# Patient Record
Sex: MALE | Race: WHITE | NOT HISPANIC OR LATINO | Employment: OTHER | URBAN - METROPOLITAN AREA
[De-identification: names, ages, dates, MRNs, and addresses within clinical notes are randomized per-mention and may not be internally consistent; named-entity substitution may affect disease eponyms.]

---

## 2017-01-26 ENCOUNTER — OFFICE VISIT (OUTPATIENT)
Dept: FAMILY MEDICINE | Facility: CLINIC | Age: 77
DRG: 871 | End: 2017-01-26
Payer: MEDICARE

## 2017-01-26 ENCOUNTER — HOSPITAL ENCOUNTER (OUTPATIENT)
Dept: RADIOLOGY | Facility: HOSPITAL | Age: 77
Discharge: HOME | DRG: 871 | End: 2017-01-26
Attending: INTERNAL MEDICINE
Payer: MEDICARE

## 2017-01-26 VITALS
RESPIRATION RATE: 16 BRPM | BODY MASS INDEX: 26.37 KG/M2 | TEMPERATURE: 98 F | HEIGHT: 72 IN | SYSTOLIC BLOOD PRESSURE: 138 MMHG | WEIGHT: 194.69 LBS | DIASTOLIC BLOOD PRESSURE: 86 MMHG | OXYGEN SATURATION: 96 % | HEART RATE: 96 BPM

## 2017-01-26 DIAGNOSIS — E11.9 TYPE 2 DIABETES MELLITUS WITHOUT COMPLICATION: ICD-10-CM

## 2017-01-26 DIAGNOSIS — E11.65 UNCONTROLLED TYPE 2 DIABETES MELLITUS WITH HYPERGLYCEMIA, WITHOUT LONG-TERM CURRENT USE OF INSULIN: ICD-10-CM

## 2017-01-26 DIAGNOSIS — R06.02 SOB (SHORTNESS OF BREATH): Primary | ICD-10-CM

## 2017-01-26 DIAGNOSIS — J44.9 CHRONIC OBSTRUCTIVE PULMONARY DISEASE, UNSPECIFIED COPD TYPE: ICD-10-CM

## 2017-01-26 DIAGNOSIS — R06.02 SOB (SHORTNESS OF BREATH): ICD-10-CM

## 2017-01-26 DIAGNOSIS — A31.0 PULMONARY MYCOBACTERIUM AVIUM COMPLEX (MAC) INFECTION: Chronic | ICD-10-CM

## 2017-01-26 PROCEDURE — 99214 OFFICE O/P EST MOD 30 MIN: CPT | Mod: S$GLB,,, | Performed by: INTERNAL MEDICINE

## 2017-01-26 PROCEDURE — 1159F MED LIST DOCD IN RCRD: CPT | Mod: S$GLB,,, | Performed by: INTERNAL MEDICINE

## 2017-01-26 PROCEDURE — 1157F ADVNC CARE PLAN IN RCRD: CPT | Mod: S$GLB,,, | Performed by: INTERNAL MEDICINE

## 2017-01-26 PROCEDURE — 1160F RVW MEDS BY RX/DR IN RCRD: CPT | Mod: S$GLB,,, | Performed by: INTERNAL MEDICINE

## 2017-01-26 PROCEDURE — 99999 PR PBB SHADOW E&M-EST. PATIENT-LVL III: CPT | Mod: PBBFAC,,, | Performed by: INTERNAL MEDICINE

## 2017-01-26 RX ORDER — PROMETHAZINE HYDROCHLORIDE AND CODEINE PHOSPHATE 6.25; 1 MG/5ML; MG/5ML
5 SOLUTION ORAL EVERY 12 HOURS PRN
Qty: 120 ML | Refills: 0 | Status: SHIPPED | OUTPATIENT
Start: 2017-01-26 | End: 2017-02-05

## 2017-01-26 RX ORDER — ALBUTEROL SULFATE 90 UG/1
2 AEROSOL, METERED RESPIRATORY (INHALATION) EVERY 6 HOURS PRN
Qty: 1 INHALER | Refills: 2 | Status: SHIPPED | OUTPATIENT
Start: 2017-01-26 | End: 2017-11-28 | Stop reason: SDUPTHER

## 2017-01-26 RX ORDER — PREDNISONE 20 MG/1
40 TABLET ORAL DAILY
Qty: 14 TABLET | Refills: 0 | Status: ON HOLD | OUTPATIENT
Start: 2017-01-26 | End: 2017-02-02 | Stop reason: HOSPADM

## 2017-01-26 NOTE — PROGRESS NOTES
"Subjective:       Patient ID: Regan Alvarez is a 76 y.o. male.    Chief Complaint: URI (sick past week to 10 days)    HPI Comments: Cough x ten days    HPI: 75 y/o w/ COPD MAC (on avelox and rifabutin) DM presents with ten days of non productive cough and wheezing. Using albuterol three to four times per day. Not using LABA/ICS due to "it wasn't helping". No hemoptysis or purulent sputum no fevers/chills. Not checking blood glucose at home. Denies LE edema no orthopnea. Cough is worse at night    Review of Systems   Constitutional: Negative for activity change, appetite change, fatigue, fever and unexpected weight change.   HENT: Negative for ear pain, rhinorrhea and sore throat.    Eyes: Negative for discharge and visual disturbance.   Respiratory: Positive for cough, shortness of breath and wheezing. Negative for chest tightness.    Cardiovascular: Negative for chest pain, palpitations and leg swelling.   Gastrointestinal: Negative for abdominal pain, constipation and diarrhea.   Endocrine: Negative for cold intolerance and heat intolerance.   Genitourinary: Negative for dysuria and hematuria.   Musculoskeletal: Negative for joint swelling and neck stiffness.   Skin: Negative for rash.   Neurological: Negative for dizziness, syncope, weakness and headaches.   Psychiatric/Behavioral: Negative for suicidal ideas.       Objective:     Vitals:    01/26/17 1517   BP: 138/86   BP Location: Right arm   Patient Position: Sitting   BP Method: Manual   Pulse: 96   Resp: 16   Temp: 98.3 °F (36.8 °C)   TempSrc: Oral   SpO2: 96%   Weight: 88.3 kg (194 lb 10.7 oz)   Height: 6' (1.829 m)          Physical Exam   Constitutional: He is oriented to person, place, and time. He appears well-developed and well-nourished.   HENT:   Head: Normocephalic and atraumatic.   Eyes: Conjunctivae are normal. Pupils are equal, round, and reactive to light.   Neck: Normal range of motion.   Cardiovascular: Normal rate and regular rhythm. "  Exam reveals no gallop and no friction rub.    No murmur heard.  Pulmonary/Chest: Effort normal. He has wheezes. He has no rales.   Decrease breath sound at apicies bilaterally   Abdominal: Soft. Bowel sounds are normal. There is no tenderness. There is no rebound and no guarding.   Musculoskeletal: Normal range of motion. He exhibits no edema or tenderness.   Neurological: He is alert and oriented to person, place, and time. No cranial nerve deficit.   Skin: Skin is warm and dry.   Psychiatric: He has a normal mood and affect.       Assessment:       1. SOB (shortness of breath)    2. Pulmonary Mycobacterium avium complex (MAC) infection    3. Chronic obstructive pulmonary disease, unspecified COPD type    4. Uncontrolled type 2 diabetes mellitus with hyperglycemia, without long-term current use of insulin        Plan:    1/2/3. Suspect some component of reactive airway given wheezing and no inhaled steroid use. Po prednisone x seven days encourage scheduled albuterol good SpO2. Check CXR to eval for consolidation or effusion    4. Needs repeat a1c and renal function

## 2017-01-29 ENCOUNTER — HOSPITAL ENCOUNTER (INPATIENT)
Facility: HOSPITAL | Age: 77
LOS: 4 days | Discharge: HOME HEALTH | DRG: 871 | End: 2017-02-02
Attending: EMERGENCY MEDICINE | Admitting: HOSPITALIST
Payer: MEDICARE

## 2017-01-29 DIAGNOSIS — J18.9 PNEUMONIA OF RIGHT LOWER LOBE DUE TO INFECTIOUS ORGANISM: ICD-10-CM

## 2017-01-29 DIAGNOSIS — A31.0 MYCOBACTERIUM AVIUM COMPLEX: ICD-10-CM

## 2017-01-29 DIAGNOSIS — J44.1 COPD EXACERBATION: Primary | ICD-10-CM

## 2017-01-29 DIAGNOSIS — J18.9 PNEUMONIA DUE TO INFECTIOUS ORGANISM: ICD-10-CM

## 2017-01-29 DIAGNOSIS — A41.9 SEPSIS, DUE TO UNSPECIFIED ORGANISM: ICD-10-CM

## 2017-01-29 DIAGNOSIS — R06.02 SHORTNESS OF BREATH: ICD-10-CM

## 2017-01-29 PROBLEM — J96.01 ACUTE HYPOXEMIC RESPIRATORY FAILURE: Status: ACTIVE | Noted: 2017-01-29

## 2017-01-29 PROBLEM — I10 ESSENTIAL HYPERTENSION: Status: ACTIVE | Noted: 2017-01-29

## 2017-01-29 LAB
ALBUMIN SERPL BCP-MCNC: 2.9 G/DL
ALP SERPL-CCNC: 82 U/L
ALT SERPL W/O P-5'-P-CCNC: 18 U/L
ANION GAP SERPL CALC-SCNC: 11 MMOL/L
AST SERPL-CCNC: 18 U/L
BACTERIA #/AREA URNS HPF: NORMAL /HPF
BASOPHILS NFR BLD: 0 %
BILIRUB SERPL-MCNC: 0.5 MG/DL
BILIRUB UR QL STRIP: NEGATIVE
BNP SERPL-MCNC: 256 PG/ML
BUN SERPL-MCNC: 12 MG/DL
CALCIUM SERPL-MCNC: 9 MG/DL
CHLORIDE SERPL-SCNC: 102 MMOL/L
CLARITY UR: CLEAR
CO2 SERPL-SCNC: 22 MMOL/L
COLOR UR: YELLOW
CREAT SERPL-MCNC: 1 MG/DL
DIFFERENTIAL METHOD: ABNORMAL
EOSINOPHIL NFR BLD: 0 %
ERYTHROCYTE [DISTWIDTH] IN BLOOD BY AUTOMATED COUNT: 14.7 %
EST. GFR  (AFRICAN AMERICAN): >60 ML/MIN/1.73 M^2
EST. GFR  (NON AFRICAN AMERICAN): >60 ML/MIN/1.73 M^2
GLUCOSE SERPL-MCNC: 371 MG/DL
GLUCOSE UR QL STRIP: ABNORMAL
HCT VFR BLD AUTO: 33.4 %
HGB BLD-MCNC: 11.3 G/DL
HGB UR QL STRIP: NEGATIVE
HYALINE CASTS #/AREA URNS LPF: 0 /LPF
KETONES UR QL STRIP: ABNORMAL
LACTATE SERPL-SCNC: 1.5 MMOL/L
LACTATE SERPL-SCNC: 2.5 MMOL/L
LEUKOCYTE ESTERASE UR QL STRIP: NEGATIVE
LYMPHOCYTES NFR BLD: 8 %
MCH RBC QN AUTO: 28.9 PG
MCHC RBC AUTO-ENTMCNC: 33.8 %
MCV RBC AUTO: 85 FL
MICROSCOPIC COMMENT: NORMAL
MONOCYTES NFR BLD: 5 %
NEUTROPHILS NFR BLD: 75 %
NEUTS BAND NFR BLD MANUAL: 12 %
NITRITE UR QL STRIP: NEGATIVE
PH UR STRIP: 6 [PH] (ref 5–8)
PLATELET # BLD AUTO: 329 K/UL
PMV BLD AUTO: 10.6 FL
POCT GLUCOSE: 382 MG/DL (ref 70–110)
POTASSIUM SERPL-SCNC: 4.2 MMOL/L
PROT SERPL-MCNC: 7 G/DL
PROT UR QL STRIP: NEGATIVE
RBC # BLD AUTO: 3.91 M/UL
RBC #/AREA URNS HPF: 0 /HPF (ref 0–4)
SODIUM SERPL-SCNC: 135 MMOL/L
SP GR UR STRIP: 1.03 (ref 1–1.03)
SQUAMOUS #/AREA URNS HPF: 0 /HPF
TROPONIN I SERPL DL<=0.01 NG/ML-MCNC: 0.02 NG/ML
URN SPEC COLLECT METH UR: ABNORMAL
UROBILINOGEN UR STRIP-ACNC: NEGATIVE EU/DL
WBC # BLD AUTO: 20.74 K/UL
WBC #/AREA URNS HPF: 1 /HPF (ref 0–5)
YEAST URNS QL MICRO: NORMAL

## 2017-01-29 PROCEDURE — 25000003 PHARM REV CODE 250: Performed by: HOSPITALIST

## 2017-01-29 PROCEDURE — 63600175 PHARM REV CODE 636 W HCPCS: Performed by: EMERGENCY MEDICINE

## 2017-01-29 PROCEDURE — 25000242 PHARM REV CODE 250 ALT 637 W/ HCPCS: Performed by: EMERGENCY MEDICINE

## 2017-01-29 PROCEDURE — 96375 TX/PRO/DX INJ NEW DRUG ADDON: CPT

## 2017-01-29 PROCEDURE — 25000003 PHARM REV CODE 250: Performed by: INTERNAL MEDICINE

## 2017-01-29 PROCEDURE — 84484 ASSAY OF TROPONIN QUANT: CPT

## 2017-01-29 PROCEDURE — 94644 CONT INHLJ TX 1ST HOUR: CPT

## 2017-01-29 PROCEDURE — 27000221 HC OXYGEN, UP TO 24 HOURS

## 2017-01-29 PROCEDURE — 99285 EMERGENCY DEPT VISIT HI MDM: CPT | Mod: 25

## 2017-01-29 PROCEDURE — 83605 ASSAY OF LACTIC ACID: CPT | Mod: 91

## 2017-01-29 PROCEDURE — 81000 URINALYSIS NONAUTO W/SCOPE: CPT

## 2017-01-29 PROCEDURE — 25000003 PHARM REV CODE 250: Performed by: EMERGENCY MEDICINE

## 2017-01-29 PROCEDURE — 21400001 HC TELEMETRY ROOM

## 2017-01-29 PROCEDURE — 80053 COMPREHEN METABOLIC PANEL: CPT

## 2017-01-29 PROCEDURE — 85007 BL SMEAR W/DIFF WBC COUNT: CPT

## 2017-01-29 PROCEDURE — 63600175 PHARM REV CODE 636 W HCPCS: Performed by: HOSPITALIST

## 2017-01-29 PROCEDURE — 94640 AIRWAY INHALATION TREATMENT: CPT

## 2017-01-29 PROCEDURE — 96365 THER/PROPH/DIAG IV INF INIT: CPT

## 2017-01-29 PROCEDURE — 83880 ASSAY OF NATRIURETIC PEPTIDE: CPT

## 2017-01-29 PROCEDURE — 36415 COLL VENOUS BLD VENIPUNCTURE: CPT

## 2017-01-29 PROCEDURE — 83605 ASSAY OF LACTIC ACID: CPT

## 2017-01-29 PROCEDURE — 87040 BLOOD CULTURE FOR BACTERIA: CPT | Mod: 59

## 2017-01-29 PROCEDURE — 96361 HYDRATE IV INFUSION ADD-ON: CPT

## 2017-01-29 PROCEDURE — 94761 N-INVAS EAR/PLS OXIMETRY MLT: CPT

## 2017-01-29 PROCEDURE — 85027 COMPLETE CBC AUTOMATED: CPT

## 2017-01-29 RX ORDER — METHYLPREDNISOLONE SODIUM SUCCINATE 125 MG/2ML
125 INJECTION INTRAMUSCULAR; INTRAVENOUS
Status: COMPLETED | OUTPATIENT
Start: 2017-01-29 | End: 2017-01-29

## 2017-01-29 RX ORDER — RAMIPRIL 2.5 MG/1
2.5 CAPSULE ORAL DAILY
Status: DISCONTINUED | OUTPATIENT
Start: 2017-01-29 | End: 2017-01-29

## 2017-01-29 RX ORDER — INSULIN ASPART 100 [IU]/ML
1-12 INJECTION, SOLUTION INTRAVENOUS; SUBCUTANEOUS
Status: DISCONTINUED | OUTPATIENT
Start: 2017-01-29 | End: 2017-01-30

## 2017-01-29 RX ORDER — HYDRALAZINE HYDROCHLORIDE 20 MG/ML
10 INJECTION INTRAMUSCULAR; INTRAVENOUS EVERY 4 HOURS PRN
Status: DISCONTINUED | OUTPATIENT
Start: 2017-01-29 | End: 2017-02-02 | Stop reason: HOSPADM

## 2017-01-29 RX ORDER — IPRATROPIUM BROMIDE 0.5 MG/2.5ML
1 SOLUTION RESPIRATORY (INHALATION) CONTINUOUS
Status: DISCONTINUED | OUTPATIENT
Start: 2017-01-29 | End: 2017-01-31

## 2017-01-29 RX ORDER — GLUCAGON 1 MG
1 KIT INJECTION
Status: DISCONTINUED | OUTPATIENT
Start: 2017-01-29 | End: 2017-02-02 | Stop reason: HOSPADM

## 2017-01-29 RX ORDER — RAMIPRIL 2.5 MG/1
5 CAPSULE ORAL DAILY
Status: DISCONTINUED | OUTPATIENT
Start: 2017-01-29 | End: 2017-02-02 | Stop reason: HOSPADM

## 2017-01-29 RX ORDER — MOXIFLOXACIN HYDROCHLORIDE 400 MG/250ML
400 INJECTION, SOLUTION INTRAVENOUS
Status: DISCONTINUED | OUTPATIENT
Start: 2017-01-30 | End: 2017-02-02 | Stop reason: HOSPADM

## 2017-01-29 RX ORDER — ALBUTEROL SULFATE 2.5 MG/.5ML
15 SOLUTION RESPIRATORY (INHALATION) CONTINUOUS
Status: DISCONTINUED | OUTPATIENT
Start: 2017-01-29 | End: 2017-01-31

## 2017-01-29 RX ORDER — ENOXAPARIN SODIUM 100 MG/ML
40 INJECTION SUBCUTANEOUS EVERY 24 HOURS
Status: DISCONTINUED | OUTPATIENT
Start: 2017-01-29 | End: 2017-02-02 | Stop reason: HOSPADM

## 2017-01-29 RX ORDER — BENZONATATE 100 MG/1
200 CAPSULE ORAL 3 TIMES DAILY PRN
Status: DISCONTINUED | OUTPATIENT
Start: 2017-01-29 | End: 2017-02-02 | Stop reason: HOSPADM

## 2017-01-29 RX ORDER — FUROSEMIDE 20 MG/1
20 TABLET ORAL DAILY
Status: DISCONTINUED | OUTPATIENT
Start: 2017-01-29 | End: 2017-02-02 | Stop reason: HOSPADM

## 2017-01-29 RX ORDER — ASPIRIN 325 MG
325 TABLET ORAL EVERY 4 HOURS PRN
Status: DISCONTINUED | OUTPATIENT
Start: 2017-01-29 | End: 2017-02-02 | Stop reason: HOSPADM

## 2017-01-29 RX ORDER — LEVALBUTEROL INHALATION SOLUTION 0.63 MG/3ML
0.63 SOLUTION RESPIRATORY (INHALATION) EVERY 8 HOURS
Status: DISCONTINUED | OUTPATIENT
Start: 2017-01-29 | End: 2017-02-02 | Stop reason: HOSPADM

## 2017-01-29 RX ORDER — ONDANSETRON 2 MG/ML
4 INJECTION INTRAMUSCULAR; INTRAVENOUS EVERY 12 HOURS PRN
Status: DISCONTINUED | OUTPATIENT
Start: 2017-01-29 | End: 2017-02-02 | Stop reason: HOSPADM

## 2017-01-29 RX ORDER — FLUTICASONE FUROATE AND VILANTEROL 100; 25 UG/1; UG/1
1 POWDER RESPIRATORY (INHALATION) DAILY
Status: DISCONTINUED | OUTPATIENT
Start: 2017-01-30 | End: 2017-02-02 | Stop reason: HOSPADM

## 2017-01-29 RX ORDER — RIFABUTIN 150 MG/1
150 CAPSULE ORAL EVERY 12 HOURS
Status: DISCONTINUED | OUTPATIENT
Start: 2017-01-29 | End: 2017-02-02 | Stop reason: HOSPADM

## 2017-01-29 RX ORDER — ACETAMINOPHEN 325 MG/1
650 TABLET ORAL EVERY 6 HOURS PRN
Status: DISCONTINUED | OUTPATIENT
Start: 2017-01-29 | End: 2017-02-02 | Stop reason: HOSPADM

## 2017-01-29 RX ORDER — AMOXICILLIN 250 MG
1 CAPSULE ORAL 2 TIMES DAILY
Status: DISCONTINUED | OUTPATIENT
Start: 2017-01-29 | End: 2017-02-02 | Stop reason: HOSPADM

## 2017-01-29 RX ORDER — FLUTICASONE PROPIONATE AND SALMETEROL 250; 50 UG/1; UG/1
1 POWDER RESPIRATORY (INHALATION) 2 TIMES DAILY
Status: DISCONTINUED | OUTPATIENT
Start: 2017-01-29 | End: 2017-01-29 | Stop reason: CLARIF

## 2017-01-29 RX ORDER — METHYLPREDNISOLONE SODIUM SUCCINATE 125 MG/2ML
80 INJECTION INTRAMUSCULAR; INTRAVENOUS EVERY 8 HOURS
Status: DISCONTINUED | OUTPATIENT
Start: 2017-01-29 | End: 2017-01-30

## 2017-01-29 RX ORDER — METOPROLOL SUCCINATE 50 MG/1
50 TABLET, EXTENDED RELEASE ORAL DAILY
Status: DISCONTINUED | OUTPATIENT
Start: 2017-01-29 | End: 2017-02-02 | Stop reason: HOSPADM

## 2017-01-29 RX ORDER — IPRATROPIUM BROMIDE AND ALBUTEROL SULFATE 2.5; .5 MG/3ML; MG/3ML
3 SOLUTION RESPIRATORY (INHALATION) EVERY 4 HOURS PRN
Status: DISCONTINUED | OUTPATIENT
Start: 2017-01-29 | End: 2017-02-02 | Stop reason: HOSPADM

## 2017-01-29 RX ORDER — PROMETHAZINE HYDROCHLORIDE AND CODEINE PHOSPHATE 6.25; 1 MG/5ML; MG/5ML
5 SOLUTION ORAL EVERY 12 HOURS PRN
Status: DISCONTINUED | OUTPATIENT
Start: 2017-01-29 | End: 2017-01-29

## 2017-01-29 RX ORDER — MOXIFLOXACIN HYDROCHLORIDE 400 MG/250ML
400 INJECTION, SOLUTION INTRAVENOUS
Status: COMPLETED | OUTPATIENT
Start: 2017-01-29 | End: 2017-01-29

## 2017-01-29 RX ORDER — AMLODIPINE BESYLATE 5 MG/1
10 TABLET ORAL DAILY
Status: DISCONTINUED | OUTPATIENT
Start: 2017-01-29 | End: 2017-02-02 | Stop reason: HOSPADM

## 2017-01-29 RX ADMIN — FUROSEMIDE 20 MG: 20 TABLET ORAL at 03:01

## 2017-01-29 RX ADMIN — METOPROLOL SUCCINATE 50 MG: 50 TABLET, EXTENDED RELEASE ORAL at 03:01

## 2017-01-29 RX ADMIN — METHYLPREDNISOLONE SODIUM SUCCINATE 80 MG: 125 INJECTION, POWDER, FOR SOLUTION INTRAMUSCULAR; INTRAVENOUS at 10:01

## 2017-01-29 RX ADMIN — BENZONATATE 200 MG: 100 CAPSULE ORAL at 08:01

## 2017-01-29 RX ADMIN — INSULIN ASPART 10 UNITS: 100 INJECTION, SOLUTION INTRAVENOUS; SUBCUTANEOUS at 05:01

## 2017-01-29 RX ADMIN — IPRATROPIUM BROMIDE AND ALBUTEROL SULFATE 3 ML: .5; 3 SOLUTION RESPIRATORY (INHALATION) at 03:01

## 2017-01-29 RX ADMIN — RAMIPRIL 5 MG: 2.5 CAPSULE ORAL at 05:01

## 2017-01-29 RX ADMIN — LEVALBUTEROL 0.63 MG: 0.63 SOLUTION RESPIRATORY (INHALATION) at 11:01

## 2017-01-29 RX ADMIN — PROMETHAZINE HYDROCHLORIDE AND CODEINE PHOSPHATE 5 ML: 6.25; 1 SYRUP ORAL at 03:01

## 2017-01-29 RX ADMIN — ALBUTEROL SULFATE 15 MG: 2.5 SOLUTION RESPIRATORY (INHALATION) at 12:01

## 2017-01-29 RX ADMIN — IPRATROPIUM BROMIDE 1 MG: 0.5 SOLUTION RESPIRATORY (INHALATION) at 12:01

## 2017-01-29 RX ADMIN — MOXIFLOXACIN HYDROCHLORIDE 400 MG: 400 INJECTION, SOLUTION INTRAVENOUS at 01:01

## 2017-01-29 RX ADMIN — ENOXAPARIN SODIUM 40 MG: 100 INJECTION SUBCUTANEOUS at 03:01

## 2017-01-29 RX ADMIN — RIFABUTIN 150 MG: 150 CAPSULE ORAL at 09:01

## 2017-01-29 RX ADMIN — INSULIN DETEMIR 20 UNITS: 100 INJECTION, SOLUTION SUBCUTANEOUS at 05:01

## 2017-01-29 RX ADMIN — DOCUSATE SODIUM AND SENNOSIDES 1 TABLET: 8.6; 5 TABLET, FILM COATED ORAL at 08:01

## 2017-01-29 RX ADMIN — LEVALBUTEROL 0.63 MG: 0.63 SOLUTION RESPIRATORY (INHALATION) at 05:01

## 2017-01-29 RX ADMIN — RAMIPRIL 2.5 MG: 2.5 CAPSULE ORAL at 03:01

## 2017-01-29 RX ADMIN — AMLODIPINE BESYLATE 10 MG: 5 TABLET ORAL at 05:01

## 2017-01-29 RX ADMIN — SODIUM CHLORIDE 500 ML: 0.9 INJECTION, SOLUTION INTRAVENOUS at 12:01

## 2017-01-29 RX ADMIN — METHYLPREDNISOLONE SODIUM SUCCINATE 125 MG: 125 INJECTION, POWDER, FOR SOLUTION INTRAMUSCULAR; INTRAVENOUS at 12:01

## 2017-01-29 NOTE — SUBJECTIVE & OBJECTIVE
Past Medical History   Diagnosis Date    Arthritis     COPD (chronic obstructive pulmonary disease)     Diabetes mellitus type II     Hypertension     Tuberculosis        Past Surgical History   Procedure Laterality Date    Melanoma         Review of patient's allergies indicates:  No Known Allergies    No current facility-administered medications on file prior to encounter.      Current Outpatient Prescriptions on File Prior to Encounter   Medication Sig    albuterol 90 mcg/actuation inhaler Inhale 2 puffs into the lungs every 6 (six) hours as needed for Wheezing.    aspirin 325 MG tablet Take 325 mg by mouth every 4 (four) hours as needed for Pain.    blood sugar diagnostic (FREESTYLE LITE STRIPS) Strp 1 strip by Misc.(Non-Drug; Combo Route) route once daily.    fluticasone-salmeterol 250-50 mcg/dose (ADVAIR) 250-50 mcg/dose diskus inhaler Inhale 1 puff into the lungs 2 (two) times daily.    furosemide (LASIX) 20 MG tablet Take 1 tablet (20 mg total) by mouth once daily.    glipiZIDE (GLUCOTROL) 5 MG tablet Take 1 tablet (5 mg total) by mouth 2 (two) times daily before meals.    lancets (FREESTYLE LANCETS) Misc 1 each by Misc.(Non-Drug; Combo Route) route once daily.    metformin (GLUCOPHAGE) 1000 MG tablet Take 1 tablet (1,000 mg total) by mouth 2 (two) times daily with meals.    metoprolol succinate (TOPROL-XL) 50 MG 24 hr tablet Take 1 tablet (50 mg total) by mouth once daily.    moxifloxacin (AVELOX) 400 mg tablet Take 1 tablet (400 mg total) by mouth once daily.    multivitamin capsule Take 1 capsule by mouth once daily.    predniSONE (DELTASONE) 20 MG tablet Take 2 tablets (40 mg total) by mouth once daily.    promethazine-codeine 6.25-10 mg/5 ml (PHENERGAN WITH CODEINE) 6.25-10 mg/5 mL syrup Take 5 mLs by mouth every 12 (twelve) hours as needed for Cough.    ramipril (ALTACE) 2.5 MG capsule Take 1 capsule (2.5 mg total) by mouth once daily.    rifabutin (MYCOBUTIN) 150 mg Cap Take 1  capsule (150 mg total) by mouth every 12 (twelve) hours.     Family History     Problem Relation (Age of Onset)    Cancer Father        Social History Main Topics    Smoking status: Former Smoker     Types: Cigars    Smokeless tobacco: Not on file      Comment: uses cigars on ocassion    Alcohol use Yes      Comment: socially    Drug use: No    Sexual activity: Not on file     Review of Systems   Constitutional: Negative for activity change, appetite change, chills and fever.   HENT: Negative for congestion and dental problem.    Eyes: Negative for discharge.   Respiratory: Positive for cough, shortness of breath and wheezing.    Cardiovascular: Negative for chest pain.   Gastrointestinal: Negative for abdominal distention and abdominal pain.   Endocrine: Negative for cold intolerance.   Genitourinary: Negative for difficulty urinating and dysuria.   Musculoskeletal: Negative for arthralgias and back pain.   Allergic/Immunologic: Negative for environmental allergies and food allergies.   Neurological: Negative for dizziness.   Hematological: Negative for adenopathy.   Psychiatric/Behavioral: Negative for agitation and behavioral problems.     Objective:     Vital Signs (Most Recent):  Temp: 97.8 °F (36.6 °C) (01/29/17 1519)  Pulse: 89 (01/29/17 1537)  Resp: (!) 24 (01/29/17 1537)  BP: (!) 180/77 (medications given) (01/29/17 1519)  SpO2: 96 % (01/29/17 1537) Vital Signs (24h Range):  Temp:  [97.8 °F (36.6 °C)-99.3 °F (37.4 °C)] 97.8 °F (36.6 °C)  Pulse:  [] 89  Resp:  [20-32] 24  SpO2:  [92 %-99 %] 96 %  BP: (122-180)/(58-80) 180/77     Weight: 74.8 kg (165 lb)  Body mass index is 18.59 kg/(m^2).    Physical Exam   Constitutional: He is oriented to person, place, and time. He appears distressed.   HENT:   Head: Atraumatic.   Eyes: EOM are normal. Pupils are equal, round, and reactive to light.   Neck: Normal range of motion.   Cardiovascular: Regular rhythm.    Pulmonary/Chest: He has wheezes. He has  rales.   Abdominal: Soft.   Musculoskeletal: Normal range of motion. He exhibits no edema or deformity.   Neurological: He is oriented to person, place, and time. No cranial nerve deficit. Coordination normal.   Skin: Skin is warm and dry. He is not diaphoretic.   Psychiatric: He has a normal mood and affect. His behavior is normal.        Significant Labs:   BMP:   Recent Labs  Lab 01/29/17  1220   *   *   K 4.2      CO2 22*   BUN 12   CREATININE 1.0   CALCIUM 9.0     CBC:   Recent Labs  Lab 01/29/17  1220   WBC 20.74*   HGB 11.3*   HCT 33.4*          Significant Imaging: CXR: I have reviewed all pertinent results/findings within the past 24 hours and my personal findings are:  right lower lobe infiltrate.

## 2017-01-29 NOTE — ED NOTES
SR up x2. Bed locked and low. Call bell in reach. Updated on plan of care. Will continue to monitor.    Urinal at BS and pt aware of need for sample.

## 2017-01-29 NOTE — H&P
[facility] Hospital Medicine  History & Physical    Patient Name: Regan Owensbrook  MRN: [id number]  Admission Date: 1/29/2017  Attending Physician: Tiana Branch MD   Primary Care Provider: Ishaan Adamson MD         Patient information was obtained from patient and ER records.     Subjective:     Principal Problem:Pneumonia due to infectious organism    Chief Complaint: SOB,cough     HPI:  This patient is a 76 y.o. M with DM Type II, HTN, COPD, Arthritis, and history of MAC,under treatment with Rifabutin  who came with C/O of  10 day history of SOB and non productive cough,. Pt denies hemoptysis, fever, chills, diaphoresis, emesis, and nausea. Patient states that he is taking antibiotics for recent diagnosis of MAC,Pt is not on home oxygen.he is former smoker,but quit smoking many years ago,patient is septic with leucocytosis,tachycardia,tachypnea,source pneumonia,per chest x ray and clinical presentation,patient has been started on IB Abx,supplemental oxygen,continues nebulizer and Solumedrol.[place] [facility] [place]    [place] Medical History   Diagnosis Date    Arthritis     COPD (chronic obstructive pulmonary disease)     Diabetes mellitus type II     Hypertension     Tuberculosis        Past Surgical History   Procedure Laterality Date    Melanoma         Review of patient's allergies indicates:  No Known Allergies    No current facility-administered medications on file prior to encounter.      Current Outpatient Prescriptions on File Prior to Encounter   Medication Sig    albuterol 90 mcg/actuation inhaler Inhale 2 puffs into the lungs every 6 (six) hours as needed for Wheezing.    aspirin 325 MG tablet Take 325 mg by mouth every 4 (four) hours as needed for Pain.    blood sugar diagnostic (FREESTYLE LITE STRIPS) Strp 1 strip by Misc.(Non-Drug; Combo Route) route once daily.    fluticasone-salmeterol 250-50 mcg/dose (ADVAIR) 250-50  mcg/dose diskus inhaler Inhale 1 puff into the lungs 2 (two) times daily.    furosemide (LASIX) 20 MG tablet Take 1 tablet (20 mg total) by mouth once daily.    glipiZIDE (GLUCOTROL) 5 MG tablet Take 1 tablet (5 mg total) by mouth 2 (two) times daily before meals.    lancets (FREESTYLE LANCETS) Misc 1 each by Misc.(Non-Drug; Combo Route) route once daily.    metformin (GLUCOPHAGE) 1000 MG tablet Take 1 tablet (1,000 mg total) by mouth 2 (two) times daily with meals.    metoprolol succinate (TOPROL-XL) 50 MG 24 hr tablet Take 1 tablet (50 mg total) by mouth once daily.    moxifloxacin (AVELOX) 400 mg tablet Take 1 tablet (400 mg total) by mouth once daily.    multivitamin capsule Take 1 capsule by mouth once daily.    predniSONE (DELTASONE) 20 MG tablet Take 2 tablets (40 mg total) by mouth once daily.    promethazine-codeine 6.25-10 mg/5 ml (PHENERGAN WITH CODEINE) 6.25-10 mg/5 mL syrup Take 5 mLs by mouth every 12 (twelve) hours as needed for Cough.    ramipril (ALTACE) 2.5 MG capsule Take 1 capsule (2.5 mg total) by mouth once daily.    rifabutin (MYCOBUTIN) 150 mg Cap Take 1 capsule (150 mg total) by mouth every 12 (twelve) hours.     Family History     Problem Relation (Age of Onset)    Cancer Father        Social History Main Topics    Smoking status: Former Smoker     Types: Cigars    Smokeless tobacco: Not on file      Comment: uses cigars on ocassion    Alcohol use Yes      Comment: socially    Drug use: No    Sexual activity: Not on file     Review of Systems   Constitutional: Negative for activity change, appetite change, chills and fever.   HENT: Negative for congestion and dental problem.    Eyes: Negative for discharge.   Respiratory: Positive for cough, shortness of breath and wheezing.    Cardiovascular: Negative for chest pain.   Gastrointestinal: Negative for abdominal distention and abdominal pain.   Endocrine: Negative for cold intolerance.   Genitourinary: Negative for difficulty  urinating and dysuria.   Musculoskeletal: Negative for arthralgias and back pain.   Allergic/Immunologic: Negative for environmental allergies and food allergies.   Neurological: Negative for dizziness.   Hematological: Negative for adenopathy.   Psychiatric/Behavioral: Negative for agitation and behavioral problems.     Objective:     Vital Signs (Most Recent):  Temp: 97.8 °F (36.6 °C) (01/29/17 1519)  Pulse: 89 (01/29/17 1537)  Resp: (!) 24 (01/29/17 1537)  BP: (!) 180/77 (medications given) (01/29/17 1519)  SpO2: 96 % (01/29/17 1537) Vital Signs (24h Range):  Temp:  [97.8 °F (36.6 °C)-99.3 °F (37.4 °C)] 97.8 °F (36.6 °C)  Pulse:  [] 89  Resp:  [20-32] 24  SpO2:  [92 %-99 %] 96 %  BP: (122-180)/(58-80) 180/77     Weight: 74.8 kg (165 lb)  Body mass index is 18.59 kg/(m^2).    Physical Exam   Constitutional: He is oriented to person, place, and time. He appears distressed.   HENT:   Head: Atraumatic.   Eyes: EOM are normal. Pupils are equal, round, and reactive to light.   Neck: Normal range of motion.   Cardiovascular: Regular rhythm.    Pulmonary/Chest: He has wheezes. He has rales.   Abdominal: Soft.   Musculoskeletal: Normal range of motion. He exhibits no edema or deformity.   Neurological: He is oriented to person, place, and time. No cranial nerve deficit. Coordination normal.   Skin: Skin is warm and dry. He is not diaphoretic.   Psychiatric: He has a normal mood and affect. His behavior is normal.        Significant Labs:   BMP:   Recent Labs  Lab 01/29/17  1220   *   *   K 4.2      CO2 22*   BUN 12   CREATININE 1.0   CALCIUM 9.0     CBC:   Recent Labs  Lab 01/29/17  1220   WBC 20.74*   HGB 11.3*   HCT 33.4*          Significant Imaging: CXR: I have reviewed all pertinent results/findings within the past 24 hours and my personal findings are:  right lower lobe infiltrate.    Assessment/Plan:     * Pneumonia due to infectious organism  Will continue with Avelox,follow with  blood culture,no recent hospitalization.      Sepsis  Patient is septic with leucocytosis,tachycardia and Tachypnea,soure pneumonia,likely bacterial,will repeat lactucic level.        Acute hypoxemic respiratory failure  Appear to be multifactorial,COPD exacerbation and pneumonia,will continue with supplemental oxygen.      COPD exacerbation  Will continue with solumedrol,nebulizer.      Pulmonary Mycobacterium avium complex (MAC) infection  continue with home medication Rifabutin,he has been [place] [facility] [place] and [place]monology.      Diabetes mellitus type 2, uncontrolled  Will continue with SSI and basal insulin.      Essential hypertension  will continues with home medication,poorly controlled added Norvasc dn prn IV Hydralazine.      VTE Risk Mitigation         Ordered     enoxaparin injection 40 mg  Daily     Route:  Subcutaneous        01/29/17 1518     Medium Risk of VTE  Once      01/29/17 1518     Place ZOEY hose  Until discontinued      01/29/17 1518        Tiana Branch MD  Department of Hospital Medicine   [facility] [place]

## 2017-01-29 NOTE — ED TRIAGE NOTES
Pt comes in complaining of SOB x 10 days with occasional productive cough. Pt went to lung doctor yesterday was prescribed some meds: cough syrup codeine and prednisone. Pt states he has hx of tuberculosis.  Pt denies fever, chills, or nausea.

## 2017-01-29 NOTE — ED NOTES
Pt RR 24-25, Dr. Bradshaw called to notify him of pts status and any needed interventions . Pt appears more SOB but states that he feels the same. Per Dr. Bradshaw monitor him for now and notify him if pts breathing gets more labored.

## 2017-01-29 NOTE — ASSESSMENT & PLAN NOTE
Appear to be multifactorial,COPD exacerbation and pneumonia,will continue with supplemental oxygen.

## 2017-01-29 NOTE — IP AVS SNAPSHOT
[place] [address] [place] [address]  Phone: [phone]           Patient Discharge Instructions     Our goal is to set you up for success. This packet includes information on your condition, medications, and your home care. It will help you to care for yourself so you don't get sicker and need to go back to the hospital.     Please ask your nurse if you have any questions.        There are many details to remember when preparing to leave the hospital. Here is what you will need to do:    1. Take your medicine. If you are prescribed medications, review your Medication List in the following pages. You may have new medications to  at the pharmacy and others that you'll need to stop taking. Review the instructions for how and when to take your medications. Talk with your doctor or nurses if you are unsure of what to do.     2. Go to your follow-up appointments. Specific follow-up information is listed in the following pages. Your may be contacted by a transition nurse or clinical provider about future appointments. Be sure we have all of the phone numbers to reach you, if needed. Please contact your provider's office if you are unable to make an appointment.     3. Watch for warning signs. Your doctor or nurse will give you detailed warning signs to watch for and when to call for assistance. These instructions may also include educational information about your condition. If you experience any of warning signs to your health, call your doctor.               [facility]  Unless otherwise directed by your provider, please contact [facility], our nurse care line that is available for 24/7 assistance.     [phone] (toll-free)    Registered nurses in the [facility] provide clinical advisement, health education, appointment booking, and other advisory services.                    ** Verify the list of medication(s) below is accurate and up to date.  Carry this with you in case of emergency. If your medications have changed, please notify your healthcare provider.             Medication List      START taking these medications        Additional Info                      amlodipine 10 MG tablet   Commonly known as:  NORVASC   Quantity:  30 tablet   Refills:  11   Dose:  10 mg    Last time this was given:  10 mg on 2/2/2017  9:21 AM   Instructions:  Take 1 tablet (10 mg total) by mouth once daily.     Begin Date    AM    Noon    PM    Bedtime       benzonatate 200 MG capsule   Commonly known as:  TESSALON   Quantity:  30 capsule   Refills:  0   Dose:  200 mg    Last time this was given:  200 mg on 2/2/2017  5:24 AM   Instructions:  Take 1 capsule (200 mg total) by mouth 3 (three) times daily as needed for Cough.     Begin Date    AM    Noon    PM    Bedtime       insulin aspart 100 unit/mL Inpn pen   Commonly known as:  NovoLOG   Quantity:  3 mL   Refills:  0   Dose:  1-10 Units    Last time this was given:  7 Units on 2/2/2017 12:33 PM   Instructions:  Inject 1-10 Units into the skin before meals and at bedtime as needed (Hyperglycemia).     Begin Date    AM    Noon    PM    Bedtime         CONTINUE taking these medications        Additional Info                      albuterol 90 mcg/actuation inhaler   Quantity:  1 Inhaler   Refills:  2   Dose:  2 puff    Instructions:  Inhale 2 puffs into the lungs every 6 (six) hours as needed for Wheezing.     Begin Date    AM    Noon    PM    Bedtime       aspirin 325 MG tablet   Refills:  0   Dose:  325 mg   Indications:  Pain    Instructions:  Take 325 mg by mouth every 4 (four) hours as needed for Pain.     Begin Date    AM    Noon    PM    Bedtime       blood sugar diagnostic Strp   Commonly known as:  FREESTYLE LITE STRIPS   Quantity:  100 strip   Refills:  6   Dose:  1 strip    Instructions:  1 strip by Misc.(Non-Drug; Combo Route) route once daily.     Begin Date    AM    Noon    PM    Bedtime        fluticasone-salmeterol 250-50 mcg/dose 250-50 mcg/dose diskus inhaler   Commonly known as:  ADVAIR   Quantity:  60 each   Refills:  4   Dose:  1 puff    Instructions:  Inhale 1 puff into the lungs 2 (two) times daily.     Begin Date    AM    Noon    PM    Bedtime       furosemide 20 MG tablet   Commonly known as:  LASIX   Quantity:  30 tablet   Refills:  4   Dose:  20 mg    Last time this was given:  20 mg on 2/2/2017  9:21 AM   Instructions:  Take 1 tablet (20 mg total) by mouth once daily.     Begin Date    AM    Noon    PM    Bedtime       glipiZIDE 5 MG tablet   Commonly known as:  GLUCOTROL   Quantity:  180 tablet   Refills:  2   Dose:  5 mg    Instructions:  Take 1 tablet (5 mg total) by mouth 2 (two) times daily before meals.     Begin Date    AM    Noon    PM    Bedtime       lancets Misc   Commonly known as:  FREESTYLE LANCETS   Quantity:  100 each   Refills:  6   Dose:  1 each    Instructions:  1 each by Misc.(Non-Drug; Combo Route) route once daily.     Begin Date    AM    Noon    PM    Bedtime       metformin 1000 MG tablet   Commonly known as:  GLUCOPHAGE   Quantity:  180 tablet   Refills:  3   Dose:  1000 mg    Instructions:  Take 1 tablet (1,000 mg total) by mouth 2 (two) times daily with meals.     Begin Date    AM    Noon    PM    Bedtime       metoprolol succinate 50 MG 24 hr tablet   Commonly known as:  TOPROL-XL   Quantity:  30 tablet   Refills:  11   Dose:  50 mg    Last time this was given:  50 mg on 2/2/2017  9:22 AM   Instructions:  Take 1 tablet (50 mg total) by mouth once daily.     Begin Date    AM    Noon    PM    Bedtime       moxifloxacin 400 mg tablet   Commonly known as:  AVELOX   Quantity:  30 tablet   Refills:  11   Dose:  400 mg    Instructions:  Take 1 tablet (400 mg total) by mouth once daily.     Begin Date    AM    Noon    PM    Bedtime       multivitamin capsule   Refills:  0   Dose:  1 capsule    Instructions:  Take 1 capsule by mouth once daily.     Begin Date    AM    Noon     PM    Bedtime       promethazine-codeine 6.25-10 mg/5 ml 6.25-10 mg/5 mL syrup   Commonly known as:  PHENERGAN with CODEINE   Quantity:  120 mL   Refills:  0   Dose:  5 mL    Last time this was given:  5 mLs on 1/29/2017  3:46 PM   Instructions:  Take 5 mLs by mouth every 12 (twelve) hours as needed for Cough.     Begin Date    AM    Noon    PM    Bedtime       ramipril 2.5 MG capsule   Commonly known as:  ALTACE   Quantity:  30 capsule   Refills:  4   Dose:  2.5 mg    Last time this was given:  5 mg on 2/2/2017  9:21 AM   Instructions:  Take 1 capsule (2.5 mg total) by mouth once daily.     Begin Date    AM    Noon    PM    Bedtime       rifabutin 150 mg Cap   Commonly known as:  MYCOBUTIN   Quantity:  60 capsule   Refills:  11   Dose:  150 mg    Last time this was given:  150 mg on 2/2/2017  9:21 AM   Instructions:  Take 1 capsule (150 mg total) by mouth every 12 (twelve) hours.     Begin Date    AM    Noon    PM    Bedtime         STOP taking these medications     predniSONE 20 MG tablet   Commonly known as:  DELTASONE            Where to Get Your Medications      These medications were sent to [facility] [address] [place] [address] [address] [place] [address]     Phone:  [phone]     amlodipine 10 MG tablet    benzonatate 200 MG capsule    insulin aspart 100 unit/mL Inpn pen    moxifloxacin 400 mg tablet                  Please bring to all follow up appointments:    1. A copy of your discharge instructions.  2. All medicines you are currently taking in their original bottles.  3. Identification and insurance card.    Please arrive 15 minutes ahead of scheduled appointment time.    Please call 24 hours in advance if you must reschedule your appointment and/or time.        Your Scheduled Appointments     Feb 07, 2017  1:00 PM [facility]   Hospital Follow Up with Ishaan Adamson MD   [facility] ([address]  [place] [address]   [phone]              Follow-up Information     Follow up with Ishaan Adamson MD On 2/7/2017.    Specialty:  Internal Medicine    Why:  Outpatient Services: Appointment scheduled for 1:00pm    Contact information:    [address]  [address]  [place] [address]  [phone]          Discharge Instructions     Future Orders    Activity as tolerated     Call MD for:  increased confusion or weakness     Call MD for:  persistent dizziness, light-headedness, or visual disturbances     Call MD for:  persistent nausea and vomiting or diarrhea     Call MD for:  redness, tenderness, or signs of infection (pain, swelling, redness, odor or green/yellow discharge around incision site)     Call MD for:  severe persistent headache     Call MD for:  severe uncontrolled pain     Call MD for:  temperature >100.4     Call MD for:  worsening rash     Diet Diabetic 2200 Calories     OXYGEN FOR HOME USE     Questions:    Liter Flow:  2    Duration:  Continuous    Qualifying SpO2:  85%    Testing done at:  Rest    Route:  nasal cannula    Portable mode:  continuous    Device:  home concentrator with portable unit    Length of need (in months):  99 mos    Patient condition with qualifying saturation:  COPD    Height:  6' 7" (2.007 m)    Weight:  74.8 kg (164 lb 14.4 oz)    Does patient have medical equipment at home?:  glucometer Comment - inhalers    Alternative treatment measures have been tried or considered and deemed clinically ineffective.:  Yes        Discharge Instructions         Discharge Instructions: COPD  You have been diagnosed with chronic obstructive pulmonary disease (COPD). This is a name given to a group of diseases that limit the flow of air in and out of your lungs. This makes it harder to breathe. With COPD, you are also more likely to get lung infections. COPD includes chronic bronchitis and emphysema. COPD is most often caused by heavy, long-term cigarette smoking.  Home " care  Quit smoking  · If you smoke, quit. It is the best thing you can do for your COPD and your overall health.  · Join a stop-smoking program. There are even telephone, text message, and Internet programs to help you quit.  · Ask your healthcare provider about medicines or other methods to help you quit.  · Ask family members to quit smoking as well.  · Don't allow people to smoke in your home, in your car, or when they are around you.  Protect yourself from infection  · Wash your hands often. Do your best to keep your hands away from your face. Most germs are spread from your hands to your mouth.  · Get a flu shot every year. Also ask your provider about pneumonia vaccines.  · Avoid crowds. It's especially important to do this in the winter when more people have colds and flu.  · To stay healthy, get enough sleep, exercise regularly, and eat a balanced diet. You should:  ¨ Get about 8 hours of sleep every night.  ¨ Try to exercise for at least 30 minutes on most days.  ¨ Have healthy foods including fruits and vegetables, 100% whole grains, lean meats and fish, and low-fat dairy products. Try to stay away from foods high in fats and sugar.  Take your medicines  Take your medicines exactly as directed. Don't skip doses.  Manage your stress  Stress can make COPD worse. Use this stress management technique:  · Find a quiet place and sit or lie in a comfortable position.  · Close your eyes and perform breathing exercises for several minutes. Ask your provider about the best way to breathe.  Pulmonary rehabilitation  · Pulmonary rehab can help you feel better. These programs include exercise, breathing techniques, information about COPD, counseling, and help for smokers.  · Ask your provider or your local hospital about programs in your area.  When to call your healthcare provider  Call your provider immediately if you have any of the following:  · Shortness of breath, wheezing, or coughing  · Increased  "mucus  · Yellow, green, bloody, or smelly mucus  · Fever or chills  · Tightness in your chest that does not go away with rest or medicine  · An irregular heartbeat or a feeling that your heart is beating very fast  · Swollen ankles   © 1950-1468 [employer]. [address] [place] [address]. All rights reserved. This information is not intended as a substitute for professional medical care. Always follow your healthcare professional's instructions.            Primary Diagnosis     Your primary diagnosis was:  Pneumonia Due To Infectious Organism      Admission Information     Date & Time Provider Department CSN    1/29/2017 11:38 AM Suzi Clark MD [facility] [id number]      Care Providers     Provider Role Specialty Primary office phone    Suzi Clark MD Attending Provider Hospitalist [phone]      Important Medicare Message          Most Recent Value    Important Message from Medicare Regarding Discharge Appeal Rights  Given to patient/caregiver, Explained to patient/caregiver, Signed/date by patient/caregiver yes 02/02/2017 1117      Your Vitals Were     BP Pulse Temp Resp Height Weight    101/57 80 98 °F (36.7 °C) (Oral) 19 6' 7" (2.007 m) 74.8 kg (164 lb 14.4 oz)    SpO2 BMI             91% 18.58 kg/m2         Recent Lab Values        1/15/2013 10/15/2013 5/14/2015 8/24/2015 11/23/2015 7/6/2016 8/3/2016 1/27/2017      3:39 PM  9:38 AM  8:11 AM  8:11 AM  8:37 AM  9:41 AM 11:56 AM 10:16 AM    A1C 10.3 (H) 8.8 (H) 11.8 (H) 10.0 (H) 8.9 (H) 8.4 (H) 7.8 (H) 8.7 (H)            8.7 (H)    Comment for A1C at  9:41 AM on 7/6/2016:  According to ADA guidelines, hemoglobin A1C <7.0% represents  optimal control in non-pregnant diabetic patients.  Different  metrics may apply to specific populations.   Standards of Medical Care in Diabetes - 2016.  For the purpose of screening for the presence of diabetes:  <5.7%     Consistent with the absence of diabetes  5.7-6.4%  " Consistent with increasing risk for diabetes   (prediabetes)  >or=6.5%  Consistent with diabetes  Currently no consensus exists for use of hemoglobin A1C  for diagnosis of diabetes for children.      Comment for A1C at 11:56 AM on 8/3/2016:  According to ADA guidelines, hemoglobin A1C <7.0% represents  optimal control in non-pregnant diabetic patients.  Different  metrics may apply to specific populations.   Standards of Medical Care in Diabetes - 2016.  For the purpose of screening for the presence of diabetes:  <5.7%     Consistent with the absence of diabetes  5.7-6.4%  Consistent with increasing risk for diabetes   (prediabetes)  >or=6.5%  Consistent with diabetes  Currently no consensus exists for use of hemoglobin A1C  for diagnosis of diabetes for children.      Comment for A1C at 10:16 AM on 1/27/2017:  According to ADA guidelines, hemoglobin A1C <7.0% represents  optimal control in non-pregnant diabetic patients.  Different  metrics may apply to specific populations.   Standards of Medical Care in Diabetes - 2016.  For the purpose of screening for the presence of diabetes:  <5.7%     Consistent with the absence of diabetes  5.7-6.4%  Consistent with increasing risk for diabetes   (prediabetes)  >or=6.5%  Consistent with diabetes  Currently no consensus exists for use of hemoglobin A1C  for diagnosis of diabetes for children.      Comment for A1C at 10:16 AM on 1/27/2017:  According to ADA guidelines, hemoglobin A1C <7.0% represents  optimal control in non-pregnant diabetic patients.  Different  metrics may apply to specific populations.   Standards of Medical Care in Diabetes - 2016.  For the purpose of screening for the presence of diabetes:  <5.7%     Consistent with the absence of diabetes  5.7-6.4%  Consistent with increasing risk for diabetes   (prediabetes)  >or=6.5%  Consistent with diabetes  Currently no consensus exists for use of hemoglobin A1C  for diagnosis of diabetes for children.         Pending Labs     Order Current Status    Blood Culture #1 **CANNOT BE ORDERED STAT** Preliminary result    Blood Culture #2 **CANNOT BE ORDERED STAT** Preliminary result      Allergies as of 2/2/2017     No Known Allergies      Advance Directives     An advance directive is a document which, in the event you are no longer able to make decisions for yourself, tells your healthcare team what kind of treatment you do or do not want to receive, or who you would like to make those decisions for you.  If you do not currently have an advance directive, [facility] encourages you to create one.  For more information call:  [phone]), [phone] ([phone]),  or log on to [email or web address].        Smoking Cessation     If you would like to quit smoking:   You may be eligible for free services if you are a [place] resident and started smoking cigarettes before September 1, 1988.  Call the Smoking Cessation Trust ([facility]) toll free at (095) 618-2878 or (077) 897-1969.   Call 5-390-QUIT-NOW if you do not meet the above criteria.            Language Assistance Services     ATTENTION: Language assistance services are available, free of charge. Please call [phone].      ATENCIÓN: Si yunior moffett, tiene a richardson disposición servicios gratuitos de asistencia lingüística. Llame al [phone].     CHÚ Ý: N?u b?n nói Ti?ng Vi?t, có các d?ch v? h? tr? ngôn ng? mi?n phí dành cho b?n. G?i s? [phone].        Diabetes Discharge Instructions                                   [facility] Sign-Up     Activating your [facility] account is as easy as 1-2-3!     1) Visit [facility] [email or web address], select Sign Up Now, enter this activation code and your date of birth, then select Next.  [email or web address]  Expires: 3/19/2017  3:35 PM      2) Create a username and password to use when you visit [facility] in the future and select a security question in case you lose your password and select Next.    3) Enter your e-mail  address and click Sign Up!    Additional Information  If you have questions, please e-mail [email or web address] or call [phone] to talk to our [facility] staff. Remember, [facility] is NOT to be used for urgent needs. For medical emergencies, dial 911.          [facility] complies with applicable Federal civil rights laws and does not discriminate on the basis of race, color, national origin, age, disability, or sex.

## 2017-01-29 NOTE — ASSESSMENT & PLAN NOTE
Patient is septic with leucocytosis,tachycardia and Tachypnea,soure pneumonia,likely bacterial,will repeat lactucic level.

## 2017-01-29 NOTE — ED PROVIDER NOTES
Encounter Date: 1/29/2017    SCRIBE #1 NOTE: I, Gerda Sr, am scribing for, and in the presence of,  Harjit Gil Jr, MD. I have scribed the following portions of the note - Other sections scribed: HPI, ROS .       History     Chief Complaint   Patient presents with    Shortness of Breath     sob/cough x1 week, getting worse     Review of patient's allergies indicates:  No Known Allergies  HPI Comments: CC: Shortness of Breath    HPI: This patient is a 76 y.o. M with DM Type II, HTN, COPD, Arthritis, and history of Tuberculosis who presents to ED c/o 10 day history of SOB and cough. Pt denies hemoptysis, fever, chills, diaphoresis, emesis, and nausea. Pt's family member states that he is taking antibiotics for recent diagnosis of Tuberculosis. Pt is not on home oxygen. Reports similar episode one year ago and had been admitted for 10 days.     The history is provided by the patient. No  was used.     Past Medical History   Diagnosis Date    Arthritis     COPD (chronic obstructive pulmonary disease)     Diabetes mellitus type II     Hypertension     Tuberculosis      No past medical history pertinent negatives.  Past Surgical History   Procedure Laterality Date    Melanoma       Family History   Problem Relation Age of Onset    Cancer Father      Tuberculosis.  Secondary scar was neoplastic.     Social History   Substance Use Topics    Smoking status: Former Smoker     Types: Cigars    Smokeless tobacco: None      Comment: uses cigars on ocassion    Alcohol use Yes      Comment: socially     Review of Systems   Constitutional: Negative for chills, diaphoresis and fever.   HENT: Negative for sore throat.    Respiratory: Positive for cough and shortness of breath.    Cardiovascular: Negative for chest pain.   Gastrointestinal: Negative for nausea and vomiting.   Genitourinary: Negative for dysuria.   Musculoskeletal: Negative for back pain.   Skin: Negative for rash.    Neurological: Negative for weakness.   Hematological: Does not bruise/bleed easily.       Physical Exam   Initial Vitals   BP Pulse Resp Temp SpO2   01/29/17 1139 01/29/17 1139 01/29/17 1139 -- 01/29/17 1139   130/80 121 32  94 %     Physical Exam    Nursing note and vitals reviewed.  Constitutional: He is not diaphoretic. He appears distressed.   Chronically ill-appearing male in acute respiratory distress.   HENT:   Head: Normocephalic and atraumatic.   Right Ear: External ear normal.   Left Ear: External ear normal.   Nose: Nose normal.   Mouth/Throat: Oropharynx is clear and moist.   Eyes: Conjunctivae and EOM are normal. Pupils are equal, round, and reactive to light. Right eye exhibits no discharge. Left eye exhibits no discharge. No scleral icterus.   Neck: Normal range of motion. Neck supple. No JVD present.   Cardiovascular: Regular rhythm, normal heart sounds and intact distal pulses. Exam reveals no gallop and no friction rub.    No murmur heard.  Tachycardic, regular rhythm.   Pulmonary/Chest: No stridor. He is in respiratory distress. He has wheezes. He has rhonchi. He has no rales. He exhibits no tenderness.   Patient is in moderate respiratory distress.  There are wheezes heard throughout both lung fields.  Symmetric breath sounds.  There are scattered rhonchi.   Abdominal: Soft. Bowel sounds are normal. He exhibits no distension and no mass. There is no tenderness. There is no rebound and no guarding.   Musculoskeletal: Normal range of motion. He exhibits no edema or tenderness.   Neurological: He is alert and oriented to person, place, and time. He has normal strength. No cranial nerve deficit or sensory deficit.   Skin: Skin is warm and dry. No rash noted. No erythema. No pallor.   Psychiatric: He has a normal mood and affect. His behavior is normal. Judgment and thought content normal.         ED Course   Procedures  Labs Reviewed   B-TYPE NATRIURETIC PEPTIDE - Abnormal; Notable for the  following:        Result Value     (*)     All other components within normal limits   COMPREHENSIVE METABOLIC PANEL - Abnormal; Notable for the following:     Sodium 135 (*)     CO2 22 (*)     Glucose 371 (*)     Albumin 2.9 (*)     All other components within normal limits   CBC W/ AUTO DIFFERENTIAL - Abnormal; Notable for the following:     WBC 20.74 (*)     RBC 3.91 (*)     Hemoglobin 11.3 (*)     Hematocrit 33.4 (*)     RDW 14.7 (*)     Gran% 75.0 (*)     Lymph% 8.0 (*)     All other components within normal limits   LACTIC ACID, PLASMA - Abnormal; Notable for the following:     Lactate (Lactic Acid) 2.5 (*)     All other components within normal limits   CULTURE, BLOOD   CULTURE, BLOOD   TROPONIN I   URINALYSIS     EKG Readings: (Independently Interpreted)   Initial Reading: No STEMI.   EKG reviewed and interpreted by me shows sinus tachycardia rate 116.  AR, QRS, QT intervals within normal limits.  Is normal axis.  Is good R-wave progression.  There are no ST segment or T-wave ischemic findings.       X-Rays:   Independently Interpreted Readings:   Other Readings:  Chest x-ray reviewed and interpreted by me shows lower lobe infiltrate.    Medical Decision Making:   ED Management:  This is the emergent evaluation of a 76-year-old male presents the emergency department complaining of cough and shortness of breath for approximately 10 days.Differential diagnosis at the time of initial evaluation included, but was not limited to: COPD exacerbation, other bronchospastic disease, pneumonia, pneumothorax, acute pulmonary edema, new onset CHF, viral illness.  I considered, but doubt pulmonary embolism.  This patient has evidence of daily acquired pneumonia.  He is not been admitted to the hospital in the last 3 months.  He is not routinely and healthcare setting.  He has a new right lower lobe infiltrate.  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  He is followed by infectious diseases and pulmonology for this.  The patient has not been diagnosed with a tuberculosis infection.  He is not suspected to be contagious at this time.  Discussed this with internal medicine, Dr. Brush who agrees with this.  The patient does not need to be in any sort of isolation at this time.  He does appear to be septic though.  He has an elevated lactate, leukocytosis, and signs of pneumonia.  He continues to be tachycardic.  He also has wheezing.  I will treat him for COPD exacerbation and for pneumonia at this time.  Case discussed with the admitting hospitalist, Dr. Branch, who agreed with the above plan.  Patient is stable the time of admission.                Scribe Attestation:   Scribe #1: I performed the above scribed service and the documentation accurately describes the services I performed. I attest to the accuracy of the note.    Attending Attestation:           Physician Attestation for Scribe:  Physician Attestation Statement for Scribe #1: I, Harjit Gil Jr, MD, reviewed documentation, as scribed by Gerda Sr in my presence, and it is both accurate and complete.                 ED Course     Clinical Impression:   The primary encounter diagnosis was COPD exacerbation. Diagnoses of Shortness of breath, Pneumonia of right lower lobe due to infectious organism, and Sepsis, due to unspecified organism were also pertinent to this visit.          Harjit Gil Jr., MD  01/29/17 4426       Harjit Gil Jr., MD  01/29/17 3572

## 2017-01-30 LAB
ANION GAP SERPL CALC-SCNC: 8 MMOL/L
BASOPHILS # BLD AUTO: ABNORMAL K/UL
BASOPHILS NFR BLD: 0 %
BUN SERPL-MCNC: 16 MG/DL
CALCIUM SERPL-MCNC: 8.9 MG/DL
CHLORIDE SERPL-SCNC: 102 MMOL/L
CO2 SERPL-SCNC: 27 MMOL/L
CREAT SERPL-MCNC: 0.9 MG/DL
DIFFERENTIAL METHOD: ABNORMAL
EOSINOPHIL # BLD AUTO: ABNORMAL K/UL
EOSINOPHIL NFR BLD: 0 %
ERYTHROCYTE [DISTWIDTH] IN BLOOD BY AUTOMATED COUNT: 14.9 %
EST. GFR  (AFRICAN AMERICAN): >60 ML/MIN/1.73 M^2
EST. GFR  (NON AFRICAN AMERICAN): >60 ML/MIN/1.73 M^2
GLUCOSE SERPL-MCNC: 392 MG/DL
HCT VFR BLD AUTO: 34.1 %
HGB BLD-MCNC: 11.4 G/DL
LYMPHOCYTES # BLD AUTO: ABNORMAL K/UL
LYMPHOCYTES NFR BLD: 2 %
MCH RBC QN AUTO: 28.6 PG
MCHC RBC AUTO-ENTMCNC: 33.4 %
MCV RBC AUTO: 86 FL
MONOCYTES # BLD AUTO: ABNORMAL K/UL
MONOCYTES NFR BLD: 4 %
NEUTROPHILS NFR BLD: 81 %
NEUTS BAND NFR BLD MANUAL: 13 %
PLATELET # BLD AUTO: 392 K/UL
PMV BLD AUTO: 10.8 FL
POCT GLUCOSE: 335 MG/DL (ref 70–110)
POCT GLUCOSE: 348 MG/DL (ref 70–110)
POCT GLUCOSE: 388 MG/DL (ref 70–110)
POCT GLUCOSE: 417 MG/DL (ref 70–110)
POTASSIUM SERPL-SCNC: 3.8 MMOL/L
RBC # BLD AUTO: 3.98 M/UL
SODIUM SERPL-SCNC: 137 MMOL/L
WBC # BLD AUTO: 22.42 K/UL

## 2017-01-30 PROCEDURE — 25000003 PHARM REV CODE 250: Performed by: HOSPITALIST

## 2017-01-30 PROCEDURE — 94761 N-INVAS EAR/PLS OXIMETRY MLT: CPT

## 2017-01-30 PROCEDURE — 85027 COMPLETE CBC AUTOMATED: CPT

## 2017-01-30 PROCEDURE — 25000003 PHARM REV CODE 250: Performed by: INTERNAL MEDICINE

## 2017-01-30 PROCEDURE — 94640 AIRWAY INHALATION TREATMENT: CPT

## 2017-01-30 PROCEDURE — 97162 PT EVAL MOD COMPLEX 30 MIN: CPT

## 2017-01-30 PROCEDURE — 63600175 PHARM REV CODE 636 W HCPCS: Performed by: HOSPITALIST

## 2017-01-30 PROCEDURE — 36415 COLL VENOUS BLD VENIPUNCTURE: CPT

## 2017-01-30 PROCEDURE — 85007 BL SMEAR W/DIFF WBC COUNT: CPT

## 2017-01-30 PROCEDURE — 21400001 HC TELEMETRY ROOM

## 2017-01-30 PROCEDURE — G8978 MOBILITY CURRENT STATUS: HCPCS | Mod: CJ

## 2017-01-30 PROCEDURE — 27000221 HC OXYGEN, UP TO 24 HOURS

## 2017-01-30 PROCEDURE — G8979 MOBILITY GOAL STATUS: HCPCS | Mod: CH

## 2017-01-30 PROCEDURE — 80048 BASIC METABOLIC PNL TOTAL CA: CPT

## 2017-01-30 PROCEDURE — 25000003 PHARM REV CODE 250: Performed by: EMERGENCY MEDICINE

## 2017-01-30 PROCEDURE — 63600175 PHARM REV CODE 636 W HCPCS: Performed by: EMERGENCY MEDICINE

## 2017-01-30 RX ORDER — INSULIN ASPART 100 [IU]/ML
7 INJECTION, SOLUTION INTRAVENOUS; SUBCUTANEOUS
Status: DISCONTINUED | OUTPATIENT
Start: 2017-01-30 | End: 2017-02-02 | Stop reason: HOSPADM

## 2017-01-30 RX ORDER — INSULIN ASPART 100 [IU]/ML
1-10 INJECTION, SOLUTION INTRAVENOUS; SUBCUTANEOUS
Status: DISCONTINUED | OUTPATIENT
Start: 2017-01-30 | End: 2017-02-02 | Stop reason: HOSPADM

## 2017-01-30 RX ADMIN — BENZONATATE 200 MG: 100 CAPSULE ORAL at 10:01

## 2017-01-30 RX ADMIN — DOCUSATE SODIUM AND SENNOSIDES 1 TABLET: 8.6; 5 TABLET, FILM COATED ORAL at 09:01

## 2017-01-30 RX ADMIN — BENZONATATE 200 MG: 100 CAPSULE ORAL at 04:01

## 2017-01-30 RX ADMIN — METHYLPREDNISOLONE SODIUM SUCCINATE 40 MG: 40 INJECTION, POWDER, FOR SOLUTION INTRAMUSCULAR; INTRAVENOUS at 09:01

## 2017-01-30 RX ADMIN — RAMIPRIL 5 MG: 2.5 CAPSULE ORAL at 10:01

## 2017-01-30 RX ADMIN — METHYLPREDNISOLONE SODIUM SUCCINATE 80 MG: 125 INJECTION, POWDER, FOR SOLUTION INTRAMUSCULAR; INTRAVENOUS at 06:01

## 2017-01-30 RX ADMIN — AMLODIPINE BESYLATE 10 MG: 5 TABLET ORAL at 10:01

## 2017-01-30 RX ADMIN — METOPROLOL SUCCINATE 50 MG: 50 TABLET, EXTENDED RELEASE ORAL at 10:01

## 2017-01-30 RX ADMIN — LEVALBUTEROL 0.63 MG: 0.63 SOLUTION RESPIRATORY (INHALATION) at 03:01

## 2017-01-30 RX ADMIN — DOCUSATE SODIUM AND SENNOSIDES 1 TABLET: 8.6; 5 TABLET, FILM COATED ORAL at 10:01

## 2017-01-30 RX ADMIN — BENZONATATE 200 MG: 100 CAPSULE ORAL at 09:01

## 2017-01-30 RX ADMIN — INSULIN DETEMIR 20 UNITS: 100 INJECTION, SOLUTION SUBCUTANEOUS at 09:01

## 2017-01-30 RX ADMIN — MOXIFLOXACIN HYDROCHLORIDE 400 MG: 400 INJECTION, SOLUTION INTRAVENOUS at 12:01

## 2017-01-30 RX ADMIN — LEVALBUTEROL 0.63 MG: 0.63 SOLUTION RESPIRATORY (INHALATION) at 07:01

## 2017-01-30 RX ADMIN — FUROSEMIDE 20 MG: 20 TABLET ORAL at 10:01

## 2017-01-30 RX ADMIN — FLUTICASONE FUROATE AND VILANTEROL TRIFENATATE 1 PUFF: 100; 25 POWDER RESPIRATORY (INHALATION) at 08:01

## 2017-01-30 RX ADMIN — RIFABUTIN 150 MG: 150 CAPSULE ORAL at 10:01

## 2017-01-30 RX ADMIN — INSULIN ASPART 7 UNITS: 100 INJECTION, SOLUTION INTRAVENOUS; SUBCUTANEOUS at 12:01

## 2017-01-30 RX ADMIN — RIFABUTIN 150 MG: 150 CAPSULE ORAL at 09:01

## 2017-01-30 RX ADMIN — LEVALBUTEROL 0.63 MG: 0.63 SOLUTION RESPIRATORY (INHALATION) at 11:01

## 2017-01-30 RX ADMIN — INSULIN ASPART 3 UNITS: 100 INJECTION, SOLUTION INTRAVENOUS; SUBCUTANEOUS at 12:01

## 2017-01-30 RX ADMIN — ENOXAPARIN SODIUM 40 MG: 100 INJECTION SUBCUTANEOUS at 12:01

## 2017-01-30 NOTE — PLAN OF CARE
Problem: Patient Care Overview  Goal: Plan of Care Review  Outcome: Ongoing (interventions implemented as appropriate)  PT WILL UNDERSTAND AND VERBALIZE PT PLAN OF CARE.

## 2017-01-30 NOTE — PT/OT/SLP PROGRESS
Occupational Therapy  Evaluation/Discharge      [facility]  MRN: [id number]    Orders received, chart reviewed.  Patient found in bed with nurse present.  Patient and nurse reporting that patient is performing functional transfers and self-care with supervision for safety.  Patient without need for OT services at this time. OT to sign off.     RODRIGUEZ Rueda, MS  1/30/2017

## 2017-01-30 NOTE — PLAN OF CARE
Problem: Patient Care Overview  Goal: Plan of Care Review  Pt noted to rest well throughout this shift with occasional episodes of excessive coughing relieved by current PRN medication.  Pt noted to continue to complain of being SOB.  Pt noted to be compliant with O2 therapy.  Able to make needs known.  No complaint of pain voiced this shift.  Pt noted to remain free from falls and trauma this shift.  Will continue to monitor.

## 2017-01-30 NOTE — PROGRESS NOTES
[facility]  Progress Note    Patient Name: Regan Owensbrook  MRN: [id number]  Patient Class: IP- Inpatient   Admission Date: 1/29/2017  Length of Stay: 1 days  Attending Physician: Tiana Branch MD  Primary Care Provider: Ishaan Adamson MD        Subjective:     Principal Problem:Pneumonia due to infectious organism    HPI:   This patient is a 76 y.o. M with DM Type II, HTN, COPD, Arthritis, and history of MAC,under treatment with Rifabutin  who came with C/O of  10 day history of SOB and non productive cough,. Pt denies hemoptysis, fever, chills, diaphoresis, emesis, and nausea. Patient states that he is taking antibiotics for recent diagnosis of MAC,Pt is not on home oxygen.he is former smoker,but quit smoking many years ago,patient is septic with leucocytosis,tachycardia,tachypnea,source pneumonia,per chest x ray and clinical presentation,patient has been started on IB Abx,supplemental oxygen,continues nebulizer and Solumedrol.Patient has been followed by ID and pulmonology in [facility].    Hospital Course:  This patient is a 76 y.o. M with DM Type II, HTN, COPD, Arthritis, and history of MAC,under treatment with Rifabutin  who came with C/O of  10 day history of SOB and non productive cough,. Pt denies hemoptysis, fever, chills, diaphoresis, emesis, and nausea. Patient states that he is taking antibiotics for recent diagnosis of MAC,Pt is not on home oxygen.he is former smoker,but quit smoking many years ago,patient is septic with leucocytosis,tachycardia,tachypnea,source pneumonia,per chest x ray and clinical presentation,patient has been started on IV Abx,supplemental oxygen,continues nebulizer and Solumedrol.Patient has been followed by ID and pulmonology in [facility].he feel better today,SOB and wheezing improving.    Interval History: SOB and wheezing improved.    Review of Systems   Constitutional: Positive for activity change, appetite change and chills.   HENT:  Negative for congestion and dental problem.    Eyes: Negative for discharge and itching.   Respiratory: Positive for cough, shortness of breath and wheezing.    Cardiovascular: Positive for leg swelling. Negative for chest pain.   Gastrointestinal: Negative for abdominal distention and abdominal pain.   Endocrine: Negative for cold intolerance and heat intolerance.   Genitourinary: Negative for difficulty urinating and dysuria.   Musculoskeletal: Negative for arthralgias.   Allergic/Immunologic: Negative for environmental allergies and food allergies.   Neurological: Negative for dizziness and facial asymmetry.   Hematological: Negative for adenopathy. Does not bruise/bleed easily.   Psychiatric/Behavioral: Negative for agitation and behavioral problems.     Objective:     Vital Signs (Most Recent):  Temp: 98 °F (36.7 °C) (01/30/17 0837)  Pulse: 82 (01/30/17 0837)  Resp: 20 (01/30/17 0837)  BP: 115/81 (01/30/17 0837)  SpO2: (!) 94 % (01/30/17 0837) Vital Signs (24h Range):  Temp:  [97.8 °F (36.6 °C)-99.3 °F (37.4 °C)] 98 °F (36.7 °C)  Pulse:  [] 82  Resp:  [18-32] 20  SpO2:  [92 %-99 %] 94 %  BP: (115-180)/(58-84) 115/81     Weight: 74.4 kg (164 lb)  Body mass index is 18.48 kg/(m^2).  No intake or output data in the 24 hours ending 01/30/17 0846   Physical Exam   Constitutional: He is oriented to person, place, and time. No distress.   HENT:   Head: Atraumatic.   Eyes: Pupils are equal, round, and reactive to light.   Neck: Normal range of motion. Neck supple.   Cardiovascular: Normal rate.    Pulmonary/Chest: He has wheezes. He has rales.   Abdominal: Soft. Bowel sounds are normal.   Musculoskeletal: Normal range of motion. He exhibits no edema or deformity.   Neurological: He is oriented to person, place, and time. No cranial nerve deficit. Coordination normal.   Skin: Skin is warm and dry. He is not diaphoretic.   Psychiatric: He has a normal mood and affect. His behavior is normal.       Significant Labs:    BMP:   Recent Labs  Lab 01/30/17  0452   *      K 3.8      CO2 27   BUN 16   CREATININE 0.9   CALCIUM 8.9     CBC:   Recent Labs  Lab 01/29/17  1220 01/30/17  0452   WBC 20.74* 22.42*   HGB 11.3* 11.4*   HCT 33.4* 34.1*    392*       Significant Imaging: reviewed.    Assessment/Plan:      * Pneumonia due to infectious organism  Will continue with Avelox,follow with blood culture,no recent hospitalization.afberile.      Sepsis  Patient is septic with leucocytosis,tachycardia and Tachypnea,soure pneumonia,likely bacterial,continue monitor.        Acute hypoxemic respiratory failure  Appear to be multifactorial,COPD exacerbation and pneumonia,will continue with supplemental oxygen.      COPD exacerbation  Will continue with solumedrol,nebulizer.wheezing improving.      Pulmonary Mycobacterium avium complex (MAC) infection  continue with home medication Rifabutin,he has been followed in [facility] by ID and pulmonology.      Diabetes mellitus type 2, uncontrolled  Will continue with SSI,prandial insulin  and basal insulin.      Essential hypertension  will continues with home medication,poorly controlled added Norvasc and  prn IV Hydralazine.better controlled today.      VTE Risk Mitigation         Ordered     enoxaparin injection 40 mg  Daily     Route:  Subcutaneous        01/29/17 1518     Medium Risk of VTE  Once      01/29/17 1518     Place ZOEY hose  Until discontinued      01/29/17 1518          Tiana Branch MD  Department of Hospital Medicine   [facility]

## 2017-01-30 NOTE — ASSESSMENT & PLAN NOTE
will continues with home medication,poorly controlled added Norvasc and  prn IV Hydralazine.better controlled today.

## 2017-01-30 NOTE — PLAN OF CARE
01/30/17 1610   Discharge Assessment   Assessment Type Discharge Planning Assessment   Confirmed/corrected address and phone number on facesheet? Yes   Assessment information obtained from? Patient   Expected Length of Stay (days) 2   Communicated expected length of stay with patient/caregiver yes   Type of Healthcare Directive Received (None)   Prior to hospitilization cognitive status: Alert/Oriented   Prior to hospitalization functional status: Independent   Current cognitive status: Alert/Oriented   Current Functional Status: Independent   Arrived From home or self-care   Lives With spouse   Able to Return to Prior Arrangements yes   Is patient able to care for self after discharge? Yes   How many people do you have in your home that can help with your care after discharge? 1   Who are your caregiver(s) and their phone number(s)? (Grandson who lives in the home. Spouse listed, Alix Anderson [phone].)   Readmission Within The Last 30 Days no previous admission in last 30 days   Patient currently being followed by outpatient case management? No   Patient currently receives home health services? No   Does the patient currently use HME? Yes   Patient currently receives private duty nursing? N/A   Patient currently receives any other outside agency services? No   Equipment Currently Used at Home glucometer  (inhalers)   Do you have any problems affording any of your prescribed medications? No   Is the patient taking medications as prescribed? yes   Do you have any financial concerns preventing you from receiving the healthcare you need? No   Does the patient have transportation to healthcare appointments? Yes   Transportation Available car   On Dialysis? No   Does the patient receive services at the Coumadin Clinic? No   Are there any open cases? No   Discharge Plan A Home with family   Patient/Family In Agreement With Plan yes   Discussion of [facility] in detail discussing benefits of  scheduling an appt. Pt stated he wants to his PCP first.    [id number] - [address] AT SEC of [place] & [address]  [place] [address]  Phone: [phone] Fax: [phone]

## 2017-01-30 NOTE — PT/OT/SLP EVAL
Physical Therapy  Evaluation    Regan Alvarez   MRN: [id number]   Admitting Diagnosis: Pneumonia due to infectious organism    PT Received On: 17  PT Start Time: 1038     PT Stop Time: 1050    PT Total Time (min): 12 min       Billable Minutes:  Evaluation 12 min    Diagnosis: Pneumonia due to infectious organism    Past Medical History   Diagnosis Date    Arthritis     COPD (chronic obstructive pulmonary disease)     Diabetes mellitus type II     Hypertension     Tuberculosis       Past Surgical History   Procedure Laterality Date    Melanoma         General Precautions: Standard, fall, diabetic, respiratory    Patient History:  Lives With: spouse (and grandson)  Living Arrangements: house (2 story ~1-2 steps at entry)  Home Layout: Bedroom on 2nd floor (only 1/2 on 1st floor)  Transportation Available: car (Pt was driving PTA.)  Equipment Currently Used at Home: shower chair      Previous Level of Function:  Ambulation Skills: independent    Subjective:      Chief Complaint: Pt c/o weakness and SOB.  Patient goals: to get well    Pain Ratin/10                    Objective:   Patient found with: oxygen 2L, peripheral IV, telemetry       Follows Commands/attention: Follows multistep commands  Communication: clear/fluent  Safety awareness/insight to disability: intact    Physical Exam:  Postural examination/scapula alignment: Rounded shoulder and Head forward    Skin integrity: Visible skin intact  Edema: None noted BLE    Sensation:   Intact  light/touch BLE    Lower Extremity Range of Motion:  Right Lower Extremity: WFL  Left Lower Extremity: WFL    Lower Extremity Strength:  Right Lower Extremity: WFL  Left Lower Extremity: WFL     Fine motor coordination:  Intact    Gross motor coordination: WFL    Functional Mobility:  Bed Mobility:  Pt found up in bedside chair with nursing (Meredith).    Transfers:  Sit <> Stand Assistance: Contact Guard Assistance, Stand By Assistance  Sit <> Stand  Assistive Device: No Assistive Device    Gait:   Gait Distance: 150 ft; Pt with c/o weakness and SOB, required short standing rest breaks.  Assistance 1: Contact Guard Assistance, Stand by Assistance  Gait Assistive Device: No device, [place] rail; 2L O2 NC  Gait Pattern: reciprocal  Gait Deviation(s): decreased step length, decreased isela      Balance:   Static Sit: GOOD: Takes MODERATE challenges from all directions  Dynamic Sit: GOOD: Maintains balance through MODERATE excursions of active trunk movement  Static Stand: FAIR+: Takes MINIMAL challenges from all directions  Dynamic stand: FAIR+: Needs CLOSE SUPERVISION during gait and is able to right self with minor LOB      AM-PAC 6 CLICK MOBILITY  How much help from another person does this patient currently need?   1 = Unable, Total/Dependent Assistance  2 = A lot, Maximum/Moderate Assistance  3 = A little, Minimum/Contact Guard/Supervision  4 = None, Modified [place]/Independent    Turning over in bed (including adjusting bedclothes, sheets and blankets)?: 4  Sitting down on and standing up from a chair with arms (e.g., wheelchair, bedside commode, etc.): 4  Moving from lying on back to sitting on the side of the bed?: 4  Moving to and from a bed to a chair (including a wheelchair)?: 3  Need to walk in hospital room?: 3  Climbing 3-5 steps with a railing?: 3  Total Score: 21     AM-PAC Raw Score CMS G-Code Modifier Level of Impairment Assistance   6 % Total / Unable   7 - 9 CM 80 - 100% Maximal Assist   10 - 14 CL 60 - 80% Moderate Assist   15 - 19 CK 40 - 60% Moderate Assist   20 - 22 CJ 20 - 40% Minimal Assist   23 CI 1-20% SBA / CGA   24 CH 0% Independent/ Mod I     Patient left up in chair with all lines intact and call button in reach.    Assessment:     Rehab identified problem list/impairments: Rehab identified problem list/impairments: weakness, impaired endurance, gait instability, impaired balance, impaired cardiopulmonary response to  activity    Rehab potential is good.    Activity tolerance: Fair    Discharge recommendations: Discharge Facility/Level Of Care Needs: home health PT     Barriers to discharge: Barriers to Discharge: Inaccessible home environment    Equipment recommendations: Equipment Needed After Discharge: none     GOALS:   Physical Therapy Goals        Problem: Physical Therapy Goal    Goal Priority Disciplines Outcome Goal Variances Interventions   Physical Therapy Goal     PT/OT, PT      Description:  Goals to be met by: 17     Patient will increase functional independence with mobility by performin. Supine to sit with [place]  2. Rolling to Left and Right with [place]  3. Sit to stand transfer with [place]  4. Bed to chair transfer with [place]   5. Gait  x 500 feet with [place]   6. Ascend/descend 1 flight of stair with right Handrail Modified [place]  7. Lower extremity exercise program x 30 reps per handout, with independence                PLAN:    Patient to be seen 5 x/week (Mon-Fri) to address the above listed problems via gait training, therapeutic activities, therapeutic exercises  Plan of Care expires: 17  Plan of Care reviewed with: patient    Functional Assessment Tool Used: AM-PAC  Score: 21  Functional Limitation: Mobility: Walking and moving around  Mobility: Walking and Moving Around Current Status (): CJ  Mobility: Walking and Moving Around Goal Status (): YOKASTA Ortega, PT  2017

## 2017-01-30 NOTE — PROGRESS NOTES
While getting report, nurse stated that pt is being treated for TB. Inquired if he needs to be on precautions. She states that MD states that pt does not need to be on any precautions. Charge nurse made aware.

## 2017-01-30 NOTE — SUBJECTIVE & OBJECTIVE
Interval History: SOB and wheezing improved.    Review of Systems   Constitutional: Positive for activity change, appetite change and chills.   HENT: Negative for congestion and dental problem.    Eyes: Negative for discharge and itching.   Respiratory: Positive for cough, shortness of breath and wheezing.    Cardiovascular: Positive for leg swelling. Negative for chest pain.   Gastrointestinal: Negative for abdominal distention and abdominal pain.   Endocrine: Negative for cold intolerance and heat intolerance.   Genitourinary: Negative for difficulty urinating and dysuria.   Musculoskeletal: Negative for arthralgias.   Allergic/Immunologic: Negative for environmental allergies and food allergies.   Neurological: Negative for dizziness and facial asymmetry.   Hematological: Negative for adenopathy. Does not bruise/bleed easily.   Psychiatric/Behavioral: Negative for agitation and behavioral problems.     Objective:     Vital Signs (Most Recent):  Temp: 98 °F (36.7 °C) (01/30/17 0837)  Pulse: 82 (01/30/17 0837)  Resp: 20 (01/30/17 0837)  BP: 115/81 (01/30/17 0837)  SpO2: (!) 94 % (01/30/17 0837) Vital Signs (24h Range):  Temp:  [97.8 °F (36.6 °C)-99.3 °F (37.4 °C)] 98 °F (36.7 °C)  Pulse:  [] 82  Resp:  [18-32] 20  SpO2:  [92 %-99 %] 94 %  BP: (115-180)/(58-84) 115/81     Weight: 74.4 kg (164 lb)  Body mass index is 18.48 kg/(m^2).  No intake or output data in the 24 hours ending 01/30/17 0846   Physical Exam   Constitutional: He is oriented to person, place, and time. No distress.   HENT:   Head: Atraumatic.   Eyes: Pupils are equal, round, and reactive to light.   Neck: Normal range of motion. Neck supple.   Cardiovascular: Normal rate.    Pulmonary/Chest: He has wheezes. He has rales.   Abdominal: Soft. Bowel sounds are normal.   Musculoskeletal: Normal range of motion. He exhibits no edema or deformity.   Neurological: He is oriented to person, place, and time. No cranial nerve deficit. Coordination  normal.   Skin: Skin is warm and dry. He is not diaphoretic.   Psychiatric: He has a normal mood and affect. His behavior is normal.       Significant Labs:   BMP:   Recent Labs  Lab 01/30/17  0452   *      K 3.8      CO2 27   BUN 16   CREATININE 0.9   CALCIUM 8.9     CBC:   Recent Labs  Lab 01/29/17  1220 01/30/17  0452   WBC 20.74* 22.42*   HGB 11.3* 11.4*   HCT 33.4* 34.1*    392*       Significant Imaging: reviewed.

## 2017-01-30 NOTE — ASSESSMENT & PLAN NOTE
Patient is septic with leucocytosis,tachycardia and Tachypnea,soure pneumonia,likely bacterial,continue monitor.

## 2017-01-30 NOTE — PLAN OF CARE
Problem: Physical Therapy Goal  Goal: Physical Therapy Goal  Goals to be met by: 17     Patient will increase functional independence with mobility by performin. Supine to sit with [place]  2. Rolling to Left and Right with [place]  3. Sit to stand transfer with [place]  4. Bed to chair transfer with [place]   5. Gait x 500 feet with [place]   6. Ascend/descend 1 flight of stair with right Handrail Modified [place]  7. Lower extremity exercise program x 30 reps per handout, with independence  Pt will benefit from home health PT services.

## 2017-01-31 PROBLEM — A41.9 SEPSIS: Status: RESOLVED | Noted: 2017-01-29 | Resolved: 2017-01-31

## 2017-01-31 LAB
ANION GAP SERPL CALC-SCNC: 8 MMOL/L
BASOPHILS # BLD AUTO: ABNORMAL K/UL
BASOPHILS NFR BLD: 0 %
BUN SERPL-MCNC: 19 MG/DL
CALCIUM SERPL-MCNC: 8.8 MG/DL
CHLORIDE SERPL-SCNC: 102 MMOL/L
CO2 SERPL-SCNC: 26 MMOL/L
CREAT SERPL-MCNC: 0.8 MG/DL
DIFFERENTIAL METHOD: ABNORMAL
EOSINOPHIL # BLD AUTO: ABNORMAL K/UL
EOSINOPHIL NFR BLD: 0 %
ERYTHROCYTE [DISTWIDTH] IN BLOOD BY AUTOMATED COUNT: 15 %
EST. GFR  (AFRICAN AMERICAN): >60 ML/MIN/1.73 M^2
EST. GFR  (NON AFRICAN AMERICAN): >60 ML/MIN/1.73 M^2
GLUCOSE SERPL-MCNC: 277 MG/DL
HCT VFR BLD AUTO: 33.7 %
HGB BLD-MCNC: 11.2 G/DL
LYMPHOCYTES # BLD AUTO: ABNORMAL K/UL
LYMPHOCYTES NFR BLD: 4 %
MCH RBC QN AUTO: 28.4 PG
MCHC RBC AUTO-ENTMCNC: 33.2 %
MCV RBC AUTO: 86 FL
MONOCYTES # BLD AUTO: ABNORMAL K/UL
MONOCYTES NFR BLD: 4 %
NEUTROPHILS NFR BLD: 86 %
NEUTS BAND NFR BLD MANUAL: 6 %
PLATELET # BLD AUTO: 424 K/UL
PMV BLD AUTO: 10.6 FL
POCT GLUCOSE: 108 MG/DL (ref 70–110)
POCT GLUCOSE: 310 MG/DL (ref 70–110)
POCT GLUCOSE: 371 MG/DL (ref 70–110)
POTASSIUM SERPL-SCNC: 4 MMOL/L
RBC # BLD AUTO: 3.94 M/UL
SODIUM SERPL-SCNC: 136 MMOL/L
WBC # BLD AUTO: 26.96 K/UL

## 2017-01-31 PROCEDURE — 25000003 PHARM REV CODE 250: Performed by: INTERNAL MEDICINE

## 2017-01-31 PROCEDURE — 63600175 PHARM REV CODE 636 W HCPCS: Performed by: EMERGENCY MEDICINE

## 2017-01-31 PROCEDURE — 25000003 PHARM REV CODE 250: Performed by: HOSPITALIST

## 2017-01-31 PROCEDURE — 27000221 HC OXYGEN, UP TO 24 HOURS

## 2017-01-31 PROCEDURE — 94761 N-INVAS EAR/PLS OXIMETRY MLT: CPT

## 2017-01-31 PROCEDURE — 63600175 PHARM REV CODE 636 W HCPCS: Performed by: HOSPITALIST

## 2017-01-31 PROCEDURE — 85007 BL SMEAR W/DIFF WBC COUNT: CPT

## 2017-01-31 PROCEDURE — 36415 COLL VENOUS BLD VENIPUNCTURE: CPT

## 2017-01-31 PROCEDURE — 63600175 PHARM REV CODE 636 W HCPCS: Performed by: INTERNAL MEDICINE

## 2017-01-31 PROCEDURE — 80048 BASIC METABOLIC PNL TOTAL CA: CPT

## 2017-01-31 PROCEDURE — 85027 COMPLETE CBC AUTOMATED: CPT

## 2017-01-31 PROCEDURE — 94640 AIRWAY INHALATION TREATMENT: CPT

## 2017-01-31 PROCEDURE — 21400001 HC TELEMETRY ROOM

## 2017-01-31 PROCEDURE — 25000003 PHARM REV CODE 250: Performed by: EMERGENCY MEDICINE

## 2017-01-31 RX ORDER — PREDNISONE 20 MG/1
40 TABLET ORAL DAILY
Status: DISCONTINUED | OUTPATIENT
Start: 2017-01-31 | End: 2017-02-02 | Stop reason: HOSPADM

## 2017-01-31 RX ADMIN — INSULIN DETEMIR 20 UNITS: 100 INJECTION, SOLUTION SUBCUTANEOUS at 08:01

## 2017-01-31 RX ADMIN — METHYLPREDNISOLONE SODIUM SUCCINATE 40 MG: 40 INJECTION, POWDER, FOR SOLUTION INTRAMUSCULAR; INTRAVENOUS at 01:01

## 2017-01-31 RX ADMIN — MOXIFLOXACIN HYDROCHLORIDE 400 MG: 400 INJECTION, SOLUTION INTRAVENOUS at 02:01

## 2017-01-31 RX ADMIN — RIFABUTIN 150 MG: 150 CAPSULE ORAL at 08:01

## 2017-01-31 RX ADMIN — INSULIN ASPART 7 UNITS: 100 INJECTION, SOLUTION INTRAVENOUS; SUBCUTANEOUS at 06:01

## 2017-01-31 RX ADMIN — INSULIN ASPART 10 UNITS: 100 INJECTION, SOLUTION INTRAVENOUS; SUBCUTANEOUS at 08:01

## 2017-01-31 RX ADMIN — DOCUSATE SODIUM AND SENNOSIDES 1 TABLET: 8.6; 5 TABLET, FILM COATED ORAL at 08:01

## 2017-01-31 RX ADMIN — INSULIN ASPART 7 UNITS: 100 INJECTION, SOLUTION INTRAVENOUS; SUBCUTANEOUS at 10:01

## 2017-01-31 RX ADMIN — LEVALBUTEROL 0.63 MG: 0.63 SOLUTION RESPIRATORY (INHALATION) at 07:01

## 2017-01-31 RX ADMIN — PREDNISONE 40 MG: 20 TABLET ORAL at 06:01

## 2017-01-31 RX ADMIN — AMLODIPINE BESYLATE 10 MG: 5 TABLET ORAL at 08:01

## 2017-01-31 RX ADMIN — FUROSEMIDE 20 MG: 20 TABLET ORAL at 08:01

## 2017-01-31 RX ADMIN — FLUTICASONE FUROATE AND VILANTEROL TRIFENATATE 1 PUFF: 100; 25 POWDER RESPIRATORY (INHALATION) at 08:01

## 2017-01-31 RX ADMIN — BENZONATATE 200 MG: 100 CAPSULE ORAL at 05:01

## 2017-01-31 RX ADMIN — LEVALBUTEROL 0.63 MG: 0.63 SOLUTION RESPIRATORY (INHALATION) at 03:01

## 2017-01-31 RX ADMIN — ENOXAPARIN SODIUM 40 MG: 100 INJECTION SUBCUTANEOUS at 10:01

## 2017-01-31 RX ADMIN — INSULIN ASPART 8 UNITS: 100 INJECTION, SOLUTION INTRAVENOUS; SUBCUTANEOUS at 02:01

## 2017-01-31 RX ADMIN — METHYLPREDNISOLONE SODIUM SUCCINATE 40 MG: 40 INJECTION, POWDER, FOR SOLUTION INTRAMUSCULAR; INTRAVENOUS at 05:01

## 2017-01-31 RX ADMIN — RIFABUTIN 150 MG: 150 CAPSULE ORAL at 10:01

## 2017-01-31 RX ADMIN — INSULIN ASPART 7 UNITS: 100 INJECTION, SOLUTION INTRAVENOUS; SUBCUTANEOUS at 02:01

## 2017-01-31 RX ADMIN — METOPROLOL SUCCINATE 50 MG: 50 TABLET, EXTENDED RELEASE ORAL at 08:01

## 2017-01-31 RX ADMIN — RAMIPRIL 5 MG: 2.5 CAPSULE ORAL at 08:01

## 2017-01-31 NOTE — PROGRESS NOTES
[facility] Hospital Medicine  Progress Note    Patient Name: Regan Alvarez  MRN: [id number]  Patient Class: IP- Inpatient   Admission Date: 1/29/2017  Length of Stay: 2 days  Attending Physician: Suzi Clark MD  Primary Care Provider: Ishaan Adamson MD        Subjective:     Principal Problem:Pneumonia due to infectious organism    HPI:  Mr. Alvarez is a 75 yo man with DM Type II, HTN, COPD, Arthritis, and history of MAC, under treatment with Rifabutin  who came with C/O of 10 day history of SOB and non productive cough. He is taking antibiotics for recent diagnosis of MAC. He is not on home oxygen. He is former smoker. On presentation he was found to have pneumonia with sepsis (leukocytosis, tachycardia, tachypnea).    Hospital Course:  He was started on IV Abx, supplemental oxygen, nebulizers and Solumedrol. He is improving but still requiring supplemental oxygen.    Review of Systems   Constitutional: Positive for activity change, appetite change and chills.   HENT: Negative for congestion and dental problem.    Eyes: Negative for discharge and itching.   Respiratory: Positive for cough, shortness of breath and wheezing.    Cardiovascular: Negative for chest pain.   Gastrointestinal: Negative for abdominal distention and abdominal pain.   Endocrine: Negative for cold intolerance and heat intolerance.   Genitourinary: Negative for difficulty urinating and dysuria.   Musculoskeletal: Negative for arthralgias.   Allergic/Immunologic: Negative for environmental allergies and food allergies.   Neurological: Negative for dizziness and facial asymmetry.   Hematological: Negative for adenopathy. Does not bruise/bleed easily.   Psychiatric/Behavioral: Negative for agitation and behavioral problems.     Objective:     Vital Signs (Most Recent):  Temp: 98 °F (36.7 °C) (01/31/17 1215)  Pulse: 85 (01/31/17 1215)  Resp: 20 (01/31/17 1215)  BP: 136/73 (01/31/17 1215)  SpO2: 97 % (01/31/17 1215)  Vital Signs (24h Range):  Temp:  [97.3 °F (36.3 °C)-98.2 °F (36.8 °C)] 98 °F (36.7 °C)  Pulse:  [] 85  Resp:  [18-22] 20  SpO2:  [90 %-97 %] 97 %  BP: (115-158)/(63-82) 136/73     Weight: 74.9 kg (165 lb 1.6 oz)  Body mass index is 18.6 kg/(m^2).  No intake or output data in the 24 hours ending 01/31/17 1406   Physical Exam   Constitutional: He is oriented to person, place, and time. No distress.   HENT:   Head: Atraumatic.   Eyes: Pupils are equal, round, and reactive to light.   Neck: Normal range of motion. Neck supple.   Cardiovascular: Normal rate.    Pulmonary/Chest: He has wheezes.   Accessory muscle use, pursed lip breathing   Abdominal: Soft. Bowel sounds are normal.   Musculoskeletal: Normal range of motion. He exhibits no edema or deformity.   Neurological: He is oriented to person, place, and time. No cranial nerve deficit. Coordination normal.   Skin: Skin is warm and dry. He is not diaphoretic.   Psychiatric: He has a normal mood and affect. His behavior is normal.       Significant Labs:     CBC:   Recent Labs  Lab 01/30/17  0452 01/31/17  0449   WBC 22.42* 26.96*   HGB 11.4* 11.2*   HCT 34.1* 33.7*   * 424*     CMP:   Recent Labs  Lab 01/30/17  0452 01/31/17  0449    136   K 3.8 4.0    102   CO2 27 26   * 277*   BUN 16 19   CREATININE 0.9 0.8   CALCIUM 8.9 8.8   ANIONGAP 8 8   EGFRNONAA >60 >60     Lactic Acid:   Recent Labs  Lab 01/29/17  2324   LACTATE 1.5     CXR reviewed    Assessment/Plan:      * Pneumonia due to infectious organism  Will continue with Avelox, follow with blood culture, no recent hospitalizations.    Acute hypoxemic respiratory failure  Appear to be multifactorial, COPD exacerbation and pneumonia, will continue with supplemental oxygen.     COPD exacerbation  Change solumedrol to PO steroids. Cont nebulizer. Improving.    Diabetes mellitus type 2, uncontrolled  Will continue with SSI, prandial insulin and basal insulin.    Pulmonary Mycobacterium  avium complex (MAC) infection  Continue with home medication Rifabutin, he is followed at [facility] by ID and pulmonology.    Essential hypertension  Continue home medication, poorly controlled; added Norvasc and prn IV Hydralazine.    VTE Risk Mitigation         Ordered     enoxaparin injection 40 mg  Daily     Route:  Subcutaneous        01/29/17 1518     Medium Risk of VTE  Once      01/29/17 1518     Place ZOEY hose  Until discontinued      01/29/17 1518          Suzi Clark MD  Department of Hospital Medicine   [facility]

## 2017-01-31 NOTE — PROGRESS NOTES
Dr castañeda called to inform of cbg 371, covered with insulin as ordered previouslyu, no further orders.

## 2017-01-31 NOTE — PT/OT/SLP PROGRESS
Physical Therapy      Regan Mario Alberto [place]  MRN: [id number]    Patient not seen today for PT tx secondary to Patient unwilling to participate. Pt found in bed visiting with family, reported SOB despite O2 NC. Pt stated not feeling up to ambulating in the hallway today. Nursing (Jaja) notified concerning pt's respiratory status. Will follow-up as able.    Patience Ortega, PT

## 2017-01-31 NOTE — PLAN OF CARE
Problem: Patient Care Overview  Goal: Plan of Care Review  Pt noted to rest quietly in bed throughout this shift.  No complaints of pain voiced this shift.  Pt continues to present with frequent coughing relieved by current PRN medication.  Able to make needs known.  Call bell within reach.  Pt noted to remain free from falls and trauma this shift. Will continue to monitor.

## 2017-01-31 NOTE — SUBJECTIVE & OBJECTIVE
Review of Systems   Constitutional: Positive for activity change, appetite change and chills.   HENT: Negative for congestion and dental problem.    Eyes: Negative for discharge and itching.   Respiratory: Positive for cough, shortness of breath and wheezing.    Cardiovascular: Negative for chest pain.   Gastrointestinal: Negative for abdominal distention and abdominal pain.   Endocrine: Negative for cold intolerance and heat intolerance.   Genitourinary: Negative for difficulty urinating and dysuria.   Musculoskeletal: Negative for arthralgias.   Allergic/Immunologic: Negative for environmental allergies and food allergies.   Neurological: Negative for dizziness and facial asymmetry.   Hematological: Negative for adenopathy. Does not bruise/bleed easily.   Psychiatric/Behavioral: Negative for agitation and behavioral problems.     Objective:     Vital Signs (Most Recent):  Temp: 98 °F (36.7 °C) (01/31/17 1215)  Pulse: 85 (01/31/17 1215)  Resp: 20 (01/31/17 1215)  BP: 136/73 (01/31/17 1215)  SpO2: 97 % (01/31/17 1215) Vital Signs (24h Range):  Temp:  [97.3 °F (36.3 °C)-98.2 °F (36.8 °C)] 98 °F (36.7 °C)  Pulse:  [] 85  Resp:  [18-22] 20  SpO2:  [90 %-97 %] 97 %  BP: (115-158)/(63-82) 136/73     Weight: 74.9 kg (165 lb 1.6 oz)  Body mass index is 18.6 kg/(m^2).  No intake or output data in the 24 hours ending 01/31/17 1406   Physical Exam   Constitutional: He is oriented to person, place, and time. No distress.   HENT:   Head: Atraumatic.   Eyes: Pupils are equal, round, and reactive to light.   Neck: Normal range of motion. Neck supple.   Cardiovascular: Normal rate.    Pulmonary/Chest: He has wheezes.   Accessory muscle use, pursed lip breathing   Abdominal: Soft. Bowel sounds are normal.   Musculoskeletal: Normal range of motion. He exhibits no edema or deformity.   Neurological: He is oriented to person, place, and time. No cranial nerve deficit. Coordination normal.   Skin: Skin is warm and dry. He is not  diaphoretic.   Psychiatric: He has a normal mood and affect. His behavior is normal.       Significant Labs:     CBC:   Recent Labs  Lab 01/30/17 0452 01/31/17 0449   WBC 22.42* 26.96*   HGB 11.4* 11.2*   HCT 34.1* 33.7*   * 424*     CMP:   Recent Labs  Lab 01/30/17 0452 01/31/17 0449    136   K 3.8 4.0    102   CO2 27 26   * 277*   BUN 16 19   CREATININE 0.9 0.8   CALCIUM 8.9 8.8   ANIONGAP 8 8   EGFRNONAA >60 >60     Lactic Acid:   Recent Labs  Lab 01/29/17  2324   LACTATE 1.5     CXR reviewed

## 2017-01-31 NOTE — PROGRESS NOTES
TN met with pt to discuss reccs from PT for Home Health with PT. Pt verbalized an understanding; signed Pt Choice Form; original placed in chart; copy placed in health information packet. Pt will need home health orders to fax to N.

## 2017-01-31 NOTE — PROGRESS NOTES
No complaint of pain, no apparent distress. resp even and nonlabored. All lung fields clear. s1s2 audible in regular rhythm. aao x4. No skin breakdown noted. Telemetry sr. teds to ble. oob with assist, verbalizes understanding. Call bell in reach.

## 2017-01-31 NOTE — ASSESSMENT & PLAN NOTE
Continue with home medication Rifabutin, he is followed at [facility] by ID and pulmonology.

## 2017-02-01 LAB
ANION GAP SERPL CALC-SCNC: 10 MMOL/L
BASOPHILS # BLD AUTO: 0.06 K/UL
BASOPHILS NFR BLD: 0.3 %
BUN SERPL-MCNC: 20 MG/DL
CALCIUM SERPL-MCNC: 8.6 MG/DL
CHLORIDE SERPL-SCNC: 104 MMOL/L
CO2 SERPL-SCNC: 25 MMOL/L
CREAT SERPL-MCNC: 0.7 MG/DL
DIFFERENTIAL METHOD: ABNORMAL
EOSINOPHIL # BLD AUTO: 0 K/UL
EOSINOPHIL NFR BLD: 0 %
ERYTHROCYTE [DISTWIDTH] IN BLOOD BY AUTOMATED COUNT: 15.4 %
EST. GFR  (AFRICAN AMERICAN): >60 ML/MIN/1.73 M^2
EST. GFR  (NON AFRICAN AMERICAN): >60 ML/MIN/1.73 M^2
GLUCOSE SERPL-MCNC: 177 MG/DL
HCT VFR BLD AUTO: 35.6 %
HGB BLD-MCNC: 12.1 G/DL
LYMPHOCYTES # BLD AUTO: 1.9 K/UL
LYMPHOCYTES NFR BLD: 7.9 %
MCH RBC QN AUTO: 29.4 PG
MCHC RBC AUTO-ENTMCNC: 34 %
MCV RBC AUTO: 86 FL
MONOCYTES # BLD AUTO: 2 K/UL
MONOCYTES NFR BLD: 8.3 %
NEUTROPHILS # BLD AUTO: 19.8 K/UL
NEUTROPHILS NFR BLD: 85.2 %
PLATELET # BLD AUTO: 434 K/UL
PMV BLD AUTO: 10.4 FL
POCT GLUCOSE: 114 MG/DL (ref 70–110)
POCT GLUCOSE: 133 MG/DL (ref 70–110)
POCT GLUCOSE: 190 MG/DL (ref 70–110)
POCT GLUCOSE: 193 MG/DL (ref 70–110)
POCT GLUCOSE: 267 MG/DL (ref 70–110)
POTASSIUM SERPL-SCNC: 4.1 MMOL/L
RBC # BLD AUTO: 4.12 M/UL
SODIUM SERPL-SCNC: 139 MMOL/L
WBC # BLD AUTO: 23.67 K/UL

## 2017-02-01 PROCEDURE — G8979 MOBILITY GOAL STATUS: HCPCS | Mod: CH

## 2017-02-01 PROCEDURE — 25000003 PHARM REV CODE 250: Performed by: EMERGENCY MEDICINE

## 2017-02-01 PROCEDURE — 94640 AIRWAY INHALATION TREATMENT: CPT

## 2017-02-01 PROCEDURE — 97116 GAIT TRAINING THERAPY: CPT

## 2017-02-01 PROCEDURE — 85025 COMPLETE CBC W/AUTO DIFF WBC: CPT

## 2017-02-01 PROCEDURE — 63600175 PHARM REV CODE 636 W HCPCS: Performed by: EMERGENCY MEDICINE

## 2017-02-01 PROCEDURE — 97110 THERAPEUTIC EXERCISES: CPT

## 2017-02-01 PROCEDURE — 27000221 HC OXYGEN, UP TO 24 HOURS

## 2017-02-01 PROCEDURE — 25000003 PHARM REV CODE 250: Performed by: INTERNAL MEDICINE

## 2017-02-01 PROCEDURE — 25000003 PHARM REV CODE 250: Performed by: HOSPITALIST

## 2017-02-01 PROCEDURE — 94761 N-INVAS EAR/PLS OXIMETRY MLT: CPT

## 2017-02-01 PROCEDURE — 36415 COLL VENOUS BLD VENIPUNCTURE: CPT

## 2017-02-01 PROCEDURE — 80048 BASIC METABOLIC PNL TOTAL CA: CPT

## 2017-02-01 PROCEDURE — 63600175 PHARM REV CODE 636 W HCPCS: Performed by: INTERNAL MEDICINE

## 2017-02-01 PROCEDURE — 21400001 HC TELEMETRY ROOM

## 2017-02-01 PROCEDURE — G8980 MOBILITY D/C STATUS: HCPCS | Mod: CJ

## 2017-02-01 PROCEDURE — G8978 MOBILITY CURRENT STATUS: HCPCS | Mod: CJ

## 2017-02-01 RX ADMIN — ONDANSETRON 4 MG: 2 INJECTION INTRAMUSCULAR; INTRAVENOUS at 12:02

## 2017-02-01 RX ADMIN — DOCUSATE SODIUM AND SENNOSIDES 1 TABLET: 8.6; 5 TABLET, FILM COATED ORAL at 09:02

## 2017-02-01 RX ADMIN — MOXIFLOXACIN HYDROCHLORIDE 400 MG: 400 INJECTION, SOLUTION INTRAVENOUS at 12:02

## 2017-02-01 RX ADMIN — RIFABUTIN 150 MG: 150 CAPSULE ORAL at 08:02

## 2017-02-01 RX ADMIN — INSULIN ASPART 2 UNITS: 100 INJECTION, SOLUTION INTRAVENOUS; SUBCUTANEOUS at 09:02

## 2017-02-01 RX ADMIN — ENOXAPARIN SODIUM 40 MG: 100 INJECTION SUBCUTANEOUS at 12:02

## 2017-02-01 RX ADMIN — INSULIN ASPART 4 UNITS: 100 INJECTION, SOLUTION INTRAVENOUS; SUBCUTANEOUS at 12:02

## 2017-02-01 RX ADMIN — INSULIN ASPART 7 UNITS: 100 INJECTION, SOLUTION INTRAVENOUS; SUBCUTANEOUS at 05:02

## 2017-02-01 RX ADMIN — RAMIPRIL 5 MG: 2.5 CAPSULE ORAL at 09:02

## 2017-02-01 RX ADMIN — METOPROLOL SUCCINATE 50 MG: 50 TABLET, EXTENDED RELEASE ORAL at 09:02

## 2017-02-01 RX ADMIN — LEVALBUTEROL 0.63 MG: 0.63 SOLUTION RESPIRATORY (INHALATION) at 12:02

## 2017-02-01 RX ADMIN — FUROSEMIDE 20 MG: 20 TABLET ORAL at 09:02

## 2017-02-01 RX ADMIN — ACETAMINOPHEN 650 MG: 325 TABLET ORAL at 08:02

## 2017-02-01 RX ADMIN — BENZONATATE 200 MG: 100 CAPSULE ORAL at 05:02

## 2017-02-01 RX ADMIN — FLUTICASONE FUROATE AND VILANTEROL TRIFENATATE 1 PUFF: 100; 25 POWDER RESPIRATORY (INHALATION) at 08:02

## 2017-02-01 RX ADMIN — PREDNISONE 40 MG: 20 TABLET ORAL at 09:02

## 2017-02-01 RX ADMIN — INSULIN DETEMIR 20 UNITS: 100 INJECTION, SOLUTION SUBCUTANEOUS at 09:02

## 2017-02-01 RX ADMIN — AMLODIPINE BESYLATE 10 MG: 5 TABLET ORAL at 09:02

## 2017-02-01 RX ADMIN — LEVALBUTEROL 0.63 MG: 0.63 SOLUTION RESPIRATORY (INHALATION) at 07:02

## 2017-02-01 RX ADMIN — INSULIN ASPART 7 UNITS: 100 INJECTION, SOLUTION INTRAVENOUS; SUBCUTANEOUS at 12:02

## 2017-02-01 RX ADMIN — LEVALBUTEROL 0.63 MG: 0.63 SOLUTION RESPIRATORY (INHALATION) at 04:02

## 2017-02-01 RX ADMIN — BENZONATATE 200 MG: 100 CAPSULE ORAL at 08:02

## 2017-02-01 RX ADMIN — INSULIN ASPART 7 UNITS: 100 INJECTION, SOLUTION INTRAVENOUS; SUBCUTANEOUS at 09:02

## 2017-02-01 RX ADMIN — RIFABUTIN 150 MG: 150 CAPSULE ORAL at 09:02

## 2017-02-01 NOTE — PROGRESS NOTES
No complaint of pain, no apparent distress. resp even and nonlabored. All lung fields coarse and diminished. s1s2 audible in regular rhythm. aao x4. No skin breakdown noted. Telemetry sr. oob with assist only, verbalizes understanding, call bell in reach.

## 2017-02-01 NOTE — SUBJECTIVE & OBJECTIVE
Review of Systems   Constitutional: Positive for activity change. Negative for chills and fever.   HENT: Negative for congestion and dental problem.    Eyes: Negative for discharge and itching.   Respiratory: Positive for cough and shortness of breath.    Cardiovascular: Negative for chest pain.   Gastrointestinal: Negative for abdominal distention and abdominal pain.   Endocrine: Negative for cold intolerance and heat intolerance.   Genitourinary: Negative for difficulty urinating and dysuria.   Musculoskeletal: Negative for arthralgias.   Allergic/Immunologic: Negative for environmental allergies and food allergies.   Neurological: Negative for dizziness and facial asymmetry.   Hematological: Negative for adenopathy. Does not bruise/bleed easily.   Psychiatric/Behavioral: Negative for agitation and behavioral problems.     Objective:     Vital Signs (Most Recent):  Temp: 98.4 °F (36.9 °C) (02/01/17 1100)  Pulse: 82 (02/01/17 1100)  Resp: 18 (02/01/17 1100)  BP: (!) 143/87 (02/01/17 1100)  SpO2: 96 % (02/01/17 1100) Vital Signs (24h Range):  Temp:  [97.4 °F (36.3 °C)-98.4 °F (36.9 °C)] 98.4 °F (36.9 °C)  Pulse:  [60-96] 82  Resp:  [18-20] 18  SpO2:  [92 %-97 %] 96 %  BP: (125-159)/(71-87) 143/87     Weight: 74.8 kg (164 lb 14.4 oz)  Body mass index is 18.58 kg/(m^2).    Intake/Output Summary (Last 24 hours) at 02/01/17 1420  Last data filed at 02/01/17 1100   Gross per 24 hour   Intake              720 ml   Output                0 ml   Net              720 ml      Physical Exam   Constitutional: He is oriented to person, place, and time. No distress.   HENT:   Head: Atraumatic.   Eyes: Pupils are equal, round, and reactive to light.   Neck: Normal range of motion. Neck supple.   Cardiovascular: Normal rate.    Pulmonary/Chest: He has wheezes. He has no rales.   Accessory muscle use, pursed lip breathing, coarse throughout   Abdominal: Soft. Bowel sounds are normal.   Musculoskeletal: Normal range of motion. He  exhibits no edema or deformity.   Neurological: He is oriented to person, place, and time. No cranial nerve deficit. Coordination normal.   Skin: Skin is warm and dry. He is not diaphoretic.   Psychiatric: He has a normal mood and affect. His behavior is normal.       Significant Labs:     CBC:     Recent Labs  Lab 01/31/17 0449 02/01/17  0507   WBC 26.96* 23.67*   HGB 11.2* 12.1*   HCT 33.7* 35.6*   * 434*     CMP:     Recent Labs  Lab 01/31/17 0449 02/01/17  0507    139   K 4.0 4.1    104   CO2 26 25   * 177*   BUN 19 20   CREATININE 0.8 0.7   CALCIUM 8.8 8.6*   ANIONGAP 8 10   EGFRNONAA >60 >60     Lactic Acid: No results for input(s): LACTATE in the last 48 hours.  CXR reviewed

## 2017-02-01 NOTE — ASSESSMENT & PLAN NOTE
Continue with home medication Rifabutin, [place] [facility] [place] and [place].

## 2017-02-01 NOTE — PLAN OF CARE
Problem: Fall Risk (Adult)  Goal: Absence of Falls  Patient will demonstrate the desired outcomes by discharge/transition of care.   Outcome: Ongoing (interventions implemented as appropriate)    02/01/17 0006   Fall Risk (Adult)   Absence of Falls making progress toward outcome         Problem: Patient Care Overview  Goal: Plan of Care Review  Outcome: Ongoing (interventions implemented as appropriate)    02/01/17 0006   Coping/Psychosocial   Plan Of Care Reviewed With patient         Problem: ARDS (Acute Resp Distress Syndrome) (Adult)  Goal: Signs and Symptoms of Listed Potential Problems Will be Absent, Minimized or Managed (ARDS)  Signs and symptoms of listed potential problems will be absent, minimized or managed by discharge/transition of care (reference ARDS (Acute Resp Distress Syndrome) (Adult) CPG).  Outcome: Ongoing (interventions implemented as appropriate)    02/01/17 0006   ARDS (Acute Resp Distress Syndrome)   Problems Assessed (Acute Respiratory Distress Syndrome) all   Problems Present (Acute Respiratory Distress Syndrome) none

## 2017-02-01 NOTE — ASSESSMENT & PLAN NOTE
Appear to be multifactorial, COPD exacerbation and pneumonia. Now weaned off supplemental oxygen.

## 2017-02-01 NOTE — PROGRESS NOTES
[facility] Hospital Medicine  Progress Note    Patient Name: Regan Alvarez  MRN: [id number]  Patient Class: IP- Inpatient   Admission Date: 1/29/2017  Length of Stay: 3 days  Attending Physician: Suzi Clark MD  Primary Care Provider: Ishaan Adamson MD        Subjective:     Principal Problem:Pneumonia due to infectious organism    HPI:  Mr. Alvarez is a 75 yo man with DM Type II, HTN, COPD, Arthritis, and history of MAC, under treatment with Rifabutin  who came with C/O of 10 day history of SOB and non productive cough. He is taking antibiotics for recent diagnosis of MAC. He is not on home oxygen. He is a former smoker. On presentation he was found to have pneumonia with sepsis (leukocytosis, tachycardia, tachypnea).    Hospital Course:  He was started on IV Abx, supplemental oxygen, nebulizers and steroids. He is improving but still having increased work of breathing. His oxygen saturations have improved. He wishes to go home despite having to climb a flight of stairs, and I am concerned that he also plans to take care of his very ill wife. This was discussed at length.    Review of Systems   Constitutional: Positive for activity change. Negative for chills and fever.   HENT: Negative for congestion and dental problem.    Eyes: Negative for discharge and itching.   Respiratory: Positive for cough and shortness of breath.    Cardiovascular: Negative for chest pain.   Gastrointestinal: Negative for abdominal distention and abdominal pain.   Endocrine: Negative for cold intolerance and heat intolerance.   Genitourinary: Negative for difficulty urinating and dysuria.   Musculoskeletal: Negative for arthralgias.   Allergic/Immunologic: Negative for environmental allergies and food allergies.   Neurological: Negative for dizziness and facial asymmetry.   Hematological: Negative for adenopathy. Does not bruise/bleed easily.   Psychiatric/Behavioral: Negative for agitation and behavioral  problems.     Objective:     Vital Signs (Most Recent):  Temp: 98.4 °F (36.9 °C) (02/01/17 1100)  Pulse: 82 (02/01/17 1100)  Resp: 18 (02/01/17 1100)  BP: (!) 143/87 (02/01/17 1100)  SpO2: 96 % (02/01/17 1100) Vital Signs (24h Range):  Temp:  [97.4 °F (36.3 °C)-98.4 °F (36.9 °C)] 98.4 °F (36.9 °C)  Pulse:  [60-96] 82  Resp:  [18-20] 18  SpO2:  [92 %-97 %] 96 %  BP: (125-159)/(71-87) 143/87     Weight: 74.8 kg (164 lb 14.4 oz)  Body mass index is 18.58 kg/(m^2).    Intake/Output Summary (Last 24 hours) at 02/01/17 1420  Last data filed at 02/01/17 1100   Gross per 24 hour   Intake              720 ml   Output                0 ml   Net              720 ml      Physical Exam   Constitutional: He is oriented to person, place, and time. No distress.   HENT:   Head: Atraumatic.   Eyes: Pupils are equal, round, and reactive to light.   Neck: Normal range of motion. Neck supple.   Cardiovascular: Normal rate.    Pulmonary/Chest: He has wheezes. He has no rales.   Accessory muscle use, pursed lip breathing, coarse throughout   Abdominal: Soft. Bowel sounds are normal.   Musculoskeletal: Normal range of motion. He exhibits no edema or deformity.   Neurological: He is oriented to person, place, and time. No cranial nerve deficit. Coordination normal.   Skin: Skin is warm and dry. He is not diaphoretic.   Psychiatric: He has a normal mood and affect. His behavior is normal.       Significant Labs:     CBC:     Recent Labs  Lab 01/31/17 0449 02/01/17  0507   WBC 26.96* 23.67*   HGB 11.2* 12.1*   HCT 33.7* 35.6*   * 434*     CMP:     Recent Labs  Lab 01/31/17 0449 02/01/17  0507    139   K 4.0 4.1    104   CO2 26 25   * 177*   BUN 19 20   CREATININE 0.8 0.7   CALCIUM 8.8 8.6*   ANIONGAP 8 10   EGFRNONAA >60 >60     Lactic Acid: No results for input(s): LACTATE in the last 48 hours.  CXR reviewed    Assessment/Plan:      * Pneumonia due to infectious organism  Will continue with Avelox, follow with  blood culture, no recent hospitalizations.    Acute hypoxemic respiratory failure  Appear to be multifactorial, COPD exacerbation and pneumonia. Now weaned off supplemental oxygen.     COPD exacerbation  Change solumedrol to PO steroids. Cont nebulizer. Improving.    Diabetes mellitus type 2, uncontrolled  Will continue with SSI, prandial insulin and basal insulin.    Pulmonary Mycobacterium avium complex (MAC) infection  Continue with home medication Rifabutin, he is followed at [facility] by ID and pulmonology.    Essential hypertension  Continue home medication, poorly controlled; added Norvasc and prn IV Hydralazine.    Good candidate for priortiy clinic, however patient refusing because he prefers to see his PCP, Dr. Adamson.    VTE Risk Mitigation         Ordered     enoxaparin injection 40 mg  Daily     Route:  Subcutaneous        01/29/17 1518     Medium Risk of VTE  Once      01/29/17 1518     Place ZOEY hose  Until discontinued      01/29/17 1518          Suzi Clark MD  Department of Hospital Medicine   [facility]

## 2017-02-01 NOTE — PLAN OF CARE
[facility]  HOME  HEALTH ORDERS     02/01/2017    Admit to Home Health    Diagnoses:  Active Hospital Problems    Diagnosis  POA    *Pneumonia due to infectious organism [J18.9]  Yes     Priority: 1 - High    Acute hypoxemic respiratory failure [J96.01]  Yes     Priority: 2     COPD exacerbation [J44.1]  Yes     Priority: 3     Essential hypertension [I10]  Yes    Pulmonary Mycobacterium avium complex (MAC) infection [A31.0]  Yes     Chronic    Diabetes mellitus type 2, uncontrolled [E11.65]  Yes      Resolved Hospital Problems    Diagnosis Date Resolved POA    Sepsis [A41.9] 01/31/2017 Yes       Patient is homebound due to:  Pneumonia due to infectious organism    Allergies:Review of patient's allergies indicates:  No Known Allergies    Diet: 2000 ADA diet    Acitivities: As tolerated    Nursing:   SN to complete comprehensive assessment including routine vital signs. Instruct on disease process and s/s of complications to report to MD. Review/verify medication list sent home with the patient at time of discharge  and instruct patient/caregiver as needed. Frequency may be adjusted depending on start of care date.    Notify MD if SBP > 160 or < 90; DBP > 90 or < 50; HR > 120 or < 50; Temp > 101; O2 sat <88%    CONSULTS:      PT to evaluate and treat. Evaluate for home safety and equipment needs; Establish/upgrade home exercise program. Perform / instruct on therapeutic exercises, gait training, transfer training, and Range of Motion.    MSW to evaluate for community resources/long-range planning.     Aide to provide assistance with personal care, ADLs, and vital signs      DIABETES CARE:     SN to perform Diabetic management with blood glucose monitoring with each visit.  Report CBG < 60 or > 350 to physician.                                          Insulin Sliding Scale          Glucose  Novolog Insulin Subcutaneous        0 - 60   Orange juice or glucose tablet, hold  insulin      No insulin   201-250  2 units   251-300  4 units   301-350  6 units   351-400  8 units   >400   10 units then call physician              _________________________________  Suzi Clark MD  02/01/2017

## 2017-02-01 NOTE — NURSING
Pt lying in bed sleeping.  Pt awakens easily and is AOX3.  Pt denies pain/sob at rest.  Pt ambulated to the bathroom with a steady gain on RA.  Gave pt juice and grahm crackers for a snack.  Plan of care discussed with pt.  Advised pt to call for assistance.  Call light in reach, pt close to the nursing station.

## 2017-02-01 NOTE — PT/OT/SLP PROGRESS
Physical Therapy  Treatment    Regan Alvarez   MRN: [id number]   Admitting Diagnosis: Pneumonia due to infectious organism    PT Received On: 17  PT Start Time: 0935     PT Stop Time: 1000    PT Total Time (min): 25 min       Billable Minutes:  Gait Training 13 min and Therapeutic Exercise 12 min    Treatment Type: Treatment  PT/PTA: PT     PTA Visit Number: 0       General Precautions: Standard, fall, diabetic, respiratory    Subjective:    Pt reported still not feeling well.      Pain Ratin/10                   Objective:   Patient found with: peripheral IV, telemetry    Functional Mobility:  Bed Mobility:   Scooting/Bridging: Stand by Assistance  Supine to Sit: Stand by Assistance (HOB elevated)    Transfers:  Sit <> Stand Assistance: Contact Guard Assistance, Stand By Assistance  Sit <> Stand Assistive Device: No Assistive Device    Gait:   Gait Distance: 200 ft; Pt with weakness and SOB.  Pt with minial swaying, no LOB.  spO2 RA at rest 89-90% and spO2 RA during ambulation 91-92%; spO2 on 2L at rest 94-95%  Assistance 1: Contact Guard Assistance  Gait Assistive Device: No device, Multani rail  Gait Pattern: reciprocal  Gait Deviation(s): decreased step length, decreased isela      Balance:   Static Sit: FAIR+: Able to take MINIMAL challenges from all directions  Dynamic Sit: FAIR+: Maintains balance through MINIMAL excursions of active trunk motion  Static Stand: FAIR+: Takes MINIMAL challenges from all directions  Dynamic stand: FAIR+: Needs CLOSE SUPERVISION during gait and is able to right self with minor LOB     Therapeutic Activities and Exercises:  BUE seated therex 10 reps: shoulder flex, shoulder horizontal abd/add, and elbow flex/ext  BLE seated therex 10 reps: hip flex, LAQ, HS, and heel/toe raises       Patient left up in chair with all lines intact and call button in reach.  Place pt back on 2L O2 NC.  Tray table next to pt.    Assessment:    Rehab identified problem list/impairments:  Rehab identified problem list/impairments: weakness, impaired endurance, gait instability, impaired balance, impaired cardiopulmonary response to activity    Rehab potential is good.    Activity tolerance: Fair    Discharge recommendations: Discharge Facility/Level Of Care Needs: home health PT     Barriers to discharge: Barriers to Discharge: Inaccessible home environment, Other (Comment) (decreased endurance)    Equipment recommendations: Equipment Needed After Discharge: none     GOALS:   Physical Therapy Goals        Problem: Physical Therapy Goal    Goal Priority Disciplines Outcome Goal Variances Interventions   Physical Therapy Goal     PT/OT, PT      Description:  Goals to be met by: 17     Patient will increase functional independence with mobility by performin. Supine to sit with [place]  2. Rolling to Left and Right with [place]  3. Sit to stand transfer with [place]  4. Bed to chair transfer with [place]   5. Gait  x 500 feet with [place]   6. Ascend/descend 1 flight of stair with right Handrail Modified [place]  7. Lower extremity exercise program x 30 reps per handout, with independence                PLAN:    Patient to be seen 5 x/week (Mon-Fri)  to address the above listed problems via gait training, therapeutic activities, therapeutic exercises  Plan of Care expires: 17  Plan of Care reviewed with: patient         Patience Ortega, PT  2017

## 2017-02-01 NOTE — PLAN OF CARE
Problem: Physical Therapy Goal  Goal: Physical Therapy Goal  Goals to be met by: 17     Patient will increase functional independence with mobility by performin. Supine to sit with [place]  2. Rolling to Left and Right with [place]  3. Sit to stand transfer with [place]  4. Bed to chair transfer with [place]   5. Gait x 500 feet with [place]   6. Ascend/descend 1 flight of stair with right Handrail Modified [place]  7. Lower extremity exercise program x 30 reps per handout, with independence   Outcome: Ongoing (interventions implemented as appropriate)  Pt still with SOB and decreased endurance.

## 2017-02-02 VITALS
BODY MASS INDEX: 19.08 KG/M2 | RESPIRATION RATE: 18 BRPM | SYSTOLIC BLOOD PRESSURE: 108 MMHG | WEIGHT: 164.88 LBS | DIASTOLIC BLOOD PRESSURE: 66 MMHG | HEART RATE: 74 BPM | OXYGEN SATURATION: 94 % | HEIGHT: 78 IN | TEMPERATURE: 99 F

## 2017-02-02 LAB
ANION GAP SERPL CALC-SCNC: 8 MMOL/L
BASOPHILS # BLD AUTO: ABNORMAL K/UL
BASOPHILS NFR BLD: 0 %
BUN SERPL-MCNC: 14 MG/DL
CALCIUM SERPL-MCNC: 8.5 MG/DL
CHLORIDE SERPL-SCNC: 102 MMOL/L
CO2 SERPL-SCNC: 28 MMOL/L
CREAT SERPL-MCNC: 0.7 MG/DL
DIFFERENTIAL METHOD: ABNORMAL
EOSINOPHIL # BLD AUTO: ABNORMAL K/UL
EOSINOPHIL NFR BLD: 1 %
ERYTHROCYTE [DISTWIDTH] IN BLOOD BY AUTOMATED COUNT: 15.2 %
EST. GFR  (AFRICAN AMERICAN): >60 ML/MIN/1.73 M^2
EST. GFR  (NON AFRICAN AMERICAN): >60 ML/MIN/1.73 M^2
GLUCOSE SERPL-MCNC: 131 MG/DL
HCT VFR BLD AUTO: 35.6 %
HGB BLD-MCNC: 11.9 G/DL
LYMPHOCYTES # BLD AUTO: ABNORMAL K/UL
LYMPHOCYTES NFR BLD: 11 %
MCH RBC QN AUTO: 28.9 PG
MCHC RBC AUTO-ENTMCNC: 33.4 %
MCV RBC AUTO: 86 FL
MONOCYTES # BLD AUTO: ABNORMAL K/UL
MONOCYTES NFR BLD: 9 %
NEUTROPHILS NFR BLD: 77 %
NEUTS BAND NFR BLD MANUAL: 2 %
PLATELET # BLD AUTO: 428 K/UL
PMV BLD AUTO: 10.3 FL
POCT GLUCOSE: 141 MG/DL (ref 70–110)
POCT GLUCOSE: 157 MG/DL (ref 70–110)
POCT GLUCOSE: 243 MG/DL (ref 70–110)
POTASSIUM SERPL-SCNC: 3.6 MMOL/L
RBC # BLD AUTO: 4.12 M/UL
SODIUM SERPL-SCNC: 138 MMOL/L
WBC # BLD AUTO: 18.72 K/UL

## 2017-02-02 PROCEDURE — 94640 AIRWAY INHALATION TREATMENT: CPT

## 2017-02-02 PROCEDURE — 25000003 PHARM REV CODE 250: Performed by: HOSPITALIST

## 2017-02-02 PROCEDURE — 80048 BASIC METABOLIC PNL TOTAL CA: CPT

## 2017-02-02 PROCEDURE — 94761 N-INVAS EAR/PLS OXIMETRY MLT: CPT

## 2017-02-02 PROCEDURE — 25000003 PHARM REV CODE 250: Performed by: INTERNAL MEDICINE

## 2017-02-02 PROCEDURE — 27000221 HC OXYGEN, UP TO 24 HOURS

## 2017-02-02 PROCEDURE — 63600175 PHARM REV CODE 636 W HCPCS: Performed by: INTERNAL MEDICINE

## 2017-02-02 PROCEDURE — 36415 COLL VENOUS BLD VENIPUNCTURE: CPT

## 2017-02-02 PROCEDURE — 85007 BL SMEAR W/DIFF WBC COUNT: CPT

## 2017-02-02 PROCEDURE — 25000003 PHARM REV CODE 250: Performed by: EMERGENCY MEDICINE

## 2017-02-02 PROCEDURE — 63600175 PHARM REV CODE 636 W HCPCS: Performed by: EMERGENCY MEDICINE

## 2017-02-02 PROCEDURE — 85027 COMPLETE CBC AUTOMATED: CPT

## 2017-02-02 RX ORDER — MOXIFLOXACIN HYDROCHLORIDE 400 MG/1
400 TABLET ORAL DAILY
Qty: 30 TABLET | Refills: 11 | Status: SHIPPED | OUTPATIENT
Start: 2017-02-02 | End: 2017-02-04

## 2017-02-02 RX ORDER — INSULIN ASPART 100 [IU]/ML
1-10 INJECTION, SOLUTION INTRAVENOUS; SUBCUTANEOUS
Qty: 3 ML | Refills: 0 | Status: SHIPPED | OUTPATIENT
Start: 2017-02-02 | End: 2017-10-02 | Stop reason: ALTCHOICE

## 2017-02-02 RX ORDER — BENZONATATE 200 MG/1
200 CAPSULE ORAL 3 TIMES DAILY PRN
Qty: 30 CAPSULE | Refills: 0 | Status: SHIPPED | OUTPATIENT
Start: 2017-02-02 | End: 2017-02-12

## 2017-02-02 RX ORDER — AMLODIPINE BESYLATE 10 MG/1
10 TABLET ORAL DAILY
Qty: 30 TABLET | Refills: 11 | Status: SHIPPED | OUTPATIENT
Start: 2017-02-02 | End: 2017-02-07

## 2017-02-02 RX ADMIN — PREDNISONE 40 MG: 20 TABLET ORAL at 09:02

## 2017-02-02 RX ADMIN — FLUTICASONE FUROATE AND VILANTEROL TRIFENATATE 1 PUFF: 100; 25 POWDER RESPIRATORY (INHALATION) at 08:02

## 2017-02-02 RX ADMIN — RAMIPRIL 5 MG: 2.5 CAPSULE ORAL at 09:02

## 2017-02-02 RX ADMIN — RIFABUTIN 150 MG: 150 CAPSULE ORAL at 09:02

## 2017-02-02 RX ADMIN — LEVALBUTEROL 0.63 MG: 0.63 SOLUTION RESPIRATORY (INHALATION) at 12:02

## 2017-02-02 RX ADMIN — MOXIFLOXACIN HYDROCHLORIDE 400 MG: 400 INJECTION, SOLUTION INTRAVENOUS at 02:02

## 2017-02-02 RX ADMIN — METOPROLOL SUCCINATE 50 MG: 50 TABLET, EXTENDED RELEASE ORAL at 09:02

## 2017-02-02 RX ADMIN — INSULIN ASPART 7 UNITS: 100 INJECTION, SOLUTION INTRAVENOUS; SUBCUTANEOUS at 12:02

## 2017-02-02 RX ADMIN — FUROSEMIDE 20 MG: 20 TABLET ORAL at 09:02

## 2017-02-02 RX ADMIN — INSULIN ASPART 7 UNITS: 100 INJECTION, SOLUTION INTRAVENOUS; SUBCUTANEOUS at 04:02

## 2017-02-02 RX ADMIN — INSULIN DETEMIR 20 UNITS: 100 INJECTION, SOLUTION SUBCUTANEOUS at 09:02

## 2017-02-02 RX ADMIN — DOCUSATE SODIUM AND SENNOSIDES 1 TABLET: 8.6; 5 TABLET, FILM COATED ORAL at 09:02

## 2017-02-02 RX ADMIN — AMLODIPINE BESYLATE 10 MG: 5 TABLET ORAL at 09:02

## 2017-02-02 RX ADMIN — LEVALBUTEROL 0.63 MG: 0.63 SOLUTION RESPIRATORY (INHALATION) at 07:02

## 2017-02-02 RX ADMIN — BENZONATATE 200 MG: 100 CAPSULE ORAL at 05:02

## 2017-02-02 RX ADMIN — ENOXAPARIN SODIUM 40 MG: 100 INJECTION SUBCUTANEOUS at 11:02

## 2017-02-02 RX ADMIN — LEVALBUTEROL 0.63 MG: 0.63 SOLUTION RESPIRATORY (INHALATION) at 04:02

## 2017-02-02 RX ADMIN — INSULIN ASPART 7 UNITS: 100 INJECTION, SOLUTION INTRAVENOUS; SUBCUTANEOUS at 09:02

## 2017-02-02 RX ADMIN — ACETAMINOPHEN 650 MG: 325 TABLET ORAL at 09:02

## 2017-02-02 NOTE — ASSESSMENT & PLAN NOTE
Appear to be multifactorial, COPD exacerbation and pneumonia. O2 weaned during admission. Met requirements for home O2 on day of discharge.

## 2017-02-02 NOTE — PROGRESS NOTES
Order received for HH.  Pt offered list of HH providers from [facility] website.  Patient choice form completed, original to blue folder, copy to patient.  Pt requested [facility] choose provider.   Facesheet, Orders, H&P, med list and PT & OT eval sent to [facility] at [phone] via [facility].  Awaiting return call to confirm providers.      Order received for DME (O2). Facesheet, Orders and H&P sent to [facility] @ [phone].  Awaiting fax confirmation and return call to confirm equipment will be provided.    Informed pt that referrals had been submitted and once HH and DME he will be informed. Pt will need to wait for O2 to be delivered.    1:42p  Message left from Shara [phone] with [facility]. Referral forwarded to [facility] and will be a couple of hours before being processed.     3:01p  Informed by [facility] D[facility] that they were authorized to provide HH services.    3:39p  Request for transportation faxed to [facility] for authorization to transport pt to his home.

## 2017-02-02 NOTE — NURSING
Patient's O2 Sat on RA at rest= 85%; Patient's O2 Sat RA with exercise 82%; Patient's O2 Sat on 2 liters oxygen =94%. Harvinder

## 2017-02-02 NOTE — NURSING
Discharge instructions reviewed with patient. Encouraged to take medications as provided, return for complications, and attend all outpatient appointments. Education provided and discussed. Verbalized understandings. IV removed. Catheter intact. Awaiting home oxygen to arrive to hospital and ambulance for transportation to home. Harvinder

## 2017-02-02 NOTE — DISCHARGE SUMMARY
[facility] Hospital Medicine  Discharge Summary      Patient Name: Regan Alvarez  MRN: [id number]  Admission Date: 1/29/2017  Hospital Length of Stay: 4 days  Discharge Date and Time: 2/2/2017  Attending Physician: Suzi Clark MD   Discharging Provider: Suzi Clark MD  Primary Care Provider: Ishaan Adamson MD      HPI:   Mr. Alvarez is a 75 yo man with DM Type II, HTN, COPD, Arthritis, and history of MAC under treatment with Rifabutin who presented for a 10 day history of SOB and non productive cough. He is taking antibiotics for recent diagnosis of MAC. He is not on home oxygen. He is a former smoker. On presentation he was found to have pneumonia with sepsis (leukocytosis, tachycardia, tachypnea). Please see H&P for full detail.    Hospital Course:   He was started on IV Abx, supplemental oxygen, nebulizers and steroids. He gradually improved but still had increased work of breathing. His oxygen saturations improved (at times 93% with ambulation on room air) but he intermittently fell to ~85% at rest qualifying him for home O2. He felt close to baseline and wanted to go home x48 hours prior to me agreeing to discharge him. I'm concerned that he is very chronically deconditioned. He lives with his disabled wife and does not have children that live nearby. We discussed long term placement and Mr. Alvarez and his wife are considering their options, but he wishes to return home at this time. We offered to arrange [facility] with [facility] however Mr. Alvarez wanted to see Dr. Adamson, his PCP, instead (scheduled for 2/7/2017). Ambulance was arranged to transport him home as he has to climb a flight of stairs to get into his home. HH for PT/SW/Aid ordered at discharge.       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 BCx NGTD. Leukocytosis decreasing prior to discharge, still elevated due to steroids. Tmax during admission 99.3. CXR on admission with slightly increased right lower lung opacification, possible pneumonitis or asymmetric pulmonary edema, but not significantly different from previous CXRs. He completed 5 days of steroids while admitted. He will complete a 7 day course with PO moxifloxacin after discharge.     Acute hypoxemic respiratory failure  Appear to be multifactorial, COPD exacerbation and pneumonia. O2 weaned during admission. Met requirements for home O2 on day of discharge.     COPD exacerbation?  No PFTs on file. I can't see previous pulm notes. CXR hyperinflated and clinically appears to have COPD. Treated with solumedrol the switched to PO steroids to complete 5 days. Cont nebulizers.    Diabetes mellitus type 2, uncontrolled  SSI QAC during admission. Resume oral meds after DC and have HH check glucose and administer SSI. Follow up with Dr. Adamson. Will need A1c 3 months after affects of steroids have cleared.    Pulmonary Mycobacterium avium complex (MAC) infection  Continued home medication Rifabutin, he is followed at [facility] by ID.    Essential hypertension  Continued home medication; added Norvasc.      Discharged Condition: stable    Disposition: Home-Health Care [facility]    Follow Up:  Follow-up Information     Follow up with Ishaan Adamson MD On 2/7/2017.    Specialty:  Internal Medicine    Why:  Outpatient Services: Appointment scheduled for  "1:00pm    Contact information:    [address]  [address]  [place] [address]  [phone]          Patient Instructions:     OXYGEN FOR HOME USE   Order Specific Question Answer Comments   Liter Flow 2    Duration Continuous    Qualifying SpO2: 85%    Testing done at: Rest    Route nasal cannula    Portable mode: continuous    Device home concentrator with portable unit    Length of need (in months): 99 mos    Patient condition with qualifying saturation COPD    Height: 6' 7" (2.007 m)    Weight: 74.8 kg (164 lb 14.4 oz)    Does patient have medical equipment at home? glucometer inhalers   Alternative treatment measures have been tried or considered and deemed clinically ineffective. Yes      Diet Diabetic 2200 Calories     Activity as tolerated     Call MD for:  temperature >100.4     Call MD for:  persistent nausea and vomiting or diarrhea     Call MD for:  severe uncontrolled pain     Call MD for:  redness, tenderness, or signs of infection (pain, swelling, redness, odor or green/yellow discharge around incision site)     Call MD for:  severe persistent headache     Call MD for:  worsening rash     Call MD for:  persistent dizziness, light-headedness, or visual disturbances     Call MD for:  increased confusion or weakness       Medications:  Reconciled Home Medications:   Current Discharge Medication List      START taking these medications    Details   amlodipine (NORVASC) 10 MG tablet Take 1 tablet (10 mg total) by mouth once daily.  Qty: 30 tablet, Refills: 11      benzonatate (TESSALON) 200 MG capsule Take 1 capsule (200 mg total) by mouth 3 (three) times daily as needed for Cough.  Qty: 30 capsule, Refills: 0      insulin aspart (NOVOLOG) 100 unit/mL InPn pen Inject 1-10 Units into the skin before meals and at bedtime as needed (Hyperglycemia).  Qty: 3 mL, Refills: 0         CONTINUE these medications which have CHANGED    Details   moxifloxacin (AVELOX) 400 mg tablet Take 1 tablet (400 mg total) by " mouth once daily.  Qty: 30 tablet, Refills: 11    Associated Diagnoses: Mycobacterium avium complex         CONTINUE these medications which have NOT CHANGED    Details   albuterol 90 mcg/actuation inhaler Inhale 2 puffs into the lungs every 6 (six) hours as needed for Wheezing.  Qty: 1 Inhaler, Refills: 2    Associated Diagnoses: Chronic obstructive pulmonary disease, unspecified COPD type      aspirin 325 MG tablet Take 325 mg by mouth every 4 (four) hours as needed for Pain.      blood sugar diagnostic (FREESTYLE LITE STRIPS) Strp 1 strip by Misc.(Non-Drug; Combo Route) route once daily.  Qty: 100 strip, Refills: 6    Associated Diagnoses: Type 2 diabetes mellitus      fluticasone-salmeterol 250-50 mcg/dose (ADVAIR) 250-50 mcg/dose diskus inhaler Inhale 1 puff into the lungs 2 (two) times daily.  Qty: 60 each, Refills: 4    Associated Diagnoses: Chronic obstructive pulmonary disease, unspecified COPD type      furosemide (LASIX) 20 MG tablet Take 1 tablet (20 mg total) by mouth once daily.  Qty: 30 tablet, Refills: 4    Associated Diagnoses: Chronic systolic heart failure      glipiZIDE (GLUCOTROL) 5 MG tablet Take 1 tablet (5 mg total) by mouth 2 (two) times daily before meals.  Qty: 180 tablet, Refills: 2    Associated Diagnoses: Diabetes mellitus type 2, uncontrolled      lancets (FREESTYLE LANCETS) Misc 1 each by Misc.(Non-Drug; Combo Route) route once daily.  Qty: 100 each, Refills: 6    Associated Diagnoses: Type 2 diabetes mellitus      metformin (GLUCOPHAGE) 1000 MG tablet Take 1 tablet (1,000 mg total) by mouth 2 (two) times daily with meals.  Qty: 180 tablet, Refills: 3    Associated Diagnoses: Diabetes mellitus type 2, uncontrolled      metoprolol succinate (TOPROL-XL) 50 MG 24 hr tablet Take 1 tablet (50 mg total) by mouth once daily.  Qty: 30 tablet, Refills: 11      multivitamin capsule Take 1 capsule by mouth once daily.      promethazine-codeine 6.25-10 mg/5 ml (PHENERGAN WITH CODEINE) 6.25-10  mg/5 mL syrup Take 5 mLs by mouth every 12 (twelve) hours as needed for Cough.  Qty: 120 mL, Refills: 0    Associated Diagnoses: SOB (shortness of breath)      ramipril (ALTACE) 2.5 MG capsule Take 1 capsule (2.5 mg total) by mouth once daily.  Qty: 30 capsule, Refills: 4    Associated Diagnoses: Chronic systolic heart failure      rifabutin (MYCOBUTIN) 150 mg Cap Take 1 capsule (150 mg total) by mouth every 12 (twelve) hours.  Qty: 60 capsule, Refills: 11    Associated Diagnoses: MAC (mycobacterium avium-intracellulare complex)         STOP taking these medications       predniSONE (DELTASONE) 20 MG tablet Comments:   Reason for Stopping:             Time spent on the discharge of patient: 50 minutes    Suzi Clark MD  Department of Hospital Medicine  [facility]

## 2017-02-02 NOTE — PROGRESS NOTES
Phone call from [facility] with Ernst MARIN. Requesting results of TB test done since it was mentioned in the H&P report by Dr. Leeann lees 1/30/17. No record found of TB test in lab back to 5/23/12. Spoke to attending nurse Velma who did not find any record of test done. Asked pt if he remembered having a test done and he did not. Returned call to [facility] and informed her of the above.

## 2017-02-02 NOTE — PLAN OF CARE
Problem: Fall Risk (Adult)  Goal: Absence of Falls  Patient will demonstrate the desired outcomes by discharge/transition of care.   Outcome: Ongoing (interventions implemented as appropriate)    02/01/17 2247   Fall Risk (Adult)   Absence of Falls making progress toward outcome         Problem: Patient Care Overview  Goal: Plan of Care Review  Outcome: Ongoing (interventions implemented as appropriate)    02/01/17 2247   Coping/Psychosocial   Plan Of Care Reviewed With patient         Problem: ARDS (Acute Resp Distress Syndrome) (Adult)  Goal: Signs and Symptoms of Listed Potential Problems Will be Absent, Minimized or Managed (ARDS)  Signs and symptoms of listed potential problems will be absent, minimized or managed by discharge/transition of care (reference ARDS (Acute Resp Distress Syndrome) (Adult) CPG).   Outcome: Ongoing (interventions implemented as appropriate)    02/01/17 2247   ARDS (Acute Resp Distress Syndrome)   Problems Assessed (Acute Respiratory Distress Syndrome) all   Problems Present (Acute Respiratory Distress Syndrome) none

## 2017-02-02 NOTE — PLAN OF CARE
02/02/17 1117   Medicare Message   Important Message from Medicare regarding Discharge Appeal Rights Given to patient/caregiver;Explained to patient/caregiver;Signed/date by patient/caregiver   Date IMM was signed 02/02/17   Time IMM was signed 1111

## 2017-02-02 NOTE — PROGRESS NOTES
Call placed to [facility] inquiring about the O2 and transportation requests. [facility] will be delivering the O2 and the nurse is reviewing the transportation request. The nurse will call back regarding the status.    Transportation request denied. Offered pt a bed to put downstairs. Pt declined since he does not have acces to the bathroom. Pt stated that he has adult male relatives that can help him with the stairs. Informed Dr. Kelly Pretty with the pt's plan. Transport cancelled.    Called [facility]. O2 should be delivered in approximately 30 minutes. Informed the nurse and the pt. Pt calling his dtr to pick him up.

## 2017-02-02 NOTE — ASSESSMENT & PLAN NOTE
Continued home medication Rifabutin, he is followed at [facility] by ID and pulmonology.

## 2017-02-02 NOTE — PROGRESS NOTES
SW called [facility] ambulance for transport as requested by MD (medically neccessary)  @ [phone].  All pt's insurance information provided.  Request put on Will Call - awaiting [facility]'s authorization.  NurseAlan informed of the delay.    Informed attendiing nurse Phillips that we are awaiting the authorization for the O2 and the transportation. The envelope to be given to the  is placed in the pt's folder slot at the nurse's station.

## 2017-02-02 NOTE — ASSESSMENT & PLAN NOTE
SSI QAC during admission. Resume oral meds after DC and have HH check glucose and administer SSI. Follow up with Dr. Adamson. Will need A1c 3 months after affects of steroids have cleared.

## 2017-02-02 NOTE — PROGRESS NOTES
WRITTEN DISCHARGE INSTRUCTIONS    Follow-up Information     Follow up with Ishaan Adamson MD On 2/7/2017.    Specialty:  Internal Medicine    Why:  Outpatient Services: Appointment scheduled for 1:00pm    Contact information:    [facility] [place] [address]  [phone]            Thank you for choosing [facility] for your care.  Within 48-72 hours after leaving the hospital you will receive a call from [facility] Nurses following up to see how you are doing.  The team will ask you a few questions and the call will last approximately 20 minutes.    Please answer any calls you may receive from [facility].  We want to continue to support you as you manage your healthcare needs.  [facility] is happy to have the opportunity to serve you.    Sincerely,  [facility],  DAQUAN Tabares, [facility]  [phone]

## 2017-02-02 NOTE — PROGRESS NOTES
"Called to bedside to assess patient respiratory status. Patient on 2L NC with sats of 95% with oswaldo exp wheezing which was same as previous assessment. Patient stated "i'm just exhausted and need some rest" patient also complained of back and leg pain which was reported to his nurse.  "

## 2017-02-02 NOTE — DISCHARGE INSTRUCTIONS
Discharge Instructions: COPD  You have been diagnosed with chronic obstructive pulmonary disease (COPD). This is a name given to a group of diseases that limit the flow of air in and out of your lungs. This makes it harder to breathe. With COPD, you are also more likely to get lung infections. COPD includes chronic bronchitis and emphysema. COPD is most often caused by heavy, long-term cigarette smoking.  Home care  Quit smoking  · If you smoke, quit. It is the best thing you can do for your COPD and your overall health.  · Join a stop-smoking program. There are even telephone, text message, and Internet programs to help you quit.  · Ask your healthcare provider about medicines or other methods to help you quit.  · Ask family members to quit smoking as well.  · Don't allow people to smoke in your home, in your car, or when they are around you.  Protect yourself from infection  · Wash your hands often. Do your best to keep your hands away from your face. Most germs are spread from your hands to your mouth.  · Get a flu shot every year. Also ask your provider about pneumonia vaccines.  · Avoid crowds. It's especially important to do this in the winter when more people have colds and flu.  · To stay healthy, get enough sleep, exercise regularly, and eat a balanced diet. You should:  ¨ Get about 8 hours of sleep every night.  ¨ Try to exercise for at least 30 minutes on most days.  ¨ Have healthy foods including fruits and vegetables, 100% whole grains, lean meats and fish, and low-fat dairy products. Try to stay away from foods high in fats and sugar.  Take your medicines  Take your medicines exactly as directed. Don't skip doses.  Manage your stress  Stress can make COPD worse. Use this stress management technique:  · Find a quiet place and sit or lie in a comfortable position.  · Close your eyes and perform breathing exercises for several minutes. Ask your provider about the best way to breathe.  Pulmonary  rehabilitation  · Pulmonary rehab can help you feel better. These programs include exercise, breathing techniques, information about COPD, counseling, and help for smokers.  · Ask your provider or your local hospital about programs in your area.  When to call your healthcare provider  Call your provider immediately if you have any of the following:  · Shortness of breath, wheezing, or coughing  · Increased mucus  · Yellow, green, bloody, or smelly mucus  · Fever or chills  · Tightness in your chest that does not go away with rest or medicine  · An irregular heartbeat or a feeling that your heart is beating very fast  · Swollen ankles   © 6395-0427 [employer]. [address] [place] [address], [place] [address]. All rights reserved. This information is not intended as a substitute for professional medical care. Always follow your healthcare professional's instructions.

## 2017-02-02 NOTE — ASSESSMENT & PLAN NOTE
On IV moxifloxacin during admission. BCx NGTD. Leukocytosis decreasing prior to discharge, still elevated due to steroids. Tmax during admission 99.3. CXR on admission with slightly increased right lower lung opacification, possible pneumonitis or asymmetric pulmonary edema, but not significantly different from previous CXRs. He completed 5 days of steroids while admitted. He will complete a 7 day course with PO moxifloxacin after discharge.

## 2017-02-02 NOTE — NURSING
Pt sitting on side of bed AAOx3.  Resp even NL at rest.  Pt c/o HA.  Gave pt a cup of coffee.  Pt denies pain/sob.  Plan of care discussed with pt.  Advised pt to call for assistance.  Call light in reach.

## 2017-02-03 LAB
BACTERIA BLD CULT: NORMAL
BACTERIA BLD CULT: NORMAL

## 2017-02-03 RX ORDER — SYRINGE,SAFETY WITH NEEDLE,1ML 25GX1"
SYRINGE (EA) MISCELLANEOUS
Refills: 0 | Status: CANCELLED | OUTPATIENT
Start: 2017-02-03

## 2017-02-03 RX ORDER — LANCETS 28 GAUGE
1 EACH MISCELLANEOUS 3 TIMES DAILY
Qty: 300 EACH | Refills: 3 | Status: SHIPPED | OUTPATIENT
Start: 2017-02-03 | End: 2018-03-06 | Stop reason: SDUPTHER

## 2017-02-03 RX ORDER — DEXTROSE 4 G
TABLET,CHEWABLE ORAL
Qty: 1 EACH | Refills: 0 | Status: SHIPPED | OUTPATIENT
Start: 2017-02-03 | End: 2018-03-06 | Stop reason: SDUPTHER

## 2017-02-03 NOTE — TELEPHONE ENCOUNTER
Spoke with Nallely with n. She states that patient was discharged from the hospital and on discharge summary, there is an order for Novolog on the discharge summary.     She would like to know if patient is supposed to continue this medication.     Patient has old glucometer.     He needs new order for glucometer and testing supplies faxed to N.     If patient needs to inject insulin, he will need order for insulin syringes sent to the pharmacy and will need instructions for insulin dosing, sliding scale    Will address insulin at office visit 2-7-17

## 2017-02-03 NOTE — PT/OT/SLP DISCHARGE
Physical Therapy Discharge Summary    Regan Alvarez  MRN: [id number]   Pneumonia due to infectious organism   Patient Discharged from acute Physical Therapy on 17.  Please refer to prior PT notes for functional status.     Assessment:   Patient has not met goals.  GOALS:   Physical Therapy Goals        Problem: Physical Therapy Goal    Goal Priority Disciplines Outcome Goal Variances Interventions   Physical Therapy Goal     PT/OT, PT Ongoing (interventions implemented as appropriate)     Description:  Goals to be met by: 17     Patient will increase functional independence with mobility by performin. Supine to sit with [place]  2. Rolling to Left and Right with [place]  3. Sit to stand transfer with [place]  4. Bed to chair transfer with [place]   5. Gait  x 500 feet with [place]   6. Ascend/descend 1 flight of stair with right Handrail Modified [place]  7. Lower extremity exercise program x 30 reps per handout, with independence              Reasons for Discontinuation of Therapy Services  Transfer to alternate level of care.      Plan:  Patient Discharged to: Home with Home Health Service.

## 2017-02-03 NOTE — PROGRESS NOTES
[facility] appointment not scheduled. As mentioned in bed huddle, pt not interested in being seen in clinic.

## 2017-02-03 NOTE — NURSING
Home oxygen arrived. Patient off unit to home with home oxygen and daughter via wheelchair. Harvinder

## 2017-02-06 ENCOUNTER — PATIENT OUTREACH (OUTPATIENT)
Dept: ADMINISTRATIVE | Facility: CLINIC | Age: 77
End: 2017-02-06
Payer: MEDICARE

## 2017-02-06 NOTE — PROGRESS NOTES
C3 nurse contacted [facility] to check when nurse would be by to see him; Nurse will call pt and is aware of reviewing medication again and check on his knowledge with insulin pen and sl scale.  Contacted pt and left msg. Will call him back shortly.  Tried to contact pt again and no answer.   Contacted Alix Anderson(2nd # on file) and left message.

## 2017-02-06 NOTE — PATIENT INSTRUCTIONS
Discharge Instructions for Pneumonia  You have been diagnosed with pneumonia, a serious lung infection. Most cases of pneumonia are caused by bacteria. Pneumonia most often occurs in older adults, young children, and people with chronic health problems.  Home Care  Take your medication exactly as directed. Dont skip doses. Continue taking your antibiotics as directed until they are all gone--even if you start to feel better. This will prevent the pneumonia from coming back.  Drink at least 8 glasses of water daily, unless directed otherwise. This helps to loosen and thin secretions so that you can cough them up.  Use a cool-mist humidifier in your bedroom. Be sure to clean the humidifier daily.  Coughing up mucus is normal. Dont use medications to suppress your cough unless your cough is dry, painful, or interferes with your sleep. You may use an expectorant if ordered by your doctor.  Warm compresses or a moist heating pad on the lowest setting can be used to relieve chest discomfort. Use several times a day for 15-20 minutes at a time. (To prevent injuring your skin, be sure the temperature of the compress or heating pad is warm, not hot.)  Get plenty of rest until your fever, shortness of breath, and chest pain go away.  Plan to get a flu shot every year.  Ask your doctor about a pneumonia vaccination.  Follow-Up  Make a follow-up appointment as directed by our staff.    When to Seek Medical Attention  Call 911 right away if you have any of the following:  Chest pain  Trouble breathing  Blue lips or fingernails  Otherwise, call your doctor if you have any of the following:  Fever above 100.4°F  Yellow, green, bloody, or smelly sputum  More than normal mucus production  Vomiting   © 1735-4819 [employer], [address] [place] [address], [place] [address]. All rights reserved. This information is not intended as a substitute for professional medical care. Always follow your healthcare professional's instructions.

## 2017-02-07 ENCOUNTER — TELEPHONE (OUTPATIENT)
Dept: FAMILY MEDICINE | Facility: CLINIC | Age: 77
End: 2017-02-07

## 2017-02-07 ENCOUNTER — OFFICE VISIT (OUTPATIENT)
Dept: FAMILY MEDICINE | Facility: CLINIC | Age: 77
End: 2017-02-07
Payer: MEDICARE

## 2017-02-07 VITALS
RESPIRATION RATE: 20 BRPM | HEART RATE: 100 BPM | TEMPERATURE: 98 F | OXYGEN SATURATION: 87 % | HEIGHT: 78 IN | SYSTOLIC BLOOD PRESSURE: 98 MMHG | WEIGHT: 166 LBS | BODY MASS INDEX: 19.21 KG/M2 | DIASTOLIC BLOOD PRESSURE: 70 MMHG

## 2017-02-07 DIAGNOSIS — R09.02 HYPOXIA: ICD-10-CM

## 2017-02-07 DIAGNOSIS — E11.65 UNCONTROLLED TYPE 2 DIABETES MELLITUS WITH HYPERGLYCEMIA, WITHOUT LONG-TERM CURRENT USE OF INSULIN: ICD-10-CM

## 2017-02-07 DIAGNOSIS — J41.0 SIMPLE CHRONIC BRONCHITIS: Primary | ICD-10-CM

## 2017-02-07 DIAGNOSIS — A31.0 PULMONARY MYCOBACTERIUM AVIUM COMPLEX (MAC) INFECTION: Chronic | ICD-10-CM

## 2017-02-07 DIAGNOSIS — R06.02 SOB (SHORTNESS OF BREATH): ICD-10-CM

## 2017-02-07 DIAGNOSIS — I50.22 CHRONIC SYSTOLIC HEART FAILURE: ICD-10-CM

## 2017-02-07 PROCEDURE — 99999 PR PBB SHADOW E&M-EST. PATIENT-LVL III: CPT | Mod: PBBFAC,,, | Performed by: INTERNAL MEDICINE

## 2017-02-07 RX ORDER — FUROSEMIDE 20 MG/1
20 TABLET ORAL DAILY
Qty: 30 TABLET | Refills: 4 | Status: SHIPPED | OUTPATIENT
Start: 2017-02-07 | End: 2017-03-15

## 2017-02-07 RX ORDER — PREDNISONE 20 MG/1
20 TABLET ORAL DAILY
COMMUNITY
End: 2017-03-15

## 2017-02-07 RX ORDER — ASPIRIN 81 MG/1
81 TABLET ORAL DAILY
COMMUNITY
End: 2017-03-06 | Stop reason: DRUGHIGH

## 2017-02-07 NOTE — PROGRESS NOTES
"Subjective:       Patient ID: Regan Alvarez is a 76 y.o. male.    Chief Complaint: Hospital Follow Up and Medication Management (discuss all meds )    HPI Comments: Hospital follow up    HPI: 75 y/o w/ DM COPD, chronic MAC presents for follow up of recent hospitalization for worsening SOB. Treated for CAP with total of 14 days of fluroquinolone therapy and seven days of  Steroid. Was persistantly hypoxic off O2 and was discharged home with oxygen. Today feels "better" still with night time coughing. He is using oxygen in home but not when leaving home. No fevers. Cough is non productive. Was given script for novolog insulin due to recurrent elevated glucose on steroids. Has been using 3 units before meals blood glucose consistently >200. No hypoglycemic symptoms. He lives with his wife who requires assistance with all ehr ADL's. He admits to difficulty in keeping up with maintenance of his house and care for his wife    Review of Systems   Constitutional: Negative for activity change, fever and unexpected weight change.   HENT: Negative for congestion, rhinorrhea, sore throat and trouble swallowing.    Eyes: Negative for photophobia and redness.   Respiratory: Positive for cough and shortness of breath. Negative for chest tightness and wheezing.    Cardiovascular: Negative for chest pain, palpitations and leg swelling.   Gastrointestinal: Negative for abdominal pain, blood in stool, constipation, diarrhea, nausea and vomiting.   Endocrine: Negative for cold intolerance, heat intolerance and polyuria.   Genitourinary: Negative for decreased urine volume, difficulty urinating, dysuria and urgency.   Musculoskeletal: Negative for arthralgias and back pain.   Skin: Negative for rash.   Neurological: Negative for dizziness, syncope, weakness and headaches.   Psychiatric/Behavioral: Negative for dysphoric mood, sleep disturbance and suicidal ideas.       Objective:     Vitals:    02/07/17 1300   BP: 98/70   BP " "Location: Left arm   Patient Position: Sitting   BP Method: Manual   Pulse: 100   Resp: 20   Temp: 97.8 °F (36.6 °C)   TempSrc: Oral   SpO2: (!) 87%   Weight: 75.3 kg (166 lb)   Height: 6' 7" (2.007 m)          Physical Exam   Constitutional: He is oriented to person, place, and time. He appears well-developed and well-nourished.   HENT:   Head: Normocephalic and atraumatic.   Eyes: Conjunctivae are normal. Pupils are equal, round, and reactive to light.   Neck: Normal range of motion.   Cardiovascular: Normal rate and regular rhythm.  Exam reveals no gallop and no friction rub.    No murmur heard.  Pulmonary/Chest: Effort normal. No respiratory distress. He has wheezes. He has no rales.   Abdominal: Soft. Bowel sounds are normal. There is no tenderness. There is no rebound and no guarding.   Musculoskeletal: Normal range of motion. He exhibits no edema or tenderness.   Neurological: He is alert and oriented to person, place, and time. No cranial nerve deficit.   Skin: Skin is warm and dry.   Psychiatric: He has a normal mood and affect.     Transitional Care Note    Family and/or Caretaker present at visit?  No.  Diagnostic tests reviewed/disposition: No diagnosic tests pending after this hospitalization.  Disease/illness education: COPD chornic pulmonaryh MAC, diabetes  Home health/community services discussion/referrals: Patient has home health established at [facility].   Establishment or re-establishment of referral orders for community resources: No other necessary community resources.   Discussion with other health care providers: No discussion with other health care providers necessary.           Assessment:       1. Simple chronic bronchitis    2. Chronic systolic heart failure    3. Pulmonary Mycobacterium avium complex (MAC) infection    4. SOB (shortness of breath)    5. Hypoxia    6. Uncontrolled type 2 diabetes mellitus with hyperglycemia, without long-term current use of insulin        Plan:    " 1/3/4/5. Worsening pulmonary function now on home O2, suspect he is going to require this indefinitely due to apical scarring. Long discussion today on need for more assistance around the home. He has started to look into nursing home nad [facility] care for he and his wife. Social work consult from home h ealth to assist with this. BP on low end stop amlodipine    2. euvolemic continue low dose diuretic     6. Increase pre meal insulin to 5 units, the pens prescribed for him are cost prohibitive will do better with basal insulin but will use that which he has already purchased first return in two weeks with log to determine basal dosing

## 2017-02-07 NOTE — PATIENT INSTRUCTIONS
Stop amlodipine    Increase insulin to 5 units before every meal (three times per day)    Bring glucose meter to next appointment

## 2017-02-07 NOTE — MR AVS SNAPSHOT
[facility]  [address]  [place] [address]  Phone: [phone]                  Regan Owensbrook   2017 1:00 PM   Office Visit    Description:  Male : 1940   Provider:  Ishaan Adamson MD   Department:  [facility]           Reason for Visit     Hospital Follow Up     Medication Management           Diagnoses this Visit        Comments    Chronic systolic heart failure                To Do List           Goals (5 Years of Data)     None      Follow-Up and Disposition     Return in about 2 weeks (around 2017).       These Medications        Disp Refills Start End    furosemide (LASIX) 20 MG tablet 30 tablet 4 2017    Take 1 tablet (20 mg total) by mouth once daily. - Oral    [id number] [place] [address] AT [facility] #: [phone]         [facility] On [facility]     [facility] On Call Nurse Care Line -  Assistance  Registered nurses in the [facility] On Call Center provide clinical advisement, health education, appointment booking, and other advisory services.  Call for this free service at [phone].             Medications           Message regarding Medications     Verify the changes and/or additions to your medication regime listed below are the same as discussed with your clinician today.  If any of these changes or additions are incorrect, please notify your healthcare provider.        STOP taking these medications     aspirin 325 MG tablet Take 325 mg by mouth every 4 (four) hours as needed for Pain.    amlodipine (NORVASC) 10 MG tablet Take 1 tablet (10 mg total) by mouth once daily.    metoprolol succinate (TOPROL-XL) 50 MG 24 hr tablet Take 1 tablet (50 mg total) by mouth once daily.           Verify that the below list of medications is an accurate representation of the medications you are currently taking.  If none reported, the list may be blank. If incorrect,  "please contact your healthcare provider. Carry this list with you in case of emergency.           Current Medications     aspirin (ECOTRIN) 81 MG EC tablet Take 81 mg by mouth once daily.    benzonatate (TESSALON) 200 MG capsule Take 1 capsule (200 mg total) by mouth 3 (three) times daily as needed for Cough.    blood sugar diagnostic (TRUE METRIX GLUCOSE TEST STRIP) Strp Test blood sugar 3 times daily    blood-glucose meter (TRUE METRIX AIR GLUCOSE METER) Misc Test blood sugar 3 times daily    lancets 28 gauge Misc 1 lancet by Misc.(Non-Drug; Combo Route) route 3 (three) times daily. True Metrix lancets    multivitamin capsule Take 1 capsule by mouth once daily.    predniSONE (DELTASONE) 20 MG tablet Take 20 mg by mouth once daily. Take two tablets daily    rifabutin (MYCOBUTIN) 150 mg Cap Take 1 capsule (150 mg total) by mouth every 12 (twelve) hours.    albuterol 90 mcg/actuation inhaler Inhale 2 puffs into the lungs every 6 (six) hours as needed for Wheezing.    fluticasone-salmeterol 250-50 mcg/dose (ADVAIR) 250-50 mcg/dose diskus inhaler Inhale 1 puff into the lungs 2 (two) times daily.    furosemide (LASIX) 20 MG tablet Take 1 tablet (20 mg total) by mouth once daily.    glipiZIDE (GLUCOTROL) 5 MG tablet Take 1 tablet (5 mg total) by mouth 2 (two) times daily before meals.    insulin aspart (NOVOLOG) 100 unit/mL InPn pen Inject 1-10 Units into the skin before meals and at bedtime as needed (Hyperglycemia).    metformin (GLUCOPHAGE) 1000 MG tablet Take 1 tablet (1,000 mg total) by mouth 2 (two) times daily with meals.    ramipril (ALTACE) 2.5 MG capsule Take 1 capsule (2.5 mg total) by mouth once daily.           Clinical Reference Information           Your Vitals Were     BP Pulse Temp Resp Height Weight    98/70 (BP Location: Left arm, Patient Position: Sitting, BP Method: Manual) 100 97.8 °F (36.6 °C) (Oral) 20 6' 7" (2.007 m) 75.3 kg (166 lb)    SpO2 BMI             87% 18.7 kg/m2         Blood Pressure  "         Most Recent Value    BP  98/70      Allergies as of 2/7/2017     No Known Allergies      Immunizations Administered on Date of Encounter - 2/7/2017     None      [facility] Sign-Up     Activating your [facility] account is as easy as 1-2-3!     1) Visit [email or web address], select Sign Up Now, enter this activation code and your date of birth, then select Next.  [email or web address]  Expires: 3/19/2017  3:35 PM      2) Create a username and password to use when you visit [facility] in the future and select a security question in case you lose your password and select Next.    3) Enter your e-mail address and click Sign Up!    Additional Information  If you have questions, please e-mail [email or web address] or call [phone] to talk to our [facility] staff. Remember, [facility] is NOT to be used for urgent needs. For medical emergencies, dial 911.         Instructions    Stop amlodipine    Increase insulin to 5 units before every meal (three times per day)    Bring glucose meter to next appointment       Language Assistance Services     ATTENTION: Language assistance services are available, free of charge. Please call [phone].      ATENCIÓN: Si habla español, tiene a richardson disposición servicios gratuitos de asistencia lingüística. Llame al [phone].     CHÚ Ý: N?u b?n nói Ti?ng Vi?t, có các d?ch v? h? tr? ngôn ng? mi?n phí dành cho b?n. G?i s? [phone].         [facility] complies with applicable Federal civil rights laws and does not discriminate on the basis of race, color, national origin, age, disability, or sex.

## 2017-02-07 NOTE — TELEPHONE ENCOUNTER
----- Message from Jesse Moraes sent at 2/6/2017  4:36 PM CST -----  Contact: Linda/[facility] /[phone]  Linda states that she needs clarification on patient's insulin. Thank you.

## 2017-02-07 NOTE — TELEPHONE ENCOUNTER
Spoke with Linda with [facility] and she states that's blood sugar has been in the 200's and is not sure how patient is supposed to take his insulin.    Advised that patient has an appointment today with Dr. Adamson and will address at office visit    Please fax orders for insulin to [facility] at   [phone]

## 2017-02-08 ENCOUNTER — TELEPHONE (OUTPATIENT)
Dept: FAMILY MEDICINE | Facility: CLINIC | Age: 77
End: 2017-02-08

## 2017-02-08 NOTE — TELEPHONE ENCOUNTER
----- Message from Ellen Farnsworth sent at 2/8/2017 12:14 PM CST -----  [facility] nurse states the order for insulin is written as 1-10. She called yesterday for clarification. The patient does not remember what he is supposed take. Please call Lnida at [phone]. Thank you!

## 2017-02-08 NOTE — TELEPHONE ENCOUNTER
novolog increased to 5 units before meals will switch to basal insulin at follow up in two weeks. Needs home heatlh social work consult for nursing home placement. Please fax medication list to [facility] with request for addition of social work services        Spoke with Hayley at [facility]. [phone] and advised her of above. Medication list faxed to [facility] at [phone]

## 2017-02-08 NOTE — TELEPHONE ENCOUNTER
Spoke with Linda with [facility] and advised of below     Increase pre meal insulin to 5 units per clinic note 2-7-17

## 2017-02-14 DIAGNOSIS — J44.9 CHRONIC OBSTRUCTIVE PULMONARY DISEASE, UNSPECIFIED COPD TYPE: ICD-10-CM

## 2017-02-14 RX ORDER — FLUTICASONE PROPIONATE AND SALMETEROL 250; 50 UG/1; UG/1
1 POWDER RESPIRATORY (INHALATION) 2 TIMES DAILY
Qty: 60 EACH | Refills: 4 | Status: SHIPPED | OUTPATIENT
Start: 2017-02-14 | End: 2017-03-06

## 2017-02-14 NOTE — TELEPHONE ENCOUNTER
Per medication list, Metoprolol was discontinued, patient no longer taking.    Please advise if patient is supposed to be taking medication or not    Prescription for lasix was approved 2-7-17 and sent to the pharmacy     Please advise

## 2017-02-14 NOTE — TELEPHONE ENCOUNTER
----- Message from Cristina Thomas sent at 2/14/2017  8:51 AM CST -----  Contact: Alix / daughter  Refill:    fluticasone-salmeterol 250-50 mcg/dose (ADVAIR) 250-50 mcg/dose diskus inhaler   furosemide (LASIX) 20 MG tablet      metoprolol succinate (TOPROL-XL) 24 hr tablet 50 mg  (daughter wants to know if pt should be taking this medication    Thanks

## 2017-02-15 ENCOUNTER — TELEPHONE (OUTPATIENT)
Dept: FAMILY MEDICINE | Facility: CLINIC | Age: 77
End: 2017-02-15

## 2017-02-15 DIAGNOSIS — B37.81 CANDIDIASIS, ESOPHAGEAL: Primary | ICD-10-CM

## 2017-02-15 DIAGNOSIS — B37.81 CANDIDIASIS, ESOPHAGEAL: ICD-10-CM

## 2017-02-15 RX ORDER — FLUCONAZOLE 100 MG/1
TABLET ORAL
Qty: 15 TABLET | Refills: 0 | Status: SHIPPED | OUTPATIENT
Start: 2017-02-15 | End: 2017-02-15 | Stop reason: SDUPTHER

## 2017-02-15 RX ORDER — FLUCONAZOLE 100 MG/1
TABLET ORAL
Qty: 15 TABLET | Refills: 0 | Status: SHIPPED | OUTPATIENT
Start: 2017-02-15 | End: 2017-03-15 | Stop reason: HOSPADM

## 2017-02-15 NOTE — TELEPHONE ENCOUNTER
Spoke with patient's daughter. She states that [facility] uses [facility] [address] [facility] not [facility].     Please resend

## 2017-02-15 NOTE — TELEPHONE ENCOUNTER
Patient contacted and ID confirmed by name and   Reports solid dysphagia x three days. Not doing rinse and spit after advair. Suspicious for esophageal canidiasis from inhaled steroid. Start fluconazole course. Will do phone follow up on Monday if no improvement will need EGD

## 2017-02-15 NOTE — TELEPHONE ENCOUNTER
----- Message from Cristina Thomas sent at 2/15/2017  3:01 PM CST -----  Contact: Alix / Daughter  Pt's daughter is asking if script for fluconazole (DIFLUCAN) 100 MG tablet can be sent to [facility] on [address].    Please make [facility].    Thanks

## 2017-02-20 ENCOUNTER — NURSE TRIAGE (OUTPATIENT)
Dept: ADMINISTRATIVE | Facility: CLINIC | Age: 77
End: 2017-02-20

## 2017-02-20 NOTE — TELEPHONE ENCOUNTER
Reason for Disposition   Continuous (nonstop) coughing interferes with work or school and no improvement using cough treatment per Care Advice    Protocols used: ST COUGH-A-OH    Linda (Nurse with [facility]) is calling with concerns of pt health status.  Linda states the pt is not eating and getting thinner, has intermittent hypotension BP:74/40s; she obtained another BP: 121/70s.  Linda believes pt needs at least another week or two of Home Health if MD agrees. Pt does have an appointment with MD tomorrow.  Will message MD and MDs Nurse.

## 2017-02-21 ENCOUNTER — HOSPITAL ENCOUNTER (INPATIENT)
Facility: HOSPITAL | Age: 77
LOS: 5 days | Discharge: HOME HEALTH | DRG: 190 | End: 2017-02-26
Attending: EMERGENCY MEDICINE | Admitting: HOSPITALIST
Payer: MEDICARE

## 2017-02-21 DIAGNOSIS — N17.9 ACUTE RENAL FAILURE, UNSPECIFIED ACUTE RENAL FAILURE TYPE: ICD-10-CM

## 2017-02-21 DIAGNOSIS — J18.9 PNEUMONIA OF RIGHT LOWER LOBE DUE TO INFECTIOUS ORGANISM: Primary | ICD-10-CM

## 2017-02-21 DIAGNOSIS — R07.9 CHEST PAIN: ICD-10-CM

## 2017-02-21 DIAGNOSIS — R06.03 RESPIRATORY DISTRESS: ICD-10-CM

## 2017-02-21 PROBLEM — I95.9 HYPOTENSION (ARTERIAL): Status: ACTIVE | Noted: 2017-02-21

## 2017-02-21 PROBLEM — R79.89 ELEVATED LACTIC ACID LEVEL: Status: ACTIVE | Noted: 2017-02-21

## 2017-02-21 LAB
ALBUMIN SERPL BCP-MCNC: 2.1 G/DL
ALP SERPL-CCNC: 86 U/L
ALT SERPL W/O P-5'-P-CCNC: 16 U/L
AMORPH CRY URNS QL MICRO: ABNORMAL
ANION GAP SERPL CALC-SCNC: 15 MMOL/L
APTT BLDCRRT: 31.5 SEC
AST SERPL-CCNC: 21 U/L
BACTERIA #/AREA URNS HPF: ABNORMAL /HPF
BASOPHILS # BLD AUTO: 0.04 K/UL
BASOPHILS NFR BLD: 0.3 %
BILIRUB SERPL-MCNC: 0.5 MG/DL
BILIRUB UR QL STRIP: NEGATIVE
BNP SERPL-MCNC: 69 PG/ML
BUN SERPL-MCNC: 44 MG/DL
CALCIUM SERPL-MCNC: 8.5 MG/DL
CHLORIDE SERPL-SCNC: 95 MMOL/L
CLARITY UR: ABNORMAL
CO2 SERPL-SCNC: 29 MMOL/L
COLOR UR: ABNORMAL
CREAT SERPL-MCNC: 3.6 MG/DL
DIFFERENTIAL METHOD: ABNORMAL
EOSINOPHIL # BLD AUTO: 0.1 K/UL
EOSINOPHIL NFR BLD: 0.7 %
ERYTHROCYTE [DISTWIDTH] IN BLOOD BY AUTOMATED COUNT: 14.5 %
EST. GFR  (AFRICAN AMERICAN): 18 ML/MIN/1.73 M^2
EST. GFR  (NON AFRICAN AMERICAN): 15 ML/MIN/1.73 M^2
FLUAV AG SPEC QL IA: NEGATIVE
FLUBV AG SPEC QL IA: NEGATIVE
GLUCOSE SERPL-MCNC: 173 MG/DL
GLUCOSE UR QL STRIP: NEGATIVE
HCT VFR BLD AUTO: 34.2 %
HGB BLD-MCNC: 11.4 G/DL
HGB UR QL STRIP: NEGATIVE
HYALINE CASTS #/AREA URNS LPF: 0 /LPF
INR PPP: 1.2
KETONES UR QL STRIP: ABNORMAL
LACTATE SERPL-SCNC: 2.8 MMOL/L
LEUKOCYTE ESTERASE UR QL STRIP: NEGATIVE
LYMPHOCYTES # BLD AUTO: 1.3 K/UL
LYMPHOCYTES NFR BLD: 10.6 %
MAGNESIUM SERPL-MCNC: 1.2 MG/DL
MCH RBC QN AUTO: 28.6 PG
MCHC RBC AUTO-ENTMCNC: 33.3 %
MCV RBC AUTO: 86 FL
MICROSCOPIC COMMENT: ABNORMAL
MONOCYTES # BLD AUTO: 1.5 K/UL
MONOCYTES NFR BLD: 12 %
NEUTROPHILS # BLD AUTO: 9.3 K/UL
NEUTROPHILS NFR BLD: 75.6 %
NITRITE UR QL STRIP: NEGATIVE
PH UR STRIP: 5 [PH] (ref 5–8)
PHOSPHATE SERPL-MCNC: 4.1 MG/DL
PLATELET # BLD AUTO: 465 K/UL
PMV BLD AUTO: 10.6 FL
POCT GLUCOSE: 216 MG/DL (ref 70–110)
POTASSIUM SERPL-SCNC: 3.6 MMOL/L
PREALB SERPL-MCNC: 7 MG/DL
PROT SERPL-MCNC: 7.4 G/DL
PROT UR QL STRIP: ABNORMAL
PROTHROMBIN TIME: 12.5 SEC
RBC # BLD AUTO: 3.99 M/UL
RBC #/AREA URNS HPF: 2 /HPF (ref 0–4)
SODIUM SERPL-SCNC: 139 MMOL/L
SP GR UR STRIP: 1.02 (ref 1–1.03)
SPECIMEN SOURCE: NORMAL
SQUAMOUS #/AREA URNS HPF: 6 /HPF
TROPONIN I SERPL DL<=0.01 NG/ML-MCNC: <0.006 NG/ML
URN SPEC COLLECT METH UR: ABNORMAL
UROBILINOGEN UR STRIP-ACNC: NEGATIVE EU/DL
WBC # BLD AUTO: 12.32 K/UL
WBC #/AREA URNS HPF: 1 /HPF (ref 0–5)

## 2017-02-21 PROCEDURE — 25000242 PHARM REV CODE 250 ALT 637 W/ HCPCS: Performed by: EMERGENCY MEDICINE

## 2017-02-21 PROCEDURE — 87186 SC STD MICRODIL/AGAR DIL: CPT

## 2017-02-21 PROCEDURE — 85025 COMPLETE CBC W/AUTO DIFF WBC: CPT

## 2017-02-21 PROCEDURE — 87400 INFLUENZA A/B EACH AG IA: CPT | Mod: 59

## 2017-02-21 PROCEDURE — 99900035 HC TECH TIME PER 15 MIN (STAT)

## 2017-02-21 PROCEDURE — 84100 ASSAY OF PHOSPHORUS: CPT

## 2017-02-21 PROCEDURE — 94761 N-INVAS EAR/PLS OXIMETRY MLT: CPT

## 2017-02-21 PROCEDURE — 83930 ASSAY OF BLOOD OSMOLALITY: CPT

## 2017-02-21 PROCEDURE — 80053 COMPREHEN METABOLIC PANEL: CPT

## 2017-02-21 PROCEDURE — 11000001 HC ACUTE MED/SURG PRIVATE ROOM

## 2017-02-21 PROCEDURE — 25000003 PHARM REV CODE 250: Performed by: EMERGENCY MEDICINE

## 2017-02-21 PROCEDURE — 83880 ASSAY OF NATRIURETIC PEPTIDE: CPT

## 2017-02-21 PROCEDURE — 83036 HEMOGLOBIN GLYCOSYLATED A1C: CPT

## 2017-02-21 PROCEDURE — 25000003 PHARM REV CODE 250: Performed by: HOSPITALIST

## 2017-02-21 PROCEDURE — 96361 HYDRATE IV INFUSION ADD-ON: CPT

## 2017-02-21 PROCEDURE — 96375 TX/PRO/DX INJ NEW DRUG ADDON: CPT

## 2017-02-21 PROCEDURE — 82962 GLUCOSE BLOOD TEST: CPT

## 2017-02-21 PROCEDURE — 36415 COLL VENOUS BLD VENIPUNCTURE: CPT

## 2017-02-21 PROCEDURE — 96367 TX/PROPH/DG ADDL SEQ IV INF: CPT

## 2017-02-21 PROCEDURE — 87040 BLOOD CULTURE FOR BACTERIA: CPT

## 2017-02-21 PROCEDURE — 81000 URINALYSIS NONAUTO W/SCOPE: CPT

## 2017-02-21 PROCEDURE — 87070 CULTURE OTHR SPECIMN AEROBIC: CPT

## 2017-02-21 PROCEDURE — 51702 INSERT TEMP BLADDER CATH: CPT

## 2017-02-21 PROCEDURE — 27100107 HC POCKET PEAK FLOW METER

## 2017-02-21 PROCEDURE — 87077 CULTURE AEROBIC IDENTIFY: CPT

## 2017-02-21 PROCEDURE — 99285 EMERGENCY DEPT VISIT HI MDM: CPT | Mod: 25

## 2017-02-21 PROCEDURE — 63600175 PHARM REV CODE 636 W HCPCS: Performed by: EMERGENCY MEDICINE

## 2017-02-21 PROCEDURE — 96372 THER/PROPH/DIAG INJ SC/IM: CPT

## 2017-02-21 PROCEDURE — 85730 THROMBOPLASTIN TIME PARTIAL: CPT

## 2017-02-21 PROCEDURE — 85610 PROTHROMBIN TIME: CPT

## 2017-02-21 PROCEDURE — 83605 ASSAY OF LACTIC ACID: CPT

## 2017-02-21 PROCEDURE — 27000221 HC OXYGEN, UP TO 24 HOURS

## 2017-02-21 PROCEDURE — 87086 URINE CULTURE/COLONY COUNT: CPT

## 2017-02-21 PROCEDURE — 96365 THER/PROPH/DIAG IV INF INIT: CPT

## 2017-02-21 PROCEDURE — 93005 ELECTROCARDIOGRAM TRACING: CPT

## 2017-02-21 PROCEDURE — 94640 AIRWAY INHALATION TREATMENT: CPT

## 2017-02-21 PROCEDURE — 87205 SMEAR GRAM STAIN: CPT

## 2017-02-21 PROCEDURE — 83735 ASSAY OF MAGNESIUM: CPT

## 2017-02-21 PROCEDURE — 83605 ASSAY OF LACTIC ACID: CPT | Mod: 91

## 2017-02-21 PROCEDURE — 84134 ASSAY OF PREALBUMIN: CPT

## 2017-02-21 PROCEDURE — 84484 ASSAY OF TROPONIN QUANT: CPT

## 2017-02-21 RX ORDER — ONDANSETRON 2 MG/ML
4 INJECTION INTRAMUSCULAR; INTRAVENOUS EVERY 12 HOURS PRN
Status: DISCONTINUED | OUTPATIENT
Start: 2017-02-21 | End: 2017-02-21

## 2017-02-21 RX ORDER — ENOXAPARIN SODIUM 100 MG/ML
30 INJECTION SUBCUTANEOUS EVERY 24 HOURS
Status: DISCONTINUED | OUTPATIENT
Start: 2017-02-22 | End: 2017-02-21

## 2017-02-21 RX ORDER — GLUCAGON 1 MG
1 KIT INJECTION
Status: DISCONTINUED | OUTPATIENT
Start: 2017-02-21 | End: 2017-02-26 | Stop reason: HOSPADM

## 2017-02-21 RX ORDER — RAMIPRIL 2.5 MG/1
2.5 CAPSULE ORAL DAILY
Status: DISCONTINUED | OUTPATIENT
Start: 2017-02-22 | End: 2017-02-21

## 2017-02-21 RX ORDER — BENZONATATE 100 MG/1
100 CAPSULE ORAL 3 TIMES DAILY PRN
Status: DISCONTINUED | OUTPATIENT
Start: 2017-02-21 | End: 2017-02-26 | Stop reason: HOSPADM

## 2017-02-21 RX ORDER — INSULIN ASPART 100 [IU]/ML
1-10 INJECTION, SOLUTION INTRAVENOUS; SUBCUTANEOUS
Status: DISCONTINUED | OUTPATIENT
Start: 2017-02-21 | End: 2017-02-22

## 2017-02-21 RX ORDER — AMLODIPINE BESYLATE 2.5 MG/1
2.5 TABLET ORAL DAILY
COMMUNITY
End: 2017-03-13 | Stop reason: SDUPTHER

## 2017-02-21 RX ORDER — ASPIRIN 81 MG/1
81 TABLET ORAL DAILY
Status: DISCONTINUED | OUTPATIENT
Start: 2017-02-22 | End: 2017-02-26 | Stop reason: HOSPADM

## 2017-02-21 RX ORDER — METHYLPREDNISOLONE SOD SUCC 125 MG
125 VIAL (EA) INJECTION
Status: COMPLETED | OUTPATIENT
Start: 2017-02-21 | End: 2017-02-21

## 2017-02-21 RX ORDER — IPRATROPIUM BROMIDE AND ALBUTEROL SULFATE 2.5; .5 MG/3ML; MG/3ML
3 SOLUTION RESPIRATORY (INHALATION)
Status: COMPLETED | OUTPATIENT
Start: 2017-02-21 | End: 2017-02-21

## 2017-02-21 RX ORDER — GUAIFENESIN/DEXTROMETHORPHAN 100-10MG/5
10 SYRUP ORAL EVERY 6 HOURS
Status: DISCONTINUED | OUTPATIENT
Start: 2017-02-21 | End: 2017-02-26 | Stop reason: HOSPADM

## 2017-02-21 RX ORDER — ONDANSETRON 2 MG/ML
8 INJECTION INTRAMUSCULAR; INTRAVENOUS EVERY 8 HOURS PRN
Status: DISCONTINUED | OUTPATIENT
Start: 2017-02-21 | End: 2017-02-26 | Stop reason: HOSPADM

## 2017-02-21 RX ORDER — FLUTICASONE FUROATE AND VILANTEROL 100; 25 UG/1; UG/1
1 POWDER RESPIRATORY (INHALATION) DAILY
Status: DISCONTINUED | OUTPATIENT
Start: 2017-02-22 | End: 2017-02-26 | Stop reason: HOSPADM

## 2017-02-21 RX ORDER — INSULIN ASPART 100 [IU]/ML
1-6 INJECTION, SOLUTION INTRAVENOUS; SUBCUTANEOUS
Status: DISCONTINUED | OUTPATIENT
Start: 2017-02-21 | End: 2017-02-21

## 2017-02-21 RX ORDER — SODIUM CHLORIDE 9 MG/ML
INJECTION, SOLUTION INTRAVENOUS CONTINUOUS
Status: ACTIVE | OUTPATIENT
Start: 2017-02-21 | End: 2017-02-22

## 2017-02-21 RX ORDER — HEPARIN SODIUM 5000 [USP'U]/ML
5000 INJECTION, SOLUTION INTRAVENOUS; SUBCUTANEOUS EVERY 8 HOURS
Status: DISCONTINUED | OUTPATIENT
Start: 2017-02-22 | End: 2017-02-26 | Stop reason: HOSPADM

## 2017-02-21 RX ORDER — AMLODIPINE BESYLATE 2.5 MG/1
2.5 TABLET ORAL DAILY
Status: DISCONTINUED | OUTPATIENT
Start: 2017-02-22 | End: 2017-02-21

## 2017-02-21 RX ORDER — FLUCONAZOLE 100 MG/1
100 TABLET ORAL DAILY
Status: DISCONTINUED | OUTPATIENT
Start: 2017-02-22 | End: 2017-02-26 | Stop reason: HOSPADM

## 2017-02-21 RX ORDER — RIFABUTIN 150 MG/1
150 CAPSULE ORAL EVERY 12 HOURS
Status: DISCONTINUED | OUTPATIENT
Start: 2017-02-21 | End: 2017-02-26 | Stop reason: HOSPADM

## 2017-02-21 RX ORDER — ACETAMINOPHEN 325 MG/1
650 TABLET ORAL EVERY 8 HOURS PRN
Status: DISCONTINUED | OUTPATIENT
Start: 2017-02-21 | End: 2017-02-26 | Stop reason: HOSPADM

## 2017-02-21 RX ORDER — SODIUM CHLORIDE 9 MG/ML
INJECTION, SOLUTION INTRAVENOUS CONTINUOUS
Status: DISCONTINUED | OUTPATIENT
Start: 2017-02-21 | End: 2017-02-21

## 2017-02-21 RX ORDER — CIPROFLOXACIN 2 MG/ML
400 INJECTION, SOLUTION INTRAVENOUS
Status: DISCONTINUED | OUTPATIENT
Start: 2017-02-21 | End: 2017-02-22

## 2017-02-21 RX ORDER — IPRATROPIUM BROMIDE AND ALBUTEROL SULFATE 2.5; .5 MG/3ML; MG/3ML
3 SOLUTION RESPIRATORY (INHALATION)
Status: COMPLETED | OUTPATIENT
Start: 2017-02-21 | End: 2017-02-22

## 2017-02-21 RX ADMIN — IPRATROPIUM BROMIDE AND ALBUTEROL SULFATE 3 ML: .5; 3 SOLUTION RESPIRATORY (INHALATION) at 02:02

## 2017-02-21 RX ADMIN — SODIUM CHLORIDE 500 ML: 0.9 INJECTION, SOLUTION INTRAVENOUS at 05:02

## 2017-02-21 RX ADMIN — IPRATROPIUM BROMIDE AND ALBUTEROL SULFATE 3 ML: .5; 3 SOLUTION RESPIRATORY (INHALATION) at 10:02

## 2017-02-21 RX ADMIN — PIPERACILLIN SODIUM AND TAZOBACTAM SODIUM 4.5 G: 4; .5 INJECTION, POWDER, FOR SOLUTION INTRAVENOUS at 04:02

## 2017-02-21 RX ADMIN — RIFABUTIN 150 MG: 150 CAPSULE ORAL at 09:02

## 2017-02-21 RX ADMIN — GUAIFENESIN AND DEXTROMETHORPHAN 10 ML: 100; 10 SYRUP ORAL at 11:02

## 2017-02-21 RX ADMIN — METHYLPREDNISOLONE SODIUM SUCCINATE 125 MG: 125 INJECTION, POWDER, FOR SOLUTION INTRAMUSCULAR; INTRAVENOUS at 05:02

## 2017-02-21 RX ADMIN — CIPROFLOXACIN 400 MG: 2 INJECTION, SOLUTION INTRAVENOUS at 05:02

## 2017-02-21 RX ADMIN — SODIUM CHLORIDE: 0.9 INJECTION, SOLUTION INTRAVENOUS at 09:02

## 2017-02-21 RX ADMIN — SODIUM CHLORIDE 1000 ML: 0.9 INJECTION, SOLUTION INTRAVENOUS at 02:02

## 2017-02-21 RX ADMIN — SODIUM CHLORIDE 1000 ML: 0.9 INJECTION, SOLUTION INTRAVENOUS at 04:02

## 2017-02-21 RX ADMIN — INSULIN ASPART 2 UNITS: 100 INJECTION, SOLUTION INTRAVENOUS; SUBCUTANEOUS at 09:02

## 2017-02-21 RX ADMIN — INSULIN ASPART 1 UNITS: 100 INJECTION, SOLUTION INTRAVENOUS; SUBCUTANEOUS at 05:02

## 2017-02-21 NOTE — ED PROVIDER NOTES
Encounter Date: 2/21/2017    SCRIBE #1 NOTE: I, Hans Kern, am scribing for, and in the presence of,  Geo Negrete MD. I have scribed the following portions of the note - Other sections scribed: HPI and ROS.       History     Chief Complaint   Patient presents with    Shortness of Breath     pt sent from dr carias office for increased fatigue and SOB, states history of COPD and pneumonia recently.      Review of patient's allergies indicates:  No Known Allergies  HPI Comments: Chief Complaint: SOB    HPI: This 76 y.o. Male with DM, arthritis, HTN, tuberculosis and COPD presents to the ED c/o SOB. Symptoms are chronic but severely worsened 2 days ago. Patient was sent to this ED from Dr. Adamson's office for further evaluation. Symptoms are worse with minimal exertion. There's associated fatigue, an intermittent productive cough and generalized dizziness upon standing. There's no relief with at home O2. Patient denies fevers, chills, blood in stool, chest pain or leg swelling.     The history is provided by the patient. No  was used.     Past Medical History   Diagnosis Date    Arthritis     COPD (chronic obstructive pulmonary disease)     Diabetes mellitus type II     Hypertension     Tuberculosis      No past medical history pertinent negatives.  Past Surgical History   Procedure Laterality Date    Melanoma       Family History   Problem Relation Age of Onset    Cancer Father      Tuberculosis.  Secondary scar was neoplastic.     Social History   Substance Use Topics    Smoking status: Former Smoker     Types: Cigars    Smokeless tobacco: None      Comment: uses cigars on ocassion    Alcohol use Yes      Comment: socially     Review of Systems   Constitutional: Positive for fatigue. Negative for chills and fever.   HENT: Negative for ear pain and sore throat.    Eyes: Negative for visual disturbance.   Respiratory: Positive for cough and shortness of breath.    Cardiovascular:  Negative for chest pain.   Gastrointestinal: Negative for abdominal pain, constipation, diarrhea, nausea and vomiting.   Genitourinary: Negative for difficulty urinating and dysuria.   Musculoskeletal: Negative for back pain.   Skin: Negative for rash.   Neurological: Positive for dizziness. Negative for weakness, light-headedness and headaches.   Hematological: Does not bruise/bleed easily.       Physical Exam   Initial Vitals   BP Pulse Resp Temp SpO2   02/21/17 1401 02/21/17 1401 02/21/17 1401 02/21/17 1401 02/21/17 1401   88/50 107 26 97.6 °F (36.4 °C) 91 %     Physical Exam    Vitals reviewed.  Constitutional: He appears cachectic. He is cooperative.  Non-toxic appearance. He has a sickly appearance.   HENT:   Head: Normocephalic and atraumatic.   Mouth/Throat: Oropharynx is clear and moist.   Eyes: Conjunctivae and EOM are normal. Pupils are equal, round, and reactive to light.   Neck: Normal range of motion. Neck supple.   Cardiovascular: Normal rate, regular rhythm, normal heart sounds and intact distal pulses.   Pulmonary/Chest: Accessory muscle usage present. No stridor. Tachypnea noted. He is in respiratory distress. He has decreased breath sounds. He has wheezes. He has no rhonchi. He has no rales.   Abdominal: Soft. Bowel sounds are normal. He exhibits no distension. There is no tenderness.   Musculoskeletal: Normal range of motion.   Neurological: He is alert and oriented to person, place, and time.   Skin: Skin is warm and dry.   Psychiatric: He has a normal mood and affect.         ED Course   Critical Care  Date/Time: 2/21/2017 5:32 PM  Performed by: JACINTA VILLEGAS  Authorized by: JACINTA VILLEGAS   Total critical care time (exclusive of procedural time) : 44 minutes  Critical care was necessary to treat or prevent imminent or life-threatening deterioration of the following conditions: dehydration, renal failure and respiratory failure.  Critical care was time spent personally by me on the  following activities: discussions with consultants, evaluation of patient's response to treatment, examination of patient, obtaining history from patient or surrogate, ordering and performing treatments and interventions, ordering and review of laboratory studies, ordering and review of radiographic studies, pulse oximetry, re-evaluation of patient's condition and review of old charts.        Labs Reviewed   CBC W/ AUTO DIFFERENTIAL - Abnormal; Notable for the following:        Result Value    RBC 3.99 (*)     Hemoglobin 11.4 (*)     Hematocrit 34.2 (*)     Platelets 465 (*)     Gran # 9.3 (*)     Mono # 1.5 (*)     Gran% 75.6 (*)     Lymph% 10.6 (*)     All other components within normal limits   COMPREHENSIVE METABOLIC PANEL - Abnormal; Notable for the following:     Glucose 173 (*)     BUN, Bld 44 (*)     Creatinine 3.6 (*)     Calcium 8.5 (*)     Albumin 2.1 (*)     eGFR if  18 (*)     eGFR if non  15 (*)     All other components within normal limits    Narrative:     Recoll. [id number] by RANDI at 02/21/2017 15:04, reason: SPECIMEN   HEMOLYZED; CALLED VERN,RN   LACTIC ACID, PLASMA - Abnormal; Notable for the following:     Lactate (Lactic Acid) 2.8 (*)     All other components within normal limits   MAGNESIUM - Abnormal; Notable for the following:     Magnesium 1.2 (*)     All other components within normal limits    Narrative:     Recoll. [id number] by RANDI at 02/21/2017 15:04, reason: SPECIMEN   HEMOLYZED; CALLED CHEY PORRAS   URINALYSIS - Abnormal; Notable for the following:     Appearance, UA Hazy (*)     Protein, UA 1+ (*)     Ketones, UA Trace (*)     All other components within normal limits   URINALYSIS MICROSCOPIC - Abnormal; Notable for the following:     Bacteria, UA Moderate (*)     All other components within normal limits   CULTURE, BLOOD    Narrative:     Aerobic and anaerobic   CULTURE, BLOOD    Narrative:     Aerobic and anaerobic   CULTURE, URINE   CULTURE, RESPIRATORY    APTT    Narrative:     Recoll. [id number] by KTN at 02/21/2017 15:04, reason: SPECIMEN   HEMOLYZED; CALLED CHEY PORRAS   PHOSPHORUS    Narrative:     Recoll. [id number] by KTN at 02/21/2017 15:04, reason: SPECIMEN   HEMOLYZED; CALLED CHEY PORRAS   PROTIME-INR    Narrative:     Recoll. [id number] by KTN at 02/21/2017 15:04, reason: SPECIMEN   HEMOLYZED; CALLED CHEY PORRAS   TROPONIN I    Narrative:     Recoll. [id number] by KTN at 02/21/2017 15:04, reason: SPECIMEN   HEMOLYZED; CALLED CHEY PORRAS   B-TYPE NATRIURETIC PEPTIDE    Narrative:     Recoll. [id number] by KTN at 02/21/2017 15:03, reason: SPECIMEN   HEMOLYZED; CALLED TO VERN,RN   LACTIC ACID, PLASMA   LACTIC ACID, PLASMA   INFLUENZA A AND B ANTIGEN   POCT GLUCOSE MONITORING CONTINUOUS   POCT GLUCOSE MONITORING CONTINUOUS     EKG Readings: (Independently Interpreted)   Initial Reading: No STEMI. Rhythm: Sinus Bradycardia. Heart Rate: 100. Ectopy: PVCs. ST Segments: Normal ST Segments.       X-Rays:   Independently Interpreted Readings:   Chest X-Ray: There is an infiltrate in the RLL, RUL and LLL.     Medical Decision Making:   History:   Old Medical Records: I decided to obtain old medical records.  Old Records Summarized: records from previous admission(s).       <> Summary of Records: Patient was recently admitted to the hospital for right lower lobe pneumonia.  Patient has a history of MAC.  Patient has severe COPD.      Medical decision-making:    The patient received a medical screening exam. If performed, the EKG was independently evaluated by me and is pending final cardiology evaluation.  If performed, all radiographic studies were independently evaluated by me and are pending final radiology evaluation. If labs were ordered, they were reviewed. Vital signs are independently assessed by me.  If performed, the pulse oximetry was independently evaluated by me.  I decided to obtain the patient's past medical record.  If available, I reviewed the patient's past medical  record, including most recent labs and radiology reports.    Patient presents to emergency department for emergent evaluation of shortness of breath.  Patient had increased work of breathing and tachypnea.  On initial presentation.  Patient was 91% on 2 L nasal cannula.  This is his home O2 requirement.  With additional supplemental oxygen.  His oxygen saturations did improve.  Patient was also mildly hypotensive.  Patient was administered IV fluids with improvement of his hypotension.  Patient was recently admitted to the hospital for pneumonia.  Chest x-ray reveals a worsening of his right lower lobe pneumonia.  Chronic changes due to chronic long-standing MAC infection.  Blood cultures were drawn prior to anabiotic administration.  Patient will be started on vancomycin, Zosyn and Cipro.  Patient now is severely dehydrated.  He has acute renal failure due to decreased by mouth intake.  This is likely accounting for his lethargy and fatigue.  Patient is uremic.  Patient will continue to be a  IV fluids.  Patient does not require pressor support at this time.  He is well-perfused.  Lactate is 2.8.  Will continue to trend this.  No other evidence for endorgan damage.  No fever.  No leukocytosis.  Patient will be given nebulizer treatments every several hours while awake.  Infectious disease will be consult at.  I discussed the patient's presentation and workup with the hospitalist who agrees with placing the patient in the hospital.  I have placed orders for the hospitalist.     The results and physical exam findings were reviewed with the patient. Pt agrees with assessment, disposition and treatment plan and has no further questions or complaints at this time.    DEBBIE Negrete M.D. 5:36 PM 2/21/2017           Scribe Attestation:   Scribe #1: I performed the above scribed service and the documentation accurately describes the services I performed. I attest to the accuracy of the note.    Attending  Attestation:           Physician Attestation for Scribe:  Physician Attestation Statement for Scribe #1: I, Geo Negrete MD, reviewed documentation, as scribed by Hans Kern in my presence, and it is both accurate and complete.                 ED Course     Clinical Impression:   The primary encounter diagnosis was Pneumonia of right lower lobe due to infectious organism. Diagnoses of Acute renal failure, unspecified acute renal failure type and Respiratory distress were also pertinent to this visit.          Ash Negrete MD  02/21/17 [id number]

## 2017-02-21 NOTE — ED TRIAGE NOTES
Pt sent to ED from Dr Adamson's office for SOB and lethargy. C/o productive cough. States his s/s worsened 2 days ago. On O2 at home but states he doesn't know how much O2 he is on. Reports a lack of energy, can't walk to car or upstairs. AAO X3.

## 2017-02-21 NOTE — IP AVS SNAPSHOT
[place] [address] [address] [place] [address]  Phone: [phone]           Patient Discharge Instructions     Our goal is to set you up for success. This packet includes information on your condition, medications, and your home care. It will help you to care for yourself so you don't get sicker and need to go back to the hospital.     Please ask your nurse if you have any questions.        There are many details to remember when preparing to leave the hospital. Here is what you will need to do:    1. Take your medicine. If you are prescribed medications, review your Medication List in the following pages. You may have new medications to  at the pharmacy and others that you'll need to stop taking. Review the instructions for how and when to take your medications. Talk with your doctor or nurses if you are unsure of what to do.     2. Go to your follow-up appointments. Specific follow-up information is listed in the following pages. Your may be contacted by a transition nurse or clinical provider about future appointments. Be sure we have all of the phone numbers to reach you, if needed. Please contact your provider's office if you are unable to make an appointment.     3. Watch for warning signs. Your doctor or nurse will give you detailed warning signs to watch for and when to call for assistance. These instructions may also include educational information about your condition. If you experience any of warning signs to your health, call your doctor.               ** Verify the list of medication(s) below is accurate and up to date. Carry this with you in case of emergency. If your medications have changed, please notify your healthcare provider.             Medication List      START taking these medications        Additional Info                      PIPERACILLIN-TAZOBACTAM 4.5G/100ML D5W IVPB (READY TO MIX)   Refills:  0   Dose:  4.5 g   Indications:  Pneumonia    Last time  this was given:  4.5 g on 2/26/2017  1:00 PM   Instructions:  Inject 100 mLs (4.5 g total) into the vein every 8 (eight) hours.     Begin Date    AM    Noon    PM    Bedtime         CONTINUE taking these medications        Additional Info                      albuterol 90 mcg/actuation inhaler   Quantity:  1 Inhaler   Refills:  2   Dose:  2 puff    Instructions:  Inhale 2 puffs into the lungs every 6 (six) hours as needed for Wheezing.     Begin Date    AM    Noon    PM    Bedtime       amlodipine 2.5 MG tablet   Commonly known as:  NORVASC   Refills:  0   Dose:  2.5 mg    Instructions:  Take 2.5 mg by mouth once daily.     Begin Date    AM    Noon    PM    Bedtime       aspirin 81 MG EC tablet   Commonly known as:  ECOTRIN   Refills:  0   Dose:  81 mg    Last time this was given:  81 mg on 2/26/2017 11:19 AM   Instructions:  Take 81 mg by mouth once daily.     Begin Date    AM    Noon    PM    Bedtime       blood sugar diagnostic Strp   Commonly known as:  TRUE METRIX GLUCOSE TEST STRIP   Quantity:  300 strip   Refills:  3    Instructions:  Test blood sugar 3 times daily     Begin Date    AM    Noon    PM    Bedtime       blood-glucose meter Misc   Commonly known as:  TRUE METRIX AIR GLUCOSE METER   Quantity:  1 each   Refills:  0    Instructions:  Test blood sugar 3 times daily     Begin Date    AM    Noon    PM    Bedtime       fluconazole 100 MG tablet   Commonly known as:  DIFLUCAN   Quantity:  15 tablet   Refills:  0    Last time this was given:  100 mg on 2/26/2017 11:20 AM   Instructions:  Two tabs po on day one then one tab po daily     Begin Date    AM    Noon    PM    Bedtime       fluticasone-salmeterol 250-50 mcg/dose 250-50 mcg/dose diskus inhaler   Commonly known as:  ADVAIR   Quantity:  60 each   Refills:  4   Dose:  1 puff    Instructions:  Inhale 1 puff into the lungs 2 (two) times daily.     Begin Date    AM    Noon    PM    Bedtime       furosemide 20 MG tablet   Commonly known as:  LASIX    Quantity:  30 tablet   Refills:  4   Dose:  20 mg    Instructions:  Take 1 tablet (20 mg total) by mouth once daily.     Begin Date    AM    Noon    PM    Bedtime       insulin aspart 100 unit/mL Inpn pen   Commonly known as:  NovoLOG   Quantity:  3 mL   Refills:  0   Dose:  1-10 Units    Last time this was given:  6 Units on 2/26/2017 12:52 PM   Instructions:  Inject 1-10 Units into the skin before meals and at bedtime as needed (Hyperglycemia).     Begin Date    AM    Noon    PM    Bedtime       lancets 28 gauge Misc   Quantity:  300 each   Refills:  3   Dose:  1 lancet    Instructions:  1 lancet by Misc.(Non-Drug; Combo Route) route 3 (three) times daily. True Metrix lancets     Begin Date    AM    Noon    PM    Bedtime       metformin 1000 MG tablet   Commonly known as:  GLUCOPHAGE   Quantity:  180 tablet   Refills:  3   Dose:  1000 mg    Instructions:  Take 1 tablet (1,000 mg total) by mouth 2 (two) times daily with meals.     Begin Date    AM    Noon    PM    Bedtime       multivitamin capsule   Refills:  0   Dose:  1 capsule    Instructions:  Take 1 capsule by mouth once daily.     Begin Date    AM    Noon    PM    Bedtime       predniSONE 20 MG tablet   Commonly known as:  DELTASONE   Refills:  0   Dose:  20 mg    Instructions:  Take 20 mg by mouth once daily. Take two tablets daily     Begin Date    AM    Noon    PM    Bedtime       ramipril 2.5 MG capsule   Commonly known as:  ALTACE   Quantity:  30 capsule   Refills:  4   Dose:  2.5 mg    Instructions:  Take 1 capsule (2.5 mg total) by mouth once daily.     Begin Date    AM    Noon    PM    Bedtime       rifabutin 150 mg Cap   Commonly known as:  MYCOBUTIN   Quantity:  60 capsule   Refills:  11   Dose:  150 mg    Last time this was given:  150 mg on 2/26/2017 11:20 AM   Instructions:  Take 1 capsule (150 mg total) by mouth every 12 (twelve) hours.     Begin Date    AM    Noon    PM    Bedtime            Where to Get Your Medications      Information  about where to get these medications is not yet available     ! Ask your nurse or doctor about these medications     PIPERACILLIN-TAZOBACTAM 4.5G/100ML D5W IVPB (READY TO MIX)                  Please bring to all follow up appointments:    1. A copy of your discharge instructions.  2. All medicines you are currently taking in their original bottles.  3. Identification and insurance card.    Please arrive 15 minutes ahead of scheduled appointment time.    Please call 24 hours in advance if you must reschedule your appointment and/or time.        Your Scheduled Appointments     Mar 06, 2017 11:00 AM [facility] Follow Up with [facility]   [facility]    [address]  [facility] [address] [phone]              Follow-up Information     Follow up with [facility] On 3/6/2017.    Specialty:  Priority Care    Why:  Outpatient services, PCP follow-up appointment. Patient should arrive by 11:00AM.     Contact information:    [address]  [facility] [address] [phone]        Follow up with Ishaan Adamson MD In 1 week.    Specialty:  Internal Medicine    Contact information:    [address]  [address]  [facility] [address] [phone]          Follow up with Saritha Wu MD In 2 weeks.    Specialty:  Infectious Diseases    Contact information:    [address] [place] [address] [address]  [place] [address] [phone]          Follow up with [facility].    Specialties:  DME Provider, Home Health Services    Why:  Home Health: SN    Contact information:    [address] [facility] [address] [facility]  [address]  [place] [address] [phone]          [facility]    Specialties:  Pharmacist, DME Provider, IV Infusion    Why:  Infusion: Home IV    Contact information:    [address] [place] [address]  [phone]          Discharge Instructions     Future Orders    Activity as tolerated      Diet general     Questions:    Total calories:  1800 Calorie    Fat restriction, if any:      Protein restriction, if any:      Na restriction, if any:  2gNa    Fluid restriction:      Additional restrictions:          Discharge Instructions       Reviewed all patients discharge instructions       Primary Diagnosis     Your primary diagnosis was:  Pneumonia Of Right Lower Lobe Due To Infectious Organism      Admission Information     Date & Time Provider Department CSN    2/21/2017  2:02 PM Keo Brush MD [facility] [id number]      Care Providers     Provider Role Specialty Primary office phone    Keo Brush MD Attending Provider Hospitalist [phone]    Pierre Ugalde MD Consulting Physician  Pulmonary Disease [phone]    Andree Garland MD Consulting Physician  Nephrology [phone]    Pierre Ugalde MD Surgeon  Pulmonary Disease [phone]      Important Medicare Message          Most Recent Value    Important Message from Medicare Regarding Discharge Appeal Rights  Given to patient/caregiver, Explained to patient/caregiver, Signed/date by patient/caregiver yes 02/26/2017 0820      Your Vitals Were     BP                   130/81 (BP Location: Left arm, Patient Position: Lying, BP Method: Automatic)           Recent Lab Values        10/15/2013 5/14/2015 8/24/2015 11/23/2015 7/6/2016 8/3/2016 1/27/2017 2/21/2017      9:38 AM  8:11 AM  8:11 AM  8:37 AM  9:41 AM 11:56 AM 10:16 AM 10:44 PM    A1C 8.8 (H) 11.8 (H) 10.0 (H) 8.9 (H) 8.4 (H) 7.8 (H) 8.7 (H) 9.2 (H)           8.7 (H)     Comment for A1C at  9:41 AM on 7/6/2016:  According to ADA guidelines, hemoglobin A1C <7.0% represents  optimal control in non-pregnant diabetic patients.  Different  metrics may apply to specific populations.   Standards of Medical Care in Diabetes - 2016.  For the purpose of screening for the presence of diabetes:  <5.7%     Consistent with the absence of diabetes  5.7-6.4%   Consistent with increasing risk for diabetes   (prediabetes)  >or=6.5%  Consistent with diabetes  Currently no consensus exists for use of hemoglobin A1C  for diagnosis of diabetes for children.      Comment for A1C at 11:56 AM on 8/3/2016:  According to ADA guidelines, hemoglobin A1C <7.0% represents  optimal control in non-pregnant diabetic patients.  Different  metrics may apply to specific populations.   Standards of Medical Care in Diabetes - 2016.  For the purpose of screening for the presence of diabetes:  <5.7%     Consistent with the absence of diabetes  5.7-6.4%  Consistent with increasing risk for diabetes   (prediabetes)  >or=6.5%  Consistent with diabetes  Currently no consensus exists for use of hemoglobin A1C  for diagnosis of diabetes for children.      Comment for A1C at 10:16 AM on 1/27/2017:  According to ADA guidelines, hemoglobin A1C <7.0% represents  optimal control in non-pregnant diabetic patients.  Different  metrics may apply to specific populations.   Standards of Medical Care in Diabetes - 2016.  For the purpose of screening for the presence of diabetes:  <5.7%     Consistent with the absence of diabetes  5.7-6.4%  Consistent with increasing risk for diabetes   (prediabetes)  >or=6.5%  Consistent with diabetes  Currently no consensus exists for use of hemoglobin A1C  for diagnosis of diabetes for children.      Comment for A1C at 10:44 PM on 2/21/2017:  According to ADA guidelines, hemoglobin A1C <7.0% represents  optimal control in non-pregnant diabetic patients.  Different  metrics may apply to specific populations.   Standards of Medical Care in Diabetes - 2016.  For the purpose of screening for the presence of diabetes:  <5.7%     Consistent with the absence of diabetes  5.7-6.4%  Consistent with increasing risk for diabetes   (prediabetes)  >or=6.5%  Consistent with diabetes  Currently no consensus exists for use of hemoglobin A1C  for diagnosis of diabetes for children.      Comment  for A1C at 10:16 AM on 1/27/2017:  According to ADA guidelines, hemoglobin A1C <7.0% represents  optimal control in non-pregnant diabetic patients.  Different  metrics may apply to specific populations.   Standards of Medical Care in Diabetes - 2016.  For the purpose of screening for the presence of diabetes:  <5.7%     Consistent with the absence of diabetes  5.7-6.4%  Consistent with increasing risk for diabetes   (prediabetes)  >or=6.5%  Consistent with diabetes  Currently no consensus exists for use of hemoglobin A1C  for diagnosis of diabetes for children.        Pending Labs     Order Current Status    Fungus culture In process    Immunofixation electrophoresis In process    AFB Culture & Smear Preliminary result    AFB Culture & Smear Preliminary result      Allergies as of 2/26/2017     No Known Allergies      [facility]     [facility] Nurse Care [facility] - 24/7 Assistance  Unless otherwise directed by your provider, please contact [facility], our nurse care line that is available for 24/7 assistance.     Registered nurses in the [facility] provide clinical advisement, health education, appointment booking, and other advisory services.  Call for this free service at [phone].        Advance Directives     An advance directive is a document which, in the event you are no longer able to make decisions for yourself, tells your healthcare team what kind of treatment you do or do not want to receive, or who you would like to make those decisions for you.  If you do not currently have an advance directive, [facility] encourages you to create one.  For more information call:  [phone]), [phone] ([phone]),  or log on to [email or web address].        Smoking Cessation     If you would like to quit smoking:   You may be eligible for free services if you are a [place] resident and started smoking cigarettes before September 1, 1988.  Call the Smoking Cessation  Trust ([facility]) toll free at (893) 798-3146 or (527) 883-8746.   Call 1-800-QUIT-NOW if you do not meet the above criteria.            Language Assistance Services     ATTENTION: Language assistance services are available, free of charge. Please call [phone].      ATENCIÓN: Si habla zuhair, tiene a richardson disposición servicios gratuitos de asistencia lingüística. Llame al [phone].     CHÚ Ý: N?u b?n nói Ti?ng Vi?t, có các d?ch v? h? tr? ngôn ng? mi?n phí dành cho b?n. G?i s? [phone].        Pneumonmia Discharge Instructions                Diabetes Discharge Instructions                                   [facility] Sign-Up     Activating your [facility] account is as easy as 1-2-3!     1) Visit [email or web address], select Sign Up Now, enter this activation code and your date of birth, then select Next.  [email or web address]  Expires: 3/19/2017  3:35 PM      2) Create a username and password to use when you visit [facility] in the future and select a security question in case you lose your password and select Next.    3) Enter your e-mail address and click Sign Up!    Additional Information  If you have questions, please e-mail [email or web address] or call [phone] to talk to our [facility] staff. Remember, [facility] is NOT to be used for urgent needs. For medical emergencies, dial 911.          [facility] complies with applicable Federal civil rights laws and does not discriminate on the basis of race, color, national origin, age, disability, or sex.

## 2017-02-21 NOTE — IP AVS SNAPSHOT
[place] [address] [address] [place] [address]  Phone: [phone]           Patient Discharge Instructions     Our goal is to set you up for success. This packet includes information on your condition, medications, and your home care. It will help you to care for yourself so you don't get sicker and need to go back to the hospital.     Please ask your nurse if you have any questions.        There are many details to remember when preparing to leave the hospital. Here is what you will need to do:    1. Take your medicine. If you are prescribed medications, review your Medication List in the following pages. You may have new medications to  at the pharmacy and others that you'll need to stop taking. Review the instructions for how and when to take your medications. Talk with your doctor or nurses if you are unsure of what to do.     2. Go to your follow-up appointments. Specific follow-up information is listed in the following pages. Your may be contacted by a transition nurse or clinical provider about future appointments. Be sure we have all of the phone numbers to reach you, if needed. Please contact your provider's office if you are unable to make an appointment.     3. Watch for warning signs. Your doctor or nurse will give you detailed warning signs to watch for and when to call for assistance. These instructions may also include educational information about your condition. If you experience any of warning signs to your health, call your doctor.               ** Verify the list of medication(s) below is accurate and up to date. Carry this with you in case of emergency. If your medications have changed, please notify your healthcare provider.             Medication List      START taking these medications        Additional Info                      PIPERACILLIN-TAZOBACTAM 4.5G/100ML D5W IVPB (READY TO MIX)   Refills:  0   Dose:  4.5 g   Indications:  Pneumonia    Last time  this was given:  4.5 g on 2/26/2017  1:00 PM   Instructions:  Inject 100 mLs (4.5 g total) into the vein every 8 (eight) hours.     Begin Date    AM    Noon    PM    Bedtime         CONTINUE taking these medications        Additional Info                      albuterol 90 mcg/actuation inhaler   Quantity:  1 Inhaler   Refills:  2   Dose:  2 puff    Instructions:  Inhale 2 puffs into the lungs every 6 (six) hours as needed for Wheezing.     Begin Date    AM    Noon    PM    Bedtime       amlodipine 2.5 MG tablet   Commonly known as:  NORVASC   Refills:  0   Dose:  2.5 mg    Instructions:  Take 2.5 mg by mouth once daily.     Begin Date    AM    Noon    PM    Bedtime       aspirin 81 MG EC tablet   Commonly known as:  ECOTRIN   Refills:  0   Dose:  81 mg    Last time this was given:  81 mg on 2/26/2017 11:19 AM   Instructions:  Take 81 mg by mouth once daily.     Begin Date    AM    Noon    PM    Bedtime       blood sugar diagnostic Strp   Commonly known as:  TRUE METRIX GLUCOSE TEST STRIP   Quantity:  300 strip   Refills:  3    Instructions:  Test blood sugar 3 times daily     Begin Date    AM    Noon    PM    Bedtime       blood-glucose meter Misc   Commonly known as:  TRUE METRIX AIR GLUCOSE METER   Quantity:  1 each   Refills:  0    Instructions:  Test blood sugar 3 times daily     Begin Date    AM    Noon    PM    Bedtime       fluconazole 100 MG tablet   Commonly known as:  DIFLUCAN   Quantity:  15 tablet   Refills:  0    Last time this was given:  100 mg on 2/26/2017 11:20 AM   Instructions:  Two tabs po on day one then one tab po daily     Begin Date    AM    Noon    PM    Bedtime       fluticasone-salmeterol 250-50 mcg/dose 250-50 mcg/dose diskus inhaler   Commonly known as:  ADVAIR   Quantity:  60 each   Refills:  4   Dose:  1 puff    Instructions:  Inhale 1 puff into the lungs 2 (two) times daily.     Begin Date    AM    Noon    PM    Bedtime       furosemide 20 MG tablet   Commonly known as:  LASIX    Quantity:  30 tablet   Refills:  4   Dose:  20 mg    Instructions:  Take 1 tablet (20 mg total) by mouth once daily.     Begin Date    AM    Noon    PM    Bedtime       insulin aspart 100 unit/mL Inpn pen   Commonly known as:  NovoLOG   Quantity:  3 mL   Refills:  0   Dose:  1-10 Units    Last time this was given:  6 Units on 2/26/2017 12:52 PM   Instructions:  Inject 1-10 Units into the skin before meals and at bedtime as needed (Hyperglycemia).     Begin Date    AM    Noon    PM    Bedtime       lancets 28 gauge Misc   Quantity:  300 each   Refills:  3   Dose:  1 lancet    Instructions:  1 lancet by Misc.(Non-Drug; Combo Route) route 3 (three) times daily. True Metrix lancets     Begin Date    AM    Noon    PM    Bedtime       metformin 1000 MG tablet   Commonly known as:  GLUCOPHAGE   Quantity:  180 tablet   Refills:  3   Dose:  1000 mg    Instructions:  Take 1 tablet (1,000 mg total) by mouth 2 (two) times daily with meals.     Begin Date    AM    Noon    PM    Bedtime       multivitamin capsule   Refills:  0   Dose:  1 capsule    Instructions:  Take 1 capsule by mouth once daily.     Begin Date    AM    Noon    PM    Bedtime       predniSONE 20 MG tablet   Commonly known as:  DELTASONE   Refills:  0   Dose:  20 mg    Instructions:  Take 20 mg by mouth once daily. Take two tablets daily     Begin Date    AM    Noon    PM    Bedtime       ramipril 2.5 MG capsule   Commonly known as:  ALTACE   Quantity:  30 capsule   Refills:  4   Dose:  2.5 mg    Instructions:  Take 1 capsule (2.5 mg total) by mouth once daily.     Begin Date    AM    Noon    PM    Bedtime       rifabutin 150 mg Cap   Commonly known as:  MYCOBUTIN   Quantity:  60 capsule   Refills:  11   Dose:  150 mg    Last time this was given:  150 mg on 2/26/2017 11:20 AM   Instructions:  Take 1 capsule (150 mg total) by mouth every 12 (twelve) hours.     Begin Date    AM    Noon    PM    Bedtime            Where to Get Your Medications      Information  about where to get these medications is not yet available     ! Ask your nurse or doctor about these medications     PIPERACILLIN-TAZOBACTAM 4.5G/100ML D5W IVPB (READY TO MIX)                  Please bring to all follow up appointments:    1. A copy of your discharge instructions.  2. All medicines you are currently taking in their original bottles.  3. Identification and insurance card.    Please arrive 15 minutes ahead of scheduled appointment time.    Please call 24 hours in advance if you must reschedule your appointment and/or time.        Your Scheduled Appointments     Mar 06, 2017 11:00 AM [facility] Follow Up with [facility]   [facility] ([facility]    [address]  [facility] [address] [phone]              Follow-up Information     Follow up with [facility] On 3/6/2017.    Specialty:  Priority Care    Why:  Outpatient services, PCP follow-up appointment. Patient should arrive by 11:00AM.     Contact information:    [address]  [facility] [address] [phone]        Follow up with Ishaan Adamson MD In 1 week.    Specialty:  Internal Medicine    Contact information:    [address]  [address]  [facility] [address] [phone]          Follow up with Saritha Wu MD In 2 weeks.    Specialty:  Infectious Diseases    Contact information:    [address] [place] [address] [address]  [place] [address] [phone]          Follow up with [facility].    Specialties:  DME Provider, Home Health Services    Why:  Home Health: SN    Contact information:    [address]  [address]  [place] [address] [phone]          Follow up with [facility].    Specialties:  Pharmacist, DME Provider, IV Infusion    Why:  Infusion: Home IV    Contact information:    [address] [place] [address]  [phone]          Discharge Instructions     Future Orders    Activity as tolerated      Diet general     Questions:    Total calories:  1800 Calorie    Fat restriction, if any:      Protein restriction, if any:      Na restriction, if any:  2gNa    Fluid restriction:      Additional restrictions:          Discharge Instructions       Reviewed all patients discharge instructions       Primary Diagnosis     Your primary diagnosis was:  Pneumonia Of Right Lower Lobe Due To Infectious Organism      Admission Information     Date & Time Provider Department CSN    2/21/2017  2:02 PM Keo Brush MD [facility] [id number]       Admisson Diagnosis: Respiratory distress, Acute renal failure, unspecified acute renal failure type, Pneumonia of right lower lobe due to infectious organism, Pneumonia of right lower lobe due to infectious organism      Care Providers     Provider Role Specialty Primary office phone    Keo Brush MD Attending Provider Hospitalist [phone]    Pierre Ugalde MD Consulting Physician  Pulmonary Disease [phone]    Andree Garland MD Consulting Physician  Nephrology [phone]    Pierre Ugalde MD Surgeon  Pulmonary Disease [phone]      Important Medicare Message          Most Recent Value    Important Message from Medicare Regarding Discharge Appeal Rights  Given to patient/caregiver, Explained to patient/caregiver, Signed/date by patient/caregiver yes 02/26/2017 0820      Your Vitals Were     BP                   130/81 (BP Location: Left arm, Patient Position: Lying, BP Method: Automatic)           Recent Lab Values        10/15/2013 5/14/2015 8/24/2015 11/23/2015 7/6/2016 8/3/2016 1/27/2017 2/21/2017      9:38 AM  8:11 AM  8:11 AM  8:37 AM  9:41 AM 11:56 AM 10:16 AM 10:44 PM    A1C 8.8 (H) 11.8 (H) 10.0 (H) 8.9 (H) 8.4 (H) 7.8 (H) 8.7 (H) 9.2 (H)           8.7 (H)     Comment for A1C at  9:41 AM on 7/6/2016:  According to ADA guidelines, hemoglobin A1C <7.0% represents  optimal control in non-pregnant diabetic patients.   Different  metrics may apply to specific populations.   Standards of Medical Care in Diabetes - 2016.  For the purpose of screening for the presence of diabetes:  <5.7%     Consistent with the absence of diabetes  5.7-6.4%  Consistent with increasing risk for diabetes   (prediabetes)  >or=6.5%  Consistent with diabetes  Currently no consensus exists for use of hemoglobin A1C  for diagnosis of diabetes for children.      Comment for A1C at 11:56 AM on 8/3/2016:  According to ADA guidelines, hemoglobin A1C <7.0% represents  optimal control in non-pregnant diabetic patients.  Different  metrics may apply to specific populations.   Standards of Medical Care in Diabetes - 2016.  For the purpose of screening for the presence of diabetes:  <5.7%     Consistent with the absence of diabetes  5.7-6.4%  Consistent with increasing risk for diabetes   (prediabetes)  >or=6.5%  Consistent with diabetes  Currently no consensus exists for use of hemoglobin A1C  for diagnosis of diabetes for children.      Comment for A1C at 10:16 AM on 1/27/2017:  According to ADA guidelines, hemoglobin A1C <7.0% represents  optimal control in non-pregnant diabetic patients.  Different  metrics may apply to specific populations.   Standards of Medical Care in Diabetes - 2016.  For the purpose of screening for the presence of diabetes:  <5.7%     Consistent with the absence of diabetes  5.7-6.4%  Consistent with increasing risk for diabetes   (prediabetes)  >or=6.5%  Consistent with diabetes  Currently no consensus exists for use of hemoglobin A1C  for diagnosis of diabetes for children.      Comment for A1C at 10:44 PM on 2/21/2017:  According to ADA guidelines, hemoglobin A1C <7.0% represents  optimal control in non-pregnant diabetic patients.  Different  metrics may apply to specific populations.   Standards of Medical Care in Diabetes - 2016.  For the purpose of screening for the presence of diabetes:  <5.7%     Consistent with the absence of  diabetes  5.7-6.4%  Consistent with increasing risk for diabetes   (prediabetes)  >or=6.5%  Consistent with diabetes  Currently no consensus exists for use of hemoglobin A1C  for diagnosis of diabetes for children.      Comment for A1C at 10:16 AM on 1/27/2017:  According to ADA guidelines, hemoglobin A1C <7.0% represents  optimal control in non-pregnant diabetic patients.  Different  metrics may apply to specific populations.   Standards of Medical Care in Diabetes - 2016.  For the purpose of screening for the presence of diabetes:  <5.7%     Consistent with the absence of diabetes  5.7-6.4%  Consistent with increasing risk for diabetes   (prediabetes)  >or=6.5%  Consistent with diabetes  Currently no consensus exists for use of hemoglobin A1C  for diagnosis of diabetes for children.        Pending Labs     Order Current Status    Fungus culture In process    Immunofixation electrophoresis In process    AFB Culture & Smear Preliminary result    AFB Culture & Smear Preliminary result      Allergies as of 2/26/2017     No Known Allergies      [facility]     [facility] Nurse Care Line - 24/7 Assistance  Unless otherwise directed by your provider, please contact [facility], our nurse care line that is available for 24/7 assistance.     Registered nurses in the [facility] provide clinical advisement, health education, appointment booking, and other advisory services.  Call for this free service at [phone].        Advance Directives     An advance directive is a document which, in the event you are no longer able to make decisions for yourself, tells your healthcare team what kind of treatment you do or do not want to receive, or who you would like to make those decisions for you.  If you do not currently have an advance directive, [facility] encourages you to create one.  For more information call:  [phone]), [phone] ([phone]), or log on to  [email or web address].        Smoking Cessation     If you would like to quit smoking:   You may be eligible for free services if you are a [place] resident and started smoking cigarettes before September 1, 1988.  Call the [facility] toll free at [phone] or [phone].      Call 1-800-QUIT-NOW if you do not meet the above criteria.        Translation Services Information     ATTENTION: Language assistance services are available, free of charge. Please call [phone].    ATENCIÓN: Si habla español, tiene a richardson disposición servicios gratuitos de asistencia lingüística. Llame al [phone].     CHÚ Ý: N?u b?n nói Ti?ng Vi?t, có các d?ch v? h? tr? ngôn ng? mi?n phí dành cho b?n. G?i s? [phone].        Pneumonmia Discharge Instructions                Diabetes Discharge Instructions                                   [facility] Sign-Up     Activating your [facility] account is as easy as 1-2-3!     1) Visit [email or web address], select Sign Up Now, enter this activation code and your date of birth, then select Next.  [email or web address]  Expires: 3/19/2017  3:35 PM      2) Create a username and password to use when you visit [facility] in the future and select a security question in case you lose your password and select Next.    3) Enter your e-mail address and click Sign Up!    Additional Information  If you have questions, please e-mail [email or web address] or call [phone] to talk to our [facility] staff. Remember, [facility] is NOT to be used for urgent needs. For medical emergencies, dial 911.          [facility] complies with applicable Federal civil rights laws and does not discriminate on the basis of race, color, national origin, age, disability, or sex.

## 2017-02-21 NOTE — TELEPHONE ENCOUNTER
Contacted patient's daughter due to missed appointment this morning. Reports patient not eating much still with difficulty swallowing, feels more short of breath the last two days despite oxygen use. Recommend ED evaluation. I am concerned he may have esophageal canidiasis from his inhaled steroid and worsening of underlying pulmonary MAC

## 2017-02-22 PROBLEM — R53.81 PHYSICAL DECONDITIONING: Status: ACTIVE | Noted: 2017-02-22

## 2017-02-22 LAB
ALBUMIN SERPL BCP-MCNC: 1.8 G/DL
ALP SERPL-CCNC: 74 U/L
ALT SERPL W/O P-5'-P-CCNC: 16 U/L
ANION GAP SERPL CALC-SCNC: 11 MMOL/L
AST SERPL-CCNC: 25 U/L
BASOPHILS # BLD AUTO: 0.03 K/UL
BASOPHILS NFR BLD: 0.4 %
BILIRUB SERPL-MCNC: 0.4 MG/DL
BUN SERPL-MCNC: 44 MG/DL
CALCIUM SERPL-MCNC: 7.8 MG/DL
CHLORIDE SERPL-SCNC: 96 MMOL/L
CHLORIDE UR-SCNC: 74 MMOL/L
CO2 SERPL-SCNC: 29 MMOL/L
CREAT SERPL-MCNC: 2.8 MG/DL
CREAT UR-MCNC: 85.3 MG/DL
CREAT UR-MCNC: 85.3 MG/DL
DIFFERENTIAL METHOD: ABNORMAL
EOSINOPHIL # BLD AUTO: 0 K/UL
EOSINOPHIL NFR BLD: 0 %
EOSINOPHIL URNS QL WRIGHT STN: NORMAL
ERYTHROCYTE [DISTWIDTH] IN BLOOD BY AUTOMATED COUNT: 14.4 %
EST. GFR  (AFRICAN AMERICAN): 24 ML/MIN/1.73 M^2
EST. GFR  (NON AFRICAN AMERICAN): 21 ML/MIN/1.73 M^2
ESTIMATED AVG GLUCOSE: 217 MG/DL
GLUCOSE SERPL-MCNC: 260 MG/DL
GRAM STN SPEC: NORMAL
GRAM STN SPEC: NORMAL
HBA1C MFR BLD HPLC: 9.2 %
HCT VFR BLD AUTO: 30 %
HGB BLD-MCNC: 9.9 G/DL
LACTATE SERPL-SCNC: 1.1 MMOL/L
LACTATE SERPL-SCNC: 1.4 MMOL/L
LACTATE SERPL-SCNC: 1.4 MMOL/L
LACTATE SERPL-SCNC: 1.8 MMOL/L
LACTATE SERPL-SCNC: 2.2 MMOL/L
LYMPHOCYTES # BLD AUTO: 1.2 K/UL
LYMPHOCYTES NFR BLD: 14.9 %
MAGNESIUM SERPL-MCNC: 1 MG/DL
MCH RBC QN AUTO: 28.4 PG
MCHC RBC AUTO-ENTMCNC: 33 %
MCV RBC AUTO: 86 FL
MONOCYTES # BLD AUTO: 0.5 K/UL
MONOCYTES NFR BLD: 6.4 %
NEUTROPHILS # BLD AUTO: 6.1 K/UL
NEUTROPHILS NFR BLD: 77.5 %
OSMOLALITY SERPL: 299 MOSM/KG
OSMOLALITY UR: 365 MOSM/KG
PHOSPHATE SERPL-MCNC: 4.8 MG/DL
PLATELET # BLD AUTO: 418 K/UL
PMV BLD AUTO: 9.7 FL
POCT GLUCOSE: 194 MG/DL (ref 70–110)
POCT GLUCOSE: 200 MG/DL (ref 70–110)
POCT GLUCOSE: 236 MG/DL (ref 70–110)
POCT GLUCOSE: 237 MG/DL (ref 70–110)
POCT GLUCOSE: 242 MG/DL (ref 70–110)
POCT GLUCOSE: 261 MG/DL (ref 70–110)
POTASSIUM SERPL-SCNC: 3.9 MMOL/L
POTASSIUM UR-SCNC: 13 MMOL/L
PROT SERPL-MCNC: 6.5 G/DL
PROT UR-MCNC: 22 MG/DL
PROT/CREAT RATIO, UR: 0.26
RBC # BLD AUTO: 3.48 M/UL
SODIUM SERPL-SCNC: 136 MMOL/L
SODIUM UR-SCNC: 78 MMOL/L
UUN UR-MCNC: 389 MG/DL
WBC # BLD AUTO: 7.91 K/UL

## 2017-02-22 PROCEDURE — 93306 TTE W/DOPPLER COMPLETE: CPT

## 2017-02-22 PROCEDURE — 94640 AIRWAY INHALATION TREATMENT: CPT

## 2017-02-22 PROCEDURE — 11000001 HC ACUTE MED/SURG PRIVATE ROOM

## 2017-02-22 PROCEDURE — 97161 PT EVAL LOW COMPLEX 20 MIN: CPT

## 2017-02-22 PROCEDURE — 82436 ASSAY OF URINE CHLORIDE: CPT

## 2017-02-22 PROCEDURE — G8979 MOBILITY GOAL STATUS: HCPCS | Mod: CJ

## 2017-02-22 PROCEDURE — 25000003 PHARM REV CODE 250: Performed by: EMERGENCY MEDICINE

## 2017-02-22 PROCEDURE — 84300 ASSAY OF URINE SODIUM: CPT

## 2017-02-22 PROCEDURE — 63600175 PHARM REV CODE 636 W HCPCS: Performed by: HOSPITALIST

## 2017-02-22 PROCEDURE — 83735 ASSAY OF MAGNESIUM: CPT

## 2017-02-22 PROCEDURE — 94761 N-INVAS EAR/PLS OXIMETRY MLT: CPT

## 2017-02-22 PROCEDURE — 83605 ASSAY OF LACTIC ACID: CPT

## 2017-02-22 PROCEDURE — 97165 OT EVAL LOW COMPLEX 30 MIN: CPT

## 2017-02-22 PROCEDURE — 63600175 PHARM REV CODE 636 W HCPCS: Performed by: INTERNAL MEDICINE

## 2017-02-22 PROCEDURE — 63600175 PHARM REV CODE 636 W HCPCS: Performed by: EMERGENCY MEDICINE

## 2017-02-22 PROCEDURE — 27000221 HC OXYGEN, UP TO 24 HOURS

## 2017-02-22 PROCEDURE — 83605 ASSAY OF LACTIC ACID: CPT | Mod: 91

## 2017-02-22 PROCEDURE — 80053 COMPREHEN METABOLIC PANEL: CPT

## 2017-02-22 PROCEDURE — 84156 ASSAY OF PROTEIN URINE: CPT

## 2017-02-22 PROCEDURE — G8988 SELF CARE GOAL STATUS: HCPCS | Mod: CJ

## 2017-02-22 PROCEDURE — 25000003 PHARM REV CODE 250: Performed by: HOSPITALIST

## 2017-02-22 PROCEDURE — 36415 COLL VENOUS BLD VENIPUNCTURE: CPT

## 2017-02-22 PROCEDURE — 84540 ASSAY OF URINE/UREA-N: CPT

## 2017-02-22 PROCEDURE — 25000242 PHARM REV CODE 250 ALT 637 W/ HCPCS: Performed by: EMERGENCY MEDICINE

## 2017-02-22 PROCEDURE — G8987 SELF CARE CURRENT STATUS: HCPCS | Mod: CK

## 2017-02-22 PROCEDURE — 87116 MYCOBACTERIA CULTURE: CPT

## 2017-02-22 PROCEDURE — 84133 ASSAY OF URINE POTASSIUM: CPT

## 2017-02-22 PROCEDURE — 83935 ASSAY OF URINE OSMOLALITY: CPT

## 2017-02-22 PROCEDURE — 87205 SMEAR GRAM STAIN: CPT | Mod: 91

## 2017-02-22 PROCEDURE — 93306 TTE W/DOPPLER COMPLETE: CPT | Mod: 26,,, | Performed by: INTERNAL MEDICINE

## 2017-02-22 PROCEDURE — 85025 COMPLETE CBC W/AUTO DIFF WBC: CPT

## 2017-02-22 PROCEDURE — 87015 SPECIMEN INFECT AGNT CONCNTJ: CPT

## 2017-02-22 PROCEDURE — 87205 SMEAR GRAM STAIN: CPT

## 2017-02-22 PROCEDURE — G8978 MOBILITY CURRENT STATUS: HCPCS | Mod: CK

## 2017-02-22 PROCEDURE — 84100 ASSAY OF PHOSPHORUS: CPT

## 2017-02-22 RX ORDER — IBUPROFEN 200 MG
16 TABLET ORAL
Status: DISCONTINUED | OUTPATIENT
Start: 2017-02-22 | End: 2017-02-26 | Stop reason: HOSPADM

## 2017-02-22 RX ORDER — IBUPROFEN 200 MG
24 TABLET ORAL
Status: DISCONTINUED | OUTPATIENT
Start: 2017-02-22 | End: 2017-02-26 | Stop reason: HOSPADM

## 2017-02-22 RX ORDER — AZITHROMYCIN 250 MG/1
250 TABLET, FILM COATED ORAL DAILY
Status: DISCONTINUED | OUTPATIENT
Start: 2017-02-23 | End: 2017-02-26 | Stop reason: HOSPADM

## 2017-02-22 RX ORDER — INSULIN ASPART 100 [IU]/ML
1-10 INJECTION, SOLUTION INTRAVENOUS; SUBCUTANEOUS
Status: DISCONTINUED | OUTPATIENT
Start: 2017-02-22 | End: 2017-02-26 | Stop reason: HOSPADM

## 2017-02-22 RX ADMIN — RIFABUTIN 150 MG: 150 CAPSULE ORAL at 09:02

## 2017-02-22 RX ADMIN — INSULIN ASPART 4 UNITS: 100 INJECTION, SOLUTION INTRAVENOUS; SUBCUTANEOUS at 11:02

## 2017-02-22 RX ADMIN — GUAIFENESIN AND DEXTROMETHORPHAN 10 ML: 100; 10 SYRUP ORAL at 11:02

## 2017-02-22 RX ADMIN — ASPIRIN 81 MG: 81 TABLET, COATED ORAL at 08:02

## 2017-02-22 RX ADMIN — HEPARIN SODIUM 5000 UNITS: 5000 INJECTION, SOLUTION INTRAVENOUS; SUBCUTANEOUS at 09:02

## 2017-02-22 RX ADMIN — HEPARIN SODIUM 5000 UNITS: 5000 INJECTION, SOLUTION INTRAVENOUS; SUBCUTANEOUS at 05:02

## 2017-02-22 RX ADMIN — ACETAMINOPHEN 650 MG: 325 TABLET ORAL at 09:02

## 2017-02-22 RX ADMIN — FLUCONAZOLE 100 MG: 100 TABLET ORAL at 08:02

## 2017-02-22 RX ADMIN — CEFTRIAXONE 1 G: 1 INJECTION, SOLUTION INTRAVENOUS at 04:02

## 2017-02-22 RX ADMIN — IPRATROPIUM BROMIDE AND ALBUTEROL SULFATE 3 ML: .5; 3 SOLUTION RESPIRATORY (INHALATION) at 01:02

## 2017-02-22 RX ADMIN — PIPERACILLIN SODIUM AND TAZOBACTAM SODIUM 4.5 G: 4; .5 INJECTION, POWDER, FOR SOLUTION INTRAVENOUS at 05:02

## 2017-02-22 RX ADMIN — INSULIN DETEMIR 10 UNITS: 100 INJECTION, SOLUTION SUBCUTANEOUS at 09:02

## 2017-02-22 RX ADMIN — IPRATROPIUM BROMIDE AND ALBUTEROL SULFATE 3 ML: .5; 3 SOLUTION RESPIRATORY (INHALATION) at 08:02

## 2017-02-22 RX ADMIN — FLUTICASONE FUROATE AND VILANTEROL TRIFENATATE 1 PUFF: 100; 25 POWDER RESPIRATORY (INHALATION) at 08:02

## 2017-02-22 RX ADMIN — HEPARIN SODIUM 5000 UNITS: 5000 INJECTION, SOLUTION INTRAVENOUS; SUBCUTANEOUS at 02:02

## 2017-02-22 RX ADMIN — GUAIFENESIN AND DEXTROMETHORPHAN 10 ML: 100; 10 SYRUP ORAL at 05:02

## 2017-02-22 RX ADMIN — RIFABUTIN 150 MG: 150 CAPSULE ORAL at 08:02

## 2017-02-22 RX ADMIN — INSULIN ASPART 4 UNITS: 100 INJECTION, SOLUTION INTRAVENOUS; SUBCUTANEOUS at 05:02

## 2017-02-22 RX ADMIN — GUAIFENESIN AND DEXTROMETHORPHAN 10 ML: 100; 10 SYRUP ORAL at 04:02

## 2017-02-22 RX ADMIN — INSULIN ASPART 3 UNITS: 100 INJECTION, SOLUTION INTRAVENOUS; SUBCUTANEOUS at 09:02

## 2017-02-22 NOTE — SUBJECTIVE & OBJECTIVE
Interval History: Pt report feeling marked weak and minimal PO intake since being home; recently moved in with daughter. No reported F/C, + decreased urine and constipation.    Review of Systems   Constitutional: Negative for chills and fever.   Respiratory: Positive for shortness of breath.    Cardiovascular: Negative for chest pain.   Gastrointestinal: Positive for constipation.     Objective:     Vital Signs (Most Recent):  Temp: 98.7 °F (37.1 °C) (02/22/17 1200)  Pulse: 91 (02/22/17 1346)  Resp: 20 (02/22/17 1346)  BP: 114/70 (02/22/17 1200)  SpO2: 97 % (02/22/17 1346) Vital Signs (24h Range):  Temp:  [97.4 °F (36.3 °C)-99 °F (37.2 °C)] 98.7 °F (37.1 °C)  Pulse:  [] 91  Resp:  [17-20] 20  SpO2:  [91 %-98 %] 97 %  BP: ()/(58-72) 114/70     Weight: 77.1 kg (170 lb)  Body mass index is 19.15 kg/(m^2).    Intake/Output Summary (Last 24 hours) at 02/22/17 1459  Last data filed at 02/22/17 0515   Gross per 24 hour   Intake            912.5 ml   Output             1100 ml   Net           -187.5 ml      Physical Exam   Constitutional: He is oriented to person, place, and time. He appears well-developed.   Chronically ill appearing, thin and weak, NAD.   HENT:   Head: Normocephalic and atraumatic.   Eyes: EOM are normal. Pupils are equal, round, and reactive to light.   Neck: Normal range of motion. Neck supple.   Cardiovascular: Normal rate and regular rhythm.    Pulmonary/Chest:   Course breath sounds that are diminished but with fair air movement, no wheezing.   Abdominal: Soft. Bowel sounds are normal.   Musculoskeletal: Normal range of motion.   Neurological: He is alert and oriented to person, place, and time.   Skin: Skin is dry.   Psychiatric: He has a normal mood and affect.       Significant Labs: All pertinent labs within the past 24 hours have been reviewed.    Significant Imaging: I have reviewed and interpreted all pertinent imaging results/findings within the past 24 hours.

## 2017-02-22 NOTE — ASSESSMENT & PLAN NOTE
SOB  COPD  Assessment and Plan:  Pt was previously treated with IV moxifloxacin during admission and CXR overall unchanged. Pt started empirically with broad spectrum antibiotics from the ER. Pt is without fever and no elevation in WBC count, suspicion low for return on pneumonia. Imaging usually lags behind clinical improvement, but given patient's poor constitution, will continue current care and consult placed to ID to lend their expertise on this matter. Cultures are negative. Pt's SOB seems very close to baseline needs, and weakness more of a factor.

## 2017-02-22 NOTE — CONSULTS
Consult Note  Infectious Disease    Consult Requested By: Sunita Weathers MD    Reason for Consult: avelino lung infection with rt ll pneumonia    SUBJECTIVE:     History of Present Illness:  Patient is a 76 y.o. male presents with increasing sobar. He has severe copd and still smokes the occasional cigar. He was found to have  Rt upper lobe cavitary lesion and has been rx with avelox, azithromycin and rifabutin based on susceptibilities at [facility]. He has been compliant with care but has become increasingly sobar in the last 10 days. He is coughing up mucoid sputum and has no hemoptysis. cxr and ct confirm a new rt ll infiltrate and a rt ul old cavity.he is o2 dependant and is now living with his daughter.    Past Medical History   Diagnosis Date    Arthritis     COPD (chronic obstructive pulmonary disease)     Diabetes mellitus type II     Hypertension     Tuberculosis      Past Surgical History   Procedure Laterality Date    Melanoma       Family History   Problem Relation Age of Onset    Cancer Father      Tuberculosis.  Secondary scar was neoplastic.     Social History   Substance Use Topics    Smoking status: Former Smoker     Types: Cigars    Smokeless tobacco: None      Comment: uses cigars on ocassion    Alcohol use Yes      Comment: socially       Review of patient's allergies indicates:  No Known Allergies     Antibiotics     Start     Stop Route Frequency Ordered    02/21/17 1700  vancomycin 1 g in dextrose 5 % 250 mL IVPB (ready to mix system)      -- IV Every 24 hours (non-standard times) 02/21/17 1559    02/22/17 0415  piperacillin-tazobactam 4.5 g in dextrose 5 % 100 mL IVPB (ready to mix system)      -- IV Every 12 hours (non-standard times) 02/21/17 1608    02/21/17 1700  ciprofloxacin (CIPRO)400mg/200ml D5W IVPB 400 mg      -- IV Every 24 hours (non-standard times) 02/21/17 1610    02/21/17 2100  rifabutin capsule 150 mg      -- Oral Every 12 hours 02/21/17 1944          Review of  Systems:  Constitutional: no fever or chills  Eyes: no visual changes  ENT: positive for nasal congestion  Respiratory: positive for cough, dyspnea on exertion and sputum  Cardiovascular: no chest pain or palpitations  Gastrointestinal: no nausea or vomiting, no abdominal pain or change in bowel habits  Genitourinary: no hematuria or dysuria  Musculoskeletal: no arthralgias or myalgias    OBJECTIVE:     Vital Signs (Most Recent)  Temp: 98.7 °F (37.1 °C) (02/22/17 1200)  Pulse: 91 (02/22/17 1346)  Resp: 20 (02/22/17 1346)  BP: 114/70 (02/22/17 1200)  SpO2: 97 % (02/22/17 1346)    Temperature Range Min/Max (Last 24H):  Temp:  [97.4 °F (36.3 °C)-99 °F (37.2 °C)]     Physical Exam:  General: cachectic  HENT: Head:normocephalic, atraumatic. Ears:not examined. Nose: Nares normal. Septum midline. Mucosa normal. No drainage or sinus tenderness., no discharge. Throat: lips, mucosa, and tongue normal; teeth and gums normal and no throat erythema.  Eyes: conjunctivae/corneas clear. PERRL.   Neck: supple, symmetrical, trachea midline, no JVD and thyroid not enlarged, symmetric, no tenderness/mass/nodules  Lungs:  labored breathing, diminished breath sounds bibasilar and bilaterally and rales bibasilar and bilaterally  Cardiovascular: Heart: regular rate and rhythm, S1, S2 normal, no murmur, click, rub or gallop. Chest Wall: no tenderness. Extremities: no cyanosis or edema, or clubbing. Pulses: 2+ and symmetric.  Abdomen/Rectal: Abdomen: soft, non-tender non-distented; bowel sounds normal; no masses,  no organomegaly. Rectal: not examined  Skin: Skin color, texture, turgor normal. No rashes or lesions  Musculoskeletal:no clubbing, cyanosis  Lymph Nodes: No cervical or supraclavicular adenopathy    Laboratory:  CBC    Recent Labs  Lab 02/22/17 0422   WBC 7.91   RBC 3.48*   HGB 9.9*   HCT 30.0*   *     BMP    Recent Labs  Lab 02/22/17 0422   CO2 29   BUN 44*   CREATININE 2.8*   CALCIUM 7.8*       Recent Labs  Lab  02/21/17  1515   COLORU Torie   SPECGRAV 1.020   PHUR 5.0   PROTEINUA 1+*   BACTERIA Moderate*   NITRITE Negative   LEUKOCYTESUR Negative   UROBILINOGEN Negative   HYALINECASTS 0     Microbiology Results (last 7 days)     Procedure Component Value Units Date/Time    Culture, Respiratory [[id number]] Collected:  02/21/17 1730    Order Status:  Completed Specimen:  Respiratory from Sputum, Expectorated Updated:  02/22/17 1045     Respiratory Culture Insufficient incubation, culture in progress     Gram Stain (Respiratory) <10 epithelial cells per low power field.     Gram Stain (Respiratory) Few Gram negative rods     Gram Stain (Respiratory) Moderate WBC's    Urine culture - Cath (Indwelling) [[id number]] Collected:  02/21/17 1515    Order Status:  Completed Specimen:  Urine from Urine, Catheterized Updated:  02/22/17 1034     Urine Culture, Routine No significant isolate to date    Gram stain [[id number]] Collected:  02/22/17 0040    Order Status:  Completed Specimen:  Other from Urine Updated:  02/22/17 1010     Gram Stain Result No organisms seen      No WBC's    Blood culture x two cultures. Draw prior to antibiotics. [[id number]] Collected:  02/21/17 1500    Order Status:  Completed Specimen:  Blood from Peripheral, Forearm, Left Updated:  02/21/17 2312     Blood Culture, Routine No Growth to date    Narrative:       Aerobic and anaerobic    Blood culture x two cultures. Draw prior to antibiotics. [[id number]] Collected:  02/21/17 1445    Order Status:  Completed Specimen:  Blood from Peripheral, Antecubital, Left Updated:  02/21/17 2312     Blood Culture, Routine No Growth to date    Narrative:       Aerobic and anaerobic          Diagnostic Results:  Labs: Reviewed  X-Ray: Reviewed  CT: Reviewed    ASSESSMENT/PLAN:     Active Hospital Problems    Diagnosis  POA    *Pneumonia of right lower lobe due to infectious organism [J18.1]  Yes    Physical deconditioning [R53.81]  Yes    Acute renal failure [N17.9]  Yes     Hypotension (arterial) [I95.9]  Yes    Essential hypertension [I10]  Yes    COPD (chronic obstructive pulmonary disease) [J44.9]  Yes    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       1. avelino on rx since march 2016  Last sputum for afb culture negative September 2016  Repeat and continue avelino meds  2. Rt ll infiltrate  Iv rocephin and dc  vanco with javi

## 2017-02-22 NOTE — CONSULTS
Consult Note  Nephrology    Consult Requested By: Sunita Weathers MD  Reason for Consult: Renal Failure    SUBJECTIVE:     History of Present Illness:  Patient is a 76 y.o. male presents with cough, weakness, fatigue. Found to have elevated BUN and Creatinine.   Pt was admitted in Jan 2017 with pneumonia and readmitted with increasing weakness. Was placed on Rifabutin for mycobacterium. Pt had serum creatinine of 1.0 mg on 1/29/2017  Pt found to have RLL pneumonia and has been placed on antibiotic s.   His creatinine has decreased to 2.8 mg with hydration. No hx of nausea vomiting diarrhea but pt has had anorexia   Hx of DM, HTN and proteinuria suggesting diabetic nephropathy. Renal us shows no obstructive components or renal calculi. No hx of skin rash or fever  suggestive of acute interstitial nephritis. U/A has few rbc s .    Past Medical History   Diagnosis Date    Arthritis     COPD (chronic obstructive pulmonary disease)     Diabetes mellitus type II     Hypertension     Tuberculosis      Past Surgical History   Procedure Laterality Date    Melanoma       Family History   Problem Relation Age of Onset    Cancer Father      Tuberculosis.  Secondary scar was neoplastic.     Social History   Substance Use Topics    Smoking status: Former Smoker     Types: Cigars    Smokeless tobacco: None      Comment: uses cigars on ocassion    Alcohol use Yes      Comment: socially       Review of patient's allergies indicates:  No Known Allergies     Review of Systems:  DM HTN   COPD    OBJECTIVE:     Vital Signs (Most Recent)  Temp: 98.7 °F (37.1 °C) (02/22/17 1200)  Pulse: 91 (02/22/17 1346)  Resp: 20 (02/22/17 1346)  BP: 114/70 (02/22/17 1200)  SpO2: 97 % (02/22/17 1346)    Vital Signs Range (Last 24H):  Temp:  [97.4 °F (36.3 °C)-99 °F (37.2 °C)]   Pulse:  [80-96]   Resp:  [17-20]   BP: (100-125)/(58-72)   SpO2:  [91 %-98 %]     Current Facility-Administered Medications   Medication    0.9%  NaCl infusion     acetaminophen tablet 650 mg    aspirin EC tablet 81 mg    benzonatate capsule 100 mg    ciprofloxacin (CIPRO)400mg/200ml D5W IVPB 400 mg    dextromethorphan-guaifenesin  mg/5 ml liquid 10 mL    dextrose 50% injection 12.5 g    fluconazole tablet 100 mg    fluticasone-vilanterol 100-25 mcg/dose diskus inhaler 1 puff    glucagon (human recombinant) injection 1 mg    glucose chewable tablet 16 g    glucose chewable tablet 24 g    heparin (porcine) injection 5,000 Units    insulin aspart pen 1-10 Units    insulin detemir pen 10 Units    ondansetron injection 8 mg    piperacillin-tazobactam 4.5 g in dextrose 5 % 100 mL IVPB (ready to mix system)    rifabutin capsule 150 mg    vancomycin 1 g in dextrose 5 % 250 mL IVPB (ready to mix system)         Physical Exam:  General: Appears chronically ill  Head: normocephalic, atraumatic  Eyes:    Nose: Nares normal. Septum midline  Neck: supple, symmetrical, trachea midline, no JVD and thyroid not enlarged, symmetric, no tenderness/mass/nodules  Lungs:  Decreased breath sounds  Scattered rhonchi rales  Chest Wall: no tenderness  Heart: regular rate and rhythm, S1, S2 normal, no murmur, click, rub or gallop  Abdomen: soft, non-tender non-distented; bowel sounds normal; no masses,  no organomegaly    Laboratory:  CBC:   Recent Labs  Lab 02/22/17 0422   WBC 7.91   RBC 3.48*   HGB 9.9*   HCT 30.0*   *   MCV 86   MCH 28.4   MCHC 33.0     BMP:   Recent Labs  Lab 02/22/17 0422   *   CL 96   CO2 29   BUN 44*   CREATININE 2.8*   CALCIUM 7.8*   MG 1.0*     CMP:   Recent Labs  Lab 02/22/17 0422   *   CALCIUM 7.8*   ALBUMIN 1.8*   PROT 6.5      K 3.9   CO2 29   CL 96   BUN 44*   CREATININE 2.8*   ALKPHOS 74   ALT 16   AST 25   BILITOT 0.4     LFTs:   Recent Labs  Lab 02/22/17 0422   ALT 16   AST 25   ALKPHOS 74   BILITOT 0.4   PROT 6.5   ALBUMIN 1.8*     TSH: No results for input(s): TSH in the last 168 hours.  PTH: No results for  input(s): PTH in the last 168 hours.  Coagulation:   Recent Labs  Lab 02/21/17  1530   INR 1.2   APTT 31.5     Cardiac Markers: No results for input(s): CKMB, TROPONINT, MYOGLOBIN in the last 168 hours.  ABGs: No results for input(s): PH, PCO2, HCO3, POCSATURATED, BE in the last 168 hours.  Microbiology Results (last 7 days)     Procedure Component Value Units Date/Time    Culture, Respiratory [[id number]] Collected:  02/21/17 1730    Order Status:  Completed Specimen:  Respiratory from Sputum, Expectorated Updated:  02/22/17 1045     Respiratory Culture Insufficient incubation, culture in progress     Gram Stain (Respiratory) <10 epithelial cells per low power field.     Gram Stain (Respiratory) Few Gram negative rods     Gram Stain (Respiratory) Moderate WBC's    Urine culture - Cath (Indwelling) [[id number]] Collected:  02/21/17 1515    Order Status:  Completed Specimen:  Urine from Urine, Catheterized Updated:  02/22/17 1034     Urine Culture, Routine No significant isolate to date    Gram stain [[id number]] Collected:  02/22/17 0040    Order Status:  Completed Specimen:  Other from Urine Updated:  02/22/17 1010     Gram Stain Result No organisms seen      No WBC's    Blood culture x two cultures. Draw prior to antibiotics. [[id number]] Collected:  02/21/17 1500    Order Status:  Completed Specimen:  Blood from Peripheral, Forearm, Left Updated:  02/21/17 2312     Blood Culture, Routine No Growth to date    Narrative:       Aerobic and anaerobic    Blood culture x two cultures. Draw prior to antibiotics. [[id number]] Collected:  02/21/17 1445    Order Status:  Completed Specimen:  Blood from Peripheral, Antecubital, Left Updated:  02/21/17 2312     Blood Culture, Routine No Growth to date    Narrative:       Aerobic and anaerobic        Specimen     None          Recent Labs  Lab 02/21/17  1515   COLORU Torie   SPECGRAV 1.020   PHUR 5.0   PROTEINUA 1+*   BACTERIA Moderate*   NITRITE Negative   LEUKOCYTESUR Negative    UROBILINOGEN Negative   HYALINECASTS 0       Diagnostic Results:  Labs: Reviewed  X-Ray: Reviewed    ASSESSMENT/PLAN:     KATHY  Pre renal   Uncontrolled DM  HTN  Proteinuria  Bronchopneumonia  Hypoalbuminemia        Plan:   Check urine for eosinophils  PATY , ANCA, SPEP  Urine for AFB   Continue hydration  Monitor renal function closely

## 2017-02-22 NOTE — PLAN OF CARE
Problem: Occupational Therapy Goal  Goal: Occupational Therapy Goal  Goals to be met by: 3/8/17    Patient will increase functional independence with ADLs by performing:    UE Dressing with Supervision.  LE Dressing with Stand-by Assistance.  Grooming while standing at sink with Stand-by Assistance.  Stand pivot transfers with Stand-by Assistance.  Toilet transfer to toilet with Stand-by Assistance.  Upper extremity exercise program x10 reps per handout, with assistance as needed.  Outcome: Ongoing (interventions implemented as appropriate)  Patient will benefit from OT to address functional deficits.

## 2017-02-22 NOTE — PROGRESS NOTES
Sputum container placed at bedside. Explain the need for sputum sample. Instructed to call nurse after sample provided. Pt verbalized udnerstanding

## 2017-02-22 NOTE — PLAN OF CARE
Problem: Fall Risk (Adult)  Goal: Identify Related Risk Factors and Signs and Symptoms  Related risk factors and signs and symptoms are identified upon initiation of Human Response Clinical Practice Guideline (CPG)   Outcome: Ongoing (interventions implemented as appropriate)  Patient will remain free from falls,trauma,and injury while hospitalized.     Problem: Patient Care Overview  Goal: Plan of Care Review  Outcome: Ongoing (interventions implemented as appropriate)  Pt progressing. Oriented X4. Voiding well. Skin integrity maintained. Denies pain or nausea during shift.  Vs stable. Adequate oral intake. Tolerating diet. Iv fluids maintained. Free of falls. Call light within reach. Bed in low position. No issues during shift. Continue plan of care.        Problem: Pneumonia (Adult)  Goal: Signs and Symptoms of Listed Potential Problems Will be Absent, Minimized or Managed (Pneumonia)  Signs and symptoms of listed potential problems will be absent, minimized or managed by discharge/transition of care (reference Pneumonia (Adult) CPG).  Outcome: Ongoing (interventions implemented as appropriate)  Patient regimen consist of Resp treatments,Iv antibiotics, and IVF.    Problem: Skin Integrity Impairment, Risk/Actual (Adult)  Goal: Identify Related Risk Factors and Signs and Symptoms  Related risk factors and signs and symptoms are identified upon initiation of Human Response Clinical Practice Guideline (CPG)  Outcome: Ongoing (interventions implemented as appropriate)  Skin integrity maintained. Patient independently turns and is ambulatory with assistance.

## 2017-02-22 NOTE — PT/OT/SLP EVAL
Occupational Therapy  Evaluation    Regan Alvarez   MRN: [id number]   Admitting Diagnosis: Pneumonia of right lower lobe due to infectious organism    OT Date of Treatment: 17   OT Start Time: 1508  OT Stop Time: 1525  OT Total Time (min): 17 min    Billable Minutes:  Evaluation 17 (co-tx with PT)    Diagnosis: Pneumonia of right lower lobe due to infectious organism       Past Medical History   Diagnosis Date    Arthritis     COPD (chronic obstructive pulmonary disease)     Diabetes mellitus type II     Hypertension     Tuberculosis       Past Surgical History   Procedure Laterality Date    Melanoma         Referring physician: Kristi  Date referred to OT: 17    General Precautions: Standard, fall, respiratory, diabetic  Orthopedic Precautions: N/A  Braces: N/A    Do you have any cultural, spiritual, Anglican conflicts, given your current situation?: none     Patient History:  Living Environment  Lives With: child(lupe), adult  Living Arrangements: house  Living Environment Comment: Patient states he recently sold his house and now lives with his son and dtr-in-law when everything is on the 1st floor.  Equipment Currently Used at Home: walker, rolling, shower chair    Prior level of function:   Bed Mobility/Transfers: independent  Grooming: independent  Bathing: independent  Upper Body Dressing: independent  Lower Body Dressing: independent  Toileting: independent  IADL Comments: The patient has been using a RW ~2 days due to difficulty ambulating.     Dominant hand: right    Subjective:  Communicated with nurseAndie prior to session.    Chief Complaint: needs to have a BM  Patient/Family stated goals: wants to get stronger    Pain Ratin/10                   Objective:  Patient found with: telemetry, oxygen, workman catheter  3 L O2 NC  Cognitive Exam:  Oriented to: Person, Place and Situation  Follows Commands/attention: Follows two-step commands  Communication: clear/fluent  Memory:   No Deficits noted  Safety awareness/insight to disability: intact  Coping skills/emotional control: Appropriate to situation    Visual/perceptual:  Intact    Physical Exam:  Postural examination/scapula alignment: Rounded shoulder and Head forward  Skin integrity: Visible skin intact  Edema: None noted     Sensation:   Intact    Upper Extremity Range of Motion:  Right Upper Extremity: WFL  Left Upper Extremity: WFL    Upper Extremity Strength:  Right Upper Extremity: WFL  Left Upper Extremity: WFL   Strength: WFL    Fine motor coordination:   Intact    Gross motor coordination: WFL    Functional Mobility:  Bed Mobility:  Scooting/Bridging: Stand by Assistance  Supine to Sit: Stand by Assistance (and assist to manage lines)  Sit to Supine: Stand by Assistance    Transfers:  Sit <> Stand Assistance: Contact Guard Assistance  Sit <> Stand Assistive Device: Rolling Walker  Toilet Transfer Technique: Stand Pivot  Toilet Transfer Assistance: Minimum Assistance  Toilet Transfer Assistive Device: Rolling Walker    Functional Ambulation: Patient amb using a RW from bed<>toilet with CGA and multiple VC for safety.     Activities of Daily Living:  UE Dressing Level of Assistance: Contact guard  LE Dressing Level of Assistance: Total assistance (to don/dof gown while seated EOB)  LE adaptive equipment: none        Toileting Where Assessed: Toilet  Toileting Level of Assistance: Set-up Assistance     Bathing Level of Assistance: Activity did not occur      Balance:   Static Sit: FAIR+: Able to take MINIMAL challenges from all directions  Dynamic Sit: FAIR+: Maintains balance through MINIMAL excursions of active trunk motion  Static Stand: FAIR+: Takes MINIMAL challenges from all directions  Dynamic stand: FAIR: Needs CONTACT GUARD during gait      AM-PAC 6 CLICK ADL  How much help from another person does this patient currently need?  1 = Unable, Total/Dependent Assistance  2 = A lot, Maximum/Moderate Assistance  3 = A  "little, Minimum/Contact Guard/Supervision  4 = None, Modified [place]/Independent    Putting on and taking off regular lower body clothing? : 1  Bathing (including washing, rinsing, drying)?: 3  Toileting, which includes using toilet, bedpan, or urinal? : 3  Putting on and taking off regular upper body clothing?: 4  Taking care of personal grooming such as brushing teeth?: 4  Eating meals?: 4  Total Score: 19    AM-PAC Raw Score CMS "G-Code Modifier Level of Impairment Assistance   6 % Total / Unable   7 - 9 CM 80 - 100% Maximal Assist   10 - 14 CL 60 - 80% Moderate Assist   15 - 19 CK 40 - 60% Moderate Assist   20 - 22 CJ 20 - 40% Minimal Assist   23 CI 1-20% SBA / CGA   24 CH 0% Independent/ Mod I       Patient left supine with all lines intact, call button in reach and nurse notified    Assessment:  Regan Alvarez is a 76 y.o. male with a medical diagnosis of Pneumonia of right lower lobe due to infectious organism and presents with self care and functional mobility deficts R/T generalized weakness. The patient gets anxious and requires VC for safety.    Rehab identified problem list/impairments: Rehab identified problem list/impairments: impaired cardiopulmonary response to activity, weakness, impaired endurance, impaired self care skills, impaired balance, gait instability, impaired functional mobilty    Rehab potential is good.    Activity tolerance: Fair    Discharge recommendations:       Barriers to discharge: Barriers to Discharge: None    Equipment recommendations: none     GOALS:   Occupational Therapy Goals        Problem: Occupational Therapy Goal    Goal Priority Disciplines Outcome Interventions   Occupational Therapy Goal     OT, PT/OT Ongoing (interventions implemented as appropriate)    Description:  Goals to be met by: 3/8/17    Patient will increase functional independence with ADLs by performing:    UE Dressing with Supervision.  LE Dressing with Stand-by " Assistance.  Grooming while standing at sink with Stand-by Assistance.  Stand pivot transfers with Stand-by Assistance.  Toilet transfer to toilet with Stand-by Assistance.  Upper extremity exercise program x10 reps per handout, with assistance as needed.                PLAN:  Patient to be seen 3 x/week to address the above listed problems via self-care/home management, therapeutic activities, therapeutic exercises  Plan of Care expires: 03/08/17  Plan of Care reviewed with: patient         Tifafny Michele, OT  02/22/2017

## 2017-02-22 NOTE — ASSESSMENT & PLAN NOTE
As discussed above. Multifactorial but likely related to chronic Mycobacterium avium complex as well as COPD and deconditioning.

## 2017-02-22 NOTE — PROGRESS NOTES
Pt on 3l nasal cannula sats 97%the patient had coughing spell and coarse bs.Pt tolerated respiratory treatment with PAL.

## 2017-02-22 NOTE — PLAN OF CARE
[id number] - [place] - [address]  [address]  [place] [address]  Phone: [phone] Fax: [phone]    [id number] - [place] - [address]  [address]  [place] [address]  Phone: [phone] Fax: [phone]       02/22/17 1049   Discharge Assessment   Assessment Type Discharge Planning Assessment   Confirmed/corrected address and phone number on facesheet? Yes   Assessment information obtained from? Patient   Prior to hospitilization cognitive status: Alert/Oriented   Prior to hospitalization functional status: Assistive Equipment   Current cognitive status: Alert/Oriented   Current Functional Status: Assistive Equipment   Lives With child(lupe), adult   Able to Return to Prior Arrangements yes   Is patient able to care for self after discharge? Yes   How many people do you have in your home that can help with your care after discharge? 1   Who are your caregiver(s) and their phone number(s)? Daughter Alix   Patient's perception of discharge disposition home or selfcare;home health   Readmission Within The Last 30 Days previous discharge plan unsuccessful   Patient currently being followed by outpatient case management? No   Patient currently receives home health services? Yes   Patient previously received home health services and would like to resume services if necessary? Yes   If yes, name of home health provider: [facility]   Does the patient currently use HME? Yes   Name and contact number for HME provider: [facility]    Equipment Currently Used at Home oxygen   Do you have any problems affording any of your prescribed medications? No   Is the patient taking medications as prescribed? yes   Do you have any financial concerns preventing you from receiving the healthcare you need? No   Does the patient have transportation to healthcare appointments? Yes   Transportation Available car;family or friend will provide   On Dialysis?  No   Are there any open cases? No   Discharge Plan A Home with family;Home Health   Discharge Plan B (F/U appts in the Morning)   Patient/Family In Agreement With Plan yes

## 2017-02-22 NOTE — INTERVAL H&P NOTE
"The patient has been examined and the H&P has been reviewed:    I concur with the findings and changes have been noted since the H&P was written: See details below.        Diagnosis leading to hospitalization: Pneumonia of right lower lobe due to infectious organism    Length of Stay since admission: 0     HPI - Interval History:       Patient was not doing well at home.  He continued to have progressively worsening shortness of breath.  He has continued to experience generalized weakness and fatigue.  This was a problem at discharge but he was adamantly wanting to go home during the previous hospitalization.  He was evaluated by his primary care physician and urged to come to the ED for further evaluation and treatment.  While in the ED routine laboratory studies and chest x-ray were obtained.  There was evidence of acute renal failure as well as unresolved right lower lobe pneumonia.  Hospital medicine has been asked to admit again for both of these issues.    Vitals:  Vitals:    02/21/17 1904 02/21/17 2006 02/21/17 2020 02/21/17 2221   BP: 110/66 107/61     BP Location:  Right arm     Patient Position:  Sitting     Pulse: 90 83 83 83   Resp:  18  18   Temp:  98.4 °F (36.9 °C)     TempSrc:  Oral Oral    SpO2: 98% 98% 98% 97%   Weight:  77.1 kg (170 lb)     Height:  6' 7" (2.007 m)         Physical Exam:    General: Chronically ill-appearing.  Afebrile, alert, comfortable, no acute distress.   HEENT: SHIKHA. EOMI, no scleral icterus. No sinus tenderness.  Dry mucous membranes.  Pulmonary: Non labored,clear to auscultation A/P/L. No wheezing, crackles, or rhonchi.  Cardiac: Rapid regular rate and rhythm. No JVD.   Abdominal: Non-tender, non-distended.Bowel sounds present x 4. No appreciable hepatosplenomegaly.  Extremities: Moves all extremities but overall appears to have generalized muscle weakness.  No peripheral edema. 2+ pulses.  Skin: No jaundice, rashes, or visible lesions.  Neuro:  Alert and oriented x 4.  CN " II-XII grossly intact, sensation intact x 4.       Current Medications:  Scheduled Meds:   albuterol-ipratropium 2.5mg-0.5mg/3mL  3 mL Nebulization Q6H WAKE    [START ON 2/22/2017] amlodipine  2.5 mg Oral Daily    [START ON 2/22/2017] aspirin  81 mg Oral Daily    ciprofloxacin (CIPRO)400mg/200ml D5W IVPB  400 mg Intravenous Q24H    [START ON 2/22/2017] enoxaparin  30 mg Subcutaneous Daily    [START ON 2/22/2017] fluconazole  100 mg Oral Daily    [START ON 2/22/2017] fluticasone-vilanterol  1 puff Inhalation Daily    [START ON 2/22/2017] piperacillin-tazobactam 4.5 g in dextrose 5 % 100 mL IVPB (ready to mix system)  4.5 g Intravenous Q12H    [START ON 2/22/2017] ramipril  2.5 mg Oral Daily    rifabutin  150 mg Oral Q12H    vancomycin (VANCOCIN) IVPB  1,000 mg Intravenous Q24H     Continuous Infusions:   sodium chloride 0.9% 125 mL/hr at 02/21/17 2125     PRN Meds:.acetaminophen, dextrose 50%, glucagon (human recombinant), insulin aspart, ondansetron    Lab Results:     CBC:    Recent Labs  Lab 02/21/17  1425   WBC 12.32   HGB 11.4*   *   MCV 86   MCH 28.6   MCHC 33.3   RBC 3.99*       CMP:    Recent Labs  Lab 02/21/17  1517   CO2 29   BUN 44*   CREATININE 3.6*   CALCIUM 8.5*   ALBUMIN 2.1*   AST 21   ALT 16   ANIONGAP 15     Coagulantion studies    Recent Labs  Lab 02/21/17  1530   INR 1.2     Cardiac Enzymes    Recent Labs  Lab 02/21/17  1517   TROPONINI <0.006       Recent Labs  Lab 02/21/17  1500   LACTATE 2.8*       Microbiology Results (last 7 days)     Procedure Component Value Units Date/Time    Culture, Respiratory [[id number]] Collected:  02/21/17 1730    Order Status:  Sent Specimen:  Respiratory from Sputum, Expectorated Updated:  02/21/17 1801    Urine culture - Cath (Indwelling) [[id number]] Collected:  02/21/17 1515    Order Status:  Sent Specimen:  Urine from Urine, Catheterized Updated:  02/21/17 1522    Blood culture x two cultures. Draw prior to antibiotics. [[id number]] Collected:   02/21/17 1500    Order Status:  Sent Specimen:  Blood from Peripheral, Forearm, Left Updated:  02/21/17 1519    Narrative:       Aerobic and anaerobic    Blood culture x two cultures. Draw prior to antibiotics. [[id number]] Collected:  02/21/17 1445    Order Status:  Sent Specimen:  Blood from Peripheral, Antecubital, Left Updated:  02/21/17 1518    Narrative:       Aerobic and anaerobic          Imaging:  Imaging Results         X-Ray Chest AP Portable (Final result) Result time:  02/21/17 15:39:27    Final result by Bob Akhtar MD (02/21/17 15:39:27)    Impression:      Patchy consolidation within the right lung base which appears more prominent when compared to prior examination.  Right basilar pneumonia should be considered.  Persistent opacification within the right lung apex with possible cavitation.  This has been noted on multiple prior examinations and is chronic.  Tubular opacification within the left lung which also appear stable when compared to multiple prior studies.      Electronically signed by: BOB AKHTAR MD  Date:     02/21/17  Time:    15:39     Narrative:    Comparison is made to prior examination dated 1/29/17.    2 portable AP radiographs of the chest were obtained.  Once again, there is patchy consolidation identified within the right lung apex with possible cavitation within the right lung apex as before.  There is also consolidation within the right lung base which may have increased in prominence when compared to the prior exam.  Right basilar pneumonia should be considered.  Once again, a tubular soft tissue density is identified within the left upper lung and this does not appear significantly changed.  There is no evidence for pneumothorax.  Bony structures are grossly intact.                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       Labs were reviewed.  Imaging was reviewed.  Problem listed reviewed and updated.    Please refer to full H&P from early today for full details.      Pneumonia of right lower lobe due to infectious organism  · Previously he was on IV moxifloxacin during admission. Leukocytosis decreased prior to discharge, still mildly elevated which may be due to an ongoing infection. CXR on admission with increased right lower lung opacification.He completed 5 days of steroids while admitted. He completed a 7 day course with PO moxifloxacin after discharge.  He continues to have symptoms including significant shortness of breath, weakness, and fatigue.  · Although I do not really think this is a healthcare associated pneumonia, given the recent hospitalization and worsening symptoms and appearance on the x-ray he was started on broad-spectrum antibiotics.     · Infectious disease consulted by ED  · Low threshold to consult pulmonology.       Acute renal failure  Acute renal failure  · As evidenced by decrease in GFR.  Baseline creatinine = 0.7  · Will evaluate for pre-renal, intrarenal, and post-obstructive etiology.    · Suspicious that this is related to poor oral intake and is therefore prerenal secondary to  dehydration and perhaps even hypotension.  Patient was hypotensive in the ED per response well to IV fluids.  He does have an elevated lactic acid level.  · Obtain:  1.  protein/creatinine ratio  2. urine and serum osmolalities  3. urine electrolytes (Na, Cl, K)  4. LDH  5. Complement  6. Ponce's stain for osinophils  · Renal ultrasound  · Monitor with serial Cr / GFR levels closely with serial labs  · Avoid nephrotoxic medications such as NSAIDs, IV contrast, or RAAS blockade  · Nephrology consult      Hypotension (arterial)  · Transient arterial hypotension that responded well to IV fluids given in the ED  · Patient admits to having poor oral intake secondary to fatigue and weakness  · Elevated lactic acid level  · Evidence of acute renal failure  · Maintain euvolemic state  · Trend lactic acid level      Pulmonary Mycobacterium avium complex (MAC) infection  Continued home medication Rifabutin, he is followed at [facility] by ID.         SOB (shortness of breath)  Multifactorial but likely related to chronic Mycobacterium avium complex infection as well as right lower lobe pneumonia.  Deconditioning is also likely to play a factor.    Diabetes mellitus type 2, uncontrolled  · BG in acceptable range at this time  · Maintain w/ subcutaneous insulin management order set  · Hold oral diabetic meds  · ADA 1800 kcal diet  · BG goal while in patient is <180mg/dL  · HgA1c = Pending      COPD (chronic obstructive pulmonary disease)  · No acute issues  · Continue with home medication regimen      Essential hypertension  · Goal while inpatient is a systolic blood pressure less than 160mmHg  · BP in acceptable range at this time  · Continue current home regimen with hold parameters  · PRN antihypertensives available        Barrie Berry MD, MPH  Hospital Medicine  Pager: [phone]

## 2017-02-22 NOTE — PROGRESS NOTES
Pt's daughter Alix states she has POA. Asked daughter to please bring copy so that it may be scanned. Pt and daughter verbalized understanding.

## 2017-02-22 NOTE — PHARMACY MED REC
[facility] Medication Reconciliation  Template    Patient was admitted on 2/21/2017 for Pneumonia of right lower lobe due to infectious organism.      Patient's prior to admission medication regimen was as follows:  Prescriptions Prior to Admission   Medication Sig Dispense Refill Last Dose    albuterol 90 mcg/actuation inhaler Inhale 2 puffs into the lungs every 6 (six) hours as needed for Wheezing. 1 Inhaler 2 2/21/2017    amlodipine (NORVASC) 2.5 MG tablet Take 2.5 mg by mouth once daily.   2/21/2017    aspirin (ECOTRIN) 81 MG EC tablet Take 81 mg by mouth once daily.   2/21/2017    blood sugar diagnostic (TRUE METRIX GLUCOSE TEST STRIP) Strp Test blood sugar 3 times daily 300 strip 3 2/21/2017    blood-glucose meter (TRUE METRIX AIR GLUCOSE METER) Misc Test blood sugar 3 times daily 1 each 0 2/21/2017    fluconazole (DIFLUCAN) 100 MG tablet Two tabs po on day one then one tab po daily 15 tablet 0 2/21/2017    fluticasone-salmeterol 250-50 mcg/dose (ADVAIR) 250-50 mcg/dose diskus inhaler Inhale 1 puff into the lungs 2 (two) times daily. 60 each 4 2/21/2017    furosemide (LASIX) 20 MG tablet Take 1 tablet (20 mg total) by mouth once daily. 30 tablet 4 2/21/2017    insulin aspart (NOVOLOG) 100 unit/mL InPn pen Inject 1-10 Units into the skin before meals and at bedtime as needed (Hyperglycemia). 3 mL 0 2/21/2017    lancets 28 gauge Misc 1 lancet by Misc.(Non-Drug; Combo Route) route 3 (three) times daily. True Metrix lancets 300 each 3 2/21/2017    metformin (GLUCOPHAGE) 1000 MG tablet Take 1 tablet (1,000 mg total) by mouth 2 (two) times daily with meals. 180 tablet 3 2/20/2017    multivitamin capsule Take 1 capsule by mouth once daily.   2/21/2017    predniSONE (DELTASONE) 20 MG tablet Take 20 mg by mouth once daily. Take two tablets daily   Past Week    ramipril (ALTACE) 2.5 MG capsule Take 1 capsule (2.5 mg total) by mouth once daily. 30 capsule 4 2/21/2017    rifabutin (MYCOBUTIN) 150 mg Cap  "Take 1 capsule (150 mg total) by mouth every 12 (twelve) hours. 60 capsule 11 2/21/2017         Please add appropriate    SmartPhrase below:      Admission Medication Reconciliation - Pharmacy Consult Note    The home medication history was reviewed by Jas Epsinal Pharm.D..  Based on information gathered and subsequent review by the clinical pharmacist, the items below may need attention.     You may go to "Admission" then "Reconcile Home Medications" tabs to review and/or act upon these items.     Potentially problematic discrepancies with current MAR  o Patient IS taking the following which was not ordered upon admit  o Ramipril 2.5 mg - Prescribed by Dr. Getachew Valera and last filled on 2/13/17 for a 30 day supply.  o Furosemide 20 mg - Prescribed by Dr. Ishaan Adamson and last filled on 2/13/17 for a 30 day supply.    Please address this information as you see fit.  Feel free to contact us if you have any questions or require assistance.    Paradise BoyceD.      "

## 2017-02-22 NOTE — ASSESSMENT & PLAN NOTE
Continued home medication Rifabutin, he is [place] [facility] [place].

## 2017-02-22 NOTE — H&P (VIEW-ONLY)
[facility] Hospital Medicine  History & Physical    Patient Name: Regan Owensbrook  MRN: [id number]  Admission Date: 1/29/2017  Attending Physician: Tiana Branch MD   Primary Care Provider: Ishaan Adamson MD         Patient information was obtained from patient and ER records.     Subjective:     Principal Problem:Pneumonia due to infectious organism    Chief Complaint: SOB,cough     HPI:  This patient is a 76 y.o. M with DM Type II, HTN, COPD, Arthritis, and history of MAC,under treatment with Rifabutin  who came with C/O of  10 day history of SOB and non productive cough,. Pt denies hemoptysis, fever, chills, diaphoresis, emesis, and nausea. Patient states that he is taking antibiotics for recent diagnosis of MAC,Pt is not on home oxygen.he is former smoker,but quit smoking many years ago,patient is septic with leucocytosis,tachycardia,tachypnea,source pneumonia,per chest x ray and clinical presentation,patient has been started on IB Abx,supplemental oxygen,continues nebulizer and Solumedrol.Patient has been followed by ID and pulmonology in [facility].    Past Medical History   Diagnosis Date    Arthritis     COPD (chronic obstructive pulmonary disease)     Diabetes mellitus type II     Hypertension     Tuberculosis        Past Surgical History   Procedure Laterality Date    Melanoma         Review of patient's allergies indicates:  No Known Allergies    No current facility-administered medications on file prior to encounter.      Current Outpatient Prescriptions on File Prior to Encounter   Medication Sig    albuterol 90 mcg/actuation inhaler Inhale 2 puffs into the lungs every 6 (six) hours as needed for Wheezing.    aspirin 325 MG tablet Take 325 mg by mouth every 4 (four) hours as needed for Pain.    blood sugar diagnostic (FREESTYLE LITE STRIPS) Strp 1 strip by Misc.(Non-Drug; Combo Route) route once daily.    fluticasone-salmeterol 250-50 mcg/dose (ADVAIR) 250-50  mcg/dose diskus inhaler Inhale 1 puff into the lungs 2 (two) times daily.    furosemide (LASIX) 20 MG tablet Take 1 tablet (20 mg total) by mouth once daily.    glipiZIDE (GLUCOTROL) 5 MG tablet Take 1 tablet (5 mg total) by mouth 2 (two) times daily before meals.    lancets (FREESTYLE LANCETS) Misc 1 each by Misc.(Non-Drug; Combo Route) route once daily.    metformin (GLUCOPHAGE) 1000 MG tablet Take 1 tablet (1,000 mg total) by mouth 2 (two) times daily with meals.    metoprolol succinate (TOPROL-XL) 50 MG 24 hr tablet Take 1 tablet (50 mg total) by mouth once daily.    moxifloxacin (AVELOX) 400 mg tablet Take 1 tablet (400 mg total) by mouth once daily.    multivitamin capsule Take 1 capsule by mouth once daily.    predniSONE (DELTASONE) 20 MG tablet Take 2 tablets (40 mg total) by mouth once daily.    promethazine-codeine 6.25-10 mg/5 ml (PHENERGAN WITH CODEINE) 6.25-10 mg/5 mL syrup Take 5 mLs by mouth every 12 (twelve) hours as needed for Cough.    ramipril (ALTACE) 2.5 MG capsule Take 1 capsule (2.5 mg total) by mouth once daily.    rifabutin (MYCOBUTIN) 150 mg Cap Take 1 capsule (150 mg total) by mouth every 12 (twelve) hours.     Family History     Problem Relation (Age of Onset)    Cancer Father        Social History Main Topics    Smoking status: Former Smoker     Types: Cigars    Smokeless tobacco: Not on file      Comment: uses cigars on ocassion    Alcohol use Yes      Comment: socially    Drug use: No    Sexual activity: Not on file     Review of Systems   Constitutional: Negative for activity change, appetite change, chills and fever.   HENT: Negative for congestion and dental problem.    Eyes: Negative for discharge.   Respiratory: Positive for cough, shortness of breath and wheezing.    Cardiovascular: Negative for chest pain.   Gastrointestinal: Negative for abdominal distention and abdominal pain.   Endocrine: Negative for cold intolerance.   Genitourinary: Negative for difficulty  urinating and dysuria.   Musculoskeletal: Negative for arthralgias and back pain.   Allergic/Immunologic: Negative for environmental allergies and food allergies.   Neurological: Negative for dizziness.   Hematological: Negative for adenopathy.   Psychiatric/Behavioral: Negative for agitation and behavioral problems.     Objective:     Vital Signs (Most Recent):  Temp: 97.8 °F (36.6 °C) (01/29/17 1519)  Pulse: 89 (01/29/17 1537)  Resp: (!) 24 (01/29/17 1537)  BP: (!) 180/77 (medications given) (01/29/17 1519)  SpO2: 96 % (01/29/17 1537) Vital Signs (24h Range):  Temp:  [97.8 °F (36.6 °C)-99.3 °F (37.4 °C)] 97.8 °F (36.6 °C)  Pulse:  [] 89  Resp:  [20-32] 24  SpO2:  [92 %-99 %] 96 %  BP: (122-180)/(58-80) 180/77     Weight: 74.8 kg (165 lb)  Body mass index is 18.59 kg/(m^2).    Physical Exam   Constitutional: He is oriented to person, place, and time. He appears distressed.   HENT:   Head: Atraumatic.   Eyes: EOM are normal. Pupils are equal, round, and reactive to light.   Neck: Normal range of motion.   Cardiovascular: Regular rhythm.    Pulmonary/Chest: He has wheezes. He has rales.   Abdominal: Soft.   Musculoskeletal: Normal range of motion. He exhibits no edema or deformity.   Neurological: He is oriented to person, place, and time. No cranial nerve deficit. Coordination normal.   Skin: Skin is warm and dry. He is not diaphoretic.   Psychiatric: He has a normal mood and affect. His behavior is normal.        Significant Labs:   BMP:   Recent Labs  Lab 01/29/17  1220   *   *   K 4.2      CO2 22*   BUN 12   CREATININE 1.0   CALCIUM 9.0     CBC:   Recent Labs  Lab 01/29/17  1220   WBC 20.74*   HGB 11.3*   HCT 33.4*          Significant Imaging: CXR: I have reviewed all pertinent results/findings within the past 24 hours and my personal findings are:  right lower lobe infiltrate.    Assessment/Plan:     * Pneumonia due to infectious organism  Will continue with Avelox,follow with  blood culture,no recent hospitalization.      Sepsis  Patient is septic with leucocytosis,tachycardia and Tachypnea,soure pneumonia,likely bacterial,will repeat lactucic level.        Acute hypoxemic respiratory failure  Appear to be multifactorial,COPD exacerbation and pneumonia,will continue with supplemental oxygen.      COPD exacerbation  Will continue with solumedrol,nebulizer.      Pulmonary Mycobacterium avium complex (MAC) infection  continue with home medication Rifabutin,he has been followed in [facility] by ID and pulmonology.      Diabetes mellitus type 2, uncontrolled  Will continue with SSI and basal insulin.      Essential hypertension  will continues with home medication,poorly controlled added Norvasc dn prn IV Hydralazine.      VTE Risk Mitigation         Ordered     enoxaparin injection 40 mg  Daily     Route:  Subcutaneous        01/29/17 1518     Medium Risk of VTE  Once      01/29/17 1518     Place ZOEY hose  Until discontinued      01/29/17 1518        Tiana Branch MD  Department of Hospital Medicine   [facility]

## 2017-02-22 NOTE — PLAN OF CARE
Problem: Physical Therapy Goal  Goal: Physical Therapy Goal  Goals to be met by: 3/8/17    Patient will increase functional independence with mobility by performin. Sit to stand transfer with Supervision  2. Gait x 250 feet with Supervision using Rolling Walker if needed  3. Lower extremity exercise program x30 reps per handout, with supervision     Patient would benefit from PT while in the hospital in order to get back to PLOF.

## 2017-02-22 NOTE — PROGRESS NOTES
[facility] Hospital Medicine  Progress Note    Patient Name: Regan Owensbrook  MRN: [id number]  Patient Class: IP- Inpatient   Admission Date: 2/21/2017  Length of Stay: 1 days  Attending Physician: Sunita Weathers MD  Primary Care Provider: Ishaan Adamson MD        Subjective:     Principal Problem:Pneumonia of right lower lobe due to infectious organism    HPI:  75 y/o male recently discharged home after admission for pneumonia with COPD. Patient was not doing well at home. He continued to have progressively worsening shortness of breath. He has continued to experience generalized weakness and fatigue. This was a problem at discharge but he was adamantly wanting to go home during the previous hospitalization. He was evaluated by his primary care physician and urged to come to the ED for further evaluation and treatment. While in the ED routine laboratory studies and chest x-ray were obtained. There was evidence of acute renal failure as well as unresolved right lower lobe pneumonia. Hospital medicine has been asked to admit again for both of these issues.         Hospital Course:  75 y/o with COPD with recent admission for PNA and discharged home who presented with weakness and SOB. Pt with CXR overall unchanged from recent admission and noted with ARF. Pt started on empiric antibiotics for pneumonia, on IVF for ARF and consult placed to Nephrology. Pt reported minimal PO intake since being home.    Interval History: Pt report feeling marked weak and minimal PO intake since being home; recently moved in with daughter. No reported F/C, + decreased urine and constipation.    Review of Systems   Constitutional: Negative for chills and fever.   Respiratory: Positive for shortness of breath.    Cardiovascular: Negative for chest pain.   Gastrointestinal: Positive for constipation.     Objective:     Vital Signs (Most Recent):  Temp: 98.7 °F (37.1 °C) (02/22/17 1200)  Pulse: 91 (02/22/17  1346)  Resp: 20 (02/22/17 1346)  BP: 114/70 (02/22/17 1200)  SpO2: 97 % (02/22/17 1346) Vital Signs (24h Range):  Temp:  [97.4 °F (36.3 °C)-99 °F (37.2 °C)] 98.7 °F (37.1 °C)  Pulse:  [] 91  Resp:  [17-20] 20  SpO2:  [91 %-98 %] 97 %  BP: ()/(58-72) 114/70     Weight: 77.1 kg (170 lb)  Body mass index is 19.15 kg/(m^2).    Intake/Output Summary (Last 24 hours) at 02/22/17 1459  Last data filed at 02/22/17 0515   Gross per 24 hour   Intake            912.5 ml   Output             1100 ml   Net           -187.5 ml      Physical Exam   Constitutional: He is oriented to person, place, and time. He appears well-developed.   Chronically ill appearing, thin and weak, NAD.   HENT:   Head: Normocephalic and atraumatic.   Eyes: EOM are normal. Pupils are equal, round, and reactive to light.   Neck: Normal range of motion. Neck supple.   Cardiovascular: Normal rate and regular rhythm.    Pulmonary/Chest:   Course breath sounds that are diminished but with fair air movement, no wheezing.   Abdominal: Soft. Bowel sounds are normal.   Musculoskeletal: Normal range of motion.   Neurological: He is alert and oriented to person, place, and time.   Skin: Skin is dry.   Psychiatric: He has a normal mood and affect.       Significant Labs: All pertinent labs within the past 24 hours have been reviewed.    Significant Imaging: I have reviewed and interpreted all pertinent imaging results/findings within the past 24 hours.    Assessment/Plan:      * Pneumonia of right lower lobe due to infectious organism  SOB  COPD  Assessment and Plan:  Pt was previously treated with IV moxifloxacin during admission and CXR overall unchanged. Pt started empirically with broad spectrum antibiotics from the ER. Pt is without fever and no elevation in WBC count, suspicion low for return on pneumonia. Imaging usually lags behind clinical improvement, but given patient's poor constitution, will continue current care and consult placed to ID to  lend their expertise on this matter. Cultures are negative. Pt's SOB seems very close to baseline needs, and weakness more of a factor.    COPD (chronic obstructive pulmonary disease)  No acute issues; continue with home medication regimen    Acute renal failure  Likely prerenal given hx and response to IVF, continue current care, Nephrology input pending, monitor.    SOB (shortness of breath)  As discussed above. Multifactorial but likely related to chronic Mycobacterium avium complex as well as COPD and deconditioning.    Pulmonary Mycobacterium avium complex (MAC) infection  Continued home medication Rifabutin, [place] [facility]     Diabetes mellitus type 2, uncontrolled  Closely monitor blood glucose and make adjustments to insulin regimen as necessary.    Essential hypertension  Continue home medication regimen.    Hypotension (arterial)  Likely due to hypovolemia, corrected with IVF, improved with current care.    Physical deconditioning  Weakness  Assessment and Plan:  PT/OT.    VTE Risk Mitigation         Ordered     heparin (porcine) injection 5,000 Units  Every 8 hours     Route:  Subcutaneous        02/21/17 3155     Medium Risk of VTE  Once      02/21/17 1944          Sunita Weathers MD  Department of Hospital Medicine   [facility]

## 2017-02-22 NOTE — PROGRESS NOTES
Physical Therapy  Missed Evaluation    Regan Owensbrook   MRN: [id number]     PT evaluation orders received and chart reviewed. Attempted PT evaluation but patient appeared to have increased work of breathing. Nurse Andie notified and took patient O2 sats which were 95%. Patient requested that PT come back at a later time and he would get up. Will attempt evaluation again as able.    Ceci Garcia, PT

## 2017-02-22 NOTE — PT/OT/SLP EVAL
Physical Therapy  Evaluation    Regan Alvarez   MRN: [id number]   Admitting Diagnosis: Pneumonia of right lower lobe due to infectious organism    PT Received On: 17  PT Start Time: 1508     PT Stop Time: 1525    PT Total Time (min): 17 min       Billable Minutes:  Evaluation  17     Diagnosis: Pneumonia of right lower lobe due to infectious organism    Past Medical History   Diagnosis Date    Arthritis     COPD (chronic obstructive pulmonary disease)     Diabetes mellitus type II     Hypertension     Tuberculosis       Past Surgical History   Procedure Laterality Date    Melanoma       Referring physician: Kristi  Date referred to PT: 17    General Precautions: Standard, fall, respiratory  Orthopedic Precautions: N/A   Braces: N/A       Patient History:  Lives With: child(lupe), adult, spouse  Living Arrangements: house  Home Accessibility:  (no concerns)  Living Environment Comment: Per patient he recently sold his house and now lives with daughter and son in law where his bedroom is on the first floor.   Equipment Currently Used at Home: walker, rolling, shower chair    Previous Level of Function:  Ambulation Skills: independent (uses RW as needed)  Transfer Skills: independent    Subjective:  Communicated with nurse Chaves prior to session.  Patient agreeable to participate in PT evaluation if he can walk to the bathroom.   Chief Complaint: SOB with activity.   Patient goals: To get stronger.     Pain Ratin/10     Objective:   Patient found with: telemetry, oxygen, peripheral IV, workman catheter     Cognitive Exam:  Oriented to: Person, Place and Situation    Follows Commands/attention: Follows one-step commands  Communication: clear/fluent  Safety awareness/insight to disability: impaired    Physical Exam:  Postural examination/scapula alignment: No postural abnormalities identified    Skin integrity: Visible skin intact  Edema: None noted     Sensation: Intact    Lower Extremity  Range of Motion:  Right Lower Extremity: WFL  Left Lower Extremity: WFL    Lower Extremity Strength:  Right Lower Extremity: 3+/5  Left Lower Extremity: 3+/5     Fine motor coordination:Intact    Gross motor coordination: WFL    Functional Mobility:  Bed Mobility:  Supine to Sit: Stand by Assistance  Sit to Supine: Stand by Assistance    Transfers:  Sit <> Stand Assistance: Contact Guard Assistance  Sit <> Stand Assistive Device: Rolling Walker    Gait:   Gait Distance: ~10ft from bed to toilet with RW and CGA. He needed verbal cues for safety with RW management. ~10ft from toilet to bed with no AD and min A.   Assistance 1: Contact Guard Assistance, Minimum assistance  Gait Assistive Device: Rolling walker, No device  Gait Pattern: reciprocal  Gait Deviation(s): decreased isela, increased time in double stance, decreased velocity of limb motion    Balance:   Static Sit: GOOD: Takes MODERATE challenges from all directions  Dynamic Sit: GOOD-: Maintains balance through MODERATE excursions of active trunk movement     Static Stand: FAIR: Maintains without assist but unable to take challenges  Dynamic stand: FAIR: Needs CONTACT GUARD during gait    AM-PAC 6 CLICK MOBILITY  How much help from another person does this patient currently need?   1 = Unable, Total/Dependent Assistance  2 = A lot, Maximum/Moderate Assistance  3 = A little, Minimum/Contact Guard/Supervision  4 = None, Modified Brooks/Independent    Turning over in bed (including adjusting bedclothes, sheets and blankets)?: 4  Sitting down on and standing up from a chair with arms (e.g., wheelchair, bedside commode, etc.): 3  Moving from lying on back to sitting on the side of the bed?: 4  Moving to and from a bed to a chair (including a wheelchair)?: 3  Need to walk in hospital room?: 3  Climbing 3-5 steps with a railing?: 2  Total Score: 19     AM-PAC Raw Score CMS G-Code Modifier Level of Impairment Assistance   6 % Total / Unable   7 - 9 CM  80 - 100% Maximal Assist   10 - 14 CL 60 - 80% Moderate Assist   15 - 19 CK 40 - 60% Moderate Assist   20 - 22 CJ 20 - 40% Minimal Assist   23 CI 1-20% SBA / CGA   24 CH 0% Independent/ Mod I     Patient left supine with all lines intact, call button in reach and nurse notified.    Assessment:   Regan Alvarez is a 76 y.o. male with a medical diagnosis of Pneumonia of right lower lobe due to infectious organism and presents with decreased strength and impaired balance for functional mobility. He would continue to benefit from PT while in the hospital in order to address deficits listed below and get patient back to PLOF.     Rehab identified problem list/impairments: Rehab identified problem list/impairments: weakness, impaired endurance, impaired functional mobilty, gait instability, impaired balance, decreased safety awareness, decreased lower extremity function, impaired cardiopulmonary response to activity    Rehab potential is good.    Activity tolerance: Fair    Discharge recommendations: Discharge Facility/Level Of Care Needs: home health PT (with family assistance as needed)     Barriers to discharge: Barriers to Discharge: None    Equipment recommendations: Equipment Needed After Discharge: none     GOALS:   Physical Therapy Goals        Problem: Physical Therapy Goal    Goal Priority Disciplines Outcome Goal Variances Interventions   Physical Therapy Goal     PT/OT, PT      Description:  Goals to be met by: 3/8/17    Patient will increase functional independence with mobility by performin. Sit to stand transfer with Supervision  2. Gait  x 250 feet with Supervision using Rolling Walker if needed  3. Lower extremity exercise program x30 reps per handout, with supervision              PLAN:    Patient to be seen 5 x/week to address the above listed problems via gait training, therapeutic exercises, therapeutic activities  Plan of Care expires: 17  Plan of Care reviewed with:  patient    Functional Assessment Tool Used: AM PAC   Score: 19  Functional Limitation: Mobility: Walking and moving around  Mobility: Walking and Moving Around Current Status (): CK  Mobility: Walking and Moving Around Goal Status (): CLARITZA Garcia, PT, MOT  02/22/2017

## 2017-02-23 ENCOUNTER — SURGERY (OUTPATIENT)
Age: 77
End: 2017-02-23

## 2017-02-23 ENCOUNTER — TELEPHONE (OUTPATIENT)
Dept: PRIMARY CARE CLINIC | Facility: CLINIC | Age: 77
End: 2017-02-23

## 2017-02-23 ENCOUNTER — ANESTHESIA EVENT (OUTPATIENT)
Dept: ENDOSCOPY | Facility: HOSPITAL | Age: 77
DRG: 190 | End: 2017-02-23
Payer: MEDICARE

## 2017-02-23 ENCOUNTER — ANESTHESIA (OUTPATIENT)
Dept: ENDOSCOPY | Facility: HOSPITAL | Age: 77
DRG: 190 | End: 2017-02-23
Payer: MEDICARE

## 2017-02-23 LAB
ALBUMIN SERPL BCP-MCNC: 1.9 G/DL
ALP SERPL-CCNC: 73 U/L
ALT SERPL W/O P-5'-P-CCNC: 12 U/L
ANION GAP SERPL CALC-SCNC: 10 MMOL/L
AST SERPL-CCNC: 17 U/L
BACTERIA UR CULT: NORMAL
BASOPHILS # BLD AUTO: 0.04 K/UL
BASOPHILS NFR BLD: 0.5 %
BILIRUB SERPL-MCNC: 0.2 MG/DL
BUN SERPL-MCNC: 26 MG/DL
CALCIUM SERPL-MCNC: 7.9 MG/DL
CHLORIDE SERPL-SCNC: 100 MMOL/L
CO2 SERPL-SCNC: 29 MMOL/L
CREAT SERPL-MCNC: 1.1 MG/DL
DIASTOLIC DYSFUNCTION: NO
DIFFERENTIAL METHOD: ABNORMAL
EOSINOPHIL # BLD AUTO: 0.2 K/UL
EOSINOPHIL NFR BLD: 1.7 %
ERYTHROCYTE [DISTWIDTH] IN BLOOD BY AUTOMATED COUNT: 14.6 %
EST. GFR  (AFRICAN AMERICAN): >60 ML/MIN/1.73 M^2
EST. GFR  (NON AFRICAN AMERICAN): >60 ML/MIN/1.73 M^2
ESTIMATED PA SYSTOLIC PRESSURE: 7.18
GLUCOSE SERPL-MCNC: 80 MG/DL
HCT VFR BLD AUTO: 29.8 %
HGB BLD-MCNC: 9.8 G/DL
KOH PREP SPEC: NORMAL
LYMPHOCYTES # BLD AUTO: 2 K/UL
LYMPHOCYTES NFR BLD: 22.8 %
MCH RBC QN AUTO: 28.2 PG
MCHC RBC AUTO-ENTMCNC: 32.9 %
MCV RBC AUTO: 86 FL
MONOCYTES # BLD AUTO: 1.1 K/UL
MONOCYTES NFR BLD: 12.3 %
NEUTROPHILS # BLD AUTO: 5.5 K/UL
NEUTROPHILS NFR BLD: 61.5 %
PHOSPHATE SERPL-MCNC: 1.7 MG/DL
PLATELET # BLD AUTO: 470 K/UL
PMV BLD AUTO: 10 FL
POCT GLUCOSE: 126 MG/DL (ref 70–110)
POCT GLUCOSE: 171 MG/DL (ref 70–110)
POCT GLUCOSE: 255 MG/DL (ref 70–110)
POTASSIUM SERPL-SCNC: 3.6 MMOL/L
PROT SERPL-MCNC: 6.5 G/DL
RBC # BLD AUTO: 3.47 M/UL
RETIRED EF AND QEF - SEE NOTES: 50 (ref 55–65)
SODIUM SERPL-SCNC: 139 MMOL/L
URATE SERPL-MCNC: 7.5 MG/DL
WBC # BLD AUTO: 8.86 K/UL

## 2017-02-23 PROCEDURE — G8987 SELF CARE CURRENT STATUS: HCPCS | Mod: CK

## 2017-02-23 PROCEDURE — 27200651 HC AIRWAY, LMA: Performed by: NURSE ANESTHETIST, CERTIFIED REGISTERED

## 2017-02-23 PROCEDURE — 86038 ANTINUCLEAR ANTIBODIES: CPT

## 2017-02-23 PROCEDURE — 36415 COLL VENOUS BLD VENIPUNCTURE: CPT

## 2017-02-23 PROCEDURE — 87102 FUNGUS ISOLATION CULTURE: CPT

## 2017-02-23 PROCEDURE — 88108 CYTOPATH CONCENTRATE TECH: CPT | Mod: 26,,,

## 2017-02-23 PROCEDURE — 87070 CULTURE OTHR SPECIMN AEROBIC: CPT

## 2017-02-23 PROCEDURE — D9220A PRA ANESTHESIA: Mod: ANES,,, | Performed by: ANESTHESIOLOGY

## 2017-02-23 PROCEDURE — 86706 HEP B SURFACE ANTIBODY: CPT

## 2017-02-23 PROCEDURE — 86334 IMMUNOFIX E-PHORESIS SERUM: CPT | Mod: 26,,, | Performed by: PATHOLOGY

## 2017-02-23 PROCEDURE — 0BB48ZX EXCISION OF RIGHT UPPER LOBE BRONCHUS, VIA NATURAL OR ARTIFICIAL OPENING ENDOSCOPIC, DIAGNOSTIC: ICD-10-PCS | Performed by: INTERNAL MEDICINE

## 2017-02-23 PROCEDURE — 31622 DX BRONCHOSCOPE/WASH: CPT | Performed by: INTERNAL MEDICINE

## 2017-02-23 PROCEDURE — 25000003 PHARM REV CODE 250: Performed by: NURSE ANESTHETIST, CERTIFIED REGISTERED

## 2017-02-23 PROCEDURE — 87116 MYCOBACTERIA CULTURE: CPT

## 2017-02-23 PROCEDURE — D9220A PRA ANESTHESIA: Mod: CRNA,,, | Performed by: NURSE ANESTHETIST, CERTIFIED REGISTERED

## 2017-02-23 PROCEDURE — 87205 SMEAR GRAM STAIN: CPT

## 2017-02-23 PROCEDURE — 25000003 PHARM REV CODE 250: Performed by: INTERNAL MEDICINE

## 2017-02-23 PROCEDURE — 25000003 PHARM REV CODE 250: Performed by: EMERGENCY MEDICINE

## 2017-02-23 PROCEDURE — 86255 FLUORESCENT ANTIBODY SCREEN: CPT | Mod: 91

## 2017-02-23 PROCEDURE — 86803 HEPATITIS C AB TEST: CPT

## 2017-02-23 PROCEDURE — 97535 SELF CARE MNGMENT TRAINING: CPT

## 2017-02-23 PROCEDURE — G8989 SELF CARE D/C STATUS: HCPCS | Mod: CK

## 2017-02-23 PROCEDURE — 85025 COMPLETE CBC W/AUTO DIFF WBC: CPT

## 2017-02-23 PROCEDURE — 87015 SPECIMEN INFECT AGNT CONCNTJ: CPT

## 2017-02-23 PROCEDURE — 87186 SC STD MICRODIL/AGAR DIL: CPT

## 2017-02-23 PROCEDURE — 88108 CYTOPATH CONCENTRATE TECH: CPT

## 2017-02-23 PROCEDURE — 84100 ASSAY OF PHOSPHORUS: CPT

## 2017-02-23 PROCEDURE — G8988 SELF CARE GOAL STATUS: HCPCS | Mod: CJ

## 2017-02-23 PROCEDURE — 87077 CULTURE AEROBIC IDENTIFY: CPT

## 2017-02-23 PROCEDURE — 84165 PROTEIN E-PHORESIS SERUM: CPT | Mod: 26,,, | Performed by: PATHOLOGY

## 2017-02-23 PROCEDURE — 80053 COMPREHEN METABOLIC PANEL: CPT

## 2017-02-23 PROCEDURE — 87210 SMEAR WET MOUNT SALINE/INK: CPT

## 2017-02-23 PROCEDURE — 37000009 HC ANESTHESIA EA ADD 15 MINS: Performed by: INTERNAL MEDICINE

## 2017-02-23 PROCEDURE — 84165 PROTEIN E-PHORESIS SERUM: CPT

## 2017-02-23 PROCEDURE — 25000003 PHARM REV CODE 250: Performed by: ANESTHESIOLOGY

## 2017-02-23 PROCEDURE — 37000008 HC ANESTHESIA 1ST 15 MINUTES: Performed by: INTERNAL MEDICINE

## 2017-02-23 PROCEDURE — 27100019 HC AMBU BAG ADULT/PED: Performed by: NURSE ANESTHETIST, CERTIFIED REGISTERED

## 2017-02-23 PROCEDURE — 11000001 HC ACUTE MED/SURG PRIVATE ROOM

## 2017-02-23 PROCEDURE — 25000003 PHARM REV CODE 250: Performed by: HOSPITALIST

## 2017-02-23 PROCEDURE — 86334 IMMUNOFIX E-PHORESIS SERUM: CPT

## 2017-02-23 PROCEDURE — 63600175 PHARM REV CODE 636 W HCPCS: Performed by: EMERGENCY MEDICINE

## 2017-02-23 PROCEDURE — 63600175 PHARM REV CODE 636 W HCPCS

## 2017-02-23 PROCEDURE — 84550 ASSAY OF BLOOD/URIC ACID: CPT

## 2017-02-23 RX ORDER — ESMOLOL HYDROCHLORIDE 10 MG/ML
INJECTION INTRAVENOUS
Status: DISPENSED
Start: 2017-02-23 | End: 2017-02-23

## 2017-02-23 RX ORDER — LIDOCAINE HYDROCHLORIDE 20 MG/ML
INJECTION, SOLUTION INFILTRATION; PERINEURAL
Status: DISPENSED
Start: 2017-02-23 | End: 2017-02-23

## 2017-02-23 RX ORDER — LANOLIN ALCOHOL/MO/W.PET/CERES
400 CREAM (GRAM) TOPICAL 2 TIMES DAILY
Status: DISCONTINUED | OUTPATIENT
Start: 2017-02-23 | End: 2017-02-26 | Stop reason: HOSPADM

## 2017-02-23 RX ORDER — MOXIFLOXACIN HYDROCHLORIDE 400 MG/1
400 TABLET ORAL DAILY
Status: DISCONTINUED | OUTPATIENT
Start: 2017-02-23 | End: 2017-02-26 | Stop reason: HOSPADM

## 2017-02-23 RX ORDER — SODIUM,POTASSIUM PHOSPHATES 280-250MG
1 POWDER IN PACKET (EA) ORAL ONCE
Status: COMPLETED | OUTPATIENT
Start: 2017-02-23 | End: 2017-02-23

## 2017-02-23 RX ORDER — SODIUM CHLORIDE 9 MG/ML
INJECTION, SOLUTION INTRAVENOUS CONTINUOUS
Status: DISCONTINUED | OUTPATIENT
Start: 2017-02-23 | End: 2017-02-26 | Stop reason: HOSPADM

## 2017-02-23 RX ORDER — PROPOFOL 10 MG/ML
VIAL (ML) INTRAVENOUS
Status: COMPLETED
Start: 2017-02-23 | End: 2017-02-23

## 2017-02-23 RX ORDER — LIDOCAINE HCL/PF 100 MG/5ML
SYRINGE (ML) INTRAVENOUS
Status: DISCONTINUED | OUTPATIENT
Start: 2017-02-23 | End: 2017-02-23

## 2017-02-23 RX ORDER — PROPOFOL 10 MG/ML
VIAL (ML) INTRAVENOUS
Status: DISCONTINUED
Start: 2017-02-23 | End: 2017-02-23 | Stop reason: WASHOUT

## 2017-02-23 RX ADMIN — AZITHROMYCIN 250 MG: 250 TABLET, FILM COATED ORAL at 09:02

## 2017-02-23 RX ADMIN — GUAIFENESIN AND DEXTROMETHORPHAN 10 ML: 100; 10 SYRUP ORAL at 05:02

## 2017-02-23 RX ADMIN — PROPOFOL 100 MG: 10 INJECTION, EMULSION INTRAVENOUS at 11:02

## 2017-02-23 RX ADMIN — HEPARIN SODIUM 5000 UNITS: 5000 INJECTION, SOLUTION INTRAVENOUS; SUBCUTANEOUS at 10:02

## 2017-02-23 RX ADMIN — LIDOCAINE HYDROCHLORIDE 100 MG: 20 INJECTION, SOLUTION INTRAVENOUS at 11:02

## 2017-02-23 RX ADMIN — INSULIN DETEMIR 10 UNITS: 100 INJECTION, SOLUTION SUBCUTANEOUS at 09:02

## 2017-02-23 RX ADMIN — RIFABUTIN 150 MG: 150 CAPSULE ORAL at 09:02

## 2017-02-23 RX ADMIN — SODIUM CHLORIDE: 0.9 INJECTION, SOLUTION INTRAVENOUS at 10:02

## 2017-02-23 RX ADMIN — INSULIN ASPART 6 UNITS: 100 INJECTION, SOLUTION INTRAVENOUS; SUBCUTANEOUS at 05:02

## 2017-02-23 RX ADMIN — RIFABUTIN 150 MG: 150 CAPSULE ORAL at 08:02

## 2017-02-23 RX ADMIN — MOXIFLOXACIN HYDROCHLORIDE 400 MG: 400 TABLET, FILM COATED ORAL at 01:02

## 2017-02-23 RX ADMIN — Medication 20 MG: at 11:02

## 2017-02-23 RX ADMIN — FLUCONAZOLE 100 MG: 100 TABLET ORAL at 09:02

## 2017-02-23 RX ADMIN — ACETAMINOPHEN 650 MG: 325 TABLET ORAL at 08:02

## 2017-02-23 RX ADMIN — FLUTICASONE FUROATE AND VILANTEROL TRIFENATATE 1 PUFF: 100; 25 POWDER RESPIRATORY (INHALATION) at 08:02

## 2017-02-23 RX ADMIN — CEFTAZIDIME 1 G: 100 INJECTION, POWDER, FOR SOLUTION INTRAMUSCULAR; INTRAVENOUS at 01:02

## 2017-02-23 RX ADMIN — CEFTAZIDIME 1 G: 100 INJECTION, POWDER, FOR SOLUTION INTRAMUSCULAR; INTRAVENOUS at 09:02

## 2017-02-23 RX ADMIN — POTASSIUM & SODIUM PHOSPHATES POWDER PACK 280-160-250 MG 1 PACKET: 280-160-250 PACK at 08:02

## 2017-02-23 RX ADMIN — INSULIN ASPART 1 UNITS: 100 INJECTION, SOLUTION INTRAVENOUS; SUBCUTANEOUS at 09:02

## 2017-02-23 RX ADMIN — GUAIFENESIN AND DEXTROMETHORPHAN 10 ML: 100; 10 SYRUP ORAL at 12:02

## 2017-02-23 RX ADMIN — MAGNESIUM OXIDE TAB 400 MG (241.3 MG ELEMENTAL MG) 400 MG: 400 (241.3 MG) TAB at 08:02

## 2017-02-23 RX ADMIN — GUAIFENESIN AND DEXTROMETHORPHAN 10 ML: 100; 10 SYRUP ORAL at 06:02

## 2017-02-23 NOTE — TRANSFER OF CARE
"Anesthesia Transfer of Care Note    Patient: Regan [facility] [place]    Procedure(s) Performed: Procedure(s) (LRB):  BRONCHOSCOPY (N/A)    Patient location: GI    Anesthesia Type: general    Transport from OR: Transported from OR on room air with adequate spontaneous ventilation    Post pain: adequate analgesia    Post assessment: no apparent anesthetic complications and tolerated procedure well    Post vital signs: stable    Level of consciousness: sedated and responds to stimulation    Nausea/Vomiting: no nausea/vomiting    Complications: none          Last vitals:   Visit Vitals    BP (!) 131/91 (BP Location: Right arm, BP Method: Automatic)    Pulse 92    Temp 36.7 °C (98 °F) (Oral)    Resp 17    Ht 6' 7" (2.007 m)    Wt 77.1 kg (170 lb)    SpO2 96%    BMI 19.15 kg/m2     "

## 2017-02-23 NOTE — PT/OT/SLP PROGRESS
Occupational Therapy      [facility]  MRN: [id number]    Patient not seen today secondary to Unavailable (Comment) (off unit for testing). Will follow-up later as able.    Tiffany Bautista OT  2/23/2017

## 2017-02-23 NOTE — NURSING
Regan Owensbrook, MRN [id number] on telemetry box #2373, verified by SABI Torres and IDALIA Dior.  Rhythm and information is correct on the monitor.

## 2017-02-23 NOTE — CONSULTS
Consult Note  Pulmonology    Consult Requested By: Sunita Weathers MD  Reason for Consult: abnormal CXR    SUBJECTIVE: acute respiratory failure     History of Present Illness:Patient was not doing well at home. He continued to have progressively worsening shortness of breath. He has continued to experience generalized weakness and fatigue. This was a problem at discharge but he was adamantly wanting to go home during the previous hospitalization. He was evaluated by his primary care physician and urged to come to the ED for further evaluation and treatment. While in the ED routine laboratory studies and chest x-ray were obtained. There was evidence of acute renal failure as well as unresolved right lower lobe pneumonia. Hospital medicine has been asked to admit again for both of these issues.         Review of patient's allergies indicates:  No Known Allergies    Past Medical History   Diagnosis Date    Arthritis     COPD (chronic obstructive pulmonary disease)     Diabetes mellitus type II     Hypertension     Tuberculosis      Past Surgical History   Procedure Laterality Date    Melanoma       Family History   Problem Relation Age of Onset    Cancer Father      Tuberculosis.  Secondary scar was neoplastic.          Review of Systems:  No headaches  No diploplia  No dysphagia  No chest pains   No nausea or vomiting   No arthralgia      OBJECTIVE:     Vital Signs (Most Recent)  Temp: 98.6 °F (37 °C) (02/23/17 1713)  Pulse: 100 (02/23/17 1713)  Resp: 18 (02/23/17 1713)  BP: 116/74 (02/23/17 1713)  SpO2: 96 % (02/23/17 1713)    Vital Signs Range (Last 24H):  Temp:  [97.4 °F (36.3 °C)-98.6 °F (37 °C)]   Pulse:  []   Resp:  [17-20]   BP: (109-142)/(68-91)   SpO2:  [92 %-98 %]     Physical Exam:  General:on nc  Neck: no jugular venous distention, no adenopathy, no carotid bruit and thyroid: non-palpable  Lungs: bilateral rhonchis and rales  Heart: regular rate and rhythm and no murmur  Abdomen: soft,  non-tender non-distended; bowel sounds normal  Extremities: no cyanosis or edema, or clubbing  Neurologic:sedated,not responsive    Laboratory:  Recent Results (from the past 24 hour(s))   Lactic acid, plasma    Collection Time: 02/22/17  8:18 PM   Result Value Ref Range    Lactate (Lactic Acid) 2.2 0.5 - 2.2 mmol/L   POCT glucose    Collection Time: 02/22/17  9:18 PM   Result Value Ref Range    POCT Glucose 261 (H) 70 - 110 mg/dL   Comprehensive metabolic panel    Collection Time: 02/23/17  5:49 AM   Result Value Ref Range    Sodium 139 136 - 145 mmol/L    Potassium 3.6 3.5 - 5.1 mmol/L    Chloride 100 95 - 110 mmol/L    CO2 29 23 - 29 mmol/L    Glucose 80 70 - 110 mg/dL    BUN, Bld 26 (H) 8 - 23 mg/dL    Creatinine 1.1 0.5 - 1.4 mg/dL    Calcium 7.9 (L) 8.7 - 10.5 mg/dL    Total Protein 6.5 6.0 - 8.4 g/dL    Albumin 1.9 (L) 3.5 - 5.2 g/dL    Total Bilirubin 0.2 0.1 - 1.0 mg/dL    Alkaline Phosphatase 73 55 - 135 U/L    AST 17 10 - 40 U/L    ALT 12 10 - 44 U/L    Anion Gap 10 8 - 16 mmol/L    eGFR if African American >60 >60 mL/min/1.73 m^2    eGFR if non African American >60 >60 mL/min/1.73 m^2   CBC auto differential    Collection Time: 02/23/17  5:49 AM   Result Value Ref Range    WBC 8.86 3.90 - 12.70 K/uL    RBC 3.47 (L) 4.60 - 6.20 M/uL    Hemoglobin 9.8 (L) 14.0 - 18.0 g/dL    Hematocrit 29.8 (L) 40.0 - 54.0 %    MCV 86 82 - 98 fL    MCH 28.2 27.0 - 31.0 pg    MCHC 32.9 32.0 - 36.0 %    RDW 14.6 (H) 11.5 - 14.5 %    Platelets 470 (H) 150 - 350 K/uL    MPV 10.0 9.2 - 12.9 fL    Gran # 5.5 1.8 - 7.7 K/uL    Lymph # 2.0 1.0 - 4.8 K/uL    Mono # 1.1 (H) 0.3 - 1.0 K/uL    Eos # 0.2 0.0 - 0.5 K/uL    Baso # 0.04 0.00 - 0.20 K/uL    Gran% 61.5 38.0 - 73.0 %    Lymph% 22.8 18.0 - 48.0 %    Mono% 12.3 4.0 - 15.0 %    Eosinophil% 1.7 0.0 - 8.0 %    Basophil% 0.5 0.0 - 1.9 %    Differential Method Automated    Phosphorus    Collection Time: 02/23/17  5:49 AM   Result Value Ref Range    Phosphorus 1.7 (L) 2.7 - 4.5  mg/dL   Uric acid    Collection Time: 02/23/17  5:49 AM   Result Value Ref Range    Uric Acid 7.5 (H) 3.4 - 7.0 mg/dL   KOH prep    Collection Time: 02/23/17 11:50 AM   Result Value Ref Range    KOH Prep No yeast or fungal elements seen    POCT glucose    Collection Time: 02/23/17  1:13 PM   Result Value Ref Range    POCT Glucose 126 (H) 70 - 110 mg/dL   POCT glucose    Collection Time: 02/23/17  4:19 PM   Result Value Ref Range    POCT Glucose 255 (H) 70 - 110 mg/dL     CBC:   Recent Labs  Lab 02/23/17  0549   WBC 8.86   RBC 3.47*   HGB 9.8*   HCT 29.8*   *   MCV 86   MCH 28.2   MCHC 32.9     BMP:   Recent Labs  Lab 02/22/17  0422 02/23/17  0549   * 80    139   K 3.9 3.6   CL 96 100   CO2 29 29   BUN 44* 26*   CREATININE 2.8* 1.1   CALCIUM 7.8* 7.9*   MG 1.0*  --      CMP:   Recent Labs  Lab 02/23/17  0549   GLU 80   CALCIUM 7.9*   ALBUMIN 1.9*   PROT 6.5      K 3.6   CO2 29      BUN 26*   CREATININE 1.1   ALKPHOS 73   ALT 12   AST 17   BILITOT 0.2     LFTs:   Recent Labs  Lab 02/23/17  0549   ALT 12   AST 17   ALKPHOS 73   BILITOT 0.2   PROT 6.5   ALBUMIN 1.9*     Coagulation:   Recent Labs  Lab 02/21/17  1530   INR 1.2   APTT 31.5     Cardiac markers: No results for input(s): CKMB, TROPONINT, MYOGLOBIN in the last 168 hours.  Microbiology Results (last 7 days)     Procedure Component Value Units Date/Time    Blood culture x two cultures. Draw prior to antibiotics. [[id number]] Collected:  02/21/17 1445    Order Status:  Completed Specimen:  Blood from Peripheral, Antecubital, Left Updated:  02/23/17 1703     Blood Culture, Routine No Growth to date     Blood Culture, Routine No Growth to date     Blood Culture, Routine No Growth to date    Narrative:       Aerobic and anaerobic    Blood culture x two cultures. Draw prior to antibiotics. [[id number]] Collected:  02/21/17 1500    Order Status:  Completed Specimen:  Blood from Peripheral, Forearm, Left Updated:  02/23/17 1709     Blood  Culture, Routine No Growth to date     Blood Culture, Routine No Growth to date     Blood Culture, Routine No Growth to date    Narrative:       Aerobic and anaerobic    Culture, Respiratory [[id number]] Collected:  02/16/17 1152    Order Status:  Completed Specimen:  Respiratory from Bronchial Wash Updated:  02/23/17 1511     Gram Stain (Respiratory) Moderate WBC's     Gram Stain (Respiratory) No organisms seen    Narrative:       RESP CULTURE DONE IN [facility] AT 1152    KOH prep [[id number]] Collected:  02/23/17 1150    Order Status:  Completed Specimen:  Respiratory from Bronchial Wash Updated:  02/23/17 1457     KOH Prep No yeast or fungal elements seen    Fungus culture [[id number]] Collected:  02/23/17 1151    Order Status:  Sent Specimen:  Respiratory from Bronchial Wash Updated:  02/23/17 1248    Gram stain [[id number]] Collected:  02/23/17 1151    Order Status:  Canceled Specimen:  Body Fluid from Lung, Left Updated:  02/23/17 1247    Narrative:       bilat lung  Gram Stain was cancelled on 02/23/2017 at 14:20 by JGO; Duplicate   order, test included in another profile. 02/23/2017  14:20    AFB Culture & Smear [[id number]] Collected:  02/23/17 1151    Order Status:  Sent Specimen:  Respiratory from Bronchial Wash Updated:  02/23/17 1151    Culture, Respiratory [[id number]] Collected:  02/21/17 1730    Order Status:  Completed Specimen:  Respiratory from Sputum, Expectorated Updated:  02/23/17 0813     Respiratory Culture --     PRESUMPTIVE PSEUDOMONAS SPECIES  Moderate  Identification and susceptibility pending       Gram Stain (Respiratory) <10 epithelial cells per low power field.     Gram Stain (Respiratory) Few Gram negative rods     Gram Stain (Respiratory) Moderate WBC's    Urine culture - Cath (Indwelling) [[id number]] Collected:  02/21/17 1515    Order Status:  Completed Specimen:  Urine from Urine, Catheterized Updated:  02/23/17 0806     Urine Culture, Routine No significant growth    AFB Culture & Smear  [[id number]] Collected:  02/22/17 1721    Order Status:  Sent Specimen:  Respiratory from Sputum, Expectorated Updated:  02/23/17 0735    Gram stain [[id number]] Collected:  02/22/17 0040    Order Status:  Completed Specimen:  Other from Urine Updated:  02/22/17 1010     Gram Stain Result No organisms seen      No WBC's            Diagnostic Results:    1.  There is no evidence of pulmonary embolus.  2.  The patient's thickwalled right upper lobe cavitary lesion pulmonary parenchymal scarring and pulmonary emphysema appear similar to prior exam.  3.  There are small bilateral pleural effusions.  4.  There is patchy ground glass opacification seen dependently within both lungs possibly representing developing pneumonia or edema.        ASSESSMENT/PLAN:     1. Pneumonia of right lower lobe due to infectious organism    2. Acute renal failure, unspecified acute renal failure type    3. Respiratory distress    4. Chest pain    5.      Large RUL cavity      Plan:  bronchscopy done ; no particular anatomical defect but LARGE amount of whitish creamy secretions ; sent for analysis

## 2017-02-23 NOTE — PT/OT/SLP PROGRESS
Occupational Therapy  Treatment    Regan Alvarez   MRN: [id number]   Admitting Diagnosis: Pneumonia of right lower lobe due to infectious organism    OT Date of Treatment: 02/23/17   OT Start Time: 1509  OT Stop Time: 1532  OT Total Time (min): 23 min    Billable Minutes:  Self Care/Home Management 23    General Precautions: Standard, fall, respiratory  Orthopedic Precautions: N/A  Braces: N/A    Do you have any cultural, spiritual, Voodoo conflicts, given your current situation?: none    Subjective:  Communicated with nurseAntonia prior to session.      Pain Rating:  (yes-did not rate)        Location: back  Pain Addressed: Reposition       Objective:  Patient found with: peripheral IV, oxygen, workman catheter (4L O2 NC)     Functional Mobility:  Bed Mobility:  Scooting/Bridging: Supervision  Supine to Sit: Supervision (assist needed to manage IV lines, O2 line and workman catheter (Pt moved supine<>sit x2))  Sit to Supine: Supervision    Transfers:   Sit <> Stand Assistance: Stand By Assistance, Contact Guard Assistance  Sit <> Stand Assistive Device: No Assistive Device    Functional Ambulation: Patient amb from bed<>sink ~8' x2 with SBA and assist to mange lines.     Activities of Daily Living:  UE Dressing Level of Assistance: Activity did not occur  Grooming Position: Standing at sink  Grooming Level of Assistance: Stand by assistance (to wash face and brush dentures)      Balance:   Static Sit: GOOD: Takes MODERATE challenges from all directions  Dynamic Sit: GOOD: Maintains balance through MODERATE excursions of active trunk movement  Static Stand: FAIR+: Takes MINIMAL challenges from all directions  Dynamic stand: FAIR+: Needs CLOSE SUPERVISION during gait and is able to right self with minor LOB    Therapeutic Activities and Exercises:  Patient tolerated standing at the sink ~8 min for grooming tasks. Patient stands with flexed posture with need to sit after 8 min to recover from SOB.    AM-PAC 6  CLICK ADL   How much help from another person does this patient currently need?   1 = Unable, Total/Dependent Assistance  2 = A lot, Maximum/Moderate Assistance  3 = A little, Minimum/Contact Guard/Supervision  4 = None, Modified Orange/Independent    Putting on and taking off regular lower body clothing? : 1  Bathing (including washing, rinsing, drying)?: 3  Toileting, which includes using toilet, bedpan, or urinal? : 3  Putting on and taking off regular upper body clothing?: 4  Taking care of personal grooming such as brushing teeth?: 4  Eating meals?: 4  Total Score: 19     AM-PAC Raw Score CMS G-Code Modifier Level of Impairment Assistance   6 % Total / Unable   7 - 9 CM 80 - 100% Maximal Assist   10 - 14 CL 60 - 80% Moderate Assist   15 - 19 CK 40 - 60% Moderate Assist   20 - 22 CJ 20 - 40% Minimal Assist   23 CI 1-20% SBA / CGA   24 CH 0% Independent/ Mod I     Patient left seated EOB with all lines intact, call button in reach, nurseAntonia notified and friends present    ASSESSMENT:  Regan Alvarez is a 76 y.o. male with a medical diagnosis of Pneumonia of right lower lobe due to infectious organism. Patient with slight increased activity tolerance. The patient refused to sit in the chair but requested to remain seated EOB with friends present.    Rehab identified problem list/impairments: Rehab identified problem list/impairments: weakness, impaired endurance, impaired self care skills, impaired functional mobilty, pain, impaired cardiopulmonary response to activity, impaired balance    Rehab potential is good.    Activity tolerance: Good    Discharge recommendations: Discharge Facility/Level Of Care Needs: home health OT (with family assist)     Barriers to discharge: Barriers to Discharge: None    Equipment recommendations: none     GOALS:   Occupational Therapy Goals        Problem: Occupational Therapy Goal    Goal Priority Disciplines Outcome Interventions   Occupational Therapy  Goal     OT, PT/OT Ongoing (interventions implemented as appropriate)    Description:  Goals to be met by: 3/8/17    Patient will increase functional independence with ADLs by performing:    UE Dressing with Supervision.  LE Dressing with Stand-by Assistance.  Grooming while standing at sink with Stand-by Assistance.  Goal met 2/23/17  Stand pivot transfers with Stand-by Assistance.  Toilet transfer to toilet with Stand-by Assistance.  Upper extremity exercise program x10 reps per handout, with assistance as needed.                 Plan:  Patient to be seen 3 x/week to address the above listed problems via self-care/home management, therapeutic activities, therapeutic exercises  Plan of Care expires: 03/08/17  Plan of Care reviewed with: patient         Tiffany Michele, OT  02/23/2017

## 2017-02-23 NOTE — ANESTHESIA PREPROCEDURE EVALUATION
02/23/2017  Regan Alvarez is a 76 y.o., male.    [facility] Anesthesia Evaluation    I have reviewed the Patient Summary Reports.    I have reviewed the Nursing Notes.      Review of Systems  Social:  Former Smoker    Cardiovascular:   Exercise tolerance: poor Denies Pacemaker. Hypertension  Denies Valvular problems/Murmurs.  Denies MI.  Denies CAD.    Denies CABG/stent. Dysrhythmias (PVCs)   Denies Angina.         Echo 2/2017:  EF 50%  No significant valve abnormality noted but TDS   Pulmonary:   Pneumonia COPD (O2 dependent at home), severe Shortness of breath Being treated for right basilar mycoplasma pna; also has old lesion in left lung from previously treated TB   Renal/:   Chronic Renal Disease (ARF resolved to creatinine of 1.1 today), ARF, CRI    Musculoskeletal:   Arthritis     Neurological:  Neurology Normal    Endocrine:   Diabetes, type 2, using insulin        Physical Exam  General:  Cachexia    Airway/Jaw/Neck:   Edentulous with upper and lower dentures     Chest/Lungs:  Chest/Lungs Findings: Rales, Basilar         Mental Status:  Mental Status Findings: Normal        Anesthesia Plan  Type of Anesthesia, risks & benefits discussed:  Anesthesia Type:  general  Patient's Preference:   Intra-op Monitoring Plan:   Intra-op Monitoring Plan Comments:   Post Op Pain Control Plan:   Post Op Pain Control Plan Comments:   Induction:   IV  Beta Blocker:  Patient is not currently on a Beta-Blocker (No further documentation required).       Informed Consent: Patient understands risks and agrees with Anesthesia plan.  Questions answered. Anesthesia consent signed with patient.  ASA Score: 3     Day of Surgery Review of History & Physical:    H&P update referred to the provider.     Anesthesia Plan Notes: Pt w/ hx tx'd TB and with recent d/c with pna, readmitted for pna. 92-94% O2 sat on NC on floor  "today. He states  That he feels his breathing is "about back to normal" and adcknowledges that he was gasping for air a few   Days ago when he presented again to the hospital. We will need to pre-oxygenate well prior to induction.  Will plan to use an LMA.         Ready For Surgery From Anesthesia Perspective.       "

## 2017-02-23 NOTE — PLAN OF CARE
Problem: Occupational Therapy Goal  Goal: Occupational Therapy Goal  Goals to be met by: 3/8/17    Patient will increase functional independence with ADLs by performing:    UE Dressing with Supervision.  LE Dressing with Stand-by Assistance.  Grooming while standing at sink with Stand-by Assistance. Goal met 2/23/17  Stand pivot transfers with Stand-by Assistance.  Toilet transfer to toilet with Stand-by Assistance.  Upper extremity exercise program x10 reps per handout, with assistance as needed.   Outcome: Ongoing (interventions implemented as appropriate)  Patient will benefit from OT to address functional deficits.

## 2017-02-23 NOTE — TELEPHONE ENCOUNTER
(Entry from 10:30 AM) - Stopped by pt's room this morning to discuss priority care clinic appt after d/c from hospital. Pt was not in his room.

## 2017-02-23 NOTE — PROGRESS NOTES
Physical Therapy  Missed Treatment    Regan Alvarez   MRN: [id number]     Attempted PT treatment but patient off floor for procedure. Will follow up again as able.     Ceci Garcia, PT

## 2017-02-23 NOTE — NURSING
Pt name,  and tele box [id number] checked and verified with Desiree GUERRIER against information on monitor, pt information and rhythm correct.  Tele box [id number] on pt with leads intact.

## 2017-02-23 NOTE — CONSULTS
Consult Note  Infectious Disease    Consult Requested By: Sunita Weathers MD    Reason for Consult: avelino lung infection with rt ll pneumonia    SUBJECTIVE:     History of Present Illness:  Patient is a 76 y.o. male presents with increasing sobar. He has severe copd and still smokes the occasional cigar. He was found to have  Rt upper lobe cavitary lesion and has been rx with avelox, azithromycin and rifabutin based on susceptibilities at [facility]. He has been compliant with care but has become increasingly sobar in the last 10 days. He is coughing up mucoid sputum and has no hemoptysis. cxr and ct confirm a new rt ll infiltrate and a rt ul old cavity.he is o2 dependant and is now living with his daughter.    Past Medical History   Diagnosis Date    Arthritis     COPD (chronic obstructive pulmonary disease)     Diabetes mellitus type II     Hypertension     Tuberculosis      Past Surgical History   Procedure Laterality Date    Melanoma       Family History   Problem Relation Age of Onset    Cancer Father      Tuberculosis.  Secondary scar was neoplastic.     Social History   Substance Use Topics    Smoking status: Former Smoker     Types: Cigars    Smokeless tobacco: None      Comment: uses cigars on ocassion    Alcohol use Yes      Comment: socially       Review of patient's allergies indicates:  No Known Allergies     Antibiotics     Start     Stop Route Frequency Ordered    02/21/17 2100  rifabutin capsule 150 mg      -- Oral Every 12 hours 02/21/17 1944    02/23/17 0900  azithromycin tablet 250 mg      -- Oral Daily 02/22/17 1630    02/23/17 1345  moxifloxacin tablet 400 mg      -- Oral Daily 02/23/17 1244    02/23/17 1400  ceftazidime 1 g in dextrose 5% 50 mL IVPB (ready to mix system)      -- IV Every 8 hours 02/23/17 1245          Review of Systems:  Constitutional: no fever or chills  Eyes: no visual changes  ENT: positive for nasal congestion  Respiratory: positive for cough, dyspnea on  exertion and sputum  Cardiovascular: no chest pain or palpitations  Gastrointestinal: no nausea or vomiting, no abdominal pain or change in bowel habits  Genitourinary: no hematuria or dysuria  Musculoskeletal: no arthralgias or myalgias    OBJECTIVE:     Vital Signs (Most Recent)  Temp: 98 °F (36.7 °C) (02/23/17 1051)  Pulse: 106 (02/23/17 1215)  Resp: 20 (02/23/17 1215)  BP: 117/77 (02/23/17 1215)  SpO2: 95 % (02/23/17 1206)    Temperature Range Min/Max (Last 24H):  Temp:  [97.4 °F (36.3 °C)-98.2 °F (36.8 °C)]     Physical Exam:  General: cachectic  HENT: Head:normocephalic, atraumatic. Ears:not examined. Nose: Nares normal. Septum midline. Mucosa normal. No drainage or sinus tenderness., no discharge. Throat: lips, mucosa, and tongue normal; teeth and gums normal and no throat erythema.  Eyes: conjunctivae/corneas clear. PERRL.   Neck: supple, symmetrical, trachea midline, no JVD and thyroid not enlarged, symmetric, no tenderness/mass/nodules  Lungs:  labored breathing, diminished breath sounds bibasilar and bilaterally and rales bibasilar and bilaterally  Cardiovascular: Heart: regular rate and rhythm, S1, S2 normal, no murmur, click, rub or gallop. Chest Wall: no tenderness. Extremities: no cyanosis or edema, or clubbing. Pulses: 2+ and symmetric.  Abdomen/Rectal: Abdomen: soft, non-tender non-distented; bowel sounds normal; no masses,  no organomegaly. Rectal: not examined  Skin: Skin color, texture, turgor normal. No rashes or lesions  Musculoskeletal:no clubbing, cyanosis  Lymph Nodes: No cervical or supraclavicular adenopathy    Laboratory:  CBC    Recent Labs  Lab 02/23/17  0549   WBC 8.86   RBC 3.47*   HGB 9.8*   HCT 29.8*   *     BMP    Recent Labs  Lab 02/23/17  0549   CO2 29   BUN 26*   CREATININE 1.1   CALCIUM 7.9*       Recent Labs  Lab 02/21/17  1515   COLORU Torie   SPECGRAV 1.020   PHUR 5.0   PROTEINUA 1+*   BACTERIA Moderate*   NITRITE Negative   LEUKOCYTESUR Negative   UROBILINOGEN  Negative   HYALINECASTS 0     Microbiology Results (last 7 days)     Procedure Component Value Units Date/Time    Gram stain [[id number]] Collected:  02/23/17 1151    Order Status:  Sent Specimen:  Body Fluid from Lung, Left Updated:  02/23/17 1151    Narrative:       bilat lung    AFB Culture & Smear [[id number]] Collected:  02/23/17 1151    Order Status:  Sent Specimen:  Respiratory from Bronchial Wash Updated:  02/23/17 1151    Fungus culture [[id number]] Collected:  02/23/17 1151    Order Status:  Sent Specimen:  Respiratory from Bronchial Wash Updated:  02/23/17 1151    REINA prep [[id number]] Collected:  02/23/17 1150    Order Status:  Sent Specimen:  Respiratory from Bronchial Wash Updated:  02/23/17 1151    Culture, Respiratory [[id number]] Collected:  02/21/17 1730    Order Status:  Completed Specimen:  Respiratory from Sputum, Expectorated Updated:  02/23/17 0813     Respiratory Culture --     PRESUMPTIVE PSEUDOMONAS SPECIES  Moderate  Identification and susceptibility pending       Gram Stain (Respiratory) <10 epithelial cells per low power field.     Gram Stain (Respiratory) Few Gram negative rods     Gram Stain (Respiratory) Moderate WBC's    Urine culture - Cath (Indwelling) [[id number]] Collected:  02/21/17 1515    Order Status:  Completed Specimen:  Urine from Urine, Catheterized Updated:  02/23/17 0806     Urine Culture, Routine No significant growth    AFB Culture & Smear [[id number]] Collected:  02/22/17 1721    Order Status:  Sent Specimen:  Respiratory from Sputum, Expectorated Updated:  02/23/17 0735    Blood culture x two cultures. Draw prior to antibiotics. [[id number]] Collected:  02/21/17 1445    Order Status:  Completed Specimen:  Blood from Peripheral, Antecubital, Left Updated:  02/22/17 1703     Blood Culture, Routine No Growth to date     Blood Culture, Routine No Growth to date    Narrative:       Aerobic and anaerobic    Blood culture x two cultures. Draw prior to antibiotics. [[id number]]  Collected:  02/21/17 1500    Order Status:  Completed Specimen:  Blood from Peripheral, Forearm, Left Updated:  02/22/17 1703     Blood Culture, Routine No Growth to date     Blood Culture, Routine No Growth to date    Narrative:       Aerobic and anaerobic    Gram stain [[id number]] Collected:  02/22/17 0040    Order Status:  Completed Specimen:  Other from Urine Updated:  02/22/17 1010     Gram Stain Result No organisms seen      No WBC's          Diagnostic Results:  Labs: Reviewed  X-Ray: Reviewed  CT: Reviewed    ASSESSMENT/PLAN:     Active Hospital Problems    Diagnosis  POA    *Pneumonia of right lower lobe due to infectious organism [J18.1]  Yes    Physical deconditioning [R53.81]  Yes    Acute renal failure [N17.9]  Yes    Hypotension (arterial) [I95.9]  Yes    Essential hypertension [I10]  Yes    COPD (chronic obstructive pulmonary disease) [J44.9]  Yes    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       1. avelino on rx since march 2016  Last sputum for afb culture negative September 2016  Repeat and continue avelino meds  2. Rt ll infiltrate  Pseudomonas on sputum  Iv fortaz  Await bal results

## 2017-02-24 LAB
ALBUMIN SERPL BCP-MCNC: 1.9 G/DL
ALBUMIN SERPL ELPH-MCNC: 2 G/DL
ALP SERPL-CCNC: 74 U/L
ALPHA1 GLOB SERPL ELPH-MCNC: 0.55 G/DL
ALPHA2 GLOB SERPL ELPH-MCNC: 1.1 G/DL
ALT SERPL W/O P-5'-P-CCNC: 16 U/L
ANA SER QL IF: NORMAL
ANCA AB TITR SER IF: NORMAL TITER
ANION GAP SERPL CALC-SCNC: 8 MMOL/L
AST SERPL-CCNC: 26 U/L
B-GLOBULIN SERPL ELPH-MCNC: 0.91 G/DL
BACTERIA SPEC AEROBE CULT: NORMAL
BASOPHILS # BLD AUTO: 0.02 K/UL
BASOPHILS NFR BLD: 0.2 %
BILIRUB SERPL-MCNC: 0.3 MG/DL
BUN SERPL-MCNC: 14 MG/DL
CALCIUM SERPL-MCNC: 8.1 MG/DL
CHLORIDE SERPL-SCNC: 100 MMOL/L
CO2 SERPL-SCNC: 31 MMOL/L
CREAT SERPL-MCNC: 0.8 MG/DL
DIFFERENTIAL METHOD: ABNORMAL
EOSINOPHIL # BLD AUTO: 0.2 K/UL
EOSINOPHIL NFR BLD: 2.6 %
ERYTHROCYTE [DISTWIDTH] IN BLOOD BY AUTOMATED COUNT: 14.8 %
EST. GFR  (AFRICAN AMERICAN): >60 ML/MIN/1.73 M^2
EST. GFR  (NON AFRICAN AMERICAN): >60 ML/MIN/1.73 M^2
GAMMA GLOB SERPL ELPH-MCNC: 1.43 G/DL
GLUCOSE SERPL-MCNC: 116 MG/DL
GRAM STN SPEC: NORMAL
HBV SURFACE AB SER-ACNC: NEGATIVE M[IU]/ML
HCT VFR BLD AUTO: 32.2 %
HCV AB SERPL QL IA: NEGATIVE
HGB BLD-MCNC: 10.4 G/DL
LYMPHOCYTES # BLD AUTO: 1.8 K/UL
LYMPHOCYTES NFR BLD: 20 %
MAGNESIUM SERPL-MCNC: 1.2 MG/DL
MCH RBC QN AUTO: 28.2 PG
MCHC RBC AUTO-ENTMCNC: 32.3 %
MCV RBC AUTO: 87 FL
MONOCYTES # BLD AUTO: 1.1 K/UL
MONOCYTES NFR BLD: 12 %
NEUTROPHILS # BLD AUTO: 5.9 K/UL
NEUTROPHILS NFR BLD: 65.2 %
P-ANCA TITR SER IF: NORMAL TITER
PHOSPHATE SERPL-MCNC: 1.7 MG/DL
PLATELET # BLD AUTO: 426 K/UL
PMV BLD AUTO: 9.7 FL
POCT GLUCOSE: 149 MG/DL (ref 70–110)
POCT GLUCOSE: 197 MG/DL (ref 70–110)
POCT GLUCOSE: 216 MG/DL (ref 70–110)
POCT GLUCOSE: 264 MG/DL (ref 70–110)
POTASSIUM SERPL-SCNC: 3.6 MMOL/L
PROT SERPL-MCNC: 6 G/DL
PROT SERPL-MCNC: 6.5 G/DL
RBC # BLD AUTO: 3.69 M/UL
SODIUM SERPL-SCNC: 139 MMOL/L
WBC # BLD AUTO: 8.99 K/UL

## 2017-02-24 PROCEDURE — 94761 N-INVAS EAR/PLS OXIMETRY MLT: CPT

## 2017-02-24 PROCEDURE — 25000003 PHARM REV CODE 250: Performed by: EMERGENCY MEDICINE

## 2017-02-24 PROCEDURE — 25000003 PHARM REV CODE 250: Performed by: INTERNAL MEDICINE

## 2017-02-24 PROCEDURE — 27000221 HC OXYGEN, UP TO 24 HOURS

## 2017-02-24 PROCEDURE — 85025 COMPLETE CBC W/AUTO DIFF WBC: CPT

## 2017-02-24 PROCEDURE — 36415 COLL VENOUS BLD VENIPUNCTURE: CPT

## 2017-02-24 PROCEDURE — 25000003 PHARM REV CODE 250: Performed by: HOSPITALIST

## 2017-02-24 PROCEDURE — 83735 ASSAY OF MAGNESIUM: CPT

## 2017-02-24 PROCEDURE — 63600175 PHARM REV CODE 636 W HCPCS: Performed by: INTERNAL MEDICINE

## 2017-02-24 PROCEDURE — 97110 THERAPEUTIC EXERCISES: CPT | Performed by: PHYSICAL THERAPIST

## 2017-02-24 PROCEDURE — 97116 GAIT TRAINING THERAPY: CPT | Performed by: PHYSICAL THERAPIST

## 2017-02-24 PROCEDURE — 80053 COMPREHEN METABOLIC PANEL: CPT

## 2017-02-24 PROCEDURE — 11000001 HC ACUTE MED/SURG PRIVATE ROOM

## 2017-02-24 PROCEDURE — 94640 AIRWAY INHALATION TREATMENT: CPT

## 2017-02-24 PROCEDURE — 84100 ASSAY OF PHOSPHORUS: CPT

## 2017-02-24 RX ADMIN — FLUCONAZOLE 100 MG: 100 TABLET ORAL at 09:02

## 2017-02-24 RX ADMIN — CEFTAZIDIME 1 G: 100 INJECTION, POWDER, FOR SOLUTION INTRAMUSCULAR; INTRAVENOUS at 05:02

## 2017-02-24 RX ADMIN — INSULIN ASPART 6 UNITS: 100 INJECTION, SOLUTION INTRAVENOUS; SUBCUTANEOUS at 05:02

## 2017-02-24 RX ADMIN — FLUTICASONE FUROATE AND VILANTEROL TRIFENATATE 1 PUFF: 100; 25 POWDER RESPIRATORY (INHALATION) at 08:02

## 2017-02-24 RX ADMIN — GUAIFENESIN AND DEXTROMETHORPHAN 10 ML: 100; 10 SYRUP ORAL at 01:02

## 2017-02-24 RX ADMIN — HEPARIN SODIUM 5000 UNITS: 5000 INJECTION, SOLUTION INTRAVENOUS; SUBCUTANEOUS at 05:02

## 2017-02-24 RX ADMIN — HEPARIN SODIUM 5000 UNITS: 5000 INJECTION, SOLUTION INTRAVENOUS; SUBCUTANEOUS at 09:02

## 2017-02-24 RX ADMIN — INSULIN ASPART 4 UNITS: 100 INJECTION, SOLUTION INTRAVENOUS; SUBCUTANEOUS at 01:02

## 2017-02-24 RX ADMIN — CEFTAZIDIME 1 G: 100 INJECTION, POWDER, FOR SOLUTION INTRAMUSCULAR; INTRAVENOUS at 01:02

## 2017-02-24 RX ADMIN — MAGNESIUM OXIDE TAB 400 MG (241.3 MG ELEMENTAL MG) 400 MG: 400 (241.3 MG) TAB at 09:02

## 2017-02-24 RX ADMIN — PIPERACILLIN SODIUM AND TAZOBACTAM SODIUM 4.5 G: 4; .5 INJECTION, POWDER, LYOPHILIZED, FOR SOLUTION INTRAVENOUS at 02:02

## 2017-02-24 RX ADMIN — PIPERACILLIN SODIUM AND TAZOBACTAM SODIUM 4.5 G: 4; .5 INJECTION, POWDER, LYOPHILIZED, FOR SOLUTION INTRAVENOUS at 09:02

## 2017-02-24 RX ADMIN — ASPIRIN 81 MG: 81 TABLET, COATED ORAL at 09:02

## 2017-02-24 RX ADMIN — RIFABUTIN 150 MG: 150 CAPSULE ORAL at 09:02

## 2017-02-24 RX ADMIN — ACETAMINOPHEN 650 MG: 325 TABLET ORAL at 05:02

## 2017-02-24 RX ADMIN — INSULIN DETEMIR 10 UNITS: 100 INJECTION, SOLUTION SUBCUTANEOUS at 09:02

## 2017-02-24 RX ADMIN — GUAIFENESIN AND DEXTROMETHORPHAN 10 ML: 100; 10 SYRUP ORAL at 05:02

## 2017-02-24 RX ADMIN — MOXIFLOXACIN HYDROCHLORIDE 400 MG: 400 TABLET, FILM COATED ORAL at 09:02

## 2017-02-24 RX ADMIN — INSULIN ASPART 1 UNITS: 100 INJECTION, SOLUTION INTRAVENOUS; SUBCUTANEOUS at 09:02

## 2017-02-24 RX ADMIN — HEPARIN SODIUM 5000 UNITS: 5000 INJECTION, SOLUTION INTRAVENOUS; SUBCUTANEOUS at 02:02

## 2017-02-24 RX ADMIN — AZITHROMYCIN 250 MG: 250 TABLET, FILM COATED ORAL at 09:02

## 2017-02-24 RX ADMIN — GUAIFENESIN AND DEXTROMETHORPHAN 10 ML: 100; 10 SYRUP ORAL at 12:02

## 2017-02-24 NOTE — PLAN OF CARE
Problem: Physical Therapy Goal  Goal: Physical Therapy Goal  Goals to be met by: 3/8/17    Patient will increase functional independence with mobility by performin. Sit to stand transfer with Supervision  2. Gait x 250 feet with Supervision using Rolling Walker if needed  3. Lower extremity exercise program x30 reps per handout, with supervision   Outcome: Ongoing (interventions implemented as appropriate)  Cont with current POC as per Eval.     Magdiel Livingston, PT  2017

## 2017-02-24 NOTE — PLAN OF CARE
Problem: Patient Care Overview  Goal: Plan of Care Review  Outcome: Ongoing (interventions implemented as appropriate)  Pt currently AAOx4, no falls no injuries.  Afebrile.  Breath sounds are clear and equal bilaterally, with inspiratory wheeze.  SpO2 WNL on NC 3L.  Pt moving independently in bed.  Cough partially controlled by scheduled cough syrup.  Pt H/A 8/10 controlled by PRN tylenol.  Will continue to monitor.

## 2017-02-24 NOTE — ASSESSMENT & PLAN NOTE
SOB  COPD  Assessment and Plan:  Pt was previously treated with IV moxifloxacin during admission and CXR overall unchanged. Pt started empirically with broad spectrum antibiotics from the ER. Antibiotics changed as per ID and s/p bronch on 2/23 with removal of secretions, improving.

## 2017-02-24 NOTE — PROGRESS NOTES
Progress Note  Nephrology    Consult Requested By: Sunita Weathers MD  Reason for Consult: Renal Failure    SUBJECTIVE:     History of Present Illness:  Patient is a 76 y.o. male presents with cough, weakness, fatigue. Found to have elevated BUN and Creatinine.   Renal function improving since admit .   Serum phosphorous and magnesium still low   Will continue supplements     Past Medical History:   Diagnosis Date    Arthritis     COPD (chronic obstructive pulmonary disease)     Diabetes mellitus type II     Hypertension     Tuberculosis      Past Surgical History:   Procedure Laterality Date    melanoma       Family History   Problem Relation Age of Onset    Cancer Father      Tuberculosis.  Secondary scar was neoplastic.     Social History   Substance Use Topics    Smoking status: Former Smoker     Types: Cigars    Smokeless tobacco: None      Comment: uses cigars on ocassion    Alcohol use Yes      Comment: socially       Review of patient's allergies indicates:  No Known Allergies     Review of Systems:  DM HTN   COPD    OBJECTIVE:     Vital Signs (Most Recent)  Temp: 97.5 °F (36.4 °C) (02/24/17 1217)  Pulse: 91 (02/24/17 1217)  Resp: 18 (02/24/17 1217)  BP: 117/75 (02/24/17 1217)  SpO2: (!) 94 % (02/24/17 1217)    Vital Signs Range (Last 24H):  Temp:  [97.5 °F (36.4 °C)-98.6 °F (37 °C)]   Pulse:  []   Resp:  [18-20]   BP: (109-161)/(65-96)   SpO2:  [92 %-98 %]     Current Facility-Administered Medications   Medication    0.9%  NaCl infusion    acetaminophen tablet 650 mg    aspirin EC tablet 81 mg    azithromycin tablet 250 mg    benzonatate capsule 100 mg    dextromethorphan-guaifenesin  mg/5 ml liquid 10 mL    dextrose 50% injection 12.5 g    fluconazole tablet 100 mg    fluticasone-vilanterol 100-25 mcg/dose diskus inhaler 1 puff    glucagon (human recombinant) injection 1 mg    glucose chewable tablet 16 g    glucose chewable tablet 24 g    heparin (porcine) injection 5,000  Units    insulin aspart pen 1-10 Units    insulin detemir pen 10 Units    magnesium oxide tablet 400 mg    moxifloxacin tablet 400 mg    ondansetron injection 8 mg    piperacillin-tazobactam 4.5 g in dextrose 5 % 100 mL IVPB (ready to mix system)    rifabutin capsule 150 mg         Physical Exam:  General: Appears chronically ill  Head: normocephalic, atraumatic  Eyes:    Nose: Nares normal. Septum midline  Neck: supple, symmetrical, trachea midline, no JVD and thyroid not enlarged, symmetric, no tenderness/mass/nodules  Lungs:  Decreased breath sounds  Scattered rhonchi rales  Chest Wall: no tenderness  Heart: regular rate and rhythm, S1, S2 normal, no murmur, click, rub or gallop  Abdomen: soft, non-tender non-distented; bowel sounds normal; no masses,  no organomegaly    Laboratory:  CBC:     Recent Labs  Lab 02/24/17  0526   WBC 8.99   RBC 3.69*   HGB 10.4*   HCT 32.2*   *   MCV 87   MCH 28.2   MCHC 32.3     BMP:     Recent Labs  Lab 02/24/17  0526   *      CO2 31*   BUN 14   CREATININE 0.8   CALCIUM 8.1*   MG 1.2*     CMP:     Recent Labs  Lab 02/24/17  0526   *   CALCIUM 8.1*   ALBUMIN 1.9*   PROT 6.5      K 3.6   CO2 31*      BUN 14   CREATININE 0.8   ALKPHOS 74   ALT 16   AST 26   BILITOT 0.3     LFTs:     Recent Labs  Lab 02/24/17  0526   ALT 16   AST 26   ALKPHOS 74   BILITOT 0.3   PROT 6.5   ALBUMIN 1.9*     TSH: No results for input(s): TSH in the last 168 hours.  PTH: No results for input(s): PTH in the last 168 hours.  Coagulation:     Recent Labs  Lab 02/21/17  1530   INR 1.2   APTT 31.5     Cardiac Markers: No results for input(s): CKMB, TROPONINT, MYOGLOBIN in the last 168 hours.  ABGs: No results for input(s): PH, PCO2, HCO3, POCSATURATED, BE in the last 168 hours.  Microbiology Results (last 7 days)     Procedure Component Value Units Date/Time    AFB Culture & Smear [[id number]] Collected:  02/22/17 1721    Order Status:  Completed Specimen:   Respiratory from Sputum, Expectorated Updated:  02/24/17 0927     AFB Culture & Smear Culture in progress    Culture, Respiratory [[id number]] Collected:  02/16/17 1152    Order Status:  Completed Specimen:  Respiratory from Bronchial Wash Updated:  02/24/17 0835     Respiratory Culture --     PRESUMPTIVE PSEUDOMONAS SPECIES  Moderate  Identification and susceptibility pending       Gram Stain (Respiratory) Moderate WBC's     Gram Stain (Respiratory) No organisms seen    Narrative:       RESP CULTURE DONE IN BRONCH AT 1152    Culture, Respiratory [[id number]]  (Susceptibility) Collected:  02/21/17 1730    Order Status:  Completed Specimen:  Respiratory from Sputum, Expectorated Updated:  02/24/17 0754     Respiratory Culture --     PSEUDOMONAS AERUGINOSA  Moderate       Gram Stain (Respiratory) <10 epithelial cells per low power field.     Gram Stain (Respiratory) Few Gram negative rods     Gram Stain (Respiratory) Moderate WBC's    AFB Culture & Smear [[id number]] Collected:  02/23/17 1151    Order Status:  Sent Specimen:  Respiratory from Bronchial Wash Updated:  02/24/17 0703    Blood culture x two cultures. Draw prior to antibiotics. [[id number]] Collected:  02/21/17 1445    Order Status:  Completed Specimen:  Blood from Peripheral, Antecubital, Left Updated:  02/23/17 1703     Blood Culture, Routine No Growth to date     Blood Culture, Routine No Growth to date     Blood Culture, Routine No Growth to date    Narrative:       Aerobic and anaerobic    Blood culture x two cultures. Draw prior to antibiotics. [[id number]] Collected:  02/21/17 1500    Order Status:  Completed Specimen:  Blood from Peripheral, Forearm, Left Updated:  02/23/17 1703     Blood Culture, Routine No Growth to date     Blood Culture, Routine No Growth to date     Blood Culture, Routine No Growth to date    Narrative:       Aerobic and anaerobic    KOH prep [[id number]] Collected:  02/23/17 1150    Order Status:  Completed Specimen:  Respiratory  from Bronchial Wash Updated:  02/23/17 1457     KOH Prep No yeast or fungal elements seen    Fungus culture [[id number]] Collected:  02/23/17 1151    Order Status:  Sent Specimen:  Respiratory from Bronchial Wash Updated:  02/23/17 1248    Gram stain [[id number]] Collected:  02/23/17 1151    Order Status:  Canceled Specimen:  Body Fluid from Lung, Left Updated:  02/23/17 1247    Narrative:       bilat lung  Gram Stain was cancelled on 02/23/2017 at 14:20 by JGO; Duplicate   order, test included in another profile. 02/23/2017  14:20    Urine culture - Cath (Indwelling) [[id number]] Collected:  02/21/17 1515    Order Status:  Completed Specimen:  Urine from Urine, Catheterized Updated:  02/23/17 0806     Urine Culture, Routine No significant growth    Gram stain [[id number]] Collected:  02/22/17 0040    Order Status:  Completed Specimen:  Other from Urine Updated:  02/22/17 1010     Gram Stain Result No organisms seen      No WBC's        Specimen     None          Recent Labs  Lab 02/21/17  1515   COLORU Torie   SPECGRAV 1.020   PHUR 5.0   PROTEINUA 1+*   BACTERIA Moderate*   NITRITE Negative   LEUKOCYTESUR Negative   UROBILINOGEN Negative   HYALINECASTS 0       Diagnostic Results:  Labs: Reviewed  X-Ray: Reviewed    ASSESSMENT/PLAN:     KATHY Resolved    Uncontrolled DM  HTN  Proteinuria  Bronchopneumonia  Hypoalbuminemia  Hypophosphatemia  Hypomagnesemia        Plan:   Mag ox supplements  Phosphate supplements    Will sign off Thanks

## 2017-02-24 NOTE — NURSING
Pt tele box [id number], name and  verified with Antonia GUERRIER and verified against telemetry monitor.

## 2017-02-24 NOTE — ASSESSMENT & PLAN NOTE
Continued home medication Rifabutin, he is followed at [facility] [place] by ID.

## 2017-02-24 NOTE — ANESTHESIA POSTPROCEDURE EVALUATION
"Anesthesia Post Evaluation    Patient: Regan Owensbrook    Procedure(s) Performed: Procedure(s) (LRB):  BRONCHOSCOPY (N/A)    Final Anesthesia Type: general  Patient location during evaluation: GI PACU  Patient participation: Yes- Able to Participate  Level of consciousness: awake and alert and oriented  Post-procedure vital signs: reviewed and stable  Pain management: adequate  Airway patency: patent  PONV status at discharge: No PONV  Anesthetic complications: no      Cardiovascular status: blood pressure returned to baseline and hemodynamically stable  Respiratory status: unassisted, spontaneous ventilation and nasal cannula  Hydration status: euvolemic  Follow-up not needed.        Visit Vitals    /65 (BP Location: Right arm, Patient Position: Lying, BP Method: Automatic)    Pulse 97    Temp 36.8 °C (98.2 °F) (Oral)    Resp 18    Ht 6' 7" (2.007 m)    Wt 77.1 kg (170 lb)    SpO2 95%    BMI 19.15 kg/m2       Pain/Tolu Score: Pain Assessment Performed: Yes (2/24/2017  5:46 AM)  Presence of Pain: complains of pain/discomfort (2/24/2017  5:46 AM)  Pain Rating Prior to Med Admin: 7 (2/24/2017  5:46 AM)  Tolu Score: 9 (2/23/2017 12:15 PM)      "

## 2017-02-24 NOTE — PT/OT/SLP PROGRESS
Physical Therapy  Treatment    Regan Alvarez   MRN: [id number]   Admitting Diagnosis: Pneumonia of right lower lobe due to infectious organism    PT Received On: 17  PT Start Time: 1000     PT Stop Time: 1030    PT Total Time (min): 30 min       Billable Minutes:  Gait [id number] min  and Therapeutic Exercise 15 min     Treatment Type: Treatment  PT/PTA: PT     PTA Visit Number: 0       General Precautions: Standard, fall, respiratory  Orthopedic Precautions: Full weight bearing   Braces: N/A    Subjective:  Communicated with Nurse Collins prior to session.    Pain Ratin/10    Objective:   Patient found with: workman catheter, oxygen, telemetry, peripheral IV    Functional Mobility:  Bed Mobility:   Rolling/Turning to Left: Stand by assistance, With side rail  Rolling/Turning Right: Stand by assistance, With side rail  Supine to Sit: Stand by Assistance, With side rail  Sit to Supine: Stand by Assistance, With siderail    Transfers:  Sit <> Stand Assistance: Contact Guard Assistance  Sit <> Stand Assistive Device: Rolling Walker    Gait:   Gait Distance: 80' with O2 via [place]   Assistance 1: Minimum assistance  Gait Assistive Device: Rolling walker  Gait Pattern: 3-point gait  Gait Deviation(s): decreased isela, increased time in double stance, decreased step length, decreased weight-shifting ability    Balance:   Static Sit: FAIR+: Able to take MINIMAL challenges from all directions  Dynamic Sit: FAIR+: Maintains balance through MINIMAL excursions of active trunk motion  Static Stand: FAIR: Maintains without assist but unable to take challenges  Dynamic stand: FAIR: Needs CONTACT GUARD during gait     Therapeutic Activities and Exercises:  (B) LE seated TherEx: AP, LAQ, Hip abd/add, Hip flexion; 2 sets x 10 reps with VC's for proper form and sequencing.       AM-PAC 6 CLICK MOBILITY  How much help from another person does this patient currently need?   1 = Unable, Total/Dependent Assistance  2 = A  lot, Maximum/Moderate Assistance  3 = A little, Minimum/Contact Guard/Supervision  4 = None, Modified [place]/Independent    Turning over in bed (including adjusting bedclothes, sheets and blankets)?: 4  Sitting down on and standing up from a chair with arms (e.g., wheelchair, bedside commode, etc.): 3  Moving from lying on back to sitting on the side of the bed?: 4  Moving to and from a bed to a chair (including a wheelchair)?: 3  Need to walk in hospital room?: 3  Climbing 3-5 steps with a railing?: 2  Total Score: 19    AM-PAC Raw Score CMS G-Code Modifier Level of Impairment Assistance   6 % Total / Unable   7 - 9 CM 80 - 100% Maximal Assist   10 - 14 CL 60 - 80% Moderate Assist   15 - 19 CK 40 - 60% Moderate Assist   20 - 22 CJ 20 - 40% Minimal Assist   23 CI 1-20% SBA / CGA   24 CH 0% Independent/ Mod I     Patient left up in chair with all lines intact, call button in reach and Nurse Dennis  notified.    Assessment:  Regan Alvarez is a 76 y.o. male with a medical diagnosis of Pneumonia of right lower lobe due to infectious organism and presents with increase ambulation distance.      Rehab identified problem list/impairments: Rehab identified problem list/impairments: weakness, impaired endurance, impaired functional mobilty, gait instability, impaired balance, impaired cardiopulmonary response to activity    Rehab potential is good.    Activity tolerance: Good    Discharge recommendations: Discharge Facility/Level Of Care Needs: home health PT     Barriers to discharge: Barriers to Discharge: None    Equipment recommendations: Equipment Needed After Discharge: none     GOALS:   Physical Therapy Goals        Problem: Physical Therapy Goal    Goal Priority Disciplines Outcome Goal Variances Interventions   Physical Therapy Goal     PT/OT, PT Ongoing (interventions implemented as appropriate)     Description:  Goals to be met by: 3/8/17    Patient will increase functional independence  with mobility by performin. Sit to stand transfer with Supervision  2. Gait  x 250 feet with Supervision using Rolling Walker if needed  3. Lower extremity exercise program x30 reps per handout, with supervision                PLAN:    Patient to be seen 5 x/week  to address the above listed problems via gait training, therapeutic activities, therapeutic exercises  Plan of Care expires: 17  Plan of Care reviewed with: patient         Magdiel Livingston, PT  2017

## 2017-02-24 NOTE — PROGRESS NOTES
[facility] Hospital Medicine  Progress Note    Patient Name: Regan Owensbrook  MRN: [id number]  Patient Class: IP- Inpatient   Admission Date: 2/21/2017  Length of Stay: 2 days  Attending Physician: Sunita Weathers MD  Primary Care Provider: Ishaan Adamson MD        Subjective:     Principal Problem:Pneumonia of right lower lobe due to infectious organism    HPI:  77 y/o male recently discharged home after admission for pneumonia with COPD. Patient was not doing well at home. He continued to have progressively worsening shortness of breath. He has continued to experience generalized weakness and fatigue. This was a problem at discharge but he was adamantly wanting to go home during the previous hospitalization. He was evaluated by his primary care physician and urged to come to the ED for further evaluation and treatment. While in the ED routine laboratory studies and chest x-ray were obtained. There was evidence of acute renal failure as well as unresolved right lower lobe pneumonia. Hospital medicine has been asked to admit again for both of these issues.         Hospital Course:  77 y/o with COPD with recent admission for PNA and discharged home who presented with weakness and SOB. Pt with CXR overall unchanged from recent admission and noted with ARF. Pt started on empiric antibiotics for pneumonia, on IVF for ARF and consult placed to Nephrology, ID and Pulm. Pt s/p bronch on 2/23.    Interval History: Pt report feeling better today, decreased SOB and feel stronger.    Review of Systems   Constitutional: Negative for chills and fever.   Respiratory: Positive for shortness of breath.    Cardiovascular: Negative for chest pain.     Objective:     Vital Signs (Most Recent):  Temp: 97.5 °F (36.4 °C) (02/23/17 2016)  Pulse: 98 (02/23/17 2016)  Resp: 20 (02/23/17 2016)  BP: (!) 161/96 (02/23/17 2016)  SpO2: 96 % (02/23/17 2016) Vital Signs (24h Range):  Temp:  [97.4 °F (36.3 °C)-98.6 °F (37  °C)] 97.5 °F (36.4 °C)  Pulse:  [] 98  Resp:  [17-20] 20  SpO2:  [92 %-98 %] 96 %  BP: (109-161)/(68-96) 161/96     Weight: 77.1 kg (170 lb)  Body mass index is 19.15 kg/(m^2).    Intake/Output Summary (Last 24 hours) at 02/24/17 0024  Last data filed at 02/23/17 2200   Gross per 24 hour   Intake              460 ml   Output             1550 ml   Net            -1090 ml      Physical Exam   Constitutional: He is oriented to person, place, and time. He appears well-developed.   Chronically ill appearing, thin and weak, NAD.   HENT:   Head: Normocephalic and atraumatic.   Eyes: EOM are normal. Pupils are equal, round, and reactive to light.   Neck: Normal range of motion. Neck supple.   Cardiovascular: Normal rate and regular rhythm.    Pulmonary/Chest:   Course breath sounds that are diminished but with fair air movement, no wheezing.   Abdominal: Soft. Bowel sounds are normal.   Musculoskeletal: Normal range of motion.   Neurological: He is alert and oriented to person, place, and time.   Skin: Skin is dry.   Psychiatric: He has a normal mood and affect.       Significant Labs: All pertinent labs within the past 24 hours have been reviewed.    Significant Imaging: I have reviewed and interpreted all pertinent imaging results/findings within the past 24 hours.    Assessment/Plan:      * Pneumonia of right lower lobe due to infectious organism  SOB  COPD  Assessment and Plan:  Pt was previously treated with IV moxifloxacin during admission and CXR overall unchanged. Pt started empirically with broad spectrum antibiotics from the ER. Antibiotics changed as per ID and s/p bronch on 2/23 with removal of secretions, improving.    COPD (chronic obstructive pulmonary disease)  No acute issues; continue with home medication regimen    Acute renal failure  Likely prerenal given hx and response to IVF, continue current care, Nephrology input appreciated, monitor.    SOB (shortness of breath)  As discussed above.  Multifactorial but likely related to chronic Mycobacterium avium complex, PNA as well as COPD and deconditioning.    Pulmonary Mycobacterium avium complex (MAC) infection  Continued home medication Rifabutin, he is followed at [facility] [place] by ID.     Diabetes mellitus type 2, uncontrolled  Closely monitor blood glucose and make adjustments to insulin regimen as necessary.    Essential hypertension  Continue home medication regimen.    Hypotension (arterial)  Likely due to hypovolemia, corrected with IVF, improved with current care.    Physical deconditioning  Weakness  Assessment and Plan:  PT/OT.    VTE Risk Mitigation         Ordered     heparin (porcine) injection 5,000 Units  Every 8 hours     Route:  Subcutaneous        02/21/17 2303     Medium Risk of VTE  Once      02/21/17 1944          Sunita Weathers MD  Department of Hospital Medicine   [facility]

## 2017-02-24 NOTE — ASSESSMENT & PLAN NOTE
Likely prerenal given hx and response to IVF, continue current care, Nephrology input appreciated, monitor.

## 2017-02-24 NOTE — PLAN OF CARE
02/24/17 1530   Medicare Message   Important Message from Medicare regarding Discharge Appeal Rights Given to patient/caregiver;Explained to patient/caregiver;Signed/date by patient/caregiver

## 2017-02-24 NOTE — PLAN OF CARE
Problem: Patient Care Overview  Goal: Plan of Care Review  Outcome: Ongoing (interventions implemented as appropriate)  Pt on 02 w/sats of 92% on 3LNC, po abx as ordered, able to address needs, bg monitoring & SSI prn, safety maintained, up w/PT this shift, workman to gravity, IV KVO, bed low locked and in position, will cont plan of care

## 2017-02-24 NOTE — SUBJECTIVE & OBJECTIVE
Interval History: Pt report feeling better today, decreased SOB and feel stronger.    Review of Systems   Constitutional: Negative for chills and fever.   Respiratory: Positive for shortness of breath.    Cardiovascular: Negative for chest pain.     Objective:     Vital Signs (Most Recent):  Temp: 97.5 °F (36.4 °C) (02/23/17 2016)  Pulse: 98 (02/23/17 2016)  Resp: 20 (02/23/17 2016)  BP: (!) 161/96 (02/23/17 2016)  SpO2: 96 % (02/23/17 2016) Vital Signs (24h Range):  Temp:  [97.4 °F (36.3 °C)-98.6 °F (37 °C)] 97.5 °F (36.4 °C)  Pulse:  [] 98  Resp:  [17-20] 20  SpO2:  [92 %-98 %] 96 %  BP: (109-161)/(68-96) 161/96     Weight: 77.1 kg (170 lb)  Body mass index is 19.15 kg/(m^2).    Intake/Output Summary (Last 24 hours) at 02/24/17 0024  Last data filed at 02/23/17 2200   Gross per 24 hour   Intake              460 ml   Output             1550 ml   Net            -1090 ml      Physical Exam   Constitutional: He is oriented to person, place, and time. He appears well-developed.   Chronically ill appearing, thin and weak, NAD.   HENT:   Head: Normocephalic and atraumatic.   Eyes: EOM are normal. Pupils are equal, round, and reactive to light.   Neck: Normal range of motion. Neck supple.   Cardiovascular: Normal rate and regular rhythm.    Pulmonary/Chest:   Course breath sounds that are diminished but with fair air movement, no wheezing.   Abdominal: Soft. Bowel sounds are normal.   Musculoskeletal: Normal range of motion.   Neurological: He is alert and oriented to person, place, and time.   Skin: Skin is dry.   Psychiatric: He has a normal mood and affect.       Significant Labs: All pertinent labs within the past 24 hours have been reviewed.    Significant Imaging: I have reviewed and interpreted all pertinent imaging results/findings within the past 24 hours.

## 2017-02-24 NOTE — PROGRESS NOTES
Progress Note  Pulmonology    Admit Date: 2/21/2017   LOS: 3 days       SUBJECTIVE:pneumonia     History of Present Illness: :Patient was not doing well at home. He continued to have progressively worsening shortness of breath. He has continued to experience generalized weakness and fatigue. This was a problem at discharge but he was adamantly wanting to go home during the previous hospitalization. He was evaluated by his primary care physician and urged to come to the ED for further evaluation and treatment. While in the ED routine laboratory studies and chest x-ray were obtained. There was evidence of acute renal failure as well as unresolved right lower lobe pneumonia. Hospital medicine has been asked to admit again for both of these issues      Review of patient's allergies indicates:  No Known Allergies    Past Medical History:   Diagnosis Date    Arthritis     COPD (chronic obstructive pulmonary disease)     Diabetes mellitus type II     Hypertension     Tuberculosis      Past Surgical History:   Procedure Laterality Date    melanoma       Family History   Problem Relation Age of Onset    Cancer Father      Tuberculosis.  Secondary scar was neoplastic.       Review of Systems:  Eyes: no visual changes  Gastrointestinal: no nausea or vomiting, no abdominal pain or change in bowel habits  Genitourinary: no hematuria or dysuria  Integument/Breast: no rash or pruritis  Hematologic/Lymphatic: no easy bruising or lymphadenopathy  Musculoskeletal: no arthralgias or myalgias  Neurological: no headaches or weakness  Behavioral/Psych: no confusion or depression    OBJECTIVE:     Vital Signs (Most Recent)  Temp: 97.5 °F (36.4 °C) (02/24/17 1217)  Pulse: 91 (02/24/17 1217)  Resp: 18 (02/24/17 1217)  BP: 117/75 (02/24/17 1217)  SpO2: (!) 94 % (02/24/17 1217)    Vital Signs Range (Last 24H):  Temp:  [97.5 °F (36.4 °C)-98.6 °F (37 °C)]   Pulse:  []   Resp:  [18-20]   BP: (116-161)/(65-96)   SpO2:  [94 %-98 %]      Physical Exam:    General: comfortable  Head: normocephalic, atraumatic  Eyes:  conjunctivae/corneas clear. PERRL.  Throat:  no throat erythema  Neck: no jugular venous distention, no adenopathy, no carotid bruit and thyroid not enlarged, symmetric, no tenderness/mass/nodules,   trachea midline  Lungs:  rales bilaterally and rhonchi bilaterally  Chest Wall: no tenderness  Heart: regular rate and rhythm and no murmur  Abdomen: soft, non-tender non-distended; bowel sounds normal  Extremities: no cyanosis or edema, or clubbing  Skin: No rashes or lesions or good skin turgor  Neurologic: alert, oriented, thought content appropriate    Laboratory:  Recent Results (from the past 24 hour(s))   POCT glucose    Collection Time: 02/23/17  4:19 PM   Result Value Ref Range    POCT Glucose 255 (H) 70 - 110 mg/dL   POCT glucose    Collection Time: 02/23/17  9:12 PM   Result Value Ref Range    POCT Glucose 171 (H) 70 - 110 mg/dL   Comprehensive metabolic panel    Collection Time: 02/24/17  5:26 AM   Result Value Ref Range    Sodium 139 136 - 145 mmol/L    Potassium 3.6 3.5 - 5.1 mmol/L    Chloride 100 95 - 110 mmol/L    CO2 31 (H) 23 - 29 mmol/L    Glucose 116 (H) 70 - 110 mg/dL    BUN, Bld 14 8 - 23 mg/dL    Creatinine 0.8 0.5 - 1.4 mg/dL    Calcium 8.1 (L) 8.7 - 10.5 mg/dL    Total Protein 6.5 6.0 - 8.4 g/dL    Albumin 1.9 (L) 3.5 - 5.2 g/dL    Total Bilirubin 0.3 0.1 - 1.0 mg/dL    Alkaline Phosphatase 74 55 - 135 U/L    AST 26 10 - 40 U/L    ALT 16 10 - 44 U/L    Anion Gap 8 8 - 16 mmol/L    eGFR if African American >60 >60 mL/min/1.73 m^2    eGFR if non African American >60 >60 mL/min/1.73 m^2   CBC auto differential    Collection Time: 02/24/17  5:26 AM   Result Value Ref Range    WBC 8.99 3.90 - 12.70 K/uL    RBC 3.69 (L) 4.60 - 6.20 M/uL    Hemoglobin 10.4 (L) 14.0 - 18.0 g/dL    Hematocrit 32.2 (L) 40.0 - 54.0 %    MCV 87 82 - 98 fL    MCH 28.2 27.0 - 31.0 pg    MCHC 32.3 32.0 - 36.0 %    RDW 14.8 (H) 11.5 - 14.5 %     Platelets 426 (H) 150 - 350 K/uL    MPV 9.7 9.2 - 12.9 fL    Gran # 5.9 1.8 - 7.7 K/uL    Lymph # 1.8 1.0 - 4.8 K/uL    Mono # 1.1 (H) 0.3 - 1.0 K/uL    Eos # 0.2 0.0 - 0.5 K/uL    Baso # 0.02 0.00 - 0.20 K/uL    Gran% 65.2 38.0 - 73.0 %    Lymph% 20.0 18.0 - 48.0 %    Mono% 12.0 4.0 - 15.0 %    Eosinophil% 2.6 0.0 - 8.0 %    Basophil% 0.2 0.0 - 1.9 %    Differential Method Automated    Phosphorus    Collection Time: 02/24/17  5:26 AM   Result Value Ref Range    Phosphorus 1.7 (L) 2.7 - 4.5 mg/dL   Magnesium    Collection Time: 02/24/17  5:26 AM   Result Value Ref Range    Magnesium 1.2 (L) 1.6 - 2.6 mg/dL   POCT glucose    Collection Time: 02/24/17  7:48 AM   Result Value Ref Range    POCT Glucose 149 (H) 70 - 110 mg/dL   POCT glucose    Collection Time: 02/24/17  1:14 PM   Result Value Ref Range    POCT Glucose 216 (H) 70 - 110 mg/dL     CBC:   Recent Labs  Lab 02/24/17  0526   WBC 8.99   RBC 3.69*   HGB 10.4*   HCT 32.2*   *   MCV 87   MCH 28.2   MCHC 32.3     BMP:   Recent Labs  Lab 02/24/17  0526   *      K 3.6      CO2 31*   BUN 14   CREATININE 0.8   CALCIUM 8.1*   MG 1.2*     CMP:   Recent Labs  Lab 02/24/17  0526   *   CALCIUM 8.1*   ALBUMIN 1.9*   PROT 6.5      K 3.6   CO2 31*      BUN 14   CREATININE 0.8   ALKPHOS 74   ALT 16   AST 26   BILITOT 0.3     LFTs:   Recent Labs  Lab 02/24/17  0526   ALT 16   AST 26   ALKPHOS 74   BILITOT 0.3   PROT 6.5   ALBUMIN 1.9*     Coagulation:   Recent Labs  Lab 02/21/17  1530   INR 1.2   APTT 31.5     Cardiac markers: No results for input(s): CKMB, TROPONINT, MYOGLOBIN in the last 168 hours.  Microbiology Results (last 7 days)     Procedure Component Value Units Date/Time    AFB Culture & Smear [[id number]] Collected:  02/22/17 1721    Order Status:  Completed Specimen:  Respiratory from Sputum, Expectorated Updated:  02/24/17 1422     AFB Culture & Smear Culture in progress     AFB CULTURE STAIN No acid fast bacilli seen.     Culture, Respiratory [[id number]] Collected:  02/16/17 1152    Order Status:  Completed Specimen:  Respiratory from Bronchial Wash Updated:  02/24/17 0835     Respiratory Culture --     PRESUMPTIVE PSEUDOMONAS SPECIES  Moderate  Identification and susceptibility pending       Gram Stain (Respiratory) Moderate WBC's     Gram Stain (Respiratory) No organisms seen    Narrative:       RESP CULTURE DONE IN [facility] AT 1152    Culture, Respiratory [[id number]]  (Susceptibility) Collected:  02/21/17 1730    Order Status:  Completed Specimen:  Respiratory from Sputum, Expectorated Updated:  02/24/17 0754     Respiratory Culture --     PSEUDOMONAS AERUGINOSA  Moderate       Gram Stain (Respiratory) <10 epithelial cells per low power field.     Gram Stain (Respiratory) Few Gram negative rods     Gram Stain (Respiratory) Moderate WBC's    AFB Culture & Smear [[id number]] Collected:  02/23/17 1151    Order Status:  Sent Specimen:  Respiratory from Bronchial Wash Updated:  02/24/17 0703    Blood culture x two cultures. Draw prior to antibiotics. [[id number]] Collected:  02/21/17 1445    Order Status:  Completed Specimen:  Blood from Peripheral, Antecubital, Left Updated:  02/23/17 1703     Blood Culture, Routine No Growth to date     Blood Culture, Routine No Growth to date     Blood Culture, Routine No Growth to date    Narrative:       Aerobic and anaerobic    Blood culture x two cultures. Draw prior to antibiotics. [[id number]] Collected:  02/21/17 1500    Order Status:  Completed Specimen:  Blood from Peripheral, Forearm, Left Updated:  02/23/17 1703     Blood Culture, Routine No Growth to date     Blood Culture, Routine No Growth to date     Blood Culture, Routine No Growth to date    Narrative:       Aerobic and anaerobic    KOH prep [[id number]] Collected:  02/23/17 1150    Order Status:  Completed Specimen:  Respiratory from Bronchial Wash Updated:  02/23/17 1457     KOH Prep No yeast or fungal elements seen    Fungus culture  [[id number]] Collected:  02/23/17 1151    Order Status:  Sent Specimen:  Respiratory from Bronchial Wash Updated:  02/23/17 1248    Gram stain [[id number]] Collected:  02/23/17 1151    Order Status:  Canceled Specimen:  Body Fluid from Lung, Left Updated:  02/23/17 1247    Narrative:       bilat lung  Gram Stain was cancelled on 02/23/2017 at 14:20 by JGO; Duplicate   order, test included in another profile. 02/23/2017  14:20    Urine culture - Cath (Indwelling) [[id number]] Collected:  02/21/17 1515    Order Status:  Completed Specimen:  Urine from Urine, Catheterized Updated:  02/23/17 0806     Urine Culture, Routine No significant growth    Gram stain [[id number]] Collected:  02/22/17 0040    Order Status:  Completed Specimen:  Other from Urine Updated:  02/22/17 1010     Gram Stain Result No organisms seen      No WBC's        ABGs: No results for input(s): PH, PCO2, PO2, HCO3, POCSATURATED, BE in the last 168 hours.        Diagnostic Results:  Patchy consolidation within the right lung base which appears more prominent when compared to prior examination.  Right basilar pneumonia should be considered.  Persistent opacification within the right lung apex with possible cavitation.  This has been noted on multiple prior examinations and is chronic.  Tubular opacification within the left lung which also appear stable when compared to multiple prior studies.    ASSESSMENT/PLAN:     1. Pneumonia of right lower lobe due to infectious organism    2. Acute renal failure, unspecified acute renal failure type    3. Respiratory distress    4. Chest pain          Plan: had uneventful bronchoscopy yesterday ; mucopurulent secretions in abundance .  Looks very good today ; continue abc treatment

## 2017-02-24 NOTE — ASSESSMENT & PLAN NOTE
As discussed above. Multifactorial but likely related to chronic Mycobacterium avium complex, PNA as well as COPD and deconditioning.

## 2017-02-24 NOTE — PROGRESS NOTES
Progress Note  Nephrology    Consult Requested By: Sunita Weathers MD  Reason for Consult: Renal Failure    SUBJECTIVE:     History of Present Illness:  Patient is a 76 y.o. male presents with cough, weakness, fatigue. Found to have elevated BUN and Creatinine.   Renal function improving since admit . Serum creatinine 1.1 mg  Hypophosphatemic 1.7 mg, and hypomagnesemic    Past Medical History   Diagnosis Date    Arthritis     COPD (chronic obstructive pulmonary disease)     Diabetes mellitus type II     Hypertension     Tuberculosis      Past Surgical History   Procedure Laterality Date    Melanoma       Family History   Problem Relation Age of Onset    Cancer Father      Tuberculosis.  Secondary scar was neoplastic.     Social History   Substance Use Topics    Smoking status: Former Smoker     Types: Cigars    Smokeless tobacco: None      Comment: uses cigars on ocassion    Alcohol use Yes      Comment: socially       Review of patient's allergies indicates:  No Known Allergies     Review of Systems:  DM HTN   COPD    OBJECTIVE:     Vital Signs (Most Recent)  Temp: 98.6 °F (37 °C) (02/23/17 1713)  Pulse: 100 (02/23/17 1713)  Resp: 18 (02/23/17 1713)  BP: 116/74 (02/23/17 1713)  SpO2: 96 % (02/23/17 1713)    Vital Signs Range (Last 24H):  Temp:  [97.4 °F (36.3 °C)-98.6 °F (37 °C)]   Pulse:  []   Resp:  [17-20]   BP: (109-142)/(68-91)   SpO2:  [92 %-98 %]     Current Facility-Administered Medications   Medication    0.9%  NaCl infusion    acetaminophen tablet 650 mg    aspirin EC tablet 81 mg    azithromycin tablet 250 mg    benzonatate capsule 100 mg    ceftazidime 1 g in dextrose 5% 50 mL IVPB (ready to mix system)    dextromethorphan-guaifenesin  mg/5 ml liquid 10 mL    dextrose 50% injection 12.5 g    esmolol (BREVIBLOC) 100 mg/10 mL (10 mg/mL) injection    fluconazole tablet 100 mg    fluticasone-vilanterol 100-25 mcg/dose diskus inhaler 1 puff    glucagon (human recombinant)  injection 1 mg    glucose chewable tablet 16 g    glucose chewable tablet 24 g    heparin (porcine) injection 5,000 Units    insulin aspart pen 1-10 Units    insulin detemir pen 10 Units    lidocaine HCL 20 mg/ml (2%) 20 mg/mL (2 %) injection    moxifloxacin tablet 400 mg    ondansetron injection 8 mg    rifabutin capsule 150 mg         Physical Exam:  General: Appears chronically ill  Head: normocephalic, atraumatic  Eyes:    Nose: Nares normal. Septum midline  Neck: supple, symmetrical, trachea midline, no JVD and thyroid not enlarged, symmetric, no tenderness/mass/nodules  Lungs:  Decreased breath sounds  Scattered rhonchi rales  Chest Wall: no tenderness  Heart: regular rate and rhythm, S1, S2 normal, no murmur, click, rub or gallop  Abdomen: soft, non-tender non-distented; bowel sounds normal; no masses,  no organomegaly    Laboratory:  CBC:     Recent Labs  Lab 02/23/17  0549   WBC 8.86   RBC 3.47*   HGB 9.8*   HCT 29.8*   *   MCV 86   MCH 28.2   MCHC 32.9     BMP:     Recent Labs  Lab 02/22/17  0422 02/23/17  0549   * 80   CL 96 100   CO2 29 29   BUN 44* 26*   CREATININE 2.8* 1.1   CALCIUM 7.8* 7.9*   MG 1.0*  --      CMP:     Recent Labs  Lab 02/23/17  0549   GLU 80   CALCIUM 7.9*   ALBUMIN 1.9*   PROT 6.5      K 3.6   CO2 29      BUN 26*   CREATININE 1.1   ALKPHOS 73   ALT 12   AST 17   BILITOT 0.2     LFTs:     Recent Labs  Lab 02/23/17  0549   ALT 12   AST 17   ALKPHOS 73   BILITOT 0.2   PROT 6.5   ALBUMIN 1.9*     TSH: No results for input(s): TSH in the last 168 hours.  PTH: No results for input(s): PTH in the last 168 hours.  Coagulation:     Recent Labs  Lab 02/21/17  1530   INR 1.2   APTT 31.5     Cardiac Markers: No results for input(s): CKMB, TROPONINT, MYOGLOBIN in the last 168 hours.  ABGs: No results for input(s): PH, PCO2, HCO3, POCSATURATED, BE in the last 168 hours.  Microbiology Results (last 7 days)     Procedure Component Value Units Date/Time    Blood  culture x two cultures. Draw prior to antibiotics. [[id number]] Collected:  02/21/17 1445    Order Status:  Completed Specimen:  Blood from Peripheral, Antecubital, Left Updated:  02/23/17 1703     Blood Culture, Routine No Growth to date     Blood Culture, Routine No Growth to date     Blood Culture, Routine No Growth to date    Narrative:       Aerobic and anaerobic    Blood culture x two cultures. Draw prior to antibiotics. [[id number]] Collected:  02/21/17 1500    Order Status:  Completed Specimen:  Blood from Peripheral, Forearm, Left Updated:  02/23/17 1703     Blood Culture, Routine No Growth to date     Blood Culture, Routine No Growth to date     Blood Culture, Routine No Growth to date    Narrative:       Aerobic and anaerobic    Culture, Respiratory [[id number]] Collected:  02/16/17 1152    Order Status:  Completed Specimen:  Respiratory from Bronchial Wash Updated:  02/23/17 1511     Gram Stain (Respiratory) Moderate WBC's     Gram Stain (Respiratory) No organisms seen    Narrative:       RESP CULTURE DONE IN [facility] AT 1152    KOH prep [[id number]] Collected:  02/23/17 1150    Order Status:  Completed Specimen:  Respiratory from Bronchial Wash Updated:  02/23/17 1457     KOH Prep No yeast or fungal elements seen    Fungus culture [[id number]] Collected:  02/23/17 1151    Order Status:  Sent Specimen:  Respiratory from Bronchial Wash Updated:  02/23/17 1248    Gram stain [[id number]] Collected:  02/23/17 1151    Order Status:  Canceled Specimen:  Body Fluid from Lung, Left Updated:  02/23/17 1247    Narrative:       bilat lung  Gram Stain was cancelled on 02/23/2017 at 14:20 by JGO; Duplicate   order, test included in another profile. 02/23/2017  14:20    AFB Culture & Smear [[id number]] Collected:  02/23/17 1151    Order Status:  Sent Specimen:  Respiratory from Bronchial Wash Updated:  02/23/17 1151    Culture, Respiratory [[id number]] Collected:  02/21/17 1730    Order Status:  Completed Specimen:  Respiratory  from Sputum, Expectorated Updated:  02/23/17 0813     Respiratory Culture --     PRESUMPTIVE PSEUDOMONAS SPECIES  Moderate  Identification and susceptibility pending       Gram Stain (Respiratory) <10 epithelial cells per low power field.     Gram Stain (Respiratory) Few Gram negative rods     Gram Stain (Respiratory) Moderate WBC's    Urine culture - Cath (Indwelling) [[id number]] Collected:  02/21/17 1515    Order Status:  Completed Specimen:  Urine from Urine, Catheterized Updated:  02/23/17 0806     Urine Culture, Routine No significant growth    AFB Culture & Smear [[id number]] Collected:  02/22/17 1721    Order Status:  Sent Specimen:  Respiratory from Sputum, Expectorated Updated:  02/23/17 0735    Gram stain [[id number]] Collected:  02/22/17 0040    Order Status:  Completed Specimen:  Other from Urine Updated:  02/22/17 1010     Gram Stain Result No organisms seen      No WBC's        Specimen     None          Recent Labs  Lab 02/21/17  1515   COLORU Torie   SPECGRAV 1.020   PHUR 5.0   PROTEINUA 1+*   BACTERIA Moderate*   NITRITE Negative   LEUKOCYTESUR Negative   UROBILINOGEN Negative   HYALINECASTS 0       Diagnostic Results:  Labs: Reviewed  X-Ray: Reviewed    ASSESSMENT/PLAN:     KATHY Resolved    Uncontrolled DM  HTN  Proteinuria  Bronchopneumonia  Hypoalbuminemia  Hypophosphatemia  Hypomagnesemia        Plan:   Mag ox supplements  Phosphate supplements  Continue hydration

## 2017-02-24 NOTE — CONSULTS
Consult Note  Infectious Disease    Consult Requested By: Sunita Weathers MD    Reason for Consult: avelino lung infection with rt ll pneumonia    SUBJECTIVE:     History of Present Illness:  Patient is a 76 y.o. male presents with increasing sobar. He has severe copd and still smokes the occasional cigar. He was found to have  Rt upper lobe cavitary lesion and has been rx with avelox, azithromycin and rifabutin based on susceptibilities at [facility]. He has been compliant with care but has become increasingly sobar in the last 10 days. He is coughing up mucoid sputum and has no hemoptysis. cxr and ct confirm a new rt ll infiltrate and a rt ul old cavity.he is o2 dependant and is now living with his daughter. Sputum has a mdr pseudomonas    Past Medical History:   Diagnosis Date    Arthritis     COPD (chronic obstructive pulmonary disease)     Diabetes mellitus type II     Hypertension     Tuberculosis      Past Surgical History:   Procedure Laterality Date    melanoma       Family History   Problem Relation Age of Onset    Cancer Father      Tuberculosis.  Secondary scar was neoplastic.     Social History   Substance Use Topics    Smoking status: Former Smoker     Types: Cigars    Smokeless tobacco: None      Comment: uses cigars on ocassion    Alcohol use Yes      Comment: socially       Review of patient's allergies indicates:  No Known Allergies     Antibiotics     Start     Stop Route Frequency Ordered    02/21/17 2100  rifabutin capsule 150 mg      -- Oral Every 12 hours 02/21/17 1944    02/23/17 0900  azithromycin tablet 250 mg      -- Oral Daily 02/22/17 1630    02/23/17 1345  moxifloxacin tablet 400 mg      -- Oral Daily 02/23/17 1244    02/24/17 1400  piperacillin-tazobactam 4.5 g in dextrose 5 % 100 mL IVPB (ready to mix system)      -- IV Every 8 hours (non-standard times) 02/24/17 1317          Review of Systems:  Constitutional: no fever or chills  Eyes: no visual changes  ENT: positive  for nasal congestion  Respiratory: positive for cough, dyspnea on exertion and sputum  Cardiovascular: no chest pain or palpitations  Gastrointestinal: no nausea or vomiting, no abdominal pain or change in bowel habits  Genitourinary: no hematuria or dysuria  Musculoskeletal: no arthralgias or myalgias    OBJECTIVE:     Vital Signs (Most Recent)  Temp: 97.5 °F (36.4 °C) (02/24/17 1217)  Pulse: 91 (02/24/17 1217)  Resp: 18 (02/24/17 1217)  BP: 117/75 (02/24/17 1217)  SpO2: (!) 94 % (02/24/17 1217)    Temperature Range Min/Max (Last 24H):  Temp:  [97.5 °F (36.4 °C)-98.6 °F (37 °C)]     Physical Exam:  General: cachectic  HENT: Head:normocephalic, atraumatic. Ears:not examined. Nose: Nares normal. Septum midline. Mucosa normal. No drainage or sinus tenderness., no discharge. Throat: lips, mucosa, and tongue normal; teeth and gums normal and no throat erythema.  Eyes: conjunctivae/corneas clear. PERRL.   Neck: supple, symmetrical, trachea midline, no JVD and thyroid not enlarged, symmetric, no tenderness/mass/nodules  Lungs:  labored breathing, diminished breath sounds bibasilar and bilaterally and rales bibasilar and bilaterally  Cardiovascular: Heart: regular rate and rhythm, S1, S2 normal, no murmur, click, rub or gallop. Chest Wall: no tenderness. Extremities: no cyanosis or edema, or clubbing. Pulses: 2+ and symmetric.  Abdomen/Rectal: Abdomen: soft, non-tender non-distented; bowel sounds normal; no masses,  no organomegaly. Rectal: not examined  Skin: Skin color, texture, turgor normal. No rashes or lesions  Musculoskeletal:no clubbing, cyanosis  Lymph Nodes: No cervical or supraclavicular adenopathy    Laboratory:  CBC    Recent Labs  Lab 02/24/17  0526   WBC 8.99   RBC 3.69*   HGB 10.4*   HCT 32.2*   *     BMP    Recent Labs  Lab 02/24/17  0526   CO2 31*   BUN 14   CREATININE 0.8   CALCIUM 8.1*       Recent Labs  Lab 02/21/17  1515   COLORU Torie   SPECGRAV 1.020   PHUR 5.0   PROTEINUA 1+*   BACTERIA  Moderate*   NITRITE Negative   LEUKOCYTESUR Negative   UROBILINOGEN Negative   HYALINECASTS 0     Microbiology Results (last 7 days)     Procedure Component Value Units Date/Time    AFB Culture & Smear [[id number]] Collected:  02/22/17 1721    Order Status:  Completed Specimen:  Respiratory from Sputum, Expectorated Updated:  02/24/17 0927     AFB Culture & Smear Culture in progress    Culture, Respiratory [[id number]] Collected:  02/16/17 1152    Order Status:  Completed Specimen:  Respiratory from Bronchial Wash Updated:  02/24/17 0835     Respiratory Culture --     PRESUMPTIVE PSEUDOMONAS SPECIES  Moderate  Identification and susceptibility pending       Gram Stain (Respiratory) Moderate WBC's     Gram Stain (Respiratory) No organisms seen    Narrative:       RESP CULTURE DONE IN BRONCH AT 1152    Culture, Respiratory [[id number]]  (Susceptibility) Collected:  02/21/17 1730    Order Status:  Completed Specimen:  Respiratory from Sputum, Expectorated Updated:  02/24/17 0754     Respiratory Culture --     PSEUDOMONAS AERUGINOSA  Moderate       Gram Stain (Respiratory) <10 epithelial cells per low power field.     Gram Stain (Respiratory) Few Gram negative rods     Gram Stain (Respiratory) Moderate WBC's    AFB Culture & Smear [[id number]] Collected:  02/23/17 1151    Order Status:  Sent Specimen:  Respiratory from Bronchial Wash Updated:  02/24/17 0703    Blood culture x two cultures. Draw prior to antibiotics. [[id number]] Collected:  02/21/17 1445    Order Status:  Completed Specimen:  Blood from Peripheral, Antecubital, Left Updated:  02/23/17 1703     Blood Culture, Routine No Growth to date     Blood Culture, Routine No Growth to date     Blood Culture, Routine No Growth to date    Narrative:       Aerobic and anaerobic    Blood culture x two cultures. Draw prior to antibiotics. [[id number]] Collected:  02/21/17 1500    Order Status:  Completed Specimen:  Blood from Peripheral, Forearm, Left Updated:  02/23/17 1703      Blood Culture, Routine No Growth to date     Blood Culture, Routine No Growth to date     Blood Culture, Routine No Growth to date    Narrative:       Aerobic and anaerobic    KOH prep [[id number]] Collected:  02/23/17 1150    Order Status:  Completed Specimen:  Respiratory from Bronchial Wash Updated:  02/23/17 1457     KOH Prep No yeast or fungal elements seen    Fungus culture [[id number]] Collected:  02/23/17 1151    Order Status:  Sent Specimen:  Respiratory from Bronchial Wash Updated:  02/23/17 1248    Gram stain [[id number]] Collected:  02/23/17 1151    Order Status:  Canceled Specimen:  Body Fluid from Lung, Left Updated:  02/23/17 1247    Narrative:       bilat lung  Gram Stain was cancelled on 02/23/2017 at 14:20 by JGO; Duplicate   order, test included in another profile. 02/23/2017  14:20    Urine culture - Cath (Indwelling) [[id number]] Collected:  02/21/17 1515    Order Status:  Completed Specimen:  Urine from Urine, Catheterized Updated:  02/23/17 0806     Urine Culture, Routine No significant growth    Gram stain [[id number]] Collected:  02/22/17 0040    Order Status:  Completed Specimen:  Other from Urine Updated:  02/22/17 1010     Gram Stain Result No organisms seen      No WBC's          Diagnostic Results:  Labs: Reviewed  X-Ray: Reviewed  CT: Reviewed    ASSESSMENT/PLAN:     Active Hospital Problems    Diagnosis  POA    *Pneumonia of right lower lobe due to infectious organism [J18.1]  Yes    Physical deconditioning [R53.81]  Yes    Acute renal failure [N17.9]  Yes    Hypotension (arterial) [I95.9]  Yes    Essential hypertension [I10]  Yes    COPD (chronic obstructive pulmonary disease) [J44.9]  Yes    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       1. avelino  on rx since march 2016  Last sputum for afb culture negative September 2016  Repeat and continue avelino meds  2. Rt ll infiltrate  Pseudomonas on sputum  Iv  Zosyn based on sensitivities  Await bal results

## 2017-02-25 LAB
ALBUMIN SERPL BCP-MCNC: 1.9 G/DL
ALP SERPL-CCNC: 67 U/L
ALT SERPL W/O P-5'-P-CCNC: 18 U/L
ANION GAP SERPL CALC-SCNC: 8 MMOL/L
AST SERPL-CCNC: 25 U/L
BACTERIA SPEC AEROBE CULT: NORMAL
BASOPHILS # BLD AUTO: 0.06 K/UL
BASOPHILS NFR BLD: 0.7 %
BILIRUB SERPL-MCNC: 0.2 MG/DL
BUN SERPL-MCNC: 8 MG/DL
CALCIUM SERPL-MCNC: 8 MG/DL
CHLORIDE SERPL-SCNC: 100 MMOL/L
CO2 SERPL-SCNC: 32 MMOL/L
CREAT SERPL-MCNC: 0.8 MG/DL
DIFFERENTIAL METHOD: ABNORMAL
EOSINOPHIL # BLD AUTO: 0.3 K/UL
EOSINOPHIL NFR BLD: 2.9 %
ERYTHROCYTE [DISTWIDTH] IN BLOOD BY AUTOMATED COUNT: 14.7 %
EST. GFR  (AFRICAN AMERICAN): >60 ML/MIN/1.73 M^2
EST. GFR  (NON AFRICAN AMERICAN): >60 ML/MIN/1.73 M^2
GLUCOSE SERPL-MCNC: 134 MG/DL
GRAM STN SPEC: NORMAL
GRAM STN SPEC: NORMAL
HCT VFR BLD AUTO: 31.7 %
HGB BLD-MCNC: 10.3 G/DL
LYMPHOCYTES # BLD AUTO: 1.7 K/UL
LYMPHOCYTES NFR BLD: 19 %
MCH RBC QN AUTO: 28.5 PG
MCHC RBC AUTO-ENTMCNC: 32.5 %
MCV RBC AUTO: 88 FL
MONOCYTES # BLD AUTO: 1.1 K/UL
MONOCYTES NFR BLD: 12.8 %
NEUTROPHILS # BLD AUTO: 5.5 K/UL
NEUTROPHILS NFR BLD: 63.1 %
PLATELET # BLD AUTO: 425 K/UL
PMV BLD AUTO: 10 FL
POCT GLUCOSE: 135 MG/DL (ref 70–110)
POCT GLUCOSE: 138 MG/DL (ref 70–110)
POCT GLUCOSE: 238 MG/DL (ref 70–110)
POCT GLUCOSE: 276 MG/DL (ref 70–110)
POTASSIUM SERPL-SCNC: 3.7 MMOL/L
PROT SERPL-MCNC: 6.3 G/DL
RBC # BLD AUTO: 3.62 M/UL
SODIUM SERPL-SCNC: 140 MMOL/L
WBC # BLD AUTO: 8.77 K/UL

## 2017-02-25 PROCEDURE — 25000003 PHARM REV CODE 250: Performed by: HOSPITALIST

## 2017-02-25 PROCEDURE — 25000003 PHARM REV CODE 250: Performed by: EMERGENCY MEDICINE

## 2017-02-25 PROCEDURE — 36569 INSJ PICC 5 YR+ W/O IMAGING: CPT

## 2017-02-25 PROCEDURE — 11000001 HC ACUTE MED/SURG PRIVATE ROOM

## 2017-02-25 PROCEDURE — 36415 COLL VENOUS BLD VENIPUNCTURE: CPT

## 2017-02-25 PROCEDURE — 80053 COMPREHEN METABOLIC PANEL: CPT

## 2017-02-25 PROCEDURE — 25000003 PHARM REV CODE 250: Performed by: INTERNAL MEDICINE

## 2017-02-25 PROCEDURE — 94761 N-INVAS EAR/PLS OXIMETRY MLT: CPT

## 2017-02-25 PROCEDURE — 63600175 PHARM REV CODE 636 W HCPCS: Performed by: INTERNAL MEDICINE

## 2017-02-25 PROCEDURE — 27000221 HC OXYGEN, UP TO 24 HOURS

## 2017-02-25 PROCEDURE — 94640 AIRWAY INHALATION TREATMENT: CPT

## 2017-02-25 PROCEDURE — 85025 COMPLETE CBC W/AUTO DIFF WBC: CPT

## 2017-02-25 PROCEDURE — 02HV33Z INSERTION OF INFUSION DEVICE INTO SUPERIOR VENA CAVA, PERCUTANEOUS APPROACH: ICD-10-PCS | Performed by: RADIOLOGY

## 2017-02-25 RX ORDER — SODIUM CHLORIDE 0.9 % (FLUSH) 0.9 %
10 SYRINGE (ML) INJECTION EVERY 6 HOURS
Status: DISCONTINUED | OUTPATIENT
Start: 2017-02-25 | End: 2017-02-26 | Stop reason: HOSPADM

## 2017-02-25 RX ORDER — SODIUM CHLORIDE 0.9 % (FLUSH) 0.9 %
10 SYRINGE (ML) INJECTION
Status: DISCONTINUED | OUTPATIENT
Start: 2017-02-25 | End: 2017-02-26 | Stop reason: HOSPADM

## 2017-02-25 RX ADMIN — PIPERACILLIN SODIUM AND TAZOBACTAM SODIUM 4.5 G: 4; .5 INJECTION, POWDER, LYOPHILIZED, FOR SOLUTION INTRAVENOUS at 06:02

## 2017-02-25 RX ADMIN — ACETAMINOPHEN 650 MG: 325 TABLET ORAL at 09:02

## 2017-02-25 RX ADMIN — FLUTICASONE FUROATE AND VILANTEROL TRIFENATATE 1 PUFF: 100; 25 POWDER RESPIRATORY (INHALATION) at 08:02

## 2017-02-25 RX ADMIN — RIFABUTIN 150 MG: 150 CAPSULE ORAL at 08:02

## 2017-02-25 RX ADMIN — INSULIN DETEMIR 10 UNITS: 100 INJECTION, SOLUTION SUBCUTANEOUS at 09:02

## 2017-02-25 RX ADMIN — PIPERACILLIN SODIUM AND TAZOBACTAM SODIUM 4.5 G: 4; .5 INJECTION, POWDER, LYOPHILIZED, FOR SOLUTION INTRAVENOUS at 02:02

## 2017-02-25 RX ADMIN — MAGNESIUM OXIDE TAB 400 MG (241.3 MG ELEMENTAL MG) 400 MG: 400 (241.3 MG) TAB at 08:02

## 2017-02-25 RX ADMIN — PIPERACILLIN SODIUM AND TAZOBACTAM SODIUM 4.5 G: 4; .5 INJECTION, POWDER, LYOPHILIZED, FOR SOLUTION INTRAVENOUS at 09:02

## 2017-02-25 RX ADMIN — MOXIFLOXACIN HYDROCHLORIDE 400 MG: 400 TABLET, FILM COATED ORAL at 08:02

## 2017-02-25 RX ADMIN — INSULIN ASPART 3 UNITS: 100 INJECTION, SOLUTION INTRAVENOUS; SUBCUTANEOUS at 09:02

## 2017-02-25 RX ADMIN — HEPARIN SODIUM 5000 UNITS: 5000 INJECTION, SOLUTION INTRAVENOUS; SUBCUTANEOUS at 09:02

## 2017-02-25 RX ADMIN — HEPARIN SODIUM 5000 UNITS: 5000 INJECTION, SOLUTION INTRAVENOUS; SUBCUTANEOUS at 06:02

## 2017-02-25 RX ADMIN — INSULIN ASPART 4 UNITS: 100 INJECTION, SOLUTION INTRAVENOUS; SUBCUTANEOUS at 12:02

## 2017-02-25 RX ADMIN — HEPARIN SODIUM 5000 UNITS: 5000 INJECTION, SOLUTION INTRAVENOUS; SUBCUTANEOUS at 02:02

## 2017-02-25 RX ADMIN — FLUCONAZOLE 100 MG: 100 TABLET ORAL at 08:02

## 2017-02-25 RX ADMIN — ASPIRIN 81 MG: 81 TABLET, COATED ORAL at 08:02

## 2017-02-25 RX ADMIN — MAGNESIUM OXIDE TAB 400 MG (241.3 MG ELEMENTAL MG) 400 MG: 400 (241.3 MG) TAB at 09:02

## 2017-02-25 RX ADMIN — RIFABUTIN 150 MG: 150 CAPSULE ORAL at 11:02

## 2017-02-25 RX ADMIN — AZITHROMYCIN 250 MG: 250 TABLET, FILM COATED ORAL at 08:02

## 2017-02-25 NOTE — PLAN OF CARE
Problem: Patient Care Overview  Goal: Plan of Care Review  Outcome: Ongoing (interventions implemented as appropriate)  Pt currently AAOx4, no falls no injuries.  Pt moving independently in bed.  SpO2 WNL on 2L NC.  Afebrile.  Breath sounds coarse and equal bilaterally.  No complaints of pain. Cough infrequent, pt refusing cough syrup.  Will continue to monitor.

## 2017-02-25 NOTE — SUBJECTIVE & OBJECTIVE
Interval History: Pt report feeling better today, decreased SOB..    Review of Systems   Constitutional: Negative for chills and fever.   Respiratory: Positive for shortness of breath.    Cardiovascular: Negative for chest pain.     Objective:     Vital Signs (Most Recent):  Temp: 97.9 °F (36.6 °C) (02/24/17 1739)  Pulse: 96 (02/24/17 1739)  Resp: 18 (02/24/17 1739)  BP: 138/78 (02/24/17 1739)  SpO2: 95 % (02/24/17 1739) Vital Signs (24h Range):  Temp:  [97.5 °F (36.4 °C)-98.2 °F (36.8 °C)] 97.9 °F (36.6 °C)  Pulse:  [78-98] 96  Resp:  [18-20] 18  SpO2:  [94 %-98 %] 95 %  BP: (117-161)/(65-96) 138/78     Weight: 77.1 kg (170 lb)  Body mass index is 19.15 kg/(m^2).    Intake/Output Summary (Last 24 hours) at 02/24/17 1954  Last data filed at 02/24/17 1632   Gross per 24 hour   Intake              580 ml   Output             1600 ml   Net            -1020 ml      Physical Exam   Constitutional: He is oriented to person, place, and time. He appears well-developed.   Chronically ill appearing, thin and weak, NAD.   HENT:   Head: Normocephalic and atraumatic.   Eyes: EOM are normal. Pupils are equal, round, and reactive to light.   Neck: Normal range of motion. Neck supple.   Cardiovascular: Normal rate and regular rhythm.    Pulmonary/Chest:   Course breath sounds that are diminished but with fair air movement, no wheezing.   Abdominal: Soft. Bowel sounds are normal.   Musculoskeletal: Normal range of motion.   Neurological: He is alert and oriented to person, place, and time.   Skin: Skin is dry.   Psychiatric: He has a normal mood and affect.       Significant Labs: All pertinent labs within the past 24 hours have been reviewed.    Significant Imaging: I have reviewed and interpreted all pertinent imaging results/findings within the past 24 hours.

## 2017-02-25 NOTE — ASSESSMENT & PLAN NOTE
Continued home medication Rifabutin, he is followed at [facility] by ID.

## 2017-02-25 NOTE — ASSESSMENT & PLAN NOTE
SOB  COPD  Assessment and Plan:  Pt was previously treated with IV moxifloxacin during last admission and CXR overall unchanged. Pt started empirically with broad spectrum antibiotics from the ER. Antibiotics changed as per ID and s/p bronch on 2/23 with removal of secretions, improving. Sputum culture remarkable for Pseudomonas, Zosyn started by ID, continue current care.

## 2017-02-25 NOTE — PROCEDURES
"Regan Alvarez is a 76 y.o. male patient.    Temp: 97.3 °F (36.3 °C) (02/25/17 1253)  Pulse: 90 (02/25/17 1253)  Resp: 17 (02/25/17 1253)  BP: 118/73 (02/25/17 1253)  SpO2: 96 % (02/25/17 1253)  Weight: 77.1 kg (170 lb) (02/21/17 2006)  Height: 6' 7" (200.7 cm) (02/21/17 2006)    PICC  Date/Time: 2/25/2017 1:10 PM  Consent Done: Yes  Time out: Immediately prior to procedure a time out was called to verify the correct patient, procedure, equipment, support staff and site/side marked as required  Indications: med administration and vascular access  Anesthesia: local infiltration  Local anesthetic: lidocaine 1% without epinephrine  Anesthetic Total (mL): 2  Preparation: skin prepped with ChloraPrep  Skin prep agent dried: skin prep agent completely dried prior to procedure  Sterile barriers: all five maximum sterile barriers used - cap, mask, sterile gown, sterile gloves, and large sterile sheet  Hand hygiene: hand hygiene performed prior to central venous catheter insertion  Location details: right basilic  Catheter type: double lumen  Catheter size: 5 Fr  Catheter Length: 40cm    Ultrasound guidance: yes  Vessel Caliber: medium and patent, compressibility normal  Needle advanced into vessel with real time Ultrasound guidance.  Guidewire confirmed in vessel.  Sterile sheath used.  Number of attempts: 1  Post-procedure: blood return through all ports, chlorhexidine patch and sterile dressing applied    Assessment: placement verified by x-ray  Complications: none        Jose Winters  2/25/2017    "

## 2017-02-25 NOTE — NURSING
Pt name, , ID band and tele box number [id number] verified at bedside with Padmaja GUERRIER, leads intact.  All information correct on telemetry monitor, pt currently in normal sinus rhythm.

## 2017-02-25 NOTE — PROGRESS NOTES
[facility] Hospital Medicine  Progress Note    Patient Name: Regan Owensbrook  MRN: [id number]  Patient Class: IP- Inpatient   Admission Date: 2/21/2017  Length of Stay: 3 days  Attending Physician: Sunita Weathers MD  Primary Care Provider: Ishaan Adamson MD        Subjective:     Principal Problem:Pneumonia of right lower lobe due to infectious organism    HPI:  75 y/o male recently discharged home after admission for pneumonia with COPD. Patient was not doing well at home. He continued to have progressively worsening shortness of breath. He has continued to experience generalized weakness and fatigue. This was a problem at discharge but he was adamantly wanting to go home during the previous hospitalization. He was evaluated by his primary care physician and urged to come to the ED for further evaluation and treatment. While in the ED routine laboratory studies and chest x-ray were obtained. There was evidence of acute renal failure as well as unresolved right lower lobe pneumonia. Hospital medicine has been asked to admit again for both of these issues.         Hospital Course:  75 y/o with COPD with recent admission for PNA and discharged home who presented with weakness and SOB. Pt with CXR overall unchanged from recent admission and noted with ARF. Pt started on empiric antibiotics for pneumonia, on IVF for ARF and consult placed to Nephrology, ID and Pulm. Pt s/p bronch on 2/23.    Interval History: Pt report feeling better today, decreased SOB..    Review of Systems   Constitutional: Negative for chills and fever.   Respiratory: Positive for shortness of breath.    Cardiovascular: Negative for chest pain.     Objective:     Vital Signs (Most Recent):  Temp: 97.9 °F (36.6 °C) (02/24/17 1739)  Pulse: 96 (02/24/17 1739)  Resp: 18 (02/24/17 1739)  BP: 138/78 (02/24/17 1739)  SpO2: 95 % (02/24/17 1739) Vital Signs (24h Range):  Temp:  [97.5 °F (36.4 °C)-98.2 °F (36.8 °C)] 97.9 °F (36.6  °C)  Pulse:  [78-98] 96  Resp:  [18-20] 18  SpO2:  [94 %-98 %] 95 %  BP: (117-161)/(65-96) 138/78     Weight: 77.1 kg (170 lb)  Body mass index is 19.15 kg/(m^2).    Intake/Output Summary (Last 24 hours) at 02/24/17 1954  Last data filed at 02/24/17 1632   Gross per 24 hour   Intake              580 ml   Output             1600 ml   Net            -1020 ml      Physical Exam   Constitutional: He is oriented to person, place, and time. He appears well-developed.   Chronically ill appearing, thin and weak, NAD.   HENT:   Head: Normocephalic and atraumatic.   Eyes: EOM are normal. Pupils are equal, round, and reactive to light.   Neck: Normal range of motion. Neck supple.   Cardiovascular: Normal rate and regular rhythm.    Pulmonary/Chest:   Course breath sounds that are diminished but with fair air movement, no wheezing.   Abdominal: Soft. Bowel sounds are normal.   Musculoskeletal: Normal range of motion.   Neurological: He is alert and oriented to person, place, and time.   Skin: Skin is dry.   Psychiatric: He has a normal mood and affect.       Significant Labs: All pertinent labs within the past 24 hours have been reviewed.    Significant Imaging: I have reviewed and interpreted all pertinent imaging results/findings within the past 24 hours.    Assessment/Plan:      * Pneumonia of right lower lobe due to infectious organism  SOB  COPD  Assessment and Plan:  Pt was previously treated with IV moxifloxacin during last admission and CXR overall unchanged. Pt started empirically with broad spectrum antibiotics from the ER. Antibiotics changed as per ID and s/p bronch on 2/23 with removal of secretions, improving. Sputum culture remarkable for Pseudomonas, Zosyn started by ID, continue current care.    COPD (chronic obstructive pulmonary disease)  No acute issues; continue with home medication regimen    Acute renal failure  Likely prerenal given hx and response to IVF, continue current care, Nephrology input  appreciated, monitor.    SOB (shortness of breath)  As discussed above. Multifactorial but likely related to chronic Mycobacterium avium complex, PNA as well as COPD and deconditioning.    Pulmonary Mycobacterium avium complex (MAC) infection  Continued home medication Rifabutin, he is followed at [facility] by ID.     Diabetes mellitus type 2, uncontrolled  Closely monitor blood glucose and make adjustments to insulin regimen as necessary.    Essential hypertension  Continue home medication regimen.    Hypotension (arterial)  Likely due to hypovolemia, corrected with IVF, improved with current care.    Physical deconditioning  Weakness  Assessment and Plan:  PT/OT.    VTE Risk Mitigation         Ordered     heparin (porcine) injection 5,000 Units  Every 8 hours     Route:  Subcutaneous        02/21/17 2303     Medium Risk of VTE  Once      02/21/17 1944          Sunita Weathers MD  Department of Hospital Medicine   [facility]

## 2017-02-25 NOTE — CONSULTS
Consult Note  Infectious Disease    Consult Requested By: Sunita Weathers MD    Reason for Consult: avelino lung infection with rt ll pneumonia    SUBJECTIVE:     History of Present Illness:  Patient is a 76 y.o. male presents with increasing sobar. He has severe copd and still smokes the occasional cigar. He was found to have  Rt upper lobe cavitary lesion and has been rx with avelox, azithromycin and rifabutin based on susceptibilities at [facility]. He has been compliant with care but has become increasingly sobar in the last 10 days. He is coughing up mucoid sputum and has no hemoptysis. cxr and ct confirm a new rt ll infiltrate and a rt ul old cavity.he is o2 dependant and is now living with his daughter. Sputum has a mdr pseudomonas    Past Medical History:   Diagnosis Date    Arthritis     COPD (chronic obstructive pulmonary disease)     Diabetes mellitus type II     Hypertension     Tuberculosis      Past Surgical History:   Procedure Laterality Date    melanoma       Family History   Problem Relation Age of Onset    Cancer Father      Tuberculosis.  Secondary scar was neoplastic.     Social History   Substance Use Topics    Smoking status: Former Smoker     Types: Cigars    Smokeless tobacco: None      Comment: uses cigars on ocassion    Alcohol use Yes      Comment: socially       Review of patient's allergies indicates:  No Known Allergies     Antibiotics     Start     Stop Route Frequency Ordered    02/21/17 2100  rifabutin capsule 150 mg      -- Oral Every 12 hours 02/21/17 1944    02/23/17 0900  azithromycin tablet 250 mg      -- Oral Daily 02/22/17 1630    02/23/17 1345  moxifloxacin tablet 400 mg      -- Oral Daily 02/23/17 1244    02/24/17 1400  piperacillin-tazobactam 4.5 g in dextrose 5 % 100 mL IVPB (ready to mix system)      -- IV Every 8 hours (non-standard times) 02/24/17 1317          Review of Systems:  Constitutional: no fever or chills  Eyes: no visual changes  ENT: positive  for nasal congestion  Respiratory: positive for cough, dyspnea on exertion and sputum  Cardiovascular: no chest pain or palpitations  Gastrointestinal: no nausea or vomiting, no abdominal pain or change in bowel habits  Genitourinary: no hematuria or dysuria  Musculoskeletal: no arthralgias or myalgias    OBJECTIVE:     Vital Signs (Most Recent)  Temp: 98.3 °F (36.8 °C) (02/25/17 0810)  Pulse: 74 (02/25/17 0843)  Resp: 18 (02/25/17 0843)  BP: 133/79 (02/25/17 0810)  SpO2: (!) 94 % (02/25/17 0843)    Temperature Range Min/Max (Last 24H):  Temp:  [97.5 °F (36.4 °C)-98.5 °F (36.9 °C)]     Physical Exam:  General: cachectic  HENT: Head:normocephalic, atraumatic. Ears:not examined. Nose: Nares normal. Septum midline. Mucosa normal. No drainage or sinus tenderness., no discharge. Throat: lips, mucosa, and tongue normal; teeth and gums normal and no throat erythema.  Eyes: conjunctivae/corneas clear. PERRL.   Neck: supple, symmetrical, trachea midline, no JVD and thyroid not enlarged, symmetric, no tenderness/mass/nodules  Lungs:  labored breathing, diminished breath sounds bibasilar and bilaterally and rales bibasilar and bilaterally  Cardiovascular: Heart: regular rate and rhythm, S1, S2 normal, no murmur, click, rub or gallop. Chest Wall: no tenderness. Extremities: no cyanosis or edema, or clubbing. Pulses: 2+ and symmetric.  Abdomen/Rectal: Abdomen: soft, non-tender non-distented; bowel sounds normal; no masses,  no organomegaly. Rectal: not examined  Skin: Skin color, texture, turgor normal. No rashes or lesions  Musculoskeletal:no clubbing, cyanosis  Lymph Nodes: No cervical or supraclavicular adenopathy    Laboratory:  CBC    Recent Labs  Lab 02/25/17  0442   WBC 8.77   RBC 3.62*   HGB 10.3*   HCT 31.7*   *     BMP    Recent Labs  Lab 02/25/17  0442   CO2 32*   BUN 8   CREATININE 0.8   CALCIUM 8.0*       Recent Labs  Lab 02/21/17  1515   COLORU Torie   SPECGRAV 1.020   PHUR 5.0   PROTEINUA 1+*   BACTERIA  Moderate*   NITRITE Negative   LEUKOCYTESUR Negative   UROBILINOGEN Negative   HYALINECASTS 0     Microbiology Results (last 7 days)     Procedure Component Value Units Date/Time    AFB Culture & Smear [[id number]] Collected:  02/23/17 1151    Order Status:  Completed Specimen:  Respiratory from Bronchial Wash Updated:  02/25/17 0927     AFB Culture & Smear Culture in progress    Culture, Respiratory [[id number]]  (Susceptibility) Collected:  02/16/17 1152    Order Status:  Completed Specimen:  Respiratory from Bronchial Wash Updated:  02/25/17 0823     Respiratory Culture --     PSEUDOMONAS AERUGINOSA  Moderate       Gram Stain (Respiratory) Moderate WBC's     Gram Stain (Respiratory) No organisms seen    Narrative:       RESP CULTURE DONE IN BRONCH AT 1152    Blood culture x two cultures. Draw prior to antibiotics. [[id number]] Collected:  02/21/17 1500    Order Status:  Completed Specimen:  Blood from Peripheral, Forearm, Left Updated:  02/24/17 1703     Blood Culture, Routine No Growth to date     Blood Culture, Routine No Growth to date     Blood Culture, Routine No Growth to date     Blood Culture, Routine No Growth to date    Narrative:       Aerobic and anaerobic    Blood culture x two cultures. Draw prior to antibiotics. [[id number]] Collected:  02/21/17 1445    Order Status:  Completed Specimen:  Blood from Peripheral, Antecubital, Left Updated:  02/24/17 1703     Blood Culture, Routine No Growth to date     Blood Culture, Routine No Growth to date     Blood Culture, Routine No Growth to date     Blood Culture, Routine No Growth to date    Narrative:       Aerobic and anaerobic    AFB Culture & Smear [[id number]] Collected:  02/22/17 1721    Order Status:  Completed Specimen:  Respiratory from Sputum, Expectorated Updated:  02/24/17 1422     AFB Culture & Smear Culture in progress     AFB CULTURE STAIN No acid fast bacilli seen.    Culture, Respiratory [[id number]]  (Susceptibility) Collected:  02/21/17 1730     Order Status:  Completed Specimen:  Respiratory from Sputum, Expectorated Updated:  02/24/17 0754     Respiratory Culture --     PSEUDOMONAS AERUGINOSA  Moderate       Gram Stain (Respiratory) <10 epithelial cells per low power field.     Gram Stain (Respiratory) Few Gram negative rods     Gram Stain (Respiratory) Moderate WBC's    KOH prep [[id number]] Collected:  02/23/17 1150    Order Status:  Completed Specimen:  Respiratory from Bronchial Wash Updated:  02/23/17 1457     KOH Prep No yeast or fungal elements seen    Fungus culture [[id number]] Collected:  02/23/17 1151    Order Status:  Sent Specimen:  Respiratory from Bronchial Wash Updated:  02/23/17 1248    Gram stain [[id number]] Collected:  02/23/17 1151    Order Status:  Canceled Specimen:  Body Fluid from Lung, Left Updated:  02/23/17 1247    Narrative:       bilat lung  Gram Stain was cancelled on 02/23/2017 at 14:20 by JGO; Duplicate   order, test included in another profile. 02/23/2017  14:20    Urine culture - Cath (Indwelling) [[id number]] Collected:  02/21/17 1515    Order Status:  Completed Specimen:  Urine from Urine, Catheterized Updated:  02/23/17 0806     Urine Culture, Routine No significant growth    Gram stain [[id number]] Collected:  02/22/17 0040    Order Status:  Completed Specimen:  Other from Urine Updated:  02/22/17 1010     Gram Stain Result No organisms seen      No WBC's          Diagnostic Results:  Labs: Reviewed  X-Ray: Reviewed  CT: Reviewed    ASSESSMENT/PLAN:     Active Hospital Problems    Diagnosis  POA    *Pneumonia of right lower lobe due to infectious organism [J18.1]  Yes    Physical deconditioning [R53.81]  Yes    Acute renal failure [N17.9]  Yes    Hypotension (arterial) [I95.9]  Yes    Essential hypertension [I10]  Yes    COPD (chronic obstructive pulmonary disease) [J44.9]  Yes    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       1. avelino on rx since march 2016  Last sputum for afb culture negative September 2016  Repeat and continue avelino meds  2. Rt ll infiltrate  Pseudomonas on sputum  Iv  Zosyn based on sensitivities  picc and iv abx at home for 2 weeks

## 2017-02-26 VITALS
HEIGHT: 78 IN | BODY MASS INDEX: 19.67 KG/M2 | WEIGHT: 170 LBS | TEMPERATURE: 98 F | SYSTOLIC BLOOD PRESSURE: 130 MMHG | OXYGEN SATURATION: 95 % | DIASTOLIC BLOOD PRESSURE: 81 MMHG | RESPIRATION RATE: 20 BRPM | HEART RATE: 91 BPM

## 2017-02-26 PROBLEM — N17.9 ACUTE RENAL FAILURE: Status: RESOLVED | Noted: 2017-02-21 | Resolved: 2017-02-26

## 2017-02-26 PROBLEM — I95.9 HYPOTENSION (ARTERIAL): Status: RESOLVED | Noted: 2017-02-21 | Resolved: 2017-02-26

## 2017-02-26 LAB
ALBUMIN SERPL BCP-MCNC: 1.8 G/DL
ALP SERPL-CCNC: 63 U/L
ALT SERPL W/O P-5'-P-CCNC: 17 U/L
ANION GAP SERPL CALC-SCNC: 7 MMOL/L
AST SERPL-CCNC: 20 U/L
BACTERIA BLD CULT: NORMAL
BACTERIA BLD CULT: NORMAL
BASOPHILS # BLD AUTO: 0.05 K/UL
BASOPHILS NFR BLD: 0.5 %
BILIRUB SERPL-MCNC: 0.2 MG/DL
BUN SERPL-MCNC: 5 MG/DL
CALCIUM SERPL-MCNC: 8 MG/DL
CHLORIDE SERPL-SCNC: 102 MMOL/L
CO2 SERPL-SCNC: 31 MMOL/L
CREAT SERPL-MCNC: 0.7 MG/DL
DIFFERENTIAL METHOD: ABNORMAL
EOSINOPHIL # BLD AUTO: 0.4 K/UL
EOSINOPHIL NFR BLD: 3.7 %
ERYTHROCYTE [DISTWIDTH] IN BLOOD BY AUTOMATED COUNT: 14.5 %
EST. GFR  (AFRICAN AMERICAN): >60 ML/MIN/1.73 M^2
EST. GFR  (NON AFRICAN AMERICAN): >60 ML/MIN/1.73 M^2
GLUCOSE SERPL-MCNC: 143 MG/DL
HCT VFR BLD AUTO: 29.4 %
HGB BLD-MCNC: 9.5 G/DL
LYMPHOCYTES # BLD AUTO: 2.2 K/UL
LYMPHOCYTES NFR BLD: 22 %
MAGNESIUM SERPL-MCNC: 1.3 MG/DL
MCH RBC QN AUTO: 28.4 PG
MCHC RBC AUTO-ENTMCNC: 32.3 %
MCV RBC AUTO: 88 FL
MONOCYTES # BLD AUTO: 1.3 K/UL
MONOCYTES NFR BLD: 12.8 %
NEUTROPHILS # BLD AUTO: 6 K/UL
NEUTROPHILS NFR BLD: 59.7 %
PLATELET # BLD AUTO: 379 K/UL
PMV BLD AUTO: 9.6 FL
POCT GLUCOSE: 152 MG/DL (ref 70–110)
POCT GLUCOSE: 167 MG/DL (ref 70–110)
POCT GLUCOSE: 263 MG/DL (ref 70–110)
POCT GLUCOSE: 295 MG/DL (ref 70–110)
POTASSIUM SERPL-SCNC: 3.6 MMOL/L
PROT SERPL-MCNC: 6.1 G/DL
RBC # BLD AUTO: 3.35 M/UL
SODIUM SERPL-SCNC: 140 MMOL/L
WBC # BLD AUTO: 9.98 K/UL

## 2017-02-26 PROCEDURE — 83735 ASSAY OF MAGNESIUM: CPT

## 2017-02-26 PROCEDURE — 25000003 PHARM REV CODE 250: Performed by: INTERNAL MEDICINE

## 2017-02-26 PROCEDURE — 85025 COMPLETE CBC W/AUTO DIFF WBC: CPT

## 2017-02-26 PROCEDURE — 80053 COMPREHEN METABOLIC PANEL: CPT

## 2017-02-26 PROCEDURE — 25000003 PHARM REV CODE 250: Performed by: EMERGENCY MEDICINE

## 2017-02-26 PROCEDURE — 27000221 HC OXYGEN, UP TO 24 HOURS

## 2017-02-26 PROCEDURE — 63600175 PHARM REV CODE 636 W HCPCS: Performed by: INTERNAL MEDICINE

## 2017-02-26 PROCEDURE — 63600175 PHARM REV CODE 636 W HCPCS: Performed by: HOSPITALIST

## 2017-02-26 PROCEDURE — 94640 AIRWAY INHALATION TREATMENT: CPT

## 2017-02-26 PROCEDURE — 36415 COLL VENOUS BLD VENIPUNCTURE: CPT

## 2017-02-26 PROCEDURE — 25000003 PHARM REV CODE 250: Performed by: HOSPITALIST

## 2017-02-26 RX ADMIN — Medication 10 ML: at 11:02

## 2017-02-26 RX ADMIN — MOXIFLOXACIN HYDROCHLORIDE 400 MG: 400 TABLET, FILM COATED ORAL at 11:02

## 2017-02-26 RX ADMIN — HEPARIN SODIUM 5000 UNITS: 5000 INJECTION, SOLUTION INTRAVENOUS; SUBCUTANEOUS at 05:02

## 2017-02-26 RX ADMIN — PIPERACILLIN SODIUM AND TAZOBACTAM SODIUM 4.5 G: 4; .5 INJECTION, POWDER, LYOPHILIZED, FOR SOLUTION INTRAVENOUS at 01:02

## 2017-02-26 RX ADMIN — INSULIN ASPART 6 UNITS: 100 INJECTION, SOLUTION INTRAVENOUS; SUBCUTANEOUS at 12:02

## 2017-02-26 RX ADMIN — AZITHROMYCIN 250 MG: 250 TABLET, FILM COATED ORAL at 11:02

## 2017-02-26 RX ADMIN — MAGNESIUM OXIDE TAB 400 MG (241.3 MG ELEMENTAL MG) 400 MG: 400 (241.3 MG) TAB at 09:02

## 2017-02-26 RX ADMIN — RIFABUTIN 150 MG: 150 CAPSULE ORAL at 11:02

## 2017-02-26 RX ADMIN — Medication 10 ML: at 12:02

## 2017-02-26 RX ADMIN — MAGNESIUM OXIDE TAB 400 MG (241.3 MG ELEMENTAL MG) 400 MG: 400 (241.3 MG) TAB at 11:02

## 2017-02-26 RX ADMIN — FLUCONAZOLE 100 MG: 100 TABLET ORAL at 11:02

## 2017-02-26 RX ADMIN — Medication 10 ML: at 06:02

## 2017-02-26 RX ADMIN — PIPERACILLIN SODIUM AND TAZOBACTAM SODIUM 4.5 G: 4; .5 INJECTION, POWDER, LYOPHILIZED, FOR SOLUTION INTRAVENOUS at 05:02

## 2017-02-26 RX ADMIN — FLUTICASONE FUROATE AND VILANTEROL TRIFENATATE 1 PUFF: 100; 25 POWDER RESPIRATORY (INHALATION) at 08:02

## 2017-02-26 RX ADMIN — MAGNESIUM SULFATE HEPTAHYDRATE 3 G: 500 INJECTION, SOLUTION INTRAMUSCULAR; INTRAVENOUS at 04:02

## 2017-02-26 RX ADMIN — ASPIRIN 81 MG: 81 TABLET, COATED ORAL at 11:02

## 2017-02-26 RX ADMIN — INSULIN DETEMIR 10 UNITS: 100 INJECTION, SOLUTION SUBCUTANEOUS at 09:02

## 2017-02-26 NOTE — PLAN OF CARE
02/26/17 0820   Medicare Message   Important Message from Medicare regarding Discharge Appeal Rights Given to patient/caregiver;Explained to patient/caregiver;Signed/date by patient/caregiver   Patient expressed understanding; initialed and dated.

## 2017-02-26 NOTE — PROGRESS NOTES
[facility] Medicine  Progress Note    Patient Name: Regan Owensbrook  MRN: [id number]  Patient Class: IP- Inpatient   Admission Date: 2/21/2017  Length of Stay: 4 days  Attending Physician: Sunita Weathers MD  Primary Care Provider: Ishaan Adamson MD        Subjective:     Principal Problem:Pneumonia of right lower lobe due to infectious organism    HPI:  77 y/o male recently discharged home after admission for pneumonia with COPD. Patient was not doing well at home. He continued to have progressively worsening shortness of breath. He has continued to experience generalized weakness and fatigue. This was a problem at discharge but he was adamantly wanting to go home during the previous hospitalization. He was evaluated by his primary care physician and urged to come to the ED for further evaluation and treatment. While in the ED routine laboratory studies and chest x-ray were obtained. There was evidence of acute renal failure as well as unresolved right lower lobe pneumonia. Hospital medicine has been asked to admit again for both of these issues.         Hospital Course:  77 y/o with COPD with recent admission for PNA and discharged home who presented with weakness and SOB. Pt with CXR that was not improved from recent admission and noted to be in ARF. Pt started on empiric antibiotics for pneumonia, on IVF for ARF and consult placed to Nephrology, ID and Pulm. Pt had CT of chest with new right infiltrate and old RUL infiltrate. Pt s/p bronch on 2/23 with removal of copious amount of secretions and sputum culture with multi-drug resistant Pseudomonas.    Interval History: Pt report he had a rough night, and SOB about the same as yesterday.    Review of Systems   Constitutional: Negative for chills and fever.   Respiratory: Positive for shortness of breath.    Cardiovascular: Negative for chest pain.     Objective:     Vital Signs (Most Recent):  Temp: 98 °F (36.7 °C) (02/25/17  1920)  Pulse: 88 (02/25/17 1920)  Resp: 17 (02/25/17 1920)  BP: (!) 151/84 (02/25/17 1920)  SpO2: 95 % (02/25/17 1920) Vital Signs (24h Range):  Temp:  [97.3 °F (36.3 °C)-98.5 °F (36.9 °C)] 98 °F (36.7 °C)  Pulse:  [74-94] 88  Resp:  [17-20] 17  SpO2:  [94 %-97 %] 95 %  BP: (113-151)/(72-84) 151/84     Weight: 77.1 kg (170 lb)  Body mass index is 19.15 kg/(m^2).    Intake/Output Summary (Last 24 hours) at 02/25/17 2308  Last data filed at 02/25/17 1800   Gross per 24 hour   Intake              700 ml   Output             1175 ml   Net             -475 ml      Physical Exam   Constitutional: He is oriented to person, place, and time. He appears well-developed.   Chronically ill appearing, thin and weak, NAD.   HENT:   Head: Normocephalic and atraumatic.   Eyes: EOM are normal. Pupils are equal, round, and reactive to light.   Neck: Normal range of motion. Neck supple.   Cardiovascular: Normal rate and regular rhythm.    Pulmonary/Chest:   Course breath sounds that are diminished but with fair air movement, no wheezing.   Abdominal: Soft. Bowel sounds are normal.   Musculoskeletal: Normal range of motion.   Neurological: He is alert and oriented to person, place, and time.   Skin: Skin is dry.   Psychiatric: He has a normal mood and affect.       Significant Labs: All pertinent labs within the past 24 hours have been reviewed.    Significant Imaging: I have reviewed and interpreted all pertinent imaging results/findings within the past 24 hours.    Assessment/Plan:      * Pneumonia of right lower lobe due to infectious organism  SOB  COPD  Assessment and Plan:  Pt was previously treated with IV moxifloxacin during last admission and CXR overall unchanged. Pt started empirically with broad spectrum antibiotics from the ER. Antibiotics changed as per ID and s/p bronch on 2/23 with removal of secretions. Sputum culture remarkable for Pseudomonas, and Zosyn started by ID. Pt ordered to have PICC placed today and will need  2 weeks of Zosyn. ID has written home health orders and SW notified of possible discharge tomorrow if patient clinically better. Will need to check for possible O2 needs in am.    COPD (chronic obstructive pulmonary disease)  No acute issues; continue with home medication regimen    Acute renal failure  Resolved. Likely prerenal given hx and response to IVF, continue current care, Nephrology input appreciated, monitor.    SOB (shortness of breath)  As discussed above. Multifactorial but likely related to chronic Mycobacterium avium complex, PNA as well as COPD and deconditioning.    Pulmonary Mycobacterium avium complex (MAC) infection  Continued home medication of Rifabutin; he is followed at [facility] by ID.     Diabetes mellitus type 2, uncontrolled  Closely monitor blood glucose and make adjustments to insulin regimen as necessary.    Essential hypertension  Continue home medication regimen.    Hypotension (arterial)  Likely due to hypovolemia, corrected with IVF, improved with current care.    Physical deconditioning  Weakness  Assessment and Plan:  PT/OT. Will need home health.    VTE Risk Mitigation         Ordered     heparin (porcine) injection 5,000 Units  Every 8 hours     Route:  Subcutaneous        02/21/17 0631     Medium Risk of VTE  Once      02/21/17 1944          Sunita Weathers MD  Department of Hospital Medicine   [facility]

## 2017-02-26 NOTE — PROGRESS NOTES
Patient signed patient choice form for provider of HH and Home IV services to be chosen by PHN; contacted [facility] to facilitate services.

## 2017-02-26 NOTE — PLAN OF CARE
02/26/17 0845   Final Note   Assessment Type Final Discharge Note   Discharge Disposition IV Therapy  (HH and Home IV services)   Discharge planning education complete? Yes   What phone number can be called within the next 1-3 days to see how you are doing after discharge? ([phone])   Hospital Follow Up  Appt(s) scheduled? Yes   Discharge plans and expectations educations in teach back method with documentation complete? Yes   Offered [facility] -- Bedside Delivery? n/a   [facility] n/a   Referral to Outpatient Case Management complete? n/a   Referral to / orders for Home Health Complete? Yes  (HH and Home IV)   30 day supply of medicines given at discharge, if documented non-compliance / non-adherence? n/a   Any social issues identified prior to discharge? No   Did you assess the readiness or willingness of the family or caregiver to support self management of care? Yes

## 2017-02-26 NOTE — SUBJECTIVE & OBJECTIVE
Interval History: Pt report he had a rough night, and SOB about the same as yesterday.    Review of Systems   Constitutional: Negative for chills and fever.   Respiratory: Positive for shortness of breath.    Cardiovascular: Negative for chest pain.     Objective:     Vital Signs (Most Recent):  Temp: 98 °F (36.7 °C) (02/25/17 1920)  Pulse: 88 (02/25/17 1920)  Resp: 17 (02/25/17 1920)  BP: (!) 151/84 (02/25/17 1920)  SpO2: 95 % (02/25/17 1920) Vital Signs (24h Range):  Temp:  [97.3 °F (36.3 °C)-98.5 °F (36.9 °C)] 98 °F (36.7 °C)  Pulse:  [74-94] 88  Resp:  [17-20] 17  SpO2:  [94 %-97 %] 95 %  BP: (113-151)/(72-84) 151/84     Weight: 77.1 kg (170 lb)  Body mass index is 19.15 kg/(m^2).    Intake/Output Summary (Last 24 hours) at 02/25/17 2308  Last data filed at 02/25/17 1800   Gross per 24 hour   Intake              700 ml   Output             1175 ml   Net             -475 ml      Physical Exam   Constitutional: He is oriented to person, place, and time. He appears well-developed.   Chronically ill appearing, thin and weak, NAD.   HENT:   Head: Normocephalic and atraumatic.   Eyes: EOM are normal. Pupils are equal, round, and reactive to light.   Neck: Normal range of motion. Neck supple.   Cardiovascular: Normal rate and regular rhythm.    Pulmonary/Chest:   Course breath sounds that are diminished but with fair air movement, no wheezing.   Abdominal: Soft. Bowel sounds are normal.   Musculoskeletal: Normal range of motion.   Neurological: He is alert and oriented to person, place, and time.   Skin: Skin is dry.   Psychiatric: He has a normal mood and affect.       Significant Labs: All pertinent labs within the past 24 hours have been reviewed.    Significant Imaging: I have reviewed and interpreted all pertinent imaging results/findings within the past 24 hours.

## 2017-02-26 NOTE — PLAN OF CARE
[facility]  FACE TO FACE ENCOUNTER    Patient Name: Regan Alvarez  YOB: 1940    PCP: Ishaan Adamson MD   PCP Address: [address] [place] [address] / [facility]  PCP Phone Number: [phone]  PCP Fax: [phone]    Encounter Date: 02/26/2017    Admit to Home Health    Diagnoses:  Active Hospital Problems    Diagnosis  POA    *Pneumonia of right lower lobe due to infectious organism [J18.1]  Yes    Physical deconditioning [R53.81]  Yes    Essential hypertension [I10]  Yes    COPD (chronic obstructive pulmonary disease) [J44.9]  Yes    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    Specialty:  [facility]    Why:  Outpatient services, PCP follow-up appointment. Patient should arrive by 11:00AM.     Contact information:    [address] [facility] [address] [place] [address] [phone]        Follow up with Ishaan Adamson MD In 1 week.    Specialty:  Internal Medicine    Contact information:    [address] [place] [id number]  [phone]          Follow up with Saritha Wu MD In 2 weeks.    Specialty:  Infectious Diseases    Contact information:    [address] [place] [address] [address]  [place] [address] [phone]              I have seen and examined this patient face to face today. My clinical findings that support the need for the home health skilled services and home  bound status are the following:  Weakness/numbness causing balance and gait disturbance due to Weakness/Debility making it taxing to leave home.    Allergies:Review of patient's allergies indicates:  No Known Allergies    Diet: diabetic diet: 1800 calorie and 2 gram sodium diet    Activities: activity as tolerated    Nursing:   SN to complete comprehensive assessment including routine vital signs. Instruct on disease process and s/s of complications to report to MD. Review/verify medication list sent home with the patient at time of discharge  and instruct patient/caregiver as needed. Frequency may be adjusted depending on start of care date.    Notify MD if SBP > 160 or < 90; DBP > 90 or < 50; HR > 120 or < 50; Temp > 101; Other:      CONSULTS:    Physical Therapy to evaluate and treat. Evaluate for home safety and equipment needs; Establish/upgrade home exercise program. Perform / instruct on therapeutic exercises, gait training, transfer training, and Range of Motion.  Occupational Therapy to evaluate and treat. Evaluate home environment for safety and equipment needs. Perform/Instruct on transfers, ADL training, ROM, and therapeutic exercises.  Aide to provide assistance with personal care, ADLs, and vital signs.      Medications: Review discharge medications with patient and family and provide education.      Current Discharge Medication List      START taking these medications    Details   PIPERACILLIN SODIUM/TAZOBACTAM (PIPERACILLIN-TAZOBACTAM 4.5G/100ML D5W IVPB, READY TO MIX,) Inject 100 mLs (4.5 g total) into the vein every 8 (eight) hours.         CONTINUE these medications which have NOT CHANGED    Details   albuterol 90 mcg/actuation inhaler Inhale 2 puffs into the lungs every 6 (six) hours as needed for Wheezing.  Qty: 1 Inhaler, Refills: 2    Associated Diagnoses: Chronic obstructive pulmonary disease, unspecified COPD type      amlodipine (NORVASC) 2.5 MG tablet Take 2.5 mg by mouth once daily.       aspirin (ECOTRIN) 81 MG EC tablet Take 81 mg by mouth once daily.      blood sugar diagnostic (TRUE METRIX GLUCOSE TEST STRIP) Strp Test blood sugar 3 times daily  Qty: 300 strip, Refills: 3      blood-glucose meter (TRUE METRIX AIR GLUCOSE METER) Misc Test blood sugar 3 times daily  Qty: 1 each, Refills: 0      fluconazole (DIFLUCAN) 100 MG tablet Two tabs po on day one then one tab po daily  Qty: 15 tablet, Refills: 0    Associated Diagnoses: Candidiasis, esophageal      fluticasone-salmeterol 250-50 mcg/dose (ADVAIR) 250-50 mcg/dose diskus inhaler Inhale 1 puff into the lungs 2 (two) times daily.  Qty: 60 each, Refills: 4    Associated Diagnoses: Chronic obstructive pulmonary disease, unspecified COPD type      furosemide (LASIX) 20 MG tablet Take 1 tablet (20 mg total) by mouth once daily.  Qty: 30 tablet, Refills: 4    Associated Diagnoses: Chronic systolic heart failure      insulin aspart (NOVOLOG) 100 unit/mL InPn pen Inject 1-10 Units into the skin before meals and at bedtime as needed (Hyperglycemia).  Qty: 3 mL, Refills: 0      lancets 28 gauge Misc 1 lancet by Misc.(Non-Drug; Combo Route) route 3 (three) times daily. True Metrix lancets  Qty: 300 each, Refills: 3      metformin (GLUCOPHAGE) 1000 MG tablet Take 1 tablet (1,000 mg total) by mouth 2 (two) times daily with meals.  Qty: 180 tablet, Refills: 3    Associated Diagnoses: Diabetes mellitus type 2, uncontrolled      multivitamin capsule Take 1 capsule by mouth once daily.      predniSONE (DELTASONE) 20 MG tablet Take 20 mg by mouth once daily. Take two tablets daily      ramipril (ALTACE) 2.5 MG capsule Take 1 capsule (2.5 mg total) by mouth once daily.  Qty: 30 capsule, Refills: 4    Associated Diagnoses: Chronic systolic heart failure      rifabutin (MYCOBUTIN) 150 mg Cap Take 1 capsule (150 mg total) by mouth every 12 (twelve) hours.  Qty: 60 capsule, Refills: 11    Associated Diagnoses: MAC (mycobacterium avium-intracellulare complex)            Zosyn is for 2 weeks     I certify that this patient is confined to his home and needs intermittent skilled nursing care, physical therapy and occupational therapy.

## 2017-02-26 NOTE — ASSESSMENT & PLAN NOTE
Continued home medication of Rifabutin; he is followed at [facility] by ID.

## 2017-02-26 NOTE — SUBJECTIVE & OBJECTIVE
Interval History No new issues.      Review of Systems   Constitutional: Negative for activity change.   Respiratory: Negative for chest tightness and shortness of breath.    Cardiovascular: Negative for chest pain.   Gastrointestinal: Negative for abdominal distention.     Objective:     Vital Signs (Most Recent):  Temp: 97.7 °F (36.5 °C) (02/26/17 0343)  Pulse: 96 (02/26/17 0343)  Resp: 20 (02/26/17 0343)  BP: (!) 150/90 (02/26/17 0343)  SpO2: (!) 93 % (02/26/17 0343) Vital Signs (24h Range):  Temp:  [97.3 °F (36.3 °C)-98 °F (36.7 °C)] 97.7 °F (36.5 °C)  Pulse:  [74-96] 96  Resp:  [17-20] 20  SpO2:  [93 %-96 %] 93 %  BP: (118-151)/(73-91) 150/90     Weight: 77.1 kg (170 lb)  Body mass index is 19.15 kg/(m^2).    Intake/Output Summary (Last 24 hours) at 02/26/17 0822  Last data filed at 02/26/17 0638   Gross per 24 hour   Intake              700 ml   Output             1200 ml   Net             -500 ml      Physical Exam   Constitutional: He appears well-developed and well-nourished.   HENT:   Head: Normocephalic.   Cardiovascular: Normal rate.    Pulmonary/Chest: No respiratory distress.   Abdominal: Soft.   Neurological: He is alert.       Significant Labs:   CBC:   Recent Labs  Lab 02/25/17  0442 02/26/17  0500   WBC 8.77 9.98   HGB 10.3* 9.5*   HCT 31.7* 29.4*   * 379*     CMP:   Recent Labs  Lab 02/25/17  0442 02/26/17  0500    140   K 3.7 3.6    102   CO2 32* 31*   * 143*   BUN 8 5*   CREATININE 0.8 0.7   CALCIUM 8.0* 8.0*   PROT 6.3 6.1   ALBUMIN 1.9* 1.8*   BILITOT 0.2 0.2   ALKPHOS 67 63   AST 25 20   ALT 18 17   ANIONGAP 8 7*   EGFRNONAA >60 >60       Significant Imaging:

## 2017-02-26 NOTE — PROGRESS NOTES
Carolina with [facility] returned call and indicated that patient was current with [facility] for [facility]; [facility] will continue to service. She did need dosage scheduled: 2pm (hospital); 10pm (home; 6pm (home) provided to Carolina.     Carolina returned call and indicated that [facility] will provide the Home IV Infusion services.

## 2017-02-26 NOTE — DISCHARGE SUMMARY
[facility] Hospital Medicine  Discharge Summary      Patient Name: Regan Owensbrook  MRN: [id number]  Admission Date: 2/21/2017  Hospital Length of Stay: 5 days  Discharge Date and Time:  02/26/2017 8:31 AM  Attending Physician: Keo Brush MD   Discharging Provider: Keo Brush MD  Primary Care Provider: Ishaan Adamson MD      HPI:   75 y/o male recently discharged home after admission for pneumonia with COPD. Patient was not doing well at home. He continued to have progressively worsening shortness of breath. He has continued to experience generalized weakness and fatigue. This was a problem at discharge but he was adamantly wanting to go home during the previous hospitalization. He was evaluated by his primary care physician and urged to come to the ED for further evaluation and treatment. While in the ED routine laboratory studies and chest x-ray were obtained. There was evidence of acute renal failure as well as unresolved right lower lobe pneumonia. Hospital medicine has been asked to admit again for both of these issues.         Procedure(s) (LRB):  BRONCHOSCOPY (N/A)      Indwelling Lines/Drains at time of discharge:   Lines/Drains/Airways     Peripherally Inserted Central Catheter Line                 PICC Double Lumen 02/25/17 1310 right basilic less than 1 day          Drain                 Urethral Catheter 02/21/17 5 days              Hospital Course:   75 y/o with COPD with recent admission for PNA and discharged home who presented with weakness and SOB. Pt with CXR that was not improved from recent admission and noted to be in ARF. Pt started on empiric antibiotics for pneumonia, on IVF for ARF and consult placed to Nephrology, ID and Pulm. Pt had CT of chest with new right infiltrate and old RUL infiltrate. Pt s/p bronch on 2/23 with removal of copious amount of secretions and sputum culture with multi-drug resistant Pseudomonas.Patient had a Picc line placed  and ID wrote home health orders for 2 weeks of IV Zosyn. The rest of the hospital course was unremarkable and the patient will be discharged to home. Activity as tolerated. Diet- low NA, ADA 1800 babita diet.      Consults:   Consults         Status Ordering Provider     Inpatient consult to Infectious Diseases  Once     Provider:  Saritha Wu MD    Completed JACINTA VILLEGAS     Inpatient consult to Nephrology  Once     Provider:  Andree Garland MD    Acknowledged NORI DO     Inpatient consult to PICC team ([facility])  Once     Provider:  (Not yet assigned)    Acknowledged SARITHA WU     Inpatient consult to Pulmonology  Once     Provider:  Pierre Ugalde MD    Completed JACINTA VILLEGAS          Significant Diagnostic Studies: Labs:     Pending Diagnostic Studies:     Procedure Component Value Units Date/Time    CBC auto differential [[id number]]     Order Status:  Sent Lab Status:  No result     Specimen:  Blood from Blood     Comprehensive metabolic panel [[id number]]     Order Status:  Sent Lab Status:  No result     Specimen:  Blood from Blood     Magnesium [[id number]]     Order Status:  Sent Lab Status:  No result     Specimen:  Blood from Blood         Final Active Diagnoses:    Diagnosis Date Noted POA    PRINCIPAL PROBLEM:  Pneumonia of right lower lobe due to infectious organism [J18.1] 02/21/2017 Yes    Physical deconditioning [R53.81] 02/22/2017 Yes    Essential hypertension [I10] 01/29/2017 Yes    COPD (chronic obstructive pulmonary disease) [J44.9] 06/21/2016 Yes    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  Service: Hospital Medicine      Discharged Condition: good    Disposition: Home or Self Care    Follow Up:  Follow-up Information     Follow up with [facility] On 3/6/2017.    Specialty:  Priority Care    Why:  Outpatient services, PCP follow-up appointment. Patient should arrive by 11:00AM.     Contact information:    [address]  [address]  [facility] [address] [phone]        Follow up with Ishaan Adamson MD In 1 week.    Specialty:  Internal Medicine    Contact information:    [address]  [address]  [facility] [address] [phone]          Follow up with Saritha Wu MD In 2 weeks.    Specialty:  Infectious Diseases    Contact information:    [address] [place] [address] [address]  [place] [address] [phone]          Patient Instructions:     Diet general   Order Specific Question Answer Comments   Total calories: 1800 Calorie    Na restriction, if any: 2gNa      Activity as tolerated       Medications:  Reconciled Home Medications:   Current Discharge Medication List      START taking these medications    Details   PIPERACILLIN SODIUM/TAZOBACTAM (PIPERACILLIN-TAZOBACTAM 4.5G/100ML D5W IVPB, READY TO MIX,) Inject 100 mLs (4.5 g total) into the vein every 8 (eight) hours.         CONTINUE these medications which have NOT CHANGED    Details   albuterol 90 mcg/actuation inhaler Inhale 2 puffs into the lungs every 6 (six) hours as needed for Wheezing.  Qty: 1 Inhaler, Refills: 2    Associated Diagnoses: Chronic obstructive pulmonary disease, unspecified COPD type      amlodipine (NORVASC) 2.5 MG tablet Take 2.5 mg by mouth once daily.      aspirin (ECOTRIN) 81 MG EC tablet Take 81 mg by mouth once daily.      blood sugar diagnostic (TRUE METRIX GLUCOSE TEST STRIP) Strp Test blood sugar 3 times  daily  Qty: 300 strip, Refills: 3      blood-glucose meter (TRUE METRIX AIR GLUCOSE METER) Misc Test blood sugar 3 times daily  Qty: 1 each, Refills: 0      fluconazole (DIFLUCAN) 100 MG tablet Two tabs po on day one then one tab po daily  Qty: 15 tablet, Refills: 0    Associated Diagnoses: Candidiasis, esophageal      fluticasone-salmeterol 250-50 mcg/dose (ADVAIR) 250-50 mcg/dose diskus inhaler Inhale 1 puff into the lungs 2 (two) times daily.  Qty: 60 each, Refills: 4    Associated Diagnoses: Chronic obstructive pulmonary disease, unspecified COPD type      furosemide (LASIX) 20 MG tablet Take 1 tablet (20 mg total) by mouth once daily.  Qty: 30 tablet, Refills: 4    Associated Diagnoses: Chronic systolic heart failure      insulin aspart (NOVOLOG) 100 unit/mL InPn pen Inject 1-10 Units into the skin before meals and at bedtime as needed (Hyperglycemia).  Qty: 3 mL, Refills: 0      lancets 28 gauge Misc 1 lancet by Misc.(Non-Drug; Combo Route) route 3 (three) times daily. True Metrix lancets  Qty: 300 each, Refills: 3      metformin (GLUCOPHAGE) 1000 MG tablet Take 1 tablet (1,000 mg total) by mouth 2 (two) times daily with meals.  Qty: 180 tablet, Refills: 3    Associated Diagnoses: Diabetes mellitus type 2, uncontrolled      multivitamin capsule Take 1 capsule by mouth once daily.      predniSONE (DELTASONE) 20 MG tablet Take 20 mg by mouth once daily. Take two tablets daily      ramipril (ALTACE) 2.5 MG capsule Take 1 capsule (2.5 mg total) by mouth once daily.  Qty: 30 capsule, Refills: 4    Associated Diagnoses: Chronic systolic heart failure      rifabutin (MYCOBUTIN) 150 mg Cap Take 1 capsule (150 mg total) by mouth every 12 (twelve) hours.  Qty: 60 capsule, Refills: 11    Associated Diagnoses: MAC (mycobacterium avium-intracellulare complex)           Time spent on the discharge of patient:  >30   minutes    Keo Interiano MD  Department of Hospital Medicine  [facility]

## 2017-02-26 NOTE — PROGRESS NOTES
[facility] Hospital Medicine  Progress Note    Patient Name: Regan Owensbrook  MRN: [id number]  Patient Class: IP- Inpatient   Admission Date: 2/21/2017  Length of Stay: 5 days  Attending Physician: Keo Brush MD  Primary Care Provider: Ishaan Adamson MD        Subjective:     Principal Problem:Pneumonia of right lower lobe due to infectious organism    HPI:  77 y/o male recently discharged home after admission for pneumonia with COPD. Patient was not doing well at home. He continued to have progressively worsening shortness of breath. He has continued to experience generalized weakness and fatigue. This was a problem at discharge but he was adamantly wanting to go home during the previous hospitalization. He was evaluated by his primary care physician and urged to come to the ED for further evaluation and treatment. While in the ED routine laboratory studies and chest x-ray were obtained. There was evidence of acute renal failure as well as unresolved right lower lobe pneumonia. Hospital medicine has been asked to admit again for both of these issues.         Hospital Course:  77 y/o with COPD with recent admission for PNA and discharged home who presented with weakness and SOB. Pt with CXR that was not improved from recent admission and noted to be in ARF. Pt started on empiric antibiotics for pneumonia, on IVF for ARF and consult placed to Nephrology, ID and Pulm. Pt had CT of chest with new right infiltrate and old RUL infiltrate. Pt s/p bronch on 2/23 with removal of copious amount of secretions and sputum culture with multi-drug resistant Pseudomonas.Patient had a Picc line placed and ID wrote home health orders for Abx.     Interval History No new issues.      Review of Systems   Constitutional: Negative for activity change.   Respiratory: Negative for chest tightness and shortness of breath.    Cardiovascular: Negative for chest pain.   Gastrointestinal: Negative for  abdominal distention.     Objective:     Vital Signs (Most Recent):  Temp: 97.7 °F (36.5 °C) (02/26/17 0343)  Pulse: 96 (02/26/17 0343)  Resp: 20 (02/26/17 0343)  BP: (!) 150/90 (02/26/17 0343)  SpO2: (!) 93 % (02/26/17 0343) Vital Signs (24h Range):  Temp:  [97.3 °F (36.3 °C)-98 °F (36.7 °C)] 97.7 °F (36.5 °C)  Pulse:  [74-96] 96  Resp:  [17-20] 20  SpO2:  [93 %-96 %] 93 %  BP: (118-151)/(73-91) 150/90     Weight: 77.1 kg (170 lb)  Body mass index is 19.15 kg/(m^2).    Intake/Output Summary (Last 24 hours) at 02/26/17 0822  Last data filed at 02/26/17 0638   Gross per 24 hour   Intake              700 ml   Output             1200 ml   Net             -500 ml      Physical Exam   Constitutional: He appears well-developed and well-nourished.   HENT:   Head: Normocephalic.   Cardiovascular: Normal rate.    Pulmonary/Chest: No respiratory distress.   Abdominal: Soft.   Neurological: He is alert.       Significant Labs:   CBC:   Recent Labs  Lab 02/25/17  0442 02/26/17  0500   WBC 8.77 9.98   HGB 10.3* 9.5*   HCT 31.7* 29.4*   * 379*     CMP:   Recent Labs  Lab 02/25/17  0442 02/26/17  0500    140   K 3.7 3.6    102   CO2 32* 31*   * 143*   BUN 8 5*   CREATININE 0.8 0.7   CALCIUM 8.0* 8.0*   PROT 6.3 6.1   ALBUMIN 1.9* 1.8*   BILITOT 0.2 0.2   ALKPHOS 67 63   AST 25 20   ALT 18 17   ANIONGAP 8 7*   EGFRNONAA >60 >60       Significant Imaging:    Assessment/Plan:      * Pneumonia of right lower lobe due to infectious organism  SOB  COPD  Assessment and Plan:  Pt was previously treated with IV moxifloxacin during last admission and CXR overall unchanged. Pt started empirically with broad spectrum antibiotics from the ER. Antibiotics changed as per ID and s/p bronch on 2/23 with removal of secretions. Sputum culture remarkable for Pseudomonas, and Zosyn started by ID. Pt ordered to have PICC placed and will need 2 weeks of Zosyn. ID has written home health orders and SW notified of possible  discharge     Diabetes mellitus type 2, uncontrolled  Closely monitor blood glucose and make adjustments to insulin regimen as necessary.      Osteoarthritis        SOB (shortness of breath)  As discussed above. Multifactorial but likely related to chronic Mycobacterium avium complex, PNA as well as COPD and deconditioning.    Pulmonary Mycobacterium avium complex (MAC) infection  Continued home medication of Rifabutin; he is followed at [facility] by ID.         COPD (chronic obstructive pulmonary disease)  No acute issues; continue with home medication regimen      Essential hypertension  Continue home medication regimen.        Acute renal failure  Resolved. Likely prerenal given hx and response to IVF, continue current care, Nephrology input appreciated, monitor.      Hypotension (arterial)  Likely due to hypovolemia, corrected with IVF, improved with current care.      Physical deconditioning  Weakness  Assessment and Plan:  PT/OT. Will need home health.    VTE Risk Mitigation         Ordered     heparin (porcine) injection 5,000 Units  Every 8 hours     Route:  Subcutaneous        02/21/17 2303     Medium Risk of VTE  Once      02/21/17 1944      Will discharge to home.      Keo Interiano MD  Department of Hospital Medicine   [facility]

## 2017-02-26 NOTE — ASSESSMENT & PLAN NOTE
SOB  COPD  Assessment and Plan:  Pt was previously treated with IV moxifloxacin during last admission and CXR overall unchanged. Pt started empirically with broad spectrum antibiotics from the ER. Antibiotics changed as per ID and s/p bronch on 2/23 with removal of secretions. Sputum culture remarkable for Pseudomonas, and Zosyn started by ID. Pt ordered to have PICC placed and will need 2 weeks of Zosyn. ID has written home health orders and SW notified of possible discharge

## 2017-02-26 NOTE — ASSESSMENT & PLAN NOTE
Continued home medication of Rifabutin; he is followed at [facility] by ID.

## 2017-02-26 NOTE — ASSESSMENT & PLAN NOTE
SOB  COPD  Assessment and Plan:  Pt was previously treated with IV moxifloxacin during last admission and CXR overall unchanged. Pt started empirically with broad spectrum antibiotics from the ER. Antibiotics changed as per ID and s/p bronch on 2/23 with removal of secretions. Sputum culture remarkable for Pseudomonas, and Zosyn started by ID. Pt ordered to have PICC placed today and will need 2 weeks of Zosyn. ID has written home health orders and SW notified of possible discharge tomorrow if patient clinically better. Will need to check for possible O2 needs in am.

## 2017-02-26 NOTE — ASSESSMENT & PLAN NOTE
Resolved. Likely prerenal given hx and response to IVF, continue current care, Nephrology input appreciated, monitor.

## 2017-02-27 LAB
INTERPRETATION SERPL IFE-IMP: NORMAL
PATHOLOGIST INTERPRETATION IFE: NORMAL
PATHOLOGIST INTERPRETATION SPE: NORMAL

## 2017-02-27 NOTE — NURSING
Patients daughter has been taught by [facility] how to administer patients IVABX tonight . [facility] will administer in am. Patient PICC flushed , dressing clean dry intact . Accompanied by daughter at discharge .

## 2017-02-27 NOTE — PT/OT/SLP DISCHARGE
Physical Therapy Discharge Summary    Regan Alvarez  MRN: [id number]   Pneumonia of right lower lobe due to infectious organism   Patient Discharged from acute Physical Therapy on 2017.  Please refer to prior PT noted date on 2017 for functional status.     Assessment:   Patient appropriate for care in another setting.  GOALS:   Physical Therapy Goals        Problem: Physical Therapy Goal    Goal Priority Disciplines Outcome Goal Variances Interventions   Physical Therapy Goal     PT/OT, PT Ongoing (interventions implemented as appropriate)     Description:  Goals to be met by: 3/8/17    Patient will increase functional independence with mobility by performin. Sit to stand transfer with Supervision  2. Gait  x 250 feet with Supervision using Rolling Walker if needed  3. Lower extremity exercise program x30 reps per handout, with supervision              Reasons for Discontinuation of Therapy Services  Transfer to alternate level of care.      Plan:  Patient Discharged to: Home with Home Health Service.    Magdiel Livingston, PT  2017

## 2017-02-27 NOTE — NURSING
Patient to be discharged home. [facility] to [facility] will administer meds . Patient will be discharged home with daughter . Verbalized understanding of discharge instructions.

## 2017-03-03 PROCEDURE — 99495 TRANSJ CARE MGMT MOD F2F 14D: CPT | Mod: S$GLB,,, | Performed by: INTERNAL MEDICINE

## 2017-03-06 ENCOUNTER — OFFICE VISIT (OUTPATIENT)
Dept: PRIMARY CARE CLINIC | Facility: CLINIC | Age: 77
End: 2017-03-06
Payer: MEDICARE

## 2017-03-06 ENCOUNTER — TELEPHONE (OUTPATIENT)
Dept: PRIMARY CARE CLINIC | Facility: CLINIC | Age: 77
End: 2017-03-06

## 2017-03-06 ENCOUNTER — DOCUMENTATION ONLY (OUTPATIENT)
Dept: PRIMARY CARE CLINIC | Facility: CLINIC | Age: 77
End: 2017-03-06

## 2017-03-06 VITALS
RESPIRATION RATE: 24 BRPM | DIASTOLIC BLOOD PRESSURE: 78 MMHG | SYSTOLIC BLOOD PRESSURE: 130 MMHG | HEART RATE: 108 BPM | HEIGHT: 78 IN | OXYGEN SATURATION: 98 % | BODY MASS INDEX: 19.89 KG/M2 | WEIGHT: 171.94 LBS | TEMPERATURE: 96 F

## 2017-03-06 DIAGNOSIS — A31.0 PULMONARY MYCOBACTERIUM AVIUM COMPLEX (MAC) INFECTION: Primary | Chronic | ICD-10-CM

## 2017-03-06 DIAGNOSIS — R91.8 LUNG MASS: ICD-10-CM

## 2017-03-06 DIAGNOSIS — J44.1 CHRONIC OBSTRUCTIVE PULMONARY DISEASE WITH ACUTE EXACERBATION: ICD-10-CM

## 2017-03-06 DIAGNOSIS — F19.982 DRUG INDUCED INSOMNIA: ICD-10-CM

## 2017-03-06 DIAGNOSIS — I10 ESSENTIAL HYPERTENSION: ICD-10-CM

## 2017-03-06 DIAGNOSIS — E11.8 TYPE 2 DIABETES MELLITUS WITH COMPLICATION, WITHOUT LONG-TERM CURRENT USE OF INSULIN: ICD-10-CM

## 2017-03-06 PROCEDURE — 1159F MED LIST DOCD IN RCRD: CPT | Mod: S$GLB,,, | Performed by: NURSE PRACTITIONER

## 2017-03-06 PROCEDURE — 1157F ADVNC CARE PLAN IN RCRD: CPT | Mod: S$GLB,,, | Performed by: NURSE PRACTITIONER

## 2017-03-06 PROCEDURE — 3078F DIAST BP <80 MM HG: CPT | Mod: S$GLB,,, | Performed by: NURSE PRACTITIONER

## 2017-03-06 PROCEDURE — 99214 OFFICE O/P EST MOD 30 MIN: CPT | Mod: S$GLB,,, | Performed by: NURSE PRACTITIONER

## 2017-03-06 PROCEDURE — 1160F RVW MEDS BY RX/DR IN RCRD: CPT | Mod: S$GLB,,, | Performed by: NURSE PRACTITIONER

## 2017-03-06 PROCEDURE — 3075F SYST BP GE 130 - 139MM HG: CPT | Mod: S$GLB,,, | Performed by: NURSE PRACTITIONER

## 2017-03-06 PROCEDURE — 1126F AMNT PAIN NOTED NONE PRSNT: CPT | Mod: S$GLB,,, | Performed by: NURSE PRACTITIONER

## 2017-03-06 PROCEDURE — 99999 PR PBB SHADOW E&M-EST. PATIENT-LVL IV: CPT | Mod: PBBFAC,,,

## 2017-03-06 RX ORDER — RAMELTEON 8 MG/1
8 TABLET ORAL NIGHTLY
Qty: 15 TABLET | Refills: 0 | Status: SHIPPED | OUTPATIENT
Start: 2017-03-06 | End: 2017-03-15

## 2017-03-06 RX ORDER — FLUTICASONE PROPIONATE 50 MCG
1 SPRAY, SUSPENSION (ML) NASAL DAILY
COMMUNITY
End: 2019-10-28

## 2017-03-06 RX ORDER — ASPIRIN 325 MG
325 TABLET ORAL DAILY
COMMUNITY
End: 2017-12-21

## 2017-03-06 RX ORDER — RIFAMPIN 300 MG/1
300 CAPSULE ORAL DAILY
COMMUNITY
End: 2017-03-13 | Stop reason: ALTCHOICE

## 2017-03-06 RX ORDER — BENZONATATE 200 MG/1
200 CAPSULE ORAL 3 TIMES DAILY PRN
COMMUNITY
End: 2017-03-13 | Stop reason: SDUPTHER

## 2017-03-06 NOTE — PROGRESS NOTES
[facility]  New Visit Progress Note   Recent Hospital Discharge          ATTENDING PHYSICIAN:  Dr. Paige Kelly   Current Provider:  ALAINA Ruth     PRESENTING HISTORY     Chief Complaint/Reason for Visit:  Follow up Hospital Discharge   Chief Complaint   Patient presents with    Hospital Follow Up     PCP: Ishaan Adamson MD    History of Present Illness:  Mr. Regan Alvarez is a 76 y.o. male who was recently admitted to the hospital  Admission Date: 2/21/2017  Discharge Date and Time:  02/26/2017 8:31 AM  -----HPI: 75 y/o male recently discharged home after admission for pneumonia with COPD. Patient was not doing well at home. He continued to have progressively worsening shortness of breath. He has continued to experience generalized weakness and fatigue. This was a problem at discharge but he was adamantly wanting to go home during the previous hospitalization. He was evaluated by his primary care physician and urged to come to the ED for further evaluation and treatment. While in the ED routine laboratory studies and chest x-ray were obtained. There was evidence of acute renal failure as well as unresolved right lower lobe pneumonia. Hospital medicine has been asked to admit again for both of these issues. Hospital Course: 75 y/o with COPD with recent admission for PNA and discharged home who presented with weakness and SOB. Pt with CXR that was not improved from recent admission and noted to be in ARF. Pt started on empiric antibiotics for pneumonia, on IVF for ARF and consult placed to Nephrology, ID and Pulm. Pt had CT of chest with new right infiltrate and old RUL infiltrate. Pt s/p bronch on 2/23 with removal of copious amount of secretions and sputum culture with multi-drug resistant Pseudomonas.Patient had a Picc line placed and ID wrote home health orders for 2 weeks of IV Zosyn. The rest of the hospital course was unremarkable and the patient will be discharged to home.  Activity as tolerated. Diet- low NA, ADA 1800 babita diet.    Procedure(s) (LRB):  BRONCHOSCOPY (N/A)   ___________________________________________________________________    Today Interval History:  Returns to clinic post hospitalization, He was started on empiric abx as his CTa chest with new right infiltrate and old RUL infiltrate. Pt s/p bronch on 2/23 with removal of copious amount of secretions and sputum culture with multi-drug resistant Pseudomonas. His daughter tells me he has been receiving his IV abx which they will continue to administer through the 10th. He reports feeling much better since the bronch and receiving the ABX. His last day is fue Friday, was scheduled for 2 weeks; [facility] PICC      Review of Systems:  Review of Systems   Constitutional: Negative for chills, fever, malaise/fatigue and weight loss.   HENT: Negative for congestion and sore throat.    Eyes: Negative for blurred vision and photophobia.   Respiratory: Positive for shortness of breath. Negative for cough and sputum production.    Cardiovascular: Positive for chest pain. Negative for palpitations and leg swelling.   Gastrointestinal: Negative for abdominal pain, blood in stool, constipation, diarrhea, nausea and vomiting.   Genitourinary: Negative for frequency, hematuria and urgency.   Musculoskeletal: Negative for joint pain and myalgias.   Skin: Negative for itching and rash.   Neurological: Negative for dizziness, focal weakness, seizures and headaches.   Endo/Heme/Allergies: Negative for polydipsia. Does not bruise/bleed easily.   Psychiatric/Behavioral: Negative for hallucinations and memory loss. The patient is not nervous/anxious.        PAST HISTORY:     Past Medical History:   Diagnosis Date    Arthritis     COPD (chronic obstructive pulmonary disease)     Diabetes mellitus type II     Hypertension     Tuberculosis        Past Surgical History:   Procedure Laterality Date    melanoma         Family History   Problem  Relation Age of Onset    Cancer Father      Tuberculosis.  Secondary scar was neoplastic.       Social History     Social History    Marital status:      Spouse name: N/A    Number of children: N/A    Years of education: N/A     Social History Main Topics    Smoking status: Former Smoker     Types: Cigars    Smokeless tobacco: None      Comment: uses cigars on ocassion    Alcohol use Yes      Comment: socially    Drug use: No    Sexual activity: Not Asked     Other Topics Concern    None     Social History Narrative       MEDICATIONS & ALLERGIES:     Current Outpatient Prescriptions on File Prior to Visit   Medication Sig Dispense Refill    albuterol 90 mcg/actuation inhaler Inhale 2 puffs into the lungs every 6 (six) hours as needed for Wheezing. 1 Inhaler 2    amlodipine (NORVASC) 2.5 MG tablet Take 2.5 mg by mouth once daily.      blood sugar diagnostic (TRUE METRIX GLUCOSE TEST STRIP) Strp Test blood sugar 3 times daily 300 strip 3    blood-glucose meter (TRUE METRIX AIR GLUCOSE METER) Misc Test blood sugar 3 times daily 1 each 0    fluconazole (DIFLUCAN) 100 MG tablet Two tabs po on day one then one tab po daily 15 tablet 0    furosemide (LASIX) 20 MG tablet Take 1 tablet (20 mg total) by mouth once daily. 30 tablet 4    insulin aspart (NOVOLOG) 100 unit/mL InPn pen Inject 1-10 Units into the skin before meals and at bedtime as needed (Hyperglycemia). 3 mL 0    lancets 28 gauge Misc 1 lancet by Misc.(Non-Drug; Combo Route) route 3 (three) times daily. True Metrix lancets 300 each 3    metformin (GLUCOPHAGE) 1000 MG tablet Take 1 tablet (1,000 mg total) by mouth 2 (two) times daily with meals. 180 tablet 3    multivitamin capsule Take 1 capsule by mouth once daily.      PIPERACILLIN SODIUM/TAZOBACTAM (PIPERACILLIN-TAZOBACTAM 4.5G/100ML D5W IVPB, READY TO MIX,) Inject 100 mLs (4.5 g total) into the vein every 8 (eight) hours.      predniSONE (DELTASONE) 20 MG tablet Take 20 mg by  mouth once daily. Take two tablets daily      ramipril (ALTACE) 2.5 MG capsule Take 1 capsule (2.5 mg total) by mouth once daily. 30 capsule 4    rifabutin (MYCOBUTIN) 150 mg Cap Take 1 capsule (150 mg total) by mouth every 12 (twelve) hours. 60 capsule 11    [DISCONTINUED] aspirin (ECOTRIN) 81 MG EC tablet Take 81 mg by mouth once daily.      [DISCONTINUED] fluticasone-salmeterol 250-50 mcg/dose (ADVAIR) 250-50 mcg/dose diskus inhaler Inhale 1 puff into the lungs 2 (two) times daily. 60 each 4     No current facility-administered medications on file prior to visit.         Review of patient's allergies indicates:  No Known Allergies    OBJECTIVE:     Vital Signs:  Vitals:    03/06/17 1127   BP: 130/78   Pulse: 108   Resp: (!) 24   Temp: 96.3 °F (35.7 °C)     Wt Readings from Last 1 Encounters:   03/06/17 1127 78 kg (171 lb 15.3 oz)     Body mass index is 19.37 kg/(m^2).       Physical Exam:  Physical Exam   Constitutional: He is oriented to person, place, and time. No distress.   HENT:   Head: Normocephalic and atraumatic.   Eyes: Pupils are equal, round, and reactive to light. No scleral icterus.   Neck: Neck supple. No JVD present.   Cardiovascular: Normal rate.  Exam reveals no gallop.    Pulmonary/Chest: No respiratory distress. He has no wheezes. He has no rales.   Abdominal: Soft. There is no tenderness. There is no guarding.   Musculoskeletal: Normal range of motion. He exhibits no edema or tenderness.   Lymphadenopathy:     He has no cervical adenopathy.   Neurological: He is alert and oriented to person, place, and time.   Skin: Skin is warm and dry. No erythema.   Psychiatric: Affect and judgment normal.         Laboratory  Lab Results   Component Value Date    WBC 9.98 02/26/2017    HGB 9.5 (L) 02/26/2017    HCT 29.4 (L) 02/26/2017    MCV 88 02/26/2017     (H) 02/26/2017     BMP  Lab Results   Component Value Date     02/26/2017    K 3.6 02/26/2017     02/26/2017    CO2 31 (H)  02/26/2017    BUN 5 (L) 02/26/2017    CREATININE 0.7 02/26/2017    CALCIUM 8.0 (L) 02/26/2017    ANIONGAP 7 (L) 02/26/2017    ESTGFRAFRICA >60 02/26/2017    EGFRNONAA >60 02/26/2017     Lab Results   Component Value Date    ALT 17 02/26/2017    AST 20 02/26/2017    ALKPHOS 63 02/26/2017    BILITOT 0.2 02/26/2017     Lab Results   Component Value Date    INR 1.2 02/21/2017    INR 1.0 06/01/2016    INR 1.0 12/10/2015     Lab Results   Component Value Date    HGBA1C 9.2 (H) 02/21/2017     No results for input(s): POCTGLUCOSE in the last 72 hours.    Diagnostic Results:  Imaging Results     None            TRANSITION OF CARE:     [facility] Contact Note: Date:  02/26/17    Family and/or Caretaker present at visit?  Yes.  Diagnostic tests reviewed/disposition: I have reviewed all completed as well as pending diagnostic tests at the time of discharge.  Disease/illness education: PNA  Home health/community services discussion/referrals: Patient does not have home health established from hospital visit.  They do not need home health.  If needed, we will set up home health for the patient.   Establishment or re-establishment of referral orders for community resources: No other necessary community resources.   Discussion with other health care providers: No discussion with other health care providers necessary.     Medications Reconciliation:   I have reconciled the patient's home medications and discharge medications with the patient/family. I have updated all changes.  Refer to After-Visit Medication List.    ASSESSMENT & PLAN:     HIGH RISK CONDITION(S):  Patient has a condition that poses threat to life and bodily function: PNA with severe respiratory distress      ASSESSMENT AND  PLAN:      Pulmonary Mycobacterium avium complex (MAC) infection    Type 2 diabetes mellitus with complication, without long-term current use of insulin    Lung mass    Essential hypertension    Drug induced insomnia      KIET: He had Rt  "upper lobe cavitary lesion and had been rx with avelox, azithromycin and rifabutin prior to this hospitalization. - He returns with infiltrate, treated with IV abx for PNA; PICC line in place. He saw Dr. Wu in the hospital who noted "Last sputum for afb culture negative September 2016 - Repeat and continue avelino meds" "Rt ll infiltrate, Pseudomonas on sputum, Iv Zosyn based on sensitivities". His daughter attempted to schedule follow up appointment for Dr. Wu but was told by her office when they called for follow up that he would have to return to is own ID doctor at [facility]. Nurses to schedule follow up. His tells me he is feeling much better. His oxygen saturations after ambulation remains >95% but he is still fatigued appearing from activity intolerance, otherwise, stable. He ambulates with a cane. He can continue his ABX through Friday as planned - RTC in 1 week - consider repeating CBC if non in system since treatment completed.  -Schedule ID appointment  -RTC post ABX (consider pulling PICC line at that time)    Lung mass: see above    COPD: quit smoking about 1 year ago - continue albuterol    Insomnia: Insomnia worse since discharge, [place] 8 mg nightly - reassess at next appointment    Essential HTN: chronic - controlled - continue current home regimen    DMII: HgA1c = 9.2 92/2017) - on prednisone; Daughter reports his BGL's have been ranging from 97-->150's, except once she gave sliding scale for 245. Continue current regimen with metformin 1000 mg - continue current s/s - keep diary, bring to clinic to evaluate BGL - Estimated Creatinine Clearance: 96 mL/min (based on Cr of 0.7).                Instructions for the patient:  Dispo: RTC on March 14, 2017 - evaluate BGL (on prednisone); consider repeating CBC; pull PICC if ABX completed ; refer to pulmonology      Scheduled Follow-up :  Future Appointments  Date Time Provider Department Center   3/13/2017 2:00 PM Getachew Valera MD [facility] ID " [facility]   3/15/2017 11:00 AM Torie Martínez PA-C [facility]ELINA [facility]       After Visit Medication List :     Medication List          This list is accurate as of: 3/6/17 11:53 AM.  Always use your most recent med list.                     albuterol 90 mcg/actuation inhaler   Inhale 2 puffs into the lungs every 6 (six) hours as needed for Wheezing.       amlodipine 2.5 MG tablet   Commonly known as:  NORVASC       aspirin 325 MG tablet       benzonatate 200 MG capsule   Commonly known as:  TESSALON       blood sugar diagnostic Strp   Commonly known as:  TRUE METRIX GLUCOSE TEST STRIP   Test blood sugar 3 times daily       blood-glucose meter Misc   Commonly known as:  TRUE METRIX AIR GLUCOSE METER   Test blood sugar 3 times daily       fluconazole 100 MG tablet   Commonly known as:  DIFLUCAN   Two tabs po on day one then one tab po daily       fluticasone 50 mcg/actuation nasal spray   Commonly known as:  FLONASE       furosemide 20 MG tablet   Commonly known as:  LASIX   Take 1 tablet (20 mg total) by mouth once daily.       insulin aspart 100 unit/mL Inpn pen   Commonly known as:  NovoLOG   Inject 1-10 Units into the skin before meals and at bedtime as needed (Hyperglycemia).       lancets 28 gauge Misc   1 lancet by Misc.(Non-Drug; Combo Route) route 3 (three) times daily. True Metrix lancets       metformin 1000 MG tablet   Commonly known as:  GLUCOPHAGE   Take 1 tablet (1,000 mg total) by mouth 2 (two) times daily with meals.       multivitamin capsule       PIPERACILLIN-TAZOBACTAM 4.5G/100ML D5W IVPB (READY TO MIX)   Inject 100 mLs (4.5 g total) into the vein every 8 (eight) hours.       predniSONE 20 MG tablet   Commonly known as:  DELTASONE       ramipril 2.5 MG capsule   Commonly known as:  ALTACE   Take 1 capsule (2.5 mg total) by mouth once daily.       rifabutin 150 mg Cap   Commonly known as:  MYCOBUTIN   Take 1 capsule (150 mg total) by mouth every 12 (twelve) hours.       rifAMpin 300 MG  capsule   Commonly known as:  RIFADIN                   GRANT Ruth, FNP-C  PA# [id number]  NPI# [id number]  Hospitalist - Department of Hospital Medicine  [address] [place] [address]  Office [phone]; Pager [phone]

## 2017-03-06 NOTE — TELEPHONE ENCOUNTER
Pt will see Dr Valera on 3/13/17 @ 2 PM. Will f/u in priority care clinic 3/15/17, and get a CBC the same day of this appt. [facility] will come out this afternoon for dressing change. I also spoke to Home Health nurse Linda [phone] about pt requesting portable oxygen. She stated that  medical necessity form needs to be completed and faxed to N. I will complete this form and fax to N.

## 2017-03-06 NOTE — PROGRESS NOTES
Appointment made for the patient to see Dr. Pierre Ugalde  Tuesday March 21st at 3 PM  Letter sent out to the patient.

## 2017-03-06 NOTE — MR AVS SNAPSHOT
[facility]  [address] [facility] [address]  [address]  [place] [address]  Phone: [phone]  Fax: [phone]                  Regan Alvarez   3/6/2017 11:00 AM   Office Visit    Description:  Male : 1940   Provider:  [facility]   Department:  [facility]           Reason for Visit     Hospital Follow Up           Diagnoses this Visit        Comments    Pulmonary Mycobacterium avium complex (MAC) infection    -  Primary     Type 2 diabetes mellitus with complication, without long-term current use of insulin         Lung mass         Essential hypertension         Drug induced insomnia                To Do List           Future Appointments        Provider Department Dept Phone    3/13/2017 2:00 PM Getachew Valera MD [facility] - Infectious Diseases [phone]    3/15/2017 11:00 AM Torie Martínez PA-C [facility] [phone]      Goals (5 Years of Data)     None      [facility]     [facility] Nurse Care Line -  Assistance  Registered nurses in the [facility] provide clinical advisement, health education, appointment booking, and other advisory services.  Call for this free service at [phone].             Medications           Message regarding Medications     Verify the changes and/or additions to your medication regime listed below are the same as discussed with your clinician today.  If any of these changes or additions are incorrect, please notify your healthcare provider.        STOP taking these medications     aspirin (ECOTRIN) 81 MG EC tablet Take 81 mg by mouth once daily.    fluticasone-salmeterol 250-50 mcg/dose (ADVAIR) 250-50 mcg/dose diskus inhaler Inhale 1 puff into the lungs 2 (two) times daily.           Verify that the below list of medications is an accurate representation of the medications you are currently taking.  If none reported, the list may be blank. If incorrect, please contact your  healthcare provider. Carry this list with you in case of emergency.           Current Medications     albuterol 90 mcg/actuation inhaler Inhale 2 puffs into the lungs every 6 (six) hours as needed for Wheezing.    amlodipine (NORVASC) 2.5 MG tablet Take 2.5 mg by mouth once daily.    aspirin 325 MG tablet Take 325 mg by mouth once daily.    benzonatate (TESSALON) 200 MG capsule Take 200 mg by mouth 3 (three) times daily as needed for Cough.    blood sugar diagnostic (TRUE METRIX GLUCOSE TEST STRIP) Strp Test blood sugar 3 times daily    blood-glucose meter (TRUE METRIX AIR GLUCOSE METER) Misc Test blood sugar 3 times daily    fluconazole (DIFLUCAN) 100 MG tablet Two tabs po on day one then one tab po daily    fluticasone (FLONASE) 50 mcg/actuation nasal spray 1 spray by Each Nare route once daily.    furosemide (LASIX) 20 MG tablet Take 1 tablet (20 mg total) by mouth once daily.    insulin aspart (NOVOLOG) 100 unit/mL InPn pen Inject 1-10 Units into the skin before meals and at bedtime as needed (Hyperglycemia).    lancets 28 gauge Misc 1 lancet by Misc.(Non-Drug; Combo Route) route 3 (three) times daily. True Metrix lancets    metformin (GLUCOPHAGE) 1000 MG tablet Take 1 tablet (1,000 mg total) by mouth 2 (two) times daily with meals.    multivitamin capsule Take 1 capsule by mouth once daily.    PIPERACILLIN SODIUM/TAZOBACTAM (PIPERACILLIN-TAZOBACTAM 4.5G/100ML D5W IVPB, READY TO MIX,) Inject 100 mLs (4.5 g total) into the vein every 8 (eight) hours.    predniSONE (DELTASONE) 20 MG tablet Take 20 mg by mouth once daily. Take two tablets daily    ramipril (ALTACE) 2.5 MG capsule Take 1 capsule (2.5 mg total) by mouth once daily.    rifabutin (MYCOBUTIN) 150 mg Cap Take 1 capsule (150 mg total) by mouth every 12 (twelve) hours.    rifAMpin (RIFADIN) 300 MG capsule Take 300 mg by mouth once daily.           Clinical Reference Information           Your Vitals Were     BP Pulse Temp Resp Height Weight    130/78 (BP  "Location: Right arm, Patient Position: Sitting, BP Method: Automatic) 108 96.3 °F (35.7 °C) (Oral) 24 6' 7" (2.007 m) 78 kg (171 lb 15.3 oz)    SpO2 BMI             98% 19.37 kg/m2         Blood Pressure          Most Recent Value    BP  130/78      Allergies as of 3/6/2017     No Known Allergies      Immunizations Administered on Date of Encounter - 3/6/2017     None      [facility] Sign-Up     Activating your [facility] account is as easy as 1-2-3!     1) Visit [email or web address], select Sign Up Now, enter this activation code and your date of birth, then select Next.  [email or web address]  Expires: 3/19/2017  3:35 PM      2) Create a username and password to use when you visit [facility] in the future and select a security question in case you lose your password and select Next.    3) Enter your e-mail address and click Sign Up!    Additional Information  If you have questions, please e-mail [email or web address] or call [phone] to talk to our [facility] staff. Remember, [facility] is NOT to be used for urgent needs. For medical emergencies, dial 911.         Instructions    Pt will see Dr Valera on 3/13/17 @ 2 PM. Will f/u in priority care clinic 3/15/17, and get a CBC the same day of this appt. [facility] will come out this afternoon for dressing change       Language Assistance Services     ATTENTION: Language assistance services are available, free of charge. Please call [phone].      ATENCIÓN: Si habla español, tiene a richardson disposición servicios gratuitos de asistencia lingüística. Llame al [phone].     CHÚ Ý: N?u b?n nói Ti?ng Vi?t, có các d?ch v? h? tr? ngôn ng? mi?n phí dành cho b?n. G?i s? [phone].         [facility] complies with applicable Federal civil rights laws and does not discriminate on the basis of race, color, national origin, age, disability, or sex.        "

## 2017-03-06 NOTE — PATIENT INSTRUCTIONS
Pt will see Dr Valera on 3/13/17 @ 2 PM. Will f/u in priority care clinic 3/15/17, and get a CBC the same day of this appt. [facility] will come out this afternoon for dressing change

## 2017-03-10 ENCOUNTER — TELEPHONE (OUTPATIENT)
Dept: FAMILY MEDICINE | Facility: CLINIC | Age: 77
End: 2017-03-10

## 2017-03-10 RX ORDER — PEN NEEDLE, DIABETIC 31 GX5/16"
NEEDLE, DISPOSABLE MISCELLANEOUS
Refills: 5 | COMMUNITY
Start: 2017-02-05

## 2017-03-10 NOTE — TELEPHONE ENCOUNTER
----- Message from Cristina Thomas sent at 3/10/2017  3:09 PM CST -----  Contact: Viki Drew PTA / Ernst MARIN is requesting a call back at [phone].  BP is great and  HR is 104 (resting) and pt denies any chest pains or shortness of Breath.    Thanks

## 2017-03-10 NOTE — TELEPHONE ENCOUNTER
Left message to voicemail [phone] to return call to clinic re: patient's heart rate      Spoke with patient and he states that he is having a little shortness of breath, nothing out of the normal. He states that he was working out at the time of the reading.     Please advise

## 2017-03-13 ENCOUNTER — OFFICE VISIT (OUTPATIENT)
Dept: INFECTIOUS DISEASES | Facility: CLINIC | Age: 77
End: 2017-03-13
Payer: MEDICARE

## 2017-03-13 ENCOUNTER — LAB VISIT (OUTPATIENT)
Dept: LAB | Facility: HOSPITAL | Age: 77
End: 2017-03-13
Attending: INTERNAL MEDICINE
Payer: MEDICARE

## 2017-03-13 VITALS
HEART RATE: 114 BPM | DIASTOLIC BLOOD PRESSURE: 80 MMHG | TEMPERATURE: 98 F | BODY MASS INDEX: 20.56 KG/M2 | WEIGHT: 177.69 LBS | HEIGHT: 78 IN | SYSTOLIC BLOOD PRESSURE: 137 MMHG

## 2017-03-13 DIAGNOSIS — I50.22 CHRONIC SYSTOLIC HEART FAILURE: ICD-10-CM

## 2017-03-13 DIAGNOSIS — J18.9 PNEUMONIA DUE TO INFECTIOUS ORGANISM, UNSPECIFIED LATERALITY, UNSPECIFIED PART OF LUNG: ICD-10-CM

## 2017-03-13 DIAGNOSIS — A31.0 PULMONARY MYCOBACTERIUM AVIUM COMPLEX (MAC) INFECTION: ICD-10-CM

## 2017-03-13 DIAGNOSIS — A31.0 PULMONARY MYCOBACTERIUM AVIUM COMPLEX (MAC) INFECTION: Primary | ICD-10-CM

## 2017-03-13 LAB
ALBUMIN SERPL BCP-MCNC: 2.6 G/DL
ALP SERPL-CCNC: 83 U/L
ALT SERPL W/O P-5'-P-CCNC: 16 U/L
ANION GAP SERPL CALC-SCNC: 12 MMOL/L
AST SERPL-CCNC: 21 U/L
BASOPHILS # BLD AUTO: 0.08 K/UL
BASOPHILS NFR BLD: 0.7 %
BILIRUB SERPL-MCNC: 0.2 MG/DL
BUN SERPL-MCNC: 8 MG/DL
CALCIUM SERPL-MCNC: 9.3 MG/DL
CHLORIDE SERPL-SCNC: 99 MMOL/L
CO2 SERPL-SCNC: 26 MMOL/L
CREAT SERPL-MCNC: 0.8 MG/DL
CRP SERPL-MCNC: 67 MG/L
DIFFERENTIAL METHOD: ABNORMAL
EOSINOPHIL # BLD AUTO: 0.6 K/UL
EOSINOPHIL NFR BLD: 5.7 %
ERYTHROCYTE [DISTWIDTH] IN BLOOD BY AUTOMATED COUNT: 19.1 %
ERYTHROCYTE [SEDIMENTATION RATE] IN BLOOD BY WESTERGREN METHOD: 114 MM/HR
EST. GFR  (AFRICAN AMERICAN): >60 ML/MIN/1.73 M^2
EST. GFR  (NON AFRICAN AMERICAN): >60 ML/MIN/1.73 M^2
GLUCOSE SERPL-MCNC: 280 MG/DL
HCT VFR BLD AUTO: 30.2 %
HGB BLD-MCNC: 9.6 G/DL
LYMPHOCYTES # BLD AUTO: 2.3 K/UL
LYMPHOCYTES NFR BLD: 21 %
MCH RBC QN AUTO: 29 PG
MCHC RBC AUTO-ENTMCNC: 31.8 %
MCV RBC AUTO: 91 FL
MONOCYTES # BLD AUTO: 1 K/UL
MONOCYTES NFR BLD: 9.4 %
NEUTROPHILS # BLD AUTO: 6.8 K/UL
NEUTROPHILS NFR BLD: 62.8 %
PLATELET # BLD AUTO: 436 K/UL
PMV BLD AUTO: 9.4 FL
POTASSIUM SERPL-SCNC: 4.2 MMOL/L
PROT SERPL-MCNC: 7.5 G/DL
RBC # BLD AUTO: 3.31 M/UL
SODIUM SERPL-SCNC: 137 MMOL/L
WBC # BLD AUTO: 10.89 K/UL

## 2017-03-13 PROCEDURE — 1159F MED LIST DOCD IN RCRD: CPT | Mod: S$GLB,,, | Performed by: INTERNAL MEDICINE

## 2017-03-13 PROCEDURE — 1160F RVW MEDS BY RX/DR IN RCRD: CPT | Mod: S$GLB,,, | Performed by: INTERNAL MEDICINE

## 2017-03-13 PROCEDURE — 1157F ADVNC CARE PLAN IN RCRD: CPT | Mod: S$GLB,,, | Performed by: INTERNAL MEDICINE

## 2017-03-13 PROCEDURE — 1126F AMNT PAIN NOTED NONE PRSNT: CPT | Mod: S$GLB,,, | Performed by: INTERNAL MEDICINE

## 2017-03-13 PROCEDURE — 3079F DIAST BP 80-89 MM HG: CPT | Mod: S$GLB,,, | Performed by: INTERNAL MEDICINE

## 2017-03-13 PROCEDURE — 99215 OFFICE O/P EST HI 40 MIN: CPT | Mod: S$GLB,,, | Performed by: INTERNAL MEDICINE

## 2017-03-13 PROCEDURE — 3075F SYST BP GE 130 - 139MM HG: CPT | Mod: S$GLB,,, | Performed by: INTERNAL MEDICINE

## 2017-03-13 PROCEDURE — 99999 PR PBB SHADOW E&M-EST. PATIENT-LVL III: CPT | Mod: PBBFAC,,, | Performed by: INTERNAL MEDICINE

## 2017-03-13 RX ORDER — RAMIPRIL 2.5 MG/1
2.5 CAPSULE ORAL DAILY
Qty: 30 CAPSULE | Refills: 4 | Status: SHIPPED | OUTPATIENT
Start: 2017-03-13 | End: 2017-08-08 | Stop reason: SDUPTHER

## 2017-03-13 RX ORDER — RIFABUTIN 150 MG/1
150 CAPSULE ORAL EVERY 12 HOURS
Qty: 60 CAPSULE | Refills: 11 | Status: SHIPPED | OUTPATIENT
Start: 2017-03-13 | End: 2017-12-21 | Stop reason: ALTCHOICE

## 2017-03-13 RX ORDER — BENZONATATE 200 MG/1
200 CAPSULE ORAL 3 TIMES DAILY PRN
Qty: 30 CAPSULE | Refills: 1 | Status: SHIPPED | OUTPATIENT
Start: 2017-03-13 | End: 2017-07-05

## 2017-03-13 RX ORDER — AZITHROMYCIN 250 MG/1
TABLET, FILM COATED ORAL
Qty: 30 TABLET | Refills: 11 | Status: SHIPPED | OUTPATIENT
Start: 2017-03-13 | End: 2017-03-15

## 2017-03-13 RX ORDER — AMLODIPINE BESYLATE 2.5 MG/1
2.5 TABLET ORAL DAILY
Qty: 30 TABLET | Refills: 11 | Status: SHIPPED | OUTPATIENT
Start: 2017-03-13 | End: 2018-03-15 | Stop reason: SDUPTHER

## 2017-03-13 RX ORDER — MOXIFLOXACIN HYDROCHLORIDE 400 MG/1
400 TABLET ORAL DAILY
Qty: 30 TABLET | Refills: 11 | Status: SHIPPED | OUTPATIENT
Start: 2017-03-13 | End: 2017-12-21 | Stop reason: ALTCHOICE

## 2017-03-13 NOTE — MR AVS SNAPSHOT
[facility]  [address]  [place] [address]  Phone: [phone]  Fax: [phone]                  Regan Alvarez   3/13/2017 2:00 PM   Office Visit    Description:  Male : 1940   Provider:  Getachew Valera MD   Department:  [facility]           Reason for Visit     Hospital Follow Up           Diagnoses this Visit        Comments    Pulmonary Mycobacterium avium complex (MAC) infection    -  Primary     Chronic systolic heart failure                To Do List           Future Appointments        Provider Department Dept Phone    3/15/2017 11:00 AM Ruma Morris NP [facility] [phone]      Goals (5 Years of Data)     None       These Medications        Disp Refills Start End    ramipril (ALTACE) 2.5 MG capsule 30 capsule 4 3/13/2017 3/13/2018    Take 1 capsule (2.5 mg total) by mouth once daily. - Oral    Pharmacy: [facility]/pharmacy #[id number] [place] [address]. Ph #: [phone]       amlodipine (NORVASC) 2.5 MG tablet 30 tablet 11 3/13/2017     Take 1 tablet (2.5 mg total) by mouth once daily. - Oral    Pharmacy: [facility]/pharmacy #[place]. Ph #: [phone]       benzonatate (TESSALON) 200 MG capsule 30 capsule 1 3/13/2017     Take 1 capsule (200 mg total) by mouth 3 (three) times daily as needed for Cough. - Oral    Pharmacy: [facility]/pharmacy #[place]. Ph #: [phone]       rifabutin (MYCOBUTIN) 150 mg Cap 60 capsule 11 3/13/2017 3/13/2018    Take 1 capsule (150 mg total) by mouth every 12 (twelve) hours. - Oral    Pharmacy: [facility]/pharmacy #[place]. Ph #: [phone]       azithromycin (ZITHROMAX) 250 MG tablet 30 tablet 11 3/13/2017 3/18/2017    Take one tablet daily with food    Pharmacy: [facility]/pharmacy #[place]. Ph #: [phone]       moxifloxacin (AVELOX) 400 mg tablet 30 tablet  11 3/13/2017 3/23/2018    Take 1 tablet (400 mg total) by mouth once daily. - Oral    Pharmacy: [facility]/pharmacy #[id number] - [place] - [address] [facility].  #: [phone]         [facility] On Call     [facility] On Call Nurse Care Line - 24/7 Assistance  Registered nurses in the [facility] On Call Center provide clinical advisement, health education, appointment booking, and other advisory services.  Call for this free service at [phone].             Medications           Message regarding Medications     Verify the changes and/or additions to your medication regime listed below are the same as discussed with your clinician today.  If any of these changes or additions are incorrect, please notify your healthcare provider.        START taking these NEW medications        Refills    azithromycin (ZITHROMAX) 250 MG tablet 11    Sig: Take one tablet daily with food    Class: Normal    moxifloxacin (AVELOX) 400 mg tablet 11    Sig: Take 1 tablet (400 mg total) by mouth once daily.    Class: Normal    Route: Oral      CHANGE how you are taking these medications     Start Taking Instead of    amlodipine (NORVASC) 2.5 MG tablet amlodipine (NORVASC) 2.5 MG tablet    Dosage:  Take 1 tablet (2.5 mg total) by mouth once daily. Dosage:  Take 2.5 mg by mouth once daily.    Reason for Change:  Reorder     benzonatate (TESSALON) 200 MG capsule benzonatate (TESSALON) 200 MG capsule    Dosage:  Take 1 capsule (200 mg total) by mouth 3 (three) times daily as needed for Cough. Dosage:  Take 200 mg by mouth 3 (three) times daily as needed for Cough.    Reason for Change:  Reorder       STOP taking these medications     rifAMpin (RIFADIN) 300 MG capsule Take 300 mg by mouth once daily.    PIPERACILLIN SODIUM/TAZOBACTAM (PIPERACILLIN-TAZOBACTAM 4.5G/100ML D5W IVPB, READY TO MIX,) Inject 100 mLs (4.5 g total) into the vein every 8 (eight) hours.           Verify that the below list of medications is an accurate representation of the  "medications you are currently taking.  If none reported, the list may be blank. If incorrect, please contact your healthcare provider. Carry this list with you in case of emergency.           Current Medications     albuterol 90 mcg/actuation inhaler Inhale 2 puffs into the lungs every 6 (six) hours as needed for Wheezing.    amlodipine (NORVASC) 2.5 MG tablet Take 1 tablet (2.5 mg total) by mouth once daily.    aspirin 325 MG tablet Take 325 mg by mouth once daily.    BD ULTRA-FINE TOMÁS PEN NEEDLES 32 gauge x 5/32" Ndle USE QID UTD    benzonatate (TESSALON) 200 MG capsule Take 1 capsule (200 mg total) by mouth 3 (three) times daily as needed for Cough.    blood sugar diagnostic (TRUE METRIX GLUCOSE TEST STRIP) Strp Test blood sugar 3 times daily    blood-glucose meter (TRUE METRIX AIR GLUCOSE METER) Misc Test blood sugar 3 times daily    fluconazole (DIFLUCAN) 100 MG tablet Two tabs po on day one then one tab po daily    fluticasone (FLONASE) 50 mcg/actuation nasal spray 1 spray by Each Nare route once daily.    furosemide (LASIX) 20 MG tablet Take 1 tablet (20 mg total) by mouth once daily.    insulin aspart (NOVOLOG) 100 unit/mL InPn pen Inject 1-10 Units into the skin before meals and at bedtime as needed (Hyperglycemia).    lancets 28 gauge Misc 1 lancet by Misc.(Non-Drug; Combo Route) route 3 (three) times daily. True Metrix lancets    metformin (GLUCOPHAGE) 1000 MG tablet Take 1 tablet (1,000 mg total) by mouth 2 (two) times daily with meals.    multivitamin capsule Take 1 capsule by mouth once daily.    predniSONE (DELTASONE) 20 MG tablet Take 20 mg by mouth once daily. Take two tablets daily    ramelteon (ROZEREM) 8 mg tablet Take 1 tablet (8 mg total) by mouth every evening.    ramipril (ALTACE) 2.5 MG capsule Take 1 capsule (2.5 mg total) by mouth once daily.    rifabutin (MYCOBUTIN) 150 mg Cap Take 1 capsule (150 mg total) by mouth every 12 (twelve) hours.    azithromycin (ZITHROMAX) 250 MG tablet Take " "one tablet daily with food    moxifloxacin (AVELOX) 400 mg tablet Take 1 tablet (400 mg total) by mouth once daily.           Clinical Reference Information           Your Vitals Were     BP Pulse Temp Height Weight BMI    137/80 (BP Location: Left arm, Patient Position: Sitting) 114 97.6 °F (36.4 °C) (Oral) 6' 7" (2.007 m) 80.6 kg (177 lb 11.1 oz) 20.02 kg/m2      Blood Pressure          Most Recent Value    BP  137/80      Allergies as of 3/13/2017     No Known Allergies      Immunizations Administered on Date of Encounter - 3/13/2017     None      [facility] Sign-Up     Activating your [facility] account is as easy as 1-2-3!     1) Visit [email or web address], select Sign Up Now, enter this activation code and your date of birth, then select Next.  [email or web address]  Expires: 3/19/2017  4:35 PM      2) Create a username and password to use when you visit [facility] in the future and select a security question in case you lose your password and select Next.    3) Enter your e-mail address and click Sign Up!    Additional Information  If you have questions, please e-mail [email or web address] or call [phone] to talk to our [facility] staff. Remember, [facility] is NOT to be used for urgent needs. For medical emergencies, dial 911.         Language Assistance Services     ATTENTION: Language assistance services are available, free of charge. Please call [phone].      ATENCIÓN: Si habla español, tiene a richardson disposición servicios gratuitos de asistencia lingüística. Llame al [phone].     SONJA Ý: N?u b?n nói Ti?ng Vi?t, có các d?ch v? h? tr? ngôn ng? mi?n phí dành cho b?n. G?i s? [phone].         [facility] complies with applicable Federal civil rights laws and does not discriminate on the basis of race, color, national origin, age, disability, or sex.        "

## 2017-03-13 NOTE — PROGRESS NOTES
Subjective:      Patient ID: Regan Alvarez is a 76 y.o. male.    Chief Complaint:Hospital Follow Up      History of Present Illness    Pt was admitted from Feb 21-26 to [facility] for pneumonia, superimposed on COPD/chronic MAC infection. Respiratory cultures grew pseudomonas aeruginosa and he was treated with two additional weeks of IV zosyn at home via PICC line. Returns today for f/u on this. Last dose of zosyn was 3 days ago.    His MAC antibiotics have been changed, I believe inadvertently during the hospitalization process. He is still on moxifloxacin and rifabutin but azithromycin was stopped an some indeterminate time. His clinic records reflects that he is on rifampin when he is in fact on rifabutin 150 mg bid (per pill bottle daughter has in her possession). He has been on the moxi/rifabutin/azithro regimen starting on 3/29/16 based on the most current susceptibility profile I had at that time. I have not seen him in clinic since 9/14/16.     He feels much better. Says no fever, diminished cough with little phlegm production.  CBC today:  Lab Results   Component Value Date    WBC 10.89 03/13/2017    HGB 9.6 (L) 03/13/2017    HCT 30.2 (L) 03/13/2017    MCV 91 03/13/2017     (H) 03/13/2017     CRP 4.6 on 9/14/16, and 346 on 1/27/17, today 67.    CMP shows albumin improved from 1.8 to 2.6, glucose 280 and other chemistries normal.    Review of Systems   Constitution: Positive for weakness and weight loss. Negative for chills, decreased appetite, fever, malaise/fatigue, night sweats and weight gain.   HENT: Negative for congestion, ear pain, headaches, hearing loss, hoarse voice, sore throat and tinnitus.    Eyes: Negative for blurred vision, redness and visual disturbance.   Cardiovascular: Negative for chest pain, leg swelling and palpitations.   Respiratory: Positive for cough and shortness of breath. Negative for hemoptysis and sputum production.    Hematologic/Lymphatic: Negative for  adenopathy. Does not bruise/bleed easily.   Skin: Negative for dry skin, itching, rash and suspicious lesions.   Musculoskeletal: Positive for joint pain. Negative for back pain, myalgias and neck pain.   Gastrointestinal: Positive for heartburn. Negative for abdominal pain, constipation, diarrhea, nausea and vomiting.   Genitourinary: Negative for dysuria, flank pain, frequency, hematuria, hesitancy and urgency.   Neurological: Positive for dizziness. Negative for numbness and paresthesias.   Psychiatric/Behavioral: Negative for depression and memory loss. The patient has insomnia. The patient is not nervous/anxious.      Objective:   Physical Exam  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 Chronic systolic heart failure    3. Intercurrent pseudomonas pneumonia due to infectious organism, improved    4. Uncontrolled type 2 diabetes mellitus          Plan:        1. Add azithromycin 250 mg daily back to rx    2. Continue rifabutin and moxifloxacin   3. DC PICC line   4. Has f/u appt with his PCP (Dr. Adamson) for DM control (he has stopped his insulin)   5. Has f/u appt with pulmonary (Dr. Velez)   6. See me back in 6 weeks with CBC, CMP, ESR, CRP, A1c

## 2017-03-14 LAB
CD3+CD4+ CELLS # BLD: 1061 CELLS/UL (ref 300–1400)
CD3+CD4+ CELLS NFR BLD: 49.6 % (ref 28–57)

## 2017-03-15 ENCOUNTER — OFFICE VISIT (OUTPATIENT)
Dept: PRIMARY CARE CLINIC | Facility: CLINIC | Age: 77
End: 2017-03-15
Payer: MEDICARE

## 2017-03-15 ENCOUNTER — DOCUMENTATION ONLY (OUTPATIENT)
Dept: PRIMARY CARE CLINIC | Facility: CLINIC | Age: 77
End: 2017-03-15

## 2017-03-15 VITALS
DIASTOLIC BLOOD PRESSURE: 60 MMHG | SYSTOLIC BLOOD PRESSURE: 106 MMHG | HEART RATE: 109 BPM | OXYGEN SATURATION: 95 % | TEMPERATURE: 98 F | BODY MASS INDEX: 19.82 KG/M2 | WEIGHT: 175.94 LBS

## 2017-03-15 DIAGNOSIS — E11.8 TYPE 2 DIABETES MELLITUS WITH COMPLICATION, WITHOUT LONG-TERM CURRENT USE OF INSULIN: ICD-10-CM

## 2017-03-15 DIAGNOSIS — A31.0 PULMONARY MYCOBACTERIUM AVIUM COMPLEX (MAC) INFECTION: Chronic | ICD-10-CM

## 2017-03-15 DIAGNOSIS — I10 ESSENTIAL HYPERTENSION: ICD-10-CM

## 2017-03-15 DIAGNOSIS — J18.9 PNEUMONIA OF RIGHT LOWER LOBE DUE TO INFECTIOUS ORGANISM: ICD-10-CM

## 2017-03-15 DIAGNOSIS — R53.81 PHYSICAL DECONDITIONING: Primary | ICD-10-CM

## 2017-03-15 PROCEDURE — 99213 OFFICE O/P EST LOW 20 MIN: CPT | Mod: S$GLB,,, | Performed by: NURSE PRACTITIONER

## 2017-03-15 PROCEDURE — 99999 PR PBB SHADOW E&M-EST. PATIENT-LVL III: CPT | Mod: PBBFAC,,, | Performed by: NURSE PRACTITIONER

## 2017-03-15 PROCEDURE — 1160F RVW MEDS BY RX/DR IN RCRD: CPT | Mod: S$GLB,,, | Performed by: NURSE PRACTITIONER

## 2017-03-15 PROCEDURE — 1159F MED LIST DOCD IN RCRD: CPT | Mod: S$GLB,,, | Performed by: NURSE PRACTITIONER

## 2017-03-15 PROCEDURE — 1126F AMNT PAIN NOTED NONE PRSNT: CPT | Mod: S$GLB,,, | Performed by: NURSE PRACTITIONER

## 2017-03-15 PROCEDURE — 1157F ADVNC CARE PLAN IN RCRD: CPT | Mod: S$GLB,,, | Performed by: NURSE PRACTITIONER

## 2017-03-15 PROCEDURE — 3074F SYST BP LT 130 MM HG: CPT | Mod: S$GLB,,, | Performed by: NURSE PRACTITIONER

## 2017-03-15 PROCEDURE — 3078F DIAST BP <80 MM HG: CPT | Mod: S$GLB,,, | Performed by: NURSE PRACTITIONER

## 2017-03-15 RX ORDER — AZITHROMYCIN 250 MG/1
250 TABLET, FILM COATED ORAL DAILY
Qty: 30 TABLET | Refills: 0 | Status: SHIPPED | OUTPATIENT
Start: 2017-03-15 | End: 2017-03-24

## 2017-03-15 RX ORDER — FUROSEMIDE 40 MG/1
20 TABLET ORAL DAILY
COMMUNITY
End: 2017-03-27 | Stop reason: DRUGHIGH

## 2017-03-15 RX ORDER — GLIPIZIDE 5 MG/1
5 TABLET ORAL
COMMUNITY
End: 2017-10-02 | Stop reason: ALTCHOICE

## 2017-03-15 NOTE — MR AVS SNAPSHOT
[facility] [address]  [address]  [place] [address]  Phone: [phone]  Fax: [phone]                  Regan Alvarez   3/15/2017 11:00 AM   Office Visit    Description:  Male : 1940   Provider:  Ruma Morris NP   Department:  [facility]           Diagnoses this Visit        Comments    Physical deconditioning    -  Primary     Pulmonary Mycobacterium avium complex (MAC) infection         Pneumonia of right lower lobe due to infectious organism         Essential hypertension         Type 2 diabetes mellitus with complication, without long-term current use of insulin                To Do List           Future Appointments        Provider Department Dept Phone    3/24/2017 11:40 AM Ishaan Adamson MD [facility] [phone]    2017 1:30 PM LAB, APPOINTMENT [place] [facility] [phone]    2017 2:00 PM Getachew Valera MD [facility] [phone]      Goals (5 Years of Data)     None       These Medications        Disp Refills Start End    azithromycin (Z-JOSE) 250 MG tablet 30 tablet 0 3/15/2017 2017    Take 1 tablet (250 mg total) by mouth once daily. - Oral    Pharmacy: [id number] [place] [facility] Ph #: [phone]         [facility] On Call     [facility] On Call Nurse Care Line -  Assistance  Registered nurses in the [facility] On Call Center provide clinical advisement, health education, appointment booking, and other advisory services.  Call for this free service at [phone].             Medications           Message regarding Medications     Verify the changes and/or additions to your medication regime listed below are the same as discussed with your clinician today.  If any of these changes or additions are incorrect, please notify your healthcare provider.        START taking these NEW  "medications        Refills    azithromycin (Z-JOSE) 250 MG tablet 0    Sig: Take 1 tablet (250 mg total) by mouth once daily.    Class: Normal    Route: Oral      STOP taking these medications     predniSONE (DELTASONE) 20 MG tablet Take 20 mg by mouth once daily. Take two tablets daily    ramelteon (ROZEREM) 8 mg tablet Take 1 tablet (8 mg total) by mouth every evening.    fluconazole (DIFLUCAN) 100 MG tablet Two tabs po on day one then one tab po daily           Verify that the below list of medications is an accurate representation of the medications you are currently taking.  If none reported, the list may be blank. If incorrect, please contact your healthcare provider. Carry this list with you in case of emergency.           Current Medications     albuterol 90 mcg/actuation inhaler Inhale 2 puffs into the lungs every 6 (six) hours as needed for Wheezing.    amlodipine (NORVASC) 2.5 MG tablet Take 1 tablet (2.5 mg total) by mouth once daily.    BD ULTRA-FINE TOMÁS PEN NEEDLES 32 gauge x 5/32" Ndle USE QID UTD    benzonatate (TESSALON) 200 MG capsule Take 1 capsule (200 mg total) by mouth 3 (three) times daily as needed for Cough.    blood sugar diagnostic (TRUE METRIX GLUCOSE TEST STRIP) Strp Test blood sugar 3 times daily    blood-glucose meter (TRUE METRIX AIR GLUCOSE METER) Misc Test blood sugar 3 times daily    furosemide (LASIX) 40 MG tablet Take 40 mg by mouth once daily.    glipiZIDE (GLUCOTROL) 5 MG tablet Take 5 mg by mouth 2 (two) times daily before meals.    insulin aspart (NOVOLOG) 100 unit/mL InPn pen Inject 1-10 Units into the skin before meals and at bedtime as needed (Hyperglycemia).    lancets 28 gauge Misc 1 lancet by Misc.(Non-Drug; Combo Route) route 3 (three) times daily. True Metrix lancets    metformin (GLUCOPHAGE) 1000 MG tablet Take 1 tablet (1,000 mg total) by mouth 2 (two) times daily with meals.    moxifloxacin (AVELOX) 400 mg tablet Take 1 tablet (400 mg total) by mouth once daily.    " multivitamin capsule Take 1 capsule by mouth once daily.    ramipril (ALTACE) 2.5 MG capsule Take 1 capsule (2.5 mg total) by mouth once daily.    rifabutin (MYCOBUTIN) 150 mg Cap Take 1 capsule (150 mg total) by mouth every 12 (twelve) hours.    aspirin 325 MG tablet Take 325 mg by mouth once daily.    azithromycin (Z-JOSE) 250 MG tablet Take 1 tablet (250 mg total) by mouth once daily.    fluticasone (FLONASE) 50 mcg/actuation nasal spray 1 spray by Each Nare route once daily.           Clinical Reference Information           Your Vitals Were     BP Pulse Temp Weight SpO2 BMI    106/60 (BP Location: Left arm, Patient Position: Sitting, BP Method: Manual) 109 98.4 °F (36.9 °C) (Oral) 79.8 kg (175 lb 14.8 oz) 95% 19.82 kg/m2      Blood Pressure          Most Recent Value    BP  106/60      Allergies as of 3/15/2017     No Known Allergies      Immunizations Administered on Date of Encounter - 3/15/2017     None      [facility] Sign-Up     Activating your [facility] account is as easy as 1-2-3!     1) Visit [email or web address], select Sign Up Now, enter this activation code and your date of birth, then select Next.  [email or web address]  Expires: 3/19/2017  4:35 PM      2) Create a username and password to use when you visit [facility] in the future and select a security question in case you lose your password and select Next.    3) Enter your e-mail address and click Sign Up!    Additional Information  If you have questions, please e-mail [email or web address] or call [phone] to talk to our [facility] staff. Remember, [facility] is NOT to be used for urgent needs. For medical emergencies, dial 911.         Language Assistance Services     ATTENTION: Language assistance services are available, free of charge. Please call [phone].      ATENCIÓN: Si habla español, tiene a richardson disposición servicios gratuitos de asistencia lingüística. Llame al [phone].     CHÚ Ý: N?u b?n nói Ti?ng Vi?t, có các d?ch v? h? tr? ngôn  ng? mi?n phí dành cho b?n. G?i s? [phone].         [facility] complies with applicable Federal civil rights laws and does not discriminate on the basis of race, color, national origin, age, disability, or sex.

## 2017-03-15 NOTE — PROGRESS NOTES
I called the patient and left a message on his home voicemail regarding the appt he has with Dr. Ugalde on March 21st at 3 PM  Let it be noted, a reminder letter was previously sent out. Today another letter was sent out as well.

## 2017-03-15 NOTE — PROGRESS NOTES
[facility]  Follow-up Visit Progress Note     PRESENTING HISTORY     PCP: Ishaan Adamson MD  Chief Complaint/Reason for Visit:  Follow up visit from recent visit.  Last [facility] Visit: 3/6/17    No chief complaint on file.      Recent Hospitalization  Admission Date: 2/21/2017  Hospital Length of Stay: 5 days  Discharge Date and Time:  02/26/2017 8:31 AM  History of Present Illness:  Mr. Regan Alvarez is a 76 y.o. male.77 y/o male recently discharged home after admission for pneumonia with COPD. Patient was not doing well at home. He continued to have progressively worsening shortness of breath. He has continued to experience generalized weakness and fatigue. This was a problem at discharge but he was adamantly wanting to go home during the previous hospitalization. He was evaluated by his primary care physician and urged to come to the ED for further evaluation and treatment. While in the ED routine laboratory studies and chest x-ray were obtained. There was evidence of acute renal failure as well as unresolved right lower lobe pneumonia. Hospital medicine has been asked to admit again for both of these issues.   Hospital Course:   77 y/o with COPD with recent admission for PNA and discharged home who presented with weakness and SOB. Pt with CXR that was not improved from recent admission and noted to be in ARF. Pt started on empiric antibiotics for pneumonia, on IVF for ARF and consult placed to Nephrology, ID and Pulm. Pt had CT of chest with new right infiltrate and old RUL infiltrate. Pt s/p bronch on 2/23 with removal of copious amount of secretions and sputum culture with multi-drug resistant Pseudomonas.Patient had a Picc line placed and ID wrote home health orders for 2 weeks of IV Zosyn. The rest of the hospital course was unremarkable and the patient will be discharged to home. Activity as tolerated. Diet- low NA, ADA 1800 babita diet.      [facility] Follow up  Today  3/15/17  Arrived to clinic with Daughter. HH PT/OT ended last week. .Overall doing well and feels like he have more energy. Reports chronic cough not more than usual. Denies fever or chills. Was seen by ID on 3/3/17 for pulmonary MAC infection where ID added azithromycin 250 mg daily back to regimen with rifabutin and moxifloxacin. Daughter reports patient have not smoke since left the hospital.     3/6/17  Returns to clinic post hospitalization, He was started on empiric abx as his CTa chest with new right infiltrate and old RUL infiltrate. Pt s/p bronch on 2/23 with removal of copious amount of secretions and sputum culture with multi-drug resistant Pseudomonas. His daughter tells me he has been receiving his IV abx which they will continue to administer through the 10th. He reports feeling much better since the bronch and receiving the ABX. His last day is fue Friday, was scheduled for 2 weeks; RU  PICC    Review of Systems:  Review of Systems   Constitutional: Positive for malaise/fatigue. Negative for chills and fever.   HENT: Negative.    Eyes: Negative.    Respiratory: Positive for cough and sputum production (no change from his chronic cough and sputum production).    Cardiovascular: Negative.    Gastrointestinal: Negative.    Genitourinary: Negative.    Musculoskeletal: Negative.    Skin: Negative.    Neurological: Positive for weakness.   Psychiatric/Behavioral: Negative.          PAST HISTORY:     Past Medical History:   Diagnosis Date    Arthritis     COPD (chronic obstructive pulmonary disease)     Diabetes mellitus type II     Hypertension     Tuberculosis        Past Surgical History:   Procedure Laterality Date    melanoma         Family History   Problem Relation Age of Onset    Cancer Father      Tuberculosis.  Secondary scar was neoplastic.       Social History     Social History    Marital status:      Spouse name: N/A    Number of children: N/A    Years of education: N/A  "    Social History Main Topics    Smoking status: Former Smoker     Types: Cigars    Smokeless tobacco: Not on file      Comment: uses cigars on ocassion    Alcohol use Yes      Comment: socially    Drug use: No    Sexual activity: Not on file     Other Topics Concern    Not on file     Social History Narrative       MEDICATIONS & ALLERGIES:     Current Outpatient Prescriptions on File Prior to Visit   Medication Sig Dispense Refill    albuterol 90 mcg/actuation inhaler Inhale 2 puffs into the lungs every 6 (six) hours as needed for Wheezing. 1 Inhaler 2    amlodipine (NORVASC) 2.5 MG tablet Take 1 tablet (2.5 mg total) by mouth once daily. 30 tablet 11    BD ULTRA-FINE TOMÁS PEN NEEDLES 32 gauge x 5/32" Ndle USE QID UTD  5    benzonatate (TESSALON) 200 MG capsule Take 1 capsule (200 mg total) by mouth 3 (three) times daily as needed for Cough. 30 capsule 1    blood sugar diagnostic (TRUE METRIX GLUCOSE TEST STRIP) Strp Test blood sugar 3 times daily 300 strip 3    blood-glucose meter (TRUE METRIX AIR GLUCOSE METER) Misc Test blood sugar 3 times daily 1 each 0    insulin aspart (NOVOLOG) 100 unit/mL InPn pen Inject 1-10 Units into the skin before meals and at bedtime as needed (Hyperglycemia). 3 mL 0    lancets 28 gauge Misc 1 lancet by Misc.(Non-Drug; Combo Route) route 3 (three) times daily. True Metrix lancets 300 each 3    metformin (GLUCOPHAGE) 1000 MG tablet Take 1 tablet (1,000 mg total) by mouth 2 (two) times daily with meals. 180 tablet 3    moxifloxacin (AVELOX) 400 mg tablet Take 1 tablet (400 mg total) by mouth once daily. 30 tablet 11    multivitamin capsule Take 1 capsule by mouth once daily.      ramipril (ALTACE) 2.5 MG capsule Take 1 capsule (2.5 mg total) by mouth once daily. 30 capsule 4    rifabutin (MYCOBUTIN) 150 mg Cap Take 1 capsule (150 mg total) by mouth every 12 (twelve) hours. 60 capsule 11    aspirin 325 MG tablet Take 325 mg by mouth once daily.      fluticasone " (FLONASE) 50 mcg/actuation nasal spray 1 spray by Each Nare route once daily.      [DISCONTINUED] azithromycin (ZITHROMAX) 250 MG tablet Take one tablet daily with food 30 tablet 11    [DISCONTINUED] fluconazole (DIFLUCAN) 100 MG tablet Two tabs po on day one then one tab po daily 15 tablet 0    [DISCONTINUED] furosemide (LASIX) 20 MG tablet Take 1 tablet (20 mg total) by mouth once daily. 30 tablet 4    [DISCONTINUED] predniSONE (DELTASONE) 20 MG tablet Take 20 mg by mouth once daily. Take two tablets daily      [DISCONTINUED] ramelteon (ROZEREM) 8 mg tablet Take 1 tablet (8 mg total) by mouth every evening. 15 tablet 0     No current facility-administered medications on file prior to visit.         Review of patient's allergies indicates:  No Known Allergies    Medications Reconciliation:   I have reconciled the patient's home medications and discharge medications with the patient/family. I have updated all changes.  Refer to After-Visit Medication List.    OBJECTIVE:     Vital Signs:  Vitals:    03/15/17 1105   BP: 106/60   Pulse: 109   Temp: 98.4 °F (36.9 °C)     Wt Readings from Last 1 Encounters:   03/15/17 1105 79.8 kg (175 lb 14.8 oz)     Body mass index is 19.82 kg/(m^2).     Physical Exam:  Physical Exam   Constitutional: He is oriented to person, place, and time and well-developed, well-nourished, and in no distress. No distress.   HENT:   Head: Normocephalic and atraumatic.   Eyes: Conjunctivae and EOM are normal.   Neck: Normal range of motion. Neck supple.   Cardiovascular: Normal rate and regular rhythm.    No murmur heard.  Pulmonary/Chest: Effort normal and breath sounds normal. No respiratory distress. He has no rales.   Abdominal: Soft. Bowel sounds are normal.   Musculoskeletal: Normal range of motion. He exhibits no edema.   Neurological: He is alert and oriented to person, place, and time. Gait normal.   Skin: Skin is warm and dry.   Psychiatric: Affect normal.         Laboratory  Lab  Results   Component Value Date    WBC 10.89 03/13/2017    HGB 9.6 (L) 03/13/2017    HCT 30.2 (L) 03/13/2017     (H) 03/13/2017    CHOL 110 (L) 01/27/2017    TRIG 110 01/27/2017    HDL 30 (L) 01/27/2017    ALT 16 03/13/2017    AST 21 03/13/2017     03/13/2017    K 4.2 03/13/2017    CL 99 03/13/2017    CREATININE 0.8 03/13/2017    BUN 8 03/13/2017    CO2 26 03/13/2017    TSH 3.853 05/14/2015    PSA 0.32 10/15/2013    INR 1.2 02/21/2017    HGBA1C 9.2 (H) 02/21/2017         Diagnostic Results:    ASSESSMENT & PLAN:   HIGH RISK CONDITION(S):  Pneumonia of right lower lobe due to infectious organism  -SP bronch on 2/23 and culture grew MDR Pseudomonas. Completed course of IV Zosyn (last does 3/11 and PICC removed by ID in clinic 3/14).     Physical deconditioning  -Overall doing much better. Dcd from  PT/OT last week. Following instructions for home exercise given by therapist.     Pulmonary Mycobacterium avium complex (MAC) infection  -Followed closely by Infectious Disease. Dx 3/2016. Daughter was not aware had to be restarted by on Azithromycin  250 mg daily. Rx sent to Pharmacy. Continue rifabutin and moxifloxacin     Essential hypertension  -Controlled.     Type 2 diabetes mellitus with complication, without long-term current use of insulin  Lab Results   Component Value Date    HGBA1C 9.2 (H) 02/21/2017   -Home BS range more so 110- 150's. Patient required correction scale one in the past 1 week. Keep logs and bring to next PCP clinic visit     Other orders  -     azithromycin (Z-JOSE) 250 MG tablet; Take 1 tablet (250 mg total) by mouth once daily.  Dispense: 30 tablet; Refill: 0        Instructions for the patient:      Scheduled Follow-up :  Future Appointments  Date Time Provider Department Center   3/24/2017 11:40 AM Ishaan Adamson MD [facility]   4/24/2017 1:30 PM LAB, APPOINTMENT [facility] LAB [facility]   4/24/2017 2:00 PM Getachew Valera MD [facility] ID [address]  "      After Visit Medication List :     Medication List          This list is accurate as of: 3/15/17  3:36 PM.  Always use your most recent med list.                     albuterol 90 mcg/actuation inhaler   Inhale 2 puffs into the lungs every 6 (six) hours as needed for Wheezing.       amlodipine 2.5 MG tablet   Commonly known as:  NORVASC   Take 1 tablet (2.5 mg total) by mouth once daily.       aspirin 325 MG tablet       azithromycin 250 MG tablet   Commonly known as:  Z-JOSE   Take 1 tablet (250 mg total) by mouth once daily.       BD ULTRA-FINE TOMÁS PEN NEEDLES 32 gauge x 5/32" Ndle   Generic drug:  pen needle, diabetic       benzonatate 200 MG capsule   Commonly known as:  TESSALON   Take 1 capsule (200 mg total) by mouth 3 (three) times daily as needed for Cough.       blood sugar diagnostic Strp   Commonly known as:  TRUE METRIX GLUCOSE TEST STRIP   Test blood sugar 3 times daily       blood-glucose meter Misc   Commonly known as:  TRUE METRIX AIR GLUCOSE METER   Test blood sugar 3 times daily       fluticasone 50 mcg/actuation nasal spray   Commonly known as:  FLONASE       furosemide 40 MG tablet   Commonly known as:  LASIX       glipiZIDE 5 MG tablet   Commonly known as:  GLUCOTROL       insulin aspart 100 unit/mL Inpn pen   Commonly known as:  NovoLOG   Inject 1-10 Units into the skin before meals and at bedtime as needed (Hyperglycemia).       lancets 28 gauge Misc   1 lancet by Misc.(Non-Drug; Combo Route) route 3 (three) times daily. True Metrix lancets       metformin 1000 MG tablet   Commonly known as:  GLUCOPHAGE   Take 1 tablet (1,000 mg total) by mouth 2 (two) times daily with meals.       moxifloxacin 400 mg tablet   Commonly known as:  AVELOX   Take 1 tablet (400 mg total) by mouth once daily.       multivitamin capsule       ramipril 2.5 MG capsule   Commonly known as:  ALTACE   Take 1 capsule (2.5 mg total) by mouth once daily.       rifabutin 150 mg Cap   Commonly known as:  MYCOBUTIN   Take " 1 capsule (150 mg total) by mouth every 12 (twelve) hours.            Where to Get Your Medications      These medications were sent to [id number]  [address] [address]     Phone:  [phone]     azithromycin 250 MG tablet             Signing Physician:  Ruma Morris NP

## 2017-03-24 ENCOUNTER — OFFICE VISIT (OUTPATIENT)
Dept: FAMILY MEDICINE | Facility: CLINIC | Age: 77
End: 2017-03-24
Payer: MEDICARE

## 2017-03-24 VITALS
SYSTOLIC BLOOD PRESSURE: 100 MMHG | TEMPERATURE: 98 F | WEIGHT: 177.5 LBS | DIASTOLIC BLOOD PRESSURE: 64 MMHG | RESPIRATION RATE: 16 BRPM | HEIGHT: 78 IN | OXYGEN SATURATION: 97 % | HEART RATE: 90 BPM | BODY MASS INDEX: 20.54 KG/M2

## 2017-03-24 DIAGNOSIS — E11.65 UNCONTROLLED TYPE 2 DIABETES MELLITUS WITH HYPERGLYCEMIA, WITHOUT LONG-TERM CURRENT USE OF INSULIN: ICD-10-CM

## 2017-03-24 DIAGNOSIS — K21.9 GASTROESOPHAGEAL REFLUX DISEASE, ESOPHAGITIS PRESENCE NOT SPECIFIED: ICD-10-CM

## 2017-03-24 DIAGNOSIS — G47.00 INSOMNIA, UNSPECIFIED TYPE: Primary | ICD-10-CM

## 2017-03-24 PROCEDURE — 1159F MED LIST DOCD IN RCRD: CPT | Mod: S$GLB,,, | Performed by: INTERNAL MEDICINE

## 2017-03-24 PROCEDURE — 3078F DIAST BP <80 MM HG: CPT | Mod: S$GLB,,, | Performed by: INTERNAL MEDICINE

## 2017-03-24 PROCEDURE — 1126F AMNT PAIN NOTED NONE PRSNT: CPT | Mod: S$GLB,,, | Performed by: INTERNAL MEDICINE

## 2017-03-24 PROCEDURE — 99999 PR PBB SHADOW E&M-EST. PATIENT-LVL III: CPT | Mod: PBBFAC,,, | Performed by: INTERNAL MEDICINE

## 2017-03-24 PROCEDURE — 1160F RVW MEDS BY RX/DR IN RCRD: CPT | Mod: S$GLB,,, | Performed by: INTERNAL MEDICINE

## 2017-03-24 PROCEDURE — 3074F SYST BP LT 130 MM HG: CPT | Mod: S$GLB,,, | Performed by: INTERNAL MEDICINE

## 2017-03-24 PROCEDURE — 99214 OFFICE O/P EST MOD 30 MIN: CPT | Mod: S$GLB,,, | Performed by: INTERNAL MEDICINE

## 2017-03-24 PROCEDURE — 1157F ADVNC CARE PLAN IN RCRD: CPT | Mod: S$GLB,,, | Performed by: INTERNAL MEDICINE

## 2017-03-24 RX ORDER — TRAZODONE HYDROCHLORIDE 50 MG/1
50 TABLET ORAL NIGHTLY
Qty: 30 TABLET | Refills: 2 | Status: SHIPPED | OUTPATIENT
Start: 2017-03-24 | End: 2017-06-15 | Stop reason: SDUPTHER

## 2017-03-24 RX ORDER — FAMOTIDINE 40 MG/1
40 TABLET, FILM COATED ORAL DAILY
Qty: 30 TABLET | Refills: 11 | Status: SHIPPED | OUTPATIENT
Start: 2017-03-24 | End: 2017-12-21

## 2017-03-24 NOTE — MR AVS SNAPSHOT
[facility]  [address]  [place] [address]  Phone: [phone]                  Regan Alvarez   3/24/2017 11:40 AM   Office Visit    Description:  Male : 1940   Provider:  Ishaan Adamson MD   Department:  [facility]           Reason for Visit     COPD     Pneumonia     Follow-up           Diagnoses this Visit        Comments    Insomnia, unspecified type    -  Primary     Uncontrolled type 2 diabetes mellitus with hyperglycemia, without long-term current use of insulin         Gastroesophageal reflux disease, esophagitis presence not specified                To Do List           Future Appointments        Provider Department Dept Phone    2017 1:30 PM LAB, APPOINTMENT [place] [facility] [phone]    2017 2:00 PM Getachew Valera MD [facility] - Infectious Diseases [phone]      Goals (5 Years of Data)     None       These Medications        Disp Refills Start End    trazodone (DESYREL) 50 MG tablet 30 tablet 2 3/24/2017 3/24/2018    Take 1 tablet (50 mg total) by mouth every evening. - Oral    Pharmacy: [facility]/pharmacy #[id number] - [place] - [address]. Ph #: [phone]       famotidine (PEPCID) 40 MG tablet 30 tablet 11 3/24/2017 3/24/2018    Take 1 tablet (40 mg total) by mouth once daily. - Oral    Pharmacy: [facility]/pharmacy #[id number] - [place] - [address]. Ph #: [phone]         [facility]     [facility] On Call Nurse Care Line -  Assistance  Registered nurses in the [facility] provide clinical advisement, health education, appointment booking, and other advisory services.  Call for this free service at [phone].             Medications           Message regarding Medications     Verify the changes and/or additions to your medication regime listed below are the same as discussed with your clinician today.  If any of these changes or additions are incorrect,  "please notify your healthcare provider.        START taking these NEW medications        Refills    trazodone (DESYREL) 50 MG tablet 2    Sig: Take 1 tablet (50 mg total) by mouth every evening.    Class: Normal    Route: Oral    famotidine (PEPCID) 40 MG tablet 11    Sig: Take 1 tablet (40 mg total) by mouth once daily.    Class: Normal    Route: Oral      STOP taking these medications     azithromycin (Z-JOSE) 250 MG tablet Take 1 tablet (250 mg total) by mouth once daily.           Verify that the below list of medications is an accurate representation of the medications you are currently taking.  If none reported, the list may be blank. If incorrect, please contact your healthcare provider. Carry this list with you in case of emergency.           Current Medications     albuterol 90 mcg/actuation inhaler Inhale 2 puffs into the lungs every 6 (six) hours as needed for Wheezing.    amlodipine (NORVASC) 2.5 MG tablet Take 1 tablet (2.5 mg total) by mouth once daily.    aspirin 325 MG tablet Take 325 mg by mouth once daily.    BD ULTRA-FINE TOMÁS PEN NEEDLES 32 gauge x 5/32" Ndle USE QID UTD    benzonatate (TESSALON) 200 MG capsule Take 1 capsule (200 mg total) by mouth 3 (three) times daily as needed for Cough.    blood sugar diagnostic (TRUE METRIX GLUCOSE TEST STRIP) Strp Test blood sugar 3 times daily    blood-glucose meter (TRUE METRIX AIR GLUCOSE METER) Misc Test blood sugar 3 times daily    fluticasone (FLONASE) 50 mcg/actuation nasal spray 1 spray by Each Nare route once daily.    furosemide (LASIX) 40 MG tablet Take 20 mg by mouth once daily.     glipiZIDE (GLUCOTROL) 5 MG tablet Take 5 mg by mouth 2 (two) times daily before meals.    insulin aspart (NOVOLOG) 100 unit/mL InPn pen Inject 1-10 Units into the skin before meals and at bedtime as needed (Hyperglycemia).    lancets 28 gauge Misc 1 lancet by Misc.(Non-Drug; Combo Route) route 3 (three) times daily. True Metrix lancets    metformin (GLUCOPHAGE) 1000 " "MG tablet Take 1 tablet (1,000 mg total) by mouth 2 (two) times daily with meals.    moxifloxacin (AVELOX) 400 mg tablet Take 1 tablet (400 mg total) by mouth once daily.    multivitamin capsule Take 1 capsule by mouth once daily.    ramipril (ALTACE) 2.5 MG capsule Take 1 capsule (2.5 mg total) by mouth once daily.    rifabutin (MYCOBUTIN) 150 mg Cap Take 1 capsule (150 mg total) by mouth every 12 (twelve) hours.    famotidine (PEPCID) 40 MG tablet Take 1 tablet (40 mg total) by mouth once daily.    trazodone (DESYREL) 50 MG tablet Take 1 tablet (50 mg total) by mouth every evening.           Clinical Reference Information           Your Vitals Were     BP Pulse Temp Resp Height Weight    100/64 (BP Location: Left arm, Patient Position: Sitting, BP Method: Manual) 90 97.5 °F (36.4 °C) (Oral) 16 6' 7" (2.007 m) 80.5 kg (177 lb 7.5 oz)    SpO2 BMI             97% 19.99 kg/m2         Blood Pressure          Most Recent Value    BP  100/64      Allergies as of 3/24/2017     No Known Allergies      Immunizations Administered on Date of Encounter - 3/24/2017     None      [facility] Sign-Up     Activating your [facility] account is as easy as 1-2-3!     1) Visit [email or web address], select Sign Up Now, enter this activation code and your date of birth, then select Next.  [email or web address]  Expires: 5/8/2017 12:16 PM      2) Create a username and password to use when you visit [facility] in the future and select a security question in case you lose your password and select Next.    3) Enter your e-mail address and click Sign Up!    Additional Information  If you have questions, please e-mail [email or web address] or call [phone] to talk to our [facility] staff. Remember, [facility] is NOT to be used for urgent needs. For medical emergencies, dial 911.         Language Assistance Services     ATTENTION: Language assistance services are available, free of charge. Please call [phone].      ATENCIÓN: Si arlen sellers " a richardson disposición servicios gratuitos de asistencia lingüística. Llbita al [phone].     SONJA Ý: N?u b?n nói Ti?ng Vi?t, có các d?ch v? h? tr? ngôn ng? mi?n phí dành cho b?n. G?i s? [phone].         [facility] complies with applicable Federal civil rights laws and does not discriminate on the basis of race, color, national origin, age, disability, or sex.

## 2017-03-24 NOTE — PROGRESS NOTES
"Subjective:       Patient ID: Regan Alvaerz is a 76 y.o. male.    Chief Complaint: COPD; Pneumonia; and Follow-up    HPI Comments: F/u breathing and DM    HPI: 77 y/o w/ DM, COPD, pulmonary MAC presents with daughter for follow up. Was hospitalized with MDR pseudomonas for which  eh completed IV zosyn. He is doing wonderful. Now living with his daughter who is helping with meal prep and care of his wife (who has dementia)> home glucose running 100-175, still using correctional insulin for blood glucose >200 two times in last week no hypoglycemia. Breathing back to baseline not using supplemental O2. Only complaint is occasional insomnia. He has to lie with his wife to keep her in bed and will often awaken early in morning.     Review of Systems   Constitutional: Negative for activity change, fever and unexpected weight change.   HENT: Negative for congestion, rhinorrhea, sore throat and trouble swallowing.    Eyes: Negative for photophobia and redness.   Respiratory: Negative for cough, chest tightness, shortness of breath and wheezing.    Cardiovascular: Negative for chest pain, palpitations and leg swelling.   Gastrointestinal: Negative for abdominal pain, blood in stool, constipation, diarrhea, nausea and vomiting.   Endocrine: Negative for cold intolerance, heat intolerance and polyuria.   Genitourinary: Negative for decreased urine volume, difficulty urinating, dysuria and urgency.   Musculoskeletal: Negative for arthralgias and back pain.   Skin: Negative for rash.   Neurological: Negative for dizziness, syncope, weakness and headaches.   Psychiatric/Behavioral: Negative for dysphoric mood, sleep disturbance and suicidal ideas.       Objective:     Vitals:    03/24/17 1134   BP: 100/64   BP Location: Left arm   Patient Position: Sitting   BP Method: Manual   Pulse: 90   Resp: 16   Temp: 97.5 °F (36.4 °C)   TempSrc: Oral   SpO2: 97%   Weight: 80.5 kg (177 lb 7.5 oz)   Height: 6' 7" (2.007 m)        "   Physical Exam   Constitutional: He is oriented to person, place, and time. He appears well-developed and well-nourished.   HENT:   Head: Normocephalic and atraumatic.   Eyes: Conjunctivae are normal. Pupils are equal, round, and reactive to light.   Neck: Normal range of motion.   Cardiovascular: Normal rate and regular rhythm.  Exam reveals no gallop and no friction rub.    No murmur heard.  Pulmonary/Chest: Effort normal and breath sounds normal. He has no wheezes. He has no rales.   Good air movement to bases no wheezing   Abdominal: Soft. Bowel sounds are normal. There is no tenderness. There is no rebound and no guarding.   Musculoskeletal: Normal range of motion. He exhibits no edema or tenderness.   Neurological: He is alert and oriented to person, place, and time. No cranial nerve deficit.   Skin: Skin is warm and dry.   Psychiatric: He has a normal mood and affect.       Assessment:       1. Insomnia, unspecified type    2. Uncontrolled type 2 diabetes mellitus with hyperglycemia, without long-term current use of insulin    3. Gastroesophageal reflux disease, esophagitis presence not specified        Plan:    1. Prn trazadone discussed attempting to move his sleep time back to a later hour     2. Improving by home readings repeat a1c in two months    3. Trial h2 blocker

## 2017-03-27 LAB — FUNGUS SPEC CULT: NORMAL

## 2017-03-27 RX ORDER — FUROSEMIDE 20 MG/1
TABLET ORAL
Qty: 30 TABLET | Refills: 5 | Status: SHIPPED | OUTPATIENT
Start: 2017-03-27 | End: 2017-10-02 | Stop reason: SDUPTHER

## 2017-04-24 ENCOUNTER — LAB VISIT (OUTPATIENT)
Dept: LAB | Facility: HOSPITAL | Age: 77
End: 2017-04-24
Attending: INTERNAL MEDICINE
Payer: MEDICARE

## 2017-04-24 ENCOUNTER — OFFICE VISIT (OUTPATIENT)
Dept: INFECTIOUS DISEASES | Facility: CLINIC | Age: 77
End: 2017-04-24
Payer: MEDICARE

## 2017-04-24 VITALS
SYSTOLIC BLOOD PRESSURE: 138 MMHG | WEIGHT: 185.44 LBS | TEMPERATURE: 98 F | BODY MASS INDEX: 21.45 KG/M2 | DIASTOLIC BLOOD PRESSURE: 82 MMHG | HEIGHT: 78 IN | HEART RATE: 115 BPM

## 2017-04-24 DIAGNOSIS — E11.65 UNCONTROLLED TYPE 2 DIABETES MELLITUS WITH HYPERGLYCEMIA, WITHOUT LONG-TERM CURRENT USE OF INSULIN: ICD-10-CM

## 2017-04-24 DIAGNOSIS — A31.0 PULMONARY MYCOBACTERIUM AVIUM COMPLEX (MAC) INFECTION: ICD-10-CM

## 2017-04-24 DIAGNOSIS — A31.0 PULMONARY MYCOBACTERIUM AVIUM COMPLEX (MAC) INFECTION: Primary | ICD-10-CM

## 2017-04-24 PROBLEM — J44.1 COPD EXACERBATION: Status: RESOLVED | Noted: 2017-01-29 | Resolved: 2017-04-24

## 2017-04-24 PROBLEM — R53.81 PHYSICAL DECONDITIONING: Status: RESOLVED | Noted: 2017-02-22 | Resolved: 2017-04-24

## 2017-04-24 PROBLEM — J96.01 ACUTE HYPOXEMIC RESPIRATORY FAILURE: Status: RESOLVED | Noted: 2017-01-29 | Resolved: 2017-04-24

## 2017-04-24 PROBLEM — J18.9 PNEUMONIA OF RIGHT LOWER LOBE DUE TO INFECTIOUS ORGANISM: Status: RESOLVED | Noted: 2017-02-21 | Resolved: 2017-04-24

## 2017-04-24 PROBLEM — R79.89 ELEVATED LACTIC ACID LEVEL: Status: RESOLVED | Noted: 2017-02-21 | Resolved: 2017-04-24

## 2017-04-24 LAB
ALBUMIN SERPL BCP-MCNC: 3.4 G/DL
ALP SERPL-CCNC: 58 U/L
ALT SERPL W/O P-5'-P-CCNC: 6 U/L
ANION GAP SERPL CALC-SCNC: 12 MMOL/L
AST SERPL-CCNC: 11 U/L
BASOPHILS # BLD AUTO: 0.06 K/UL
BASOPHILS NFR BLD: 0.6 %
BILIRUB SERPL-MCNC: 0.2 MG/DL
BUN SERPL-MCNC: 14 MG/DL
CALCIUM SERPL-MCNC: 9.1 MG/DL
CHLORIDE SERPL-SCNC: 105 MMOL/L
CO2 SERPL-SCNC: 22 MMOL/L
CREAT SERPL-MCNC: 0.7 MG/DL
CRP SERPL-MCNC: 24.7 MG/L
DIFFERENTIAL METHOD: ABNORMAL
EOSINOPHIL # BLD AUTO: 0.8 K/UL
EOSINOPHIL NFR BLD: 8 %
ERYTHROCYTE [DISTWIDTH] IN BLOOD BY AUTOMATED COUNT: 14.8 %
ERYTHROCYTE [SEDIMENTATION RATE] IN BLOOD BY WESTERGREN METHOD: 68 MM/HR
EST. GFR  (AFRICAN AMERICAN): >60 ML/MIN/1.73 M^2
EST. GFR  (NON AFRICAN AMERICAN): >60 ML/MIN/1.73 M^2
GLUCOSE SERPL-MCNC: 140 MG/DL
HCT VFR BLD AUTO: 30.1 %
HGB BLD-MCNC: 9.9 G/DL
LYMPHOCYTES # BLD AUTO: 2.9 K/UL
LYMPHOCYTES NFR BLD: 31.3 %
MCH RBC QN AUTO: 29.7 PG
MCHC RBC AUTO-ENTMCNC: 32.9 %
MCV RBC AUTO: 90 FL
MONOCYTES # BLD AUTO: 0.9 K/UL
MONOCYTES NFR BLD: 9.9 %
NEUTROPHILS # BLD AUTO: 4.7 K/UL
NEUTROPHILS NFR BLD: 50 %
PLATELET # BLD AUTO: 339 K/UL
PMV BLD AUTO: 9.9 FL
POTASSIUM SERPL-SCNC: 3.9 MMOL/L
PROT SERPL-MCNC: 7.4 G/DL
RBC # BLD AUTO: 3.33 M/UL
SODIUM SERPL-SCNC: 139 MMOL/L
WBC # BLD AUTO: 9.34 K/UL

## 2017-04-24 PROCEDURE — 3079F DIAST BP 80-89 MM HG: CPT | Mod: S$GLB,,, | Performed by: INTERNAL MEDICINE

## 2017-04-24 PROCEDURE — 3075F SYST BP GE 130 - 139MM HG: CPT | Mod: S$GLB,,, | Performed by: INTERNAL MEDICINE

## 2017-04-24 PROCEDURE — 99215 OFFICE O/P EST HI 40 MIN: CPT | Mod: S$GLB,,, | Performed by: INTERNAL MEDICINE

## 2017-04-24 PROCEDURE — 1126F AMNT PAIN NOTED NONE PRSNT: CPT | Mod: S$GLB,,, | Performed by: INTERNAL MEDICINE

## 2017-04-24 PROCEDURE — 99999 PR PBB SHADOW E&M-EST. PATIENT-LVL III: CPT | Mod: PBBFAC,,, | Performed by: INTERNAL MEDICINE

## 2017-04-24 PROCEDURE — 1160F RVW MEDS BY RX/DR IN RCRD: CPT | Mod: S$GLB,,, | Performed by: INTERNAL MEDICINE

## 2017-04-24 PROCEDURE — 1159F MED LIST DOCD IN RCRD: CPT | Mod: S$GLB,,, | Performed by: INTERNAL MEDICINE

## 2017-04-24 RX ORDER — AZITHROMYCIN 250 MG/1
250 TABLET, FILM COATED ORAL DAILY
COMMUNITY
End: 2017-12-21

## 2017-04-24 NOTE — PROGRESS NOTES
Subjective:      Patient ID: Regan Alvarez is a 76 y.o. male.    Chief Complaint:Follow-up      History of Present Illness  Pulmonary Mycobacterium avium complex (MAC) infection  Pt on MAC rx since 1/14/16 but the rx was changed because of susceptibility profile to current rx azithro/moxi/rifabutin on 3/29/16. He is tolerating this well and feels better with less cough, no fever, sweats or chills.  Last  CT of chest 6/2/16:  1.  There is no evidence of pulmonary embolus.  2.  The patient's thickwalled right upper lobe cavitary lesion pulmonary parenchymal scarring and pulmonary emphysema appear similar to prior exam.  3.  There are small bilateral pleural effusions.  4.  There is patchy ground glass opacification seen dependently within both lungs possibly representing developing pneumonia or edema.     Since being on anti MAC rx, he hs almost no cough and little phlegm. Has never had fever, weight loss, chills. Last sputum AFB culture was 2/23/17 and is negative to date.      todays labs:  Lab Results   Component Value Date    WBC 9.34 04/24/2017    HGB 9.9 (L) 04/24/2017    HCT 30.1 (L) 04/24/2017    MCV 90 04/24/2017     04/24/2017   ESR and CRP pending       Diabetes mellitus type 2, uncontrolled  He is followed by Dr. Adamson for his DM. On metformin and glipizide. His home glucose levels are 160-180 and his last A1c was consistent with this.         Review of Systems   Constitution: Positive for weakness and weight loss. Negative for chills, decreased appetite, fever, malaise/fatigue, night sweats and weight gain.   HENT: Negative for congestion, ear pain, headaches, hearing loss, hoarse voice, sore throat and tinnitus.    Eyes: Negative for blurred vision, redness and visual disturbance.   Cardiovascular: Negative for chest pain, leg swelling and palpitations.   Respiratory: Negative for cough, hemoptysis, shortness of breath and sputum production.    Hematologic/Lymphatic: Negative for  adenopathy. Does not bruise/bleed easily.   Skin: Negative for dry skin, itching, rash and suspicious lesions.   Musculoskeletal: Negative for back pain, joint pain, myalgias and neck pain.   Gastrointestinal: Negative for abdominal pain, constipation, diarrhea, heartburn, nausea and vomiting.   Genitourinary: Negative for dysuria, flank pain, frequency, hematuria, hesitancy and urgency.   Neurological: Negative for dizziness, numbness and paresthesias.   Psychiatric/Behavioral: Negative for depression and memory loss. The patient does not have insomnia and is not nervous/anxious.      Objective:   Physical Exam   Constitutional: He is oriented to person, place, and time. He appears well-developed and well-nourished.   Cardiovascular: Regular rhythm.  Exam reveals no gallop and no friction rub.    No murmur heard.  Pulse 100 regular   Pulmonary/Chest: Effort normal and breath sounds normal. No respiratory distress. He has no wheezes. He has no rales.   Neurological: He is alert and oriented to person, place, and time.   Skin: Skin is warm and dry.   Psychiatric: He has a normal mood and affect. His behavior is normal. Thought content normal.   Vitals reviewed.    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 Uncontrolled type 2 diabetes mellitus with hyperglycemia, without long-term current use of insulin          Plan:        1. Continue present triple therapy   2. RTC 1 month with CT chest

## 2017-04-24 NOTE — MR AVS SNAPSHOT
[facility]  [address]  [facility] [address]  Phone: [phone]  Fax: [phone]                  Regan Alvarez   2017 2:00 PM   Office Visit    Description:  Male : 1940   Provider:  Getachew Valera MD   Department:  [facility]           Reason for Visit     Follow-up           Diagnoses this Visit        Comments    Pulmonary Mycobacterium avium complex (MAC) infection    -  Primary            To Do List           Future Appointments        Provider Department Dept Phone    2017 1:00 PM [facility] CT1 LIMIT 400 LBS [facility] [phone]      Goals (5 Years of Data)     None      [facility]     [facility] Nurse Care Line -  Assistance  Unless otherwise directed by your provider, please contact [facility], our nurse care line that is available for  assistance.     Registered nurses in the [facility] provide: appointment scheduling, clinical advisement, health education, and other advisory services.  Call: [phone] (toll free)               Medications           Message regarding Medications     Verify the changes and/or additions to your medication regime listed below are the same as discussed with your clinician today.  If any of these changes or additions are incorrect, please notify your healthcare provider.             Verify that the below list of medications is an accurate representation of the medications you are currently taking.  If none reported, the list may be blank. If incorrect, please contact your healthcare provider. Carry this list with you in case of emergency.           Current Medications     albuterol 90 mcg/actuation inhaler Inhale 2 puffs into the lungs every 6 (six) hours as needed for Wheezing.    amlodipine (NORVASC) 2.5 MG tablet Take 1 tablet (2.5 mg total) by mouth once daily.    aspirin 325 MG tablet Take 325 mg by mouth once daily.     "azithromycin (Z-JOSE) 250 MG tablet Take 250 mg by mouth once daily.    BD ULTRA-FINE TOMÁS PEN NEEDLES 32 gauge x 5/32" Ndle USE QID UTD    benzonatate (TESSALON) 200 MG capsule Take 1 capsule (200 mg total) by mouth 3 (three) times daily as needed for Cough.    blood sugar diagnostic (TRUE METRIX GLUCOSE TEST STRIP) Strp Test blood sugar 3 times daily    blood-glucose meter (TRUE METRIX AIR GLUCOSE METER) Misc Test blood sugar 3 times daily    famotidine (PEPCID) 40 MG tablet Take 1 tablet (40 mg total) by mouth once daily.    fluticasone (FLONASE) 50 mcg/actuation nasal spray 1 spray by Each Nare route once daily.    furosemide (LASIX) 20 MG tablet TAKE 1 TABLET BY MOUTH DAILY    glipiZIDE (GLUCOTROL) 5 MG tablet Take 5 mg by mouth 2 (two) times daily before meals.    insulin aspart (NOVOLOG) 100 unit/mL InPn pen Inject 1-10 Units into the skin before meals and at bedtime as needed (Hyperglycemia).    lancets 28 gauge Misc 1 lancet by Misc.(Non-Drug; Combo Route) route 3 (three) times daily. True Metrix lancets    metformin (GLUCOPHAGE) 1000 MG tablet Take 1 tablet (1,000 mg total) by mouth 2 (two) times daily with meals.    moxifloxacin (AVELOX) 400 mg tablet Take 1 tablet (400 mg total) by mouth once daily.    multivitamin capsule Take 1 capsule by mouth once daily.    ramipril (ALTACE) 2.5 MG capsule Take 1 capsule (2.5 mg total) by mouth once daily.    rifabutin (MYCOBUTIN) 150 mg Cap Take 1 capsule (150 mg total) by mouth every 12 (twelve) hours.    trazodone (DESYREL) 50 MG tablet Take 1 tablet (50 mg total) by mouth every evening.           Clinical Reference Information           Your Vitals Were     BP Pulse Temp Height Weight BMI    138/82 115 97.5 °F (36.4 °C) (Oral) 6' 7" (2.007 m) 84.1 kg (185 lb 6.5 oz) 20.89 kg/m2      Blood Pressure          Most Recent Value    BP  138/82      Allergies as of 4/24/2017     No Known Allergies      Immunizations Administered on Date of Encounter - 4/24/2017     None "      Orders Placed During Today's Visit     Future Labs/Procedures Expected by Expires    CT Chest Without Contrast  4/24/2017 4/24/2018      [facility] Sign-Up     Activating your [facility] account is as easy as 1-2-3!     1) Visit [email or web address], select Sign Up Now, enter this activation code and your date of birth, then select Next.  [email or web address]  Expires: 5/8/2017 12:16 PM      2) Create a username and password to use when you visit [facility] in the future and select a security question in case you lose your password and select Next.    3) Enter your e-mail address and click Sign Up!    Additional Information  If you have questions, please e-mail [email or web address] or call [phone] to talk to our [facility] staff. Remember, [facility] is NOT to be used for urgent needs. For medical emergencies, dial 911.         Language Assistance Services     ATTENTION: Language assistance services are available, free of charge. Please call [phone].      ATENCIÓN: Si habla español, tiene a richardson disposición servicios gratuitos de asistencia lingüística. Llame al [phone].     SONJA Ý: N?u b?n nói Ti?ng Vi?t, có các d?ch v? h? tr? ngôn ng? mi?n phí dành cho b?n. G?i s? [phone].         [facility] complies with applicable Federal civil rights laws and does not discriminate on the basis of race, color, national origin, age, disability, or sex.

## 2017-04-25 LAB
ESTIMATED AVG GLUCOSE: 128 MG/DL
HBA1C MFR BLD HPLC: 6.1 %

## 2017-04-27 LAB
ACID FAST MOD KINY STN SPEC: NORMAL
MYCOBACTERIUM SPEC QL CULT: NORMAL

## 2017-04-28 LAB
ACID FAST MOD KINY STN SPEC: NORMAL
MYCOBACTERIUM SPEC QL CULT: NORMAL

## 2017-06-13 RX ORDER — METFORMIN HYDROCHLORIDE 1000 MG/1
1000 TABLET ORAL 2 TIMES DAILY WITH MEALS
Qty: 180 TABLET | Refills: 3 | Status: SHIPPED | OUTPATIENT
Start: 2017-06-13 | End: 2018-06-09 | Stop reason: SDUPTHER

## 2017-06-14 ENCOUNTER — HOSPITAL ENCOUNTER (OUTPATIENT)
Dept: RADIOLOGY | Facility: HOSPITAL | Age: 77
Discharge: HOME | End: 2017-06-14
Attending: INTERNAL MEDICINE
Payer: MEDICARE

## 2017-06-14 DIAGNOSIS — A31.0 PULMONARY MYCOBACTERIUM AVIUM COMPLEX (MAC) INFECTION: ICD-10-CM

## 2017-06-14 PROCEDURE — 71250 CT THORAX DX C-: CPT | Mod: 26,,, | Performed by: RADIOLOGY

## 2017-06-14 PROCEDURE — 71250 CT THORAX DX C-: CPT | Mod: TC

## 2017-06-15 DIAGNOSIS — G47.00 INSOMNIA, UNSPECIFIED TYPE: ICD-10-CM

## 2017-06-15 RX ORDER — TRAZODONE HYDROCHLORIDE 50 MG/1
50 TABLET ORAL NIGHTLY
Qty: 30 TABLET | Refills: 2 | Status: SHIPPED | OUTPATIENT
Start: 2017-06-15 | End: 2018-07-09 | Stop reason: ALTCHOICE

## 2017-07-05 ENCOUNTER — OFFICE VISIT (OUTPATIENT)
Dept: INFECTIOUS DISEASES | Facility: CLINIC | Age: 77
End: 2017-07-05
Payer: MEDICARE

## 2017-07-05 VITALS
WEIGHT: 192 LBS | BODY MASS INDEX: 22.21 KG/M2 | TEMPERATURE: 98 F | HEART RATE: 97 BPM | SYSTOLIC BLOOD PRESSURE: 118 MMHG | HEIGHT: 78 IN | DIASTOLIC BLOOD PRESSURE: 72 MMHG

## 2017-07-05 DIAGNOSIS — E11.65 UNCONTROLLED TYPE 2 DIABETES MELLITUS WITH HYPERGLYCEMIA, WITHOUT LONG-TERM CURRENT USE OF INSULIN: ICD-10-CM

## 2017-07-05 DIAGNOSIS — A31.0 PULMONARY MYCOBACTERIUM AVIUM COMPLEX (MAC) INFECTION: Primary | Chronic | ICD-10-CM

## 2017-07-05 PROCEDURE — 1159F MED LIST DOCD IN RCRD: CPT | Mod: S$GLB,,, | Performed by: INTERNAL MEDICINE

## 2017-07-05 PROCEDURE — 99999 PR PBB SHADOW E&M-EST. PATIENT-LVL III: CPT | Mod: PBBFAC,,, | Performed by: INTERNAL MEDICINE

## 2017-07-05 PROCEDURE — 1126F AMNT PAIN NOTED NONE PRSNT: CPT | Mod: S$GLB,,, | Performed by: INTERNAL MEDICINE

## 2017-07-05 PROCEDURE — 99214 OFFICE O/P EST MOD 30 MIN: CPT | Mod: S$GLB,,, | Performed by: INTERNAL MEDICINE

## 2017-07-05 NOTE — PROGRESS NOTES
Subjective:      Patient ID: Regan Alvarez is a 76 y.o. male.    Chief Complaint:Follow-up      History of Present Illness    Pulmonary Mycobacterium avium complex (MAC) infection  Pt on MAC rx since 1/14/16 but the rx was changed because of susceptibility profile to current rx azithro/moxi/rifabutin on 3/29/16. He is tolerating this well and feels better with less cough, no fever, sweats or chills.  Last  CT of chest 6/14/17 improved:    Interval reduced soft tissue rind about cavitary lesion in the right lung apex with increased gas density which may represent developing bulla and evolving atypical infectious process.  Clinical correlation and followup advised.    No evidence for significant new lung consolidation.    Continued scattered emphysematous change the lungs with persistent right pleural effusion with resolution of left pleural effusion.    Scattered micronodular opacities most pronounced in the right lower lobe additional scattered nodular opacities in the lungs likely related to granulomatous or atypical microbacterial infection in light of history.  Since being on anti MAC rx, he hs almost no cough and little phlegm. Has never had fever, weight loss, chills. Last sputum AFB culture was 2/23/17 and is negative to date.      6/14  Labs are improved:      ESR 68 to 55, CRP 24 to 15, HGB 9.3 to 10.4.  LFTs and creatinine are normal    AFB cultures from 2/23 are negative, final.    Diabetes mellitus type 2, uncontrolled  He is followed by Dr. Adamson for his DM. Last A1c 4/24 was 6.1%      Review of Systems   Constitution: Positive for weakness. Negative for chills, decreased appetite, fever, malaise/fatigue, night sweats, weight gain and weight loss.   HENT: Positive for headaches. Negative for congestion, ear pain, hearing loss, hoarse voice, sore throat and tinnitus.    Eyes: Negative for blurred vision, redness and visual disturbance.   Cardiovascular: Negative for chest pain, leg swelling and  palpitations.   Respiratory: Positive for shortness of breath and wheezing. Negative for cough, hemoptysis and sputum production.    Hematologic/Lymphatic: Negative for adenopathy. Does not bruise/bleed easily.   Skin: Negative for dry skin, itching, rash and suspicious lesions.   Musculoskeletal: Positive for joint pain, myalgias and neck pain. Negative for back pain.   Gastrointestinal: Positive for heartburn. Negative for abdominal pain, constipation, diarrhea, nausea and vomiting.   Genitourinary: Positive for frequency. Negative for dysuria, flank pain, hematuria, hesitancy and urgency.   Neurological: Positive for dizziness. Negative for numbness and paresthesias.   Psychiatric/Behavioral: Positive for memory loss. Negative for depression. The patient does not have insomnia and is not nervous/anxious.      Objective:   Physical Exam   Constitutional: He is oriented to person, place, and time. He appears well-developed and well-nourished.   Cardiovascular: Normal rate, regular rhythm and normal heart sounds.  Exam reveals no gallop and no friction rub.    No murmur heard.  Pulmonary/Chest: Effort normal and breath sounds normal. No respiratory distress. He has no wheezes. He has no rales.   Neurological: He is alert and oriented to person, place, and time.   Vitals reviewed.    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 Uncontrolled type 2 diabetes mellitus with hyperglycemia, without long-term current use of insulin          Plan:        1. RTC 3 mos with CBC, CMP, ESR, CRP   2. Hope we can DC MAC rx at end of 2017 (24 mos)

## 2017-08-08 DIAGNOSIS — I50.22 CHRONIC SYSTOLIC HEART FAILURE: ICD-10-CM

## 2017-08-08 RX ORDER — RAMIPRIL 2.5 MG/1
2.5 CAPSULE ORAL DAILY
Qty: 30 CAPSULE | Refills: 4 | Status: SHIPPED | OUTPATIENT
Start: 2017-08-08 | End: 2017-10-16 | Stop reason: SDUPTHER

## 2017-10-02 ENCOUNTER — OFFICE VISIT (OUTPATIENT)
Dept: FAMILY MEDICINE | Facility: CLINIC | Age: 77
End: 2017-10-02
Payer: MEDICARE

## 2017-10-02 ENCOUNTER — TELEPHONE (OUTPATIENT)
Dept: FAMILY MEDICINE | Facility: CLINIC | Age: 77
End: 2017-10-02

## 2017-10-02 ENCOUNTER — APPOINTMENT (OUTPATIENT)
Dept: RADIOLOGY | Facility: HOSPITAL | Age: 77
End: 2017-10-02
Attending: INTERNAL MEDICINE
Payer: MEDICARE

## 2017-10-02 VITALS
OXYGEN SATURATION: 96 % | DIASTOLIC BLOOD PRESSURE: 76 MMHG | TEMPERATURE: 98 F | HEART RATE: 94 BPM | RESPIRATION RATE: 17 BRPM | BODY MASS INDEX: 21.63 KG/M2 | HEIGHT: 78 IN | WEIGHT: 186.94 LBS | SYSTOLIC BLOOD PRESSURE: 122 MMHG

## 2017-10-02 DIAGNOSIS — A31.0 PULMONARY MYCOBACTERIUM AVIUM COMPLEX (MAC) INFECTION: Chronic | ICD-10-CM

## 2017-10-02 DIAGNOSIS — E11.65 UNCONTROLLED TYPE 2 DIABETES MELLITUS WITH HYPERGLYCEMIA, WITHOUT LONG-TERM CURRENT USE OF INSULIN: ICD-10-CM

## 2017-10-02 DIAGNOSIS — R53.83 FATIGUE, UNSPECIFIED TYPE: ICD-10-CM

## 2017-10-02 DIAGNOSIS — Z23 NEED FOR INFLUENZA VACCINATION: ICD-10-CM

## 2017-10-02 DIAGNOSIS — R13.19 ESOPHAGEAL DYSPHAGIA: Primary | ICD-10-CM

## 2017-10-02 PROBLEM — J18.9 PNEUMONIA DUE TO INFECTIOUS ORGANISM: Status: RESOLVED | Noted: 2017-01-29 | Resolved: 2017-10-02

## 2017-10-02 PROCEDURE — 3078F DIAST BP <80 MM HG: CPT | Mod: S$GLB,,, | Performed by: INTERNAL MEDICINE

## 2017-10-02 PROCEDURE — 71020 XR CHEST PA AND LATERAL: CPT | Mod: 26,,, | Performed by: RADIOLOGY

## 2017-10-02 PROCEDURE — 3074F SYST BP LT 130 MM HG: CPT | Mod: S$GLB,,, | Performed by: INTERNAL MEDICINE

## 2017-10-02 PROCEDURE — 1126F AMNT PAIN NOTED NONE PRSNT: CPT | Mod: S$GLB,,, | Performed by: INTERNAL MEDICINE

## 2017-10-02 PROCEDURE — 90662 IIV NO PRSV INCREASED AG IM: CPT | Mod: S$GLB,,, | Performed by: INTERNAL MEDICINE

## 2017-10-02 PROCEDURE — 99999 PR PBB SHADOW E&M-EST. PATIENT-LVL III: CPT | Mod: PBBFAC,,, | Performed by: INTERNAL MEDICINE

## 2017-10-02 PROCEDURE — 1159F MED LIST DOCD IN RCRD: CPT | Mod: S$GLB,,, | Performed by: INTERNAL MEDICINE

## 2017-10-02 PROCEDURE — 71020 XR CHEST PA AND LATERAL: CPT | Mod: TC,PN

## 2017-10-02 PROCEDURE — 3008F BODY MASS INDEX DOCD: CPT | Mod: S$GLB,,, | Performed by: INTERNAL MEDICINE

## 2017-10-02 PROCEDURE — G0008 ADMIN INFLUENZA VIRUS VAC: HCPCS | Mod: S$GLB,,, | Performed by: INTERNAL MEDICINE

## 2017-10-02 PROCEDURE — 99214 OFFICE O/P EST MOD 30 MIN: CPT | Mod: S$GLB,,, | Performed by: INTERNAL MEDICINE

## 2017-10-02 RX ORDER — FUROSEMIDE 20 MG/1
20 TABLET ORAL DAILY
Qty: 30 TABLET | Refills: 5 | Status: SHIPPED | OUTPATIENT
Start: 2017-10-02 | End: 2017-10-17 | Stop reason: SDUPTHER

## 2017-10-02 NOTE — PROGRESS NOTES
Influenza high dose vaccine Given to the pt as ordered by the MD. The patient tolerated well, I advised the patient to wait 15 minuets to observe for any vaccine reactions. The pt. Expressed an understanding.

## 2017-10-02 NOTE — TELEPHONE ENCOUNTER
----- Message from Bernadette Cantu sent at 10/2/2017  9:46 AM CDT -----  Contact: Daughter-Alix  States pt was seen today and has questions regarding medication for fluid. Daughter can be reached @ [phone].

## 2017-10-02 NOTE — PROGRESS NOTES
"Subjective:       Patient ID: Regan Alvarez is a 76 y.o. male.    Chief Complaint: Fatigue; Diabetes; Diabetic Foot Exam; Medication Management (discuss Lasix ); and Flu Vaccine    F/u chronic conditions    HPI: 77 y/o w/ DM, COPD, pulmonary MAC presents with daughter to discuss two months of progressive fatigue. Notes sleeping more during day. Sleeping through night. No further coughing. Does not feel short of breath. daugther does note that two to three times per week he feels as if food gets "stuck in mid chest" he has to spit up non digested food. No change in bowels or bladder. No hypoglycemia. Currently only taking metformin for DM. Not using inhalers or home oxygen. No night sweats occasional cough but significant improved since spring time.       Review of Systems   Constitutional: Positive for fatigue. Negative for activity change, appetite change, fever and unexpected weight change.   HENT: Negative for ear pain, rhinorrhea and sore throat.    Eyes: Negative for discharge and visual disturbance.   Respiratory: Positive for choking. Negative for chest tightness, shortness of breath and wheezing.    Cardiovascular: Negative for chest pain, palpitations and leg swelling.   Gastrointestinal: Negative for abdominal pain, constipation and diarrhea.   Endocrine: Negative for cold intolerance and heat intolerance.   Genitourinary: Negative for dysuria and hematuria.   Musculoskeletal: Negative for joint swelling and neck stiffness.   Skin: Negative for rash.   Neurological: Negative for dizziness, syncope, weakness and headaches.   Psychiatric/Behavioral: Negative for suicidal ideas.       Objective:     Vitals:    10/02/17 0832   BP: 122/76   BP Location: Left arm   Patient Position: Sitting   BP Method: Medium (Manual)   Pulse: 94   Resp: 17   Temp: 97.7 °F (36.5 °C)   TempSrc: Oral   SpO2: 96%   Weight: 84.8 kg (186 lb 15.2 oz)   Height: 6' 7" (2.007 m)          Physical Exam   Constitutional: He is " oriented to person, place, and time. He appears well-developed and well-nourished.   HENT:   Head: Normocephalic and atraumatic.   Eyes: Conjunctivae are normal. Pupils are equal, round, and reactive to light. No scleral icterus.   Neck: Normal range of motion. Neck supple. No JVD present. No thyromegaly present.   No anterior/posterior or supraclavicluarl LAD   Cardiovascular: Normal rate and regular rhythm.  Exam reveals no gallop and no friction rub.    No murmur heard.  Pulmonary/Chest: Effort normal and breath sounds normal. He has no wheezes. He has no rales.   No acessory muscle use speaking full sentences   Abdominal: Soft. Bowel sounds are normal. There is no tenderness. There is no rebound and no guarding.   Musculoskeletal: Normal range of motion. He exhibits no edema or tenderness.   Lymphadenopathy:     He has no cervical adenopathy.   Neurological: He is alert and oriented to person, place, and time. No cranial nerve deficit.   Skin: Skin is warm and dry.   Psychiatric: He has a normal mood and affect.       Assessment:       1. Esophageal dysphagia    2. Uncontrolled type 2 diabetes mellitus with hyperglycemia, without long-term current use of insulin    3. Pulmonary Mycobacterium avium complex (MAC) infection    4. Fatigue, unspecified type    5. Need for influenza vaccination        Plan:    1. Check EGD for obstructive pathology check serum ferritin     2. Repeat a1c home glucose log suggest well controlled, renal function and electrolytes    3. Inflammatory markers per ID, repeat CXR today to monitor for interval worsening new consolidation    4. Check cbc and tsh for secondary causes    5. Flu vaccine today    F/u two months sooner prn

## 2017-10-02 NOTE — TELEPHONE ENCOUNTER
Spoke with daughter of patient and she informed me that she had placed a refill for patient's Lasix about a week ago and she for got to ask if he still need to be on this medication. If so please send in refill to pharmacy.

## 2017-10-09 ENCOUNTER — SURGERY (OUTPATIENT)
Age: 77
End: 2017-10-09

## 2017-10-09 ENCOUNTER — ANESTHESIA (OUTPATIENT)
Dept: ENDOSCOPY | Facility: HOSPITAL | Age: 77
End: 2017-10-09
Payer: MEDICARE

## 2017-10-09 ENCOUNTER — HOSPITAL ENCOUNTER (OUTPATIENT)
Facility: HOSPITAL | Age: 77
Discharge: HOME | End: 2017-10-09
Attending: INTERNAL MEDICINE | Admitting: INTERNAL MEDICINE
Payer: MEDICARE

## 2017-10-09 ENCOUNTER — ANESTHESIA EVENT (OUTPATIENT)
Dept: ENDOSCOPY | Facility: HOSPITAL | Age: 77
End: 2017-10-09
Payer: MEDICARE

## 2017-10-09 VITALS
HEART RATE: 74 BPM | RESPIRATION RATE: 18 BRPM | OXYGEN SATURATION: 98 % | SYSTOLIC BLOOD PRESSURE: 140 MMHG | TEMPERATURE: 97 F | DIASTOLIC BLOOD PRESSURE: 70 MMHG

## 2017-10-09 DIAGNOSIS — R13.19 ESOPHAGEAL DYSPHAGIA: ICD-10-CM

## 2017-10-09 LAB — POCT GLUCOSE: 180 MG/DL (ref 70–110)

## 2017-10-09 PROCEDURE — 37000008 HC ANESTHESIA 1ST 15 MINUTES: Performed by: INTERNAL MEDICINE

## 2017-10-09 PROCEDURE — D9220A PRA ANESTHESIA: Mod: CRNA,,, | Performed by: NURSE ANESTHETIST, CERTIFIED REGISTERED

## 2017-10-09 PROCEDURE — D9220A PRA ANESTHESIA: Mod: ANES,,, | Performed by: ANESTHESIOLOGY

## 2017-10-09 PROCEDURE — 25000003 PHARM REV CODE 250: Performed by: ANESTHESIOLOGY

## 2017-10-09 PROCEDURE — 43239 EGD BIOPSY SINGLE/MULTIPLE: CPT | Performed by: INTERNAL MEDICINE

## 2017-10-09 PROCEDURE — 25000003 PHARM REV CODE 250: Performed by: NURSE ANESTHETIST, CERTIFIED REGISTERED

## 2017-10-09 PROCEDURE — 27201012 HC FORCEPS, HOT/COLD, DISP: Performed by: INTERNAL MEDICINE

## 2017-10-09 PROCEDURE — 63600175 PHARM REV CODE 636 W HCPCS: Performed by: NURSE ANESTHETIST, CERTIFIED REGISTERED

## 2017-10-09 PROCEDURE — 88305 TISSUE EXAM BY PATHOLOGIST: CPT | Performed by: PATHOLOGY

## 2017-10-09 PROCEDURE — 88305 TISSUE EXAM BY PATHOLOGIST: CPT | Mod: 26,,, | Performed by: PATHOLOGY

## 2017-10-09 PROCEDURE — 37000009 HC ANESTHESIA EA ADD 15 MINS: Performed by: INTERNAL MEDICINE

## 2017-10-09 PROCEDURE — 88312 SPECIAL STAINS GROUP 1: CPT | Mod: 26,,, | Performed by: PATHOLOGY

## 2017-10-09 PROCEDURE — 82962 GLUCOSE BLOOD TEST: CPT | Performed by: INTERNAL MEDICINE

## 2017-10-09 RX ORDER — PROPOFOL 10 MG/ML
VIAL (ML) INTRAVENOUS
Status: DISCONTINUED | OUTPATIENT
Start: 2017-10-09 | End: 2017-10-09

## 2017-10-09 RX ORDER — PROPOFOL 10 MG/ML
VIAL (ML) INTRAVENOUS
Status: DISCONTINUED
Start: 2017-10-09 | End: 2017-10-09 | Stop reason: HOSPADM

## 2017-10-09 RX ORDER — LIDOCAINE HYDROCHLORIDE 10 MG/ML
INJECTION INFILTRATION; PERINEURAL
Status: DISCONTINUED
Start: 2017-10-09 | End: 2017-10-09 | Stop reason: HOSPADM

## 2017-10-09 RX ORDER — SODIUM CHLORIDE 9 MG/ML
INJECTION, SOLUTION INTRAVENOUS ONCE
Status: COMPLETED | OUTPATIENT
Start: 2017-10-09 | End: 2017-10-09

## 2017-10-09 RX ORDER — LIDOCAINE HYDROCHLORIDE 10 MG/ML
INJECTION, SOLUTION INTRAVENOUS
Status: DISCONTINUED | OUTPATIENT
Start: 2017-10-09 | End: 2017-10-09

## 2017-10-09 RX ORDER — SODIUM CHLORIDE 9 MG/ML
INJECTION, SOLUTION INTRAVENOUS CONTINUOUS PRN
Status: DISCONTINUED | OUTPATIENT
Start: 2017-10-09 | End: 2017-10-09

## 2017-10-09 RX ADMIN — PROPOFOL 50 MG: 10 INJECTION, EMULSION INTRAVENOUS at 11:10

## 2017-10-09 RX ADMIN — PROPOFOL 30 MG: 10 INJECTION, EMULSION INTRAVENOUS at 11:10

## 2017-10-09 RX ADMIN — LIDOCAINE HYDROCHLORIDE 5 ML: 10 INJECTION, SOLUTION INTRAVENOUS at 11:10

## 2017-10-09 RX ADMIN — SODIUM CHLORIDE: 0.9 INJECTION, SOLUTION INTRAVENOUS at 10:10

## 2017-10-09 NOTE — ANESTHESIA PREPROCEDURE EVALUATION
10/09/2017  Regan Alvarez is a 76 y.o., male.    Anesthesia Evaluation     I have reviewed the Nursing Notes.      Review of Systems  Social:  Former Smoker   Cardiovascular:   Denies Pacemaker. Hypertension Dysrhythmias (PVCs)  Echo 2/2017  TDS but EF 50-55%  No other abnormalities appreciated   Pulmonary:   COPD Pna has resolved.  Pt has Mycobacterium Avium Complex and has been treated for ~ 8 months and is minimally, if at all symptomatic   Hepatic/GI:   No Bowel Prep. GERD Denies Liver Disease. Denies Hepatitis.    Musculoskeletal:   Arthritis     Neurological:  Neurology Normal    Endocrine:   Diabetes, type 2 Denies Hypothyroidism. Denies Hyperthyroidism.        Physical Exam  General:  Well nourished    Airway/Jaw/Neck:   edentulous          Mental Status:  Mental Status Findings: Normal        Anesthesia Plan  Type of Anesthesia, risks & benefits discussed:  Anesthesia Type:  general  Patient's Preference:   Intra-op Monitoring Plan: standard ASA monitors  Intra-op Monitoring Plan Comments:   Post Op Pain Control Plan:   Post Op Pain Control Plan Comments:   Induction:   IV  Beta Blocker:  Patient is not currently on a Beta-Blocker (No further documentation required).       Informed Consent: Patient understands risks and agrees with Anesthesia plan.  Questions answered. Anesthesia consent signed with patient.  ASA Score: 3     Day of Surgery Review of History & Physical:    H&P update referred to the surgeon.     Anesthesia Plan Notes: Pt recovered from MAC-pna. Still taking rifabutin          Ready For Surgery From Anesthesia Perspective.

## 2017-10-09 NOTE — H&P
Gastroenterology    CC: Dysphagia    HPI 76 y.o. male sent from PCP for dysphagia for EGD.       Past Medical History:   Diagnosis Date    Arthritis     COPD (chronic obstructive pulmonary disease)     Diabetes mellitus type II     Hypertension     Tuberculosis          Review of Systems  General ROS: negative for chills, fever or weight loss  Cardiovascular ROS: no chest pain or dyspnea on exertion    Physical Examination  /70 (BP Location: Left arm, Patient Position: Lying)   Pulse 82   Temp 97.7 °F (36.5 °C) (Oral)   Resp 20   SpO2 97% Comment: RA  General appearance: alert, cooperative, no distress  HENT: Normocephalic, atraumatic, neck symmetrical, no nasal discharge   Eyes: conjunctivae/corneas clear, no icterus, EOM's intact  Lungs: resp non-labored  Heart: regular rate   Abdomen: soft, non-tender; bowel sounds normoactive; no organomegaly  Extremities: extremities symmetric; no clubbing, cyanosis, or edema  Neurologic: Alert and oriented X 3, normal strength, normal coordination and gait    Assessment:     Dysphagia    Plan:   EGD

## 2017-10-10 NOTE — ANESTHESIA POSTPROCEDURE EVALUATION
Anesthesia Post Evaluation    Patient: Regan Owensbrook    Procedure(s) Performed: Procedure(s) (LRB):  ESOPHAGOGASTRODUODENOSCOPY (EGD) (N/A)    Final Anesthesia Type: general  Patient location during evaluation: GI PACU  Patient participation: Yes- Able to Participate  Level of consciousness: awake and alert  Post-procedure vital signs: reviewed and stable  Pain management: adequate  Airway patency: patent  PONV status at discharge: No PONV  Anesthetic complications: no      Cardiovascular status: blood pressure returned to baseline and hemodynamically stable  Respiratory status: unassisted  Hydration status: euvolemic  Follow-up not needed.        Visit Vitals  BP (!) 140/70 (BP Location: Left arm, Patient Position: Lying)   Pulse 74   Temp 36.3 °C (97.3 °F) (Oral)   Resp 18   SpO2 98%       Pain/Tolu Score: Pain Assessment Performed: Yes (10/9/2017 12:14 PM)  Presence of Pain: denies (10/9/2017 12:14 PM)  Tolu Score: 10 (10/9/2017 12:14 PM)

## 2017-10-16 DIAGNOSIS — I50.22 CHRONIC SYSTOLIC HEART FAILURE: ICD-10-CM

## 2017-10-16 RX ORDER — RAMIPRIL 2.5 MG/1
2.5 CAPSULE ORAL DAILY
Qty: 90 CAPSULE | Refills: 3 | Status: SHIPPED | OUTPATIENT
Start: 2017-10-16 | End: 2018-10-02 | Stop reason: SDUPTHER

## 2017-10-17 RX ORDER — FUROSEMIDE 20 MG/1
20 TABLET ORAL DAILY
Qty: 30 TABLET | Refills: 5 | Status: SHIPPED | OUTPATIENT
Start: 2017-10-17 | End: 2018-04-06 | Stop reason: SDUPTHER

## 2017-11-08 ENCOUNTER — OFFICE VISIT (OUTPATIENT)
Dept: INFECTIOUS DISEASES | Facility: CLINIC | Age: 77
End: 2017-11-08
Payer: MEDICARE

## 2017-11-08 VITALS
WEIGHT: 194.88 LBS | DIASTOLIC BLOOD PRESSURE: 84 MMHG | HEIGHT: 78 IN | HEART RATE: 99 BPM | TEMPERATURE: 99 F | BODY MASS INDEX: 22.55 KG/M2 | SYSTOLIC BLOOD PRESSURE: 143 MMHG

## 2017-11-08 DIAGNOSIS — B37.81 ESOPHAGEAL CANDIDIASIS: ICD-10-CM

## 2017-11-08 DIAGNOSIS — Z12.5 PROSTATE CANCER SCREENING: ICD-10-CM

## 2017-11-08 DIAGNOSIS — Z00.00 PREVENTATIVE HEALTH CARE: ICD-10-CM

## 2017-11-08 DIAGNOSIS — A31.0 MYCOBACTERIUM AVIUM-INTRACELLULARE COMPLEX: Primary | ICD-10-CM

## 2017-11-08 PROCEDURE — 99999 PR PBB SHADOW E&M-EST. PATIENT-LVL III: CPT | Mod: PBBFAC,,, | Performed by: INTERNAL MEDICINE

## 2017-11-08 PROCEDURE — 99215 OFFICE O/P EST HI 40 MIN: CPT | Mod: S$GLB,,, | Performed by: INTERNAL MEDICINE

## 2017-11-08 PROCEDURE — 90732 PPSV23 VACC 2 YRS+ SUBQ/IM: CPT | Mod: S$GLB,,, | Performed by: INTERNAL MEDICINE

## 2017-11-08 PROCEDURE — G0009 ADMIN PNEUMOCOCCAL VACCINE: HCPCS | Mod: S$GLB,,, | Performed by: INTERNAL MEDICINE

## 2017-11-08 NOTE — PROGRESS NOTES
Subjective:      Patient ID: Regan Alvarez is a 76 y.o. male.    Chief Complaint:Follow-up      History of Present Illness    Pulmonary Mycobacterium avium complex (MAC) infection  Pt on MAC rx since 1/14/16 but the rx was changed because of susceptibility profile to current rx azithro/moxi/rifabutin on 3/29/16. He is tolerating this well and feels better with less cough, no fever, sweats or chills.    Last  CT of chest 6/14/17 improved:    Interval reduced soft tissue rind about cavitary lesion in the right lung apex with increased gas density which may represent developing bulla and evolving atypical infectious process.  Clinical correlation and followup advised.    No evidence for significant new lung consolidation.    Continued scattered emphysematous change the lungs with persistent right pleural effusion with resolution of left pleural effusion.    Scattered micronodular opacities most pronounced in the right lower lobe additional scattered nodular opacities in the lungs likely related to granulomatous or atypical microbacterial infection in light of history.  Since being on anti MAC rx, he hs almost no cough and little phlegm. Has never had fever, weight loss, chills. Last sputum AFB culture was 2/23/17 and is negative to date.      6/14  Labs are improved:      ESR 68 to 55, CRP 24 to 15,. These bumped up on labs done on 10/2 likely related to dx of a case of fairly severe symptomatic esophageal candidiasis. His chronic antibiotic use and diabetes surely contributed to this. He has just been started on 14 day course of fluconazole. EGD confirmed dx of esophageal candidiasis.    LFTs and creatinine are normal     AFB cultures from 2/23 are negative, final.     Diabetes mellitus type 2, uncontrolled  He is followed by Dr. Adamson for his DM. Last A1c 10/2 was 7.3%.          Review of Systems   Constitution: Positive for malaise/fatigue. Negative for chills, decreased appetite, fever, weakness, night sweats,  weight gain and weight loss.   HENT: Negative for congestion, ear pain, hearing loss, hoarse voice, sore throat and tinnitus.    Eyes: Negative for blurred vision, redness and visual disturbance.   Cardiovascular: Negative for chest pain, leg swelling and palpitations.   Respiratory: Positive for shortness of breath and wheezing. Negative for cough, hemoptysis and sputum production.    Hematologic/Lymphatic: Negative for adenopathy. Does not bruise/bleed easily.   Skin: Positive for dry skin and itching. Negative for rash and suspicious lesions.   Musculoskeletal: Positive for joint pain. Negative for back pain, myalgias and neck pain.   Gastrointestinal: Positive for heartburn. Negative for abdominal pain, constipation, diarrhea, nausea and vomiting.   Genitourinary: Negative for dysuria, flank pain, frequency, hematuria, hesitancy and urgency.   Neurological: Negative for dizziness, headaches, numbness and paresthesias.   Psychiatric/Behavioral: Negative for depression and memory loss. The patient does not have insomnia and is not nervous/anxious.      Objective:   Physical Exam   Constitutional: He appears well-developed and well-nourished.   Cardiovascular: Normal rate, regular rhythm and normal heart sounds.  Exam reveals no friction rub.    No murmur heard.  Pulmonary/Chest: Effort normal and breath sounds normal. No respiratory distress. He has no wheezes. He has no rales.   Vitals reviewed.    Assessment:       1. Mycobacterium avium-intracellulare complex pulmonary infection   3. Preventative health care    4. Esophageal candidiasis          Plan:        1. RTC in late Dec for CT chest and labs. Will consider stopping MAC rx as he is completing 2 year course (azithro/moxi/rifabutin)

## 2017-11-20 ENCOUNTER — TELEPHONE (OUTPATIENT)
Dept: FAMILY MEDICINE | Facility: CLINIC | Age: 77
End: 2017-11-20

## 2017-11-20 NOTE — TELEPHONE ENCOUNTER
The pt. Followed up with GI on 11/03/17. He was put on antibiotics for two weeks for an intestinal infection.   The pt. Will return to the GI md After the medication is finished.

## 2017-11-28 DIAGNOSIS — J44.9 CHRONIC OBSTRUCTIVE PULMONARY DISEASE, UNSPECIFIED COPD TYPE: ICD-10-CM

## 2017-11-28 RX ORDER — ALBUTEROL SULFATE 90 UG/1
2 AEROSOL, METERED RESPIRATORY (INHALATION) EVERY 6 HOURS PRN
Qty: 1 INHALER | Refills: 2 | Status: SHIPPED | OUTPATIENT
Start: 2017-11-28 | End: 2019-06-06 | Stop reason: SDUPTHER

## 2017-12-04 ENCOUNTER — OFFICE VISIT (OUTPATIENT)
Dept: FAMILY MEDICINE | Facility: CLINIC | Age: 77
End: 2017-12-04
Payer: MEDICARE

## 2017-12-04 VITALS
BODY MASS INDEX: 22.04 KG/M2 | TEMPERATURE: 98 F | HEIGHT: 78 IN | HEART RATE: 88 BPM | OXYGEN SATURATION: 96 % | WEIGHT: 190.5 LBS | DIASTOLIC BLOOD PRESSURE: 72 MMHG | SYSTOLIC BLOOD PRESSURE: 138 MMHG | RESPIRATION RATE: 16 BRPM

## 2017-12-04 DIAGNOSIS — E11.65 UNCONTROLLED TYPE 2 DIABETES MELLITUS WITH HYPERGLYCEMIA, WITHOUT LONG-TERM CURRENT USE OF INSULIN: Primary | ICD-10-CM

## 2017-12-04 DIAGNOSIS — J41.0 SIMPLE CHRONIC BRONCHITIS: ICD-10-CM

## 2017-12-04 DIAGNOSIS — A31.0 PULMONARY MYCOBACTERIUM AVIUM COMPLEX (MAC) INFECTION: Chronic | ICD-10-CM

## 2017-12-04 PROCEDURE — 99999 PR PBB SHADOW E&M-EST. PATIENT-LVL III: CPT | Mod: PBBFAC,,, | Performed by: INTERNAL MEDICINE

## 2017-12-04 PROCEDURE — 99214 OFFICE O/P EST MOD 30 MIN: CPT | Mod: S$GLB,,, | Performed by: INTERNAL MEDICINE

## 2017-12-04 RX ORDER — FLUCONAZOLE 100 MG/1
TABLET ORAL
Refills: 0 | COMMUNITY
Start: 2017-11-06 | End: 2017-12-21

## 2017-12-04 RX ORDER — TIOTROPIUM BROMIDE 18 UG/1
18 CAPSULE ORAL; RESPIRATORY (INHALATION) DAILY
Qty: 90 CAPSULE | Refills: 3 | Status: SHIPPED | OUTPATIENT
Start: 2017-12-04 | End: 2017-12-14

## 2017-12-04 NOTE — PROGRESS NOTES
"Subjective:       Patient ID: Regan Alvarez is a 77 y.o. male.    Chief Complaint: Diabetes; Follow-up; and Cough    F/u chronic conditions    HPI: 76 y/o w/ DM, pulmonary MAC and COPD (followed by ID) here for scheduled follow up. Since last visit had EGD suggestive of esophageal canidiasis has completed two weeks of fluconazole. No further sensation of food getting "stuck" weight is stable using rescue inhalter three to four times per day for cough. No fevers/chills. Schedule for ID follow up later this month for recommendations on continued antibioitcs      Review of Systems   Constitutional: Negative for activity change, appetite change, fatigue, fever and unexpected weight change.   HENT: Negative for ear pain, rhinorrhea and sore throat.    Eyes: Negative for discharge and visual disturbance.   Respiratory: Positive for cough. Negative for chest tightness, shortness of breath and wheezing.    Cardiovascular: Negative for chest pain, palpitations and leg swelling.   Gastrointestinal: Negative for abdominal pain, constipation and diarrhea.   Endocrine: Negative for cold intolerance and heat intolerance.   Genitourinary: Negative for dysuria and hematuria.   Musculoskeletal: Negative for joint swelling and neck stiffness.   Skin: Negative for rash.   Neurological: Negative for dizziness, syncope, weakness and headaches.   Psychiatric/Behavioral: Negative for suicidal ideas.       Objective:     Vitals:    12/04/17 0846 12/04/17 0904   BP: (!) 150/84 138/72   BP Location: Left arm    Patient Position: Sitting    BP Method: Medium (Manual)    Pulse: 95 88   Resp: 16    Temp: 98.4 °F (36.9 °C)    TempSrc: Oral    SpO2: 96%    Weight: 86.4 kg (190 lb 7.6 oz)    Height: 6' 7" (2.007 m)           Physical Exam   Constitutional: He is oriented to person, place, and time. He appears well-developed and well-nourished.   HENT:   Head: Normocephalic and atraumatic.   Eyes: Conjunctivae are normal. Pupils are equal, " round, and reactive to light.   Neck: Normal range of motion.   Cardiovascular: Normal rate and regular rhythm.  Exam reveals no gallop and no friction rub.    No murmur heard.  Pulmonary/Chest: Effort normal and breath sounds normal. He has no wheezes. He has no rales.   Good air movement to bases bilaterally   Abdominal: Soft. Bowel sounds are normal. There is no tenderness. There is no rebound and no guarding.   Musculoskeletal: Normal range of motion. He exhibits no edema or tenderness.   Neurological: He is alert and oriented to person, place, and time. No cranial nerve deficit.   Skin: Skin is warm and dry.   Psychiatric: He has a normal mood and affect.       Assessment:       1. Uncontrolled type 2 diabetes mellitus with hyperglycemia, without long-term current use of insulin    2. Pulmonary Mycobacterium avium complex (MAC) infection    3. Simple chronic bronchitis        Plan:    1. Improved controll with treatement of MAC continue current medicaitonsr epeat a1c with labs in two weeks.     2. Per ID continue triple therapy    3. Would benefit from treatment for obstruciton start with daily anticholernergi (spiriva) continue prn albuterol

## 2017-12-06 NOTE — ASSESSMENT & PLAN NOTE
Appear to be multifactorial, COPD exacerbation and pneumonia, will continue with supplemental oxygen.    OT ACUTE Treatment Session    Pt seen on 4 nursing unit.                                                          Frequency Comments: M-F (IRP accepted; awaiting insurance authorization)     Admitting complaint:: Dehydration [E86.0]  Acute kidney injury (CMS/HCC) [N17.9]                                                                                         Precautions  Other Precautions: Falls risk (12/04/17 0845)  Precautions Comments: intermittent R resting tremor - negative for PD (11/27/17 7858)      ASSESSMENT: Current overall ADL status is supv/set-up for toileting and standing grooming at sink for safety due to weakness. Current functional mobility for ADL and Instrumental-ADL tasks is light min A (touching/steadying) w/ 2WW for sit<>stand transfers, toilet transfer, and ambulation to and from bathroom for safety due to weakness and lethargy. Limitations at this time include weakness, fatigue and medical status. Patient will benefit from further skilled OT for continued training with functional mobility and ADLs to help the patient meet goal of increasing independence and maximizing safety.      RECOMMENDATIONS FOR DISCHARGE:  Recommendations for Discharge: OT: Intensive therapy program (12/06/17 1301)    OT Identified Barriers to Discharge: medical status, weakness     PT/OT Mobility Equipment for Discharge: pt has 4ww (12/06/17 1301)  PT/OT ADL Equipment for Discharge: has reacher and sock aid (12/06/17 1301)    ICU Mobility Assesment (PERME):         PLAN: Continue skilled OT, including the following Treatment Interventions: ADL retraining;Functional transfer training;UE strengthening/ROM;Endurance training;Patient/Family training;Equipment eval/education;Compensatory technique education (12/06/17 1301)   Treatment Plan for Next Session: Standing self-cares at sink  Additional Plan Considerations: Lower body dressing  Plan Comments: 2 week tremors b/l hands, R >then L    EDUCATION:   On this date, the  patient was educated on role of OT, plan of care, importance of daily activity.    The response to education was: Needs reinforcement                                                    SUBJECTIVE: Patient's Personal Goal: To get stronger and then return home (12/06/17 1301)   Subjective: Pt agreeable to session. \"I have a headache, but my nurse gave me some Tylenol.\" (12/06/17 1301)  Subjective/Objective Comments: RN Rajani guerrier session. Pt seated in recliner with needs met, call light in reach, RN present at end of session. (12/06/17 1301)    OBJECTIVE:Basic Lines: Capped IV;Telemetry (12/06/17 1301)  Safety Measures: Alarms (12/06/17 1301)    RN reported Beard Fall Scale Score: 85       Last 24 hours of Functional Data     ADLs  Self Cares/ADL's  Grooming Assistance: Supervision;Set-up;Standing at sink (12/06/17 1301)  Grooming/Oral Hygiene Deficit: Wash/dry hands (12/06/17 1301)  Toileting Assistance: Supervision (12/06/17 1301)  Toileting Deficit: Clothing management up;Clothing management down;Perineal hygiene (12/06/17 1301)  Self Cares/ADL's Comments #1: Supv/set-up for toileting and standing grooming at sink for safety due to weakness. (12/06/17 1301)    Household mobility  Household Mobility  Sit to Stand: Minimal Assist (Min);Touching/Steadying Assistance (12/06/17 1301)  Stand to Sit: Minimal Assist (Min);Touching/Steadying Assistance (12/06/17 1301)  Toilet Transfers: Minimal Assist (Min);Touching/Steadying Assistance (12/06/17 1301)  Transfer Equipment: gait belt, 2WW (12/06/17 1301)  Sitting - Static: Supervision (12/06/17 1301)  Sitting - Dynamic: Supervision (12/06/17 1301)  Standing - Static: Supervision (12/06/17 1301)  Standing - Dynamic: Minimal Assist (Min);Touching/Steadying Assistance (12/06/17 1301)  Household Mobility Comments #1: Light min A (touching/steadying) w/ 2WW for sit<>stand transfers, toilet transfer, and ambulation to and from bathroom for safety due to weakness.  (12/06/17  1301)    Home Management       Tolerance  OT Activity Tolerance  Activity Tolerance: 1:1 Activity to rest (12/06/17 1301)  Activity Tolerance Comments: Fair (12/06/17 1301)    Cognition  Communication/Cognition  Communication: Clear speech;Appropriate for developmental age (12/06/17 1301)  Arousal/Alertness: Appropriate responses to stimuli (12/06/17 1301)  Attention: Appears intact (12/06/17 1301)  Following Verbal Directions: Follows one step directions without difficulty (12/06/17 1301)    Patient's Personal Goal: To get stronger and then return home (12/06/17 1301)    Therapy Goals:    Goals  Short Term Goals to Be Reviewed On: 12/11/17 (12/06/17 1301)  Short Term Goals Are The Same as Discharge Goals: Yes (12/06/17 1301)  Goal Agreement: Patient agrees with goals and treatment plan (12/06/17 1301)  Feeding Discharge Goal 1: modified independence (12/06/17 1301)  Feeding Discharge Goal Progress 1: Outcome met, continue evaluating goal progress toward completion (11/28/17 0925)  Bathing Discharge Goal 1: supervision, seated and stance  (12/06/17 1301)  Bathing Discharge Goal Progress 1: Outcome met, continue evaluating goal progress toward completion (11/29/17 0938)  Dressing Discharge Goal 1: upper body modified independence (12/06/17 1301)  Dressing Discharge Goal 2: lower body set-up (12/06/17 1301)  Dressing Discharge Goal Progress 2: Outcome not met, plan adjusted (11/28/17 0925)  Toileting Discharge Goal 1: modified independence (12/06/17 1301)  Toileting Discharge Goal Progress 1: Outcome met, continue evaluating goal progress toward completion (11/29/17 0938)  Home Setting Transfer Discharge Goal 1: toilet transfer modified independence (12/06/17 1301)  Home Setting Transfer Discharge Goal Progress 1: Outcome met, continue evaluating goal progress toward completion (11/28/17 0925)  Home Setting Transfer Discharge Goal 2: safe item retrieval and transport with modified independence (12/06/17 1301)  UE  Function Discharge Goal 1: . (12/04/17 1315)  Other Short Term Goal 2: Pt verbalize or demo tremor remediation methods for self cares, fair + or better.  (12/06/17 1301)  Other Discharge Goal Progress 2: Outcome met, continue evaluating goal progress toward completion (11/30/17 7844)        Total Treatment Time:  OT Time Spent: 40 minutes (12/06/17 1301)    See OT flowsheet for full details regarding the OT therapy provided.

## 2017-12-13 ENCOUNTER — HOSPITAL ENCOUNTER (OUTPATIENT)
Dept: RADIOLOGY | Facility: HOSPITAL | Age: 77
Discharge: HOME | End: 2017-12-13
Attending: INTERNAL MEDICINE
Payer: MEDICARE

## 2017-12-13 DIAGNOSIS — A31.0 MYCOBACTERIUM AVIUM-INTRACELLULARE COMPLEX: ICD-10-CM

## 2017-12-13 PROCEDURE — 71250 CT THORAX DX C-: CPT | Mod: 26,,, | Performed by: RADIOLOGY

## 2017-12-13 PROCEDURE — 71250 CT THORAX DX C-: CPT | Mod: TC

## 2017-12-14 ENCOUNTER — TELEPHONE (OUTPATIENT)
Dept: FAMILY MEDICINE | Facility: CLINIC | Age: 77
End: 2017-12-14

## 2017-12-14 DIAGNOSIS — J41.0 SIMPLE CHRONIC BRONCHITIS: ICD-10-CM

## 2017-12-14 NOTE — TELEPHONE ENCOUNTER
----- Message from [place] sent at 12/14/2017 12:19 PM CST -----  Contact: [facility]  [facility] called stating that Spiriva is not covered. Please send over another inhaler, Thanks!

## 2017-12-15 RX ORDER — TIOTROPIUM BROMIDE 18 UG/1
18 CAPSULE ORAL; RESPIRATORY (INHALATION) DAILY
Qty: 90 CAPSULE | Refills: 3 | OUTPATIENT
Start: 2017-12-15 | End: 2018-12-15

## 2017-12-21 ENCOUNTER — OFFICE VISIT (OUTPATIENT)
Dept: INFECTIOUS DISEASES | Facility: CLINIC | Age: 77
End: 2017-12-21
Payer: MEDICARE

## 2017-12-21 ENCOUNTER — LAB VISIT (OUTPATIENT)
Dept: LAB | Facility: HOSPITAL | Age: 77
End: 2017-12-21
Attending: INTERNAL MEDICINE
Payer: MEDICARE

## 2017-12-21 VITALS
WEIGHT: 194.88 LBS | SYSTOLIC BLOOD PRESSURE: 153 MMHG | BODY MASS INDEX: 22.55 KG/M2 | TEMPERATURE: 98 F | DIASTOLIC BLOOD PRESSURE: 85 MMHG | HEART RATE: 103 BPM | HEIGHT: 78 IN

## 2017-12-21 DIAGNOSIS — E11.9 DIABETES MELLITUS TYPE 2, DIET-CONTROLLED: ICD-10-CM

## 2017-12-21 DIAGNOSIS — D64.9 ANEMIA, UNSPECIFIED TYPE: ICD-10-CM

## 2017-12-21 DIAGNOSIS — E11.65 UNCONTROLLED TYPE 2 DIABETES MELLITUS WITH HYPERGLYCEMIA, WITHOUT LONG-TERM CURRENT USE OF INSULIN: ICD-10-CM

## 2017-12-21 DIAGNOSIS — A31.0 PULMONARY MYCOBACTERIUM AVIUM COMPLEX (MAC) INFECTION: Primary | ICD-10-CM

## 2017-12-21 LAB
ESTIMATED AVG GLUCOSE: 137 MG/DL
FERRITIN SERPL-MCNC: 21 NG/ML
FOLATE SERPL-MCNC: 13.9 NG/ML
HBA1C MFR BLD HPLC: 6.4 %
IRON SERPL-MCNC: 42 UG/DL
SATURATED IRON: 10 %
TOTAL IRON BINDING CAPACITY: 413 UG/DL
TRANSFERRIN SERPL-MCNC: 279 MG/DL
VIT B12 SERPL-MCNC: 445 PG/ML

## 2017-12-21 PROCEDURE — 82746 ASSAY OF FOLIC ACID SERUM: CPT

## 2017-12-21 PROCEDURE — 83036 HEMOGLOBIN GLYCOSYLATED A1C: CPT

## 2017-12-21 PROCEDURE — 83540 ASSAY OF IRON: CPT

## 2017-12-21 PROCEDURE — 82607 VITAMIN B-12: CPT

## 2017-12-21 PROCEDURE — 82728 ASSAY OF FERRITIN: CPT

## 2017-12-21 PROCEDURE — 36415 COLL VENOUS BLD VENIPUNCTURE: CPT

## 2017-12-21 PROCEDURE — 99215 OFFICE O/P EST HI 40 MIN: CPT | Mod: S$GLB,,, | Performed by: INTERNAL MEDICINE

## 2017-12-21 PROCEDURE — 99999 PR PBB SHADOW E&M-EST. PATIENT-LVL III: CPT | Mod: PBBFAC,,, | Performed by: INTERNAL MEDICINE

## 2017-12-21 NOTE — ASSESSMENT & PLAN NOTE
Pt has pulmonary MAC infection on treatment since approximately Jan 2016. Several variations on his rx but has mostly been azithromycin/rifabutin / moxifloxaxin.  His last chest CT was 12/13/17:    Moderate sized right right pleural fluid collection with mild basilar opacification, similar to slightly improved from the 6/14/17 exam. A component of aspiration pneumonitis may be present.  Moderate emphysematous lung changes.  Right upper lobe scarring with thick walled apical bulla, similar to prior exam.     He has a chronic elevation of his ESR but CRP has gone from high of 346 to current of 17.4.    AFB cultures were positive for MAC on 12/14/15 and negative on 9/16/16 and 2/22/17.    He has COPD from smoking and is chronically dyspneic. His cough has greatly improved.

## 2017-12-21 NOTE — ASSESSMENT & PLAN NOTE
I had assumed that his anemia was related to anemia of chronic disease, but it has not improved much over the course of his MAC treatment. Denies any dark or bloody stools, but he needs to be worked up for anemia. GFR is normal.

## 2017-12-21 NOTE — PROGRESS NOTES
Subjective:      Patient ID: Regan Alvarez is a 77 y.o. male.    Chief Complaint:Follow-up      History of Present Illness    Pulmonary Mycobacterium avium complex (MAC) infection  Pt has pulmonary MAC infection on treatment since approximately Jan 2016. Several variations on his rx but has mostly been azithromycin/rifabutin / moxifloxaxin.  His last chest CT was 12/13/17:    Moderate sized right right pleural fluid collection with mild basilar opacification, similar to slightly improved from the 6/14/17 exam. A component of aspiration pneumonitis may be present.  Moderate emphysematous lung changes.  Right upper lobe scarring with thick walled apical bulla, similar to prior exam.     He has a chronic elevation of his ESR but CRP has gone from high of 346 to current of 17.4.    AFB cultures were positive for MAC on 12/14/15 and negative on 9/16/16 and 2/22/17.    He has COPD from smoking and is chronically dyspneic. His cough has greatly improved.    Anemia  I had assumed that his anemia was related to anemia of chronic disease, but it has not improved much over the course of his MAC treatment. Denies any dark or bloody stools, but he needs to be worked up for anemia. GFR is normal.    Uncontrolled type 2 diabetes mellitus with hyperglycemia, without long-term current use of insulin  He feels his DM is better controlled. A1c today was 7% (154).         Review of Systems   Constitution: Positive for malaise/fatigue. Negative for chills, decreased appetite, fever, weakness, night sweats, weight gain and weight loss.   HENT: Negative for congestion, ear pain, hearing loss, hoarse voice, sore throat and tinnitus.    Eyes: Negative for blurred vision, redness and visual disturbance.   Cardiovascular: Negative for chest pain, leg swelling and palpitations.   Respiratory: Positive for shortness of breath. Negative for cough, hemoptysis and sputum production.    Hematologic/Lymphatic: Negative for adenopathy. Does not  bruise/bleed easily.   Skin: Positive for itching. Negative for dry skin, rash and suspicious lesions.   Musculoskeletal: Negative for back pain, joint pain, myalgias and neck pain.   Gastrointestinal: Negative for abdominal pain, constipation, diarrhea, heartburn, nausea and vomiting.   Genitourinary: Negative for dysuria, flank pain, frequency, hematuria, hesitancy and urgency.   Neurological: Negative for dizziness, headaches, numbness and paresthesias.   Psychiatric/Behavioral: Negative for depression and memory loss. The patient does not have insomnia and is not nervous/anxious.      Objective:   Physical Exam   Constitutional: He is oriented to person, place, and time. He appears well-developed and well-nourished. No distress.   Cardiovascular: Normal rate, regular rhythm and normal heart sounds.  Exam reveals no friction rub.    No murmur heard.  Pulmonary/Chest: Effort normal and breath sounds normal. No respiratory distress. He has no wheezes. He has no rales.   Neurological: He is alert and oriented to person, place, and time.   Skin: Skin is warm and dry.   Psychiatric: He has a normal mood and affect. His behavior is normal. Thought content normal.   Vitals reviewed.    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 Anemia, unspecified type    3. Diabetes mellitus type 2, diet-controlled          Plan:        1. DC rifabutin and moxifloxacin (he was supposed to be on azithro but says he has not taken it recently)   2. Sputum for AFB smear/culture   3. Anemia W/U ordered including stool hemoccults. Home review   4. See back in 3 mos with labs

## 2018-01-11 ENCOUNTER — LAB VISIT (OUTPATIENT)
Dept: LAB | Facility: HOSPITAL | Age: 78
End: 2018-01-11
Attending: INTERNAL MEDICINE
Payer: MEDICARE

## 2018-01-11 ENCOUNTER — TELEPHONE (OUTPATIENT)
Dept: INFECTIOUS DISEASES | Facility: CLINIC | Age: 78
End: 2018-01-11

## 2018-01-11 DIAGNOSIS — K92.1 BLOOD IN STOOL: Primary | ICD-10-CM

## 2018-01-11 DIAGNOSIS — A31.0 PULMONARY MYCOBACTERIUM AVIUM COMPLEX (MAC) INFECTION: ICD-10-CM

## 2018-01-11 DIAGNOSIS — D64.9 ANEMIA, UNSPECIFIED TYPE: ICD-10-CM

## 2018-01-11 PROCEDURE — 87015 SPECIMEN INFECT AGNT CONCNTJ: CPT

## 2018-01-11 PROCEDURE — 87116 MYCOBACTERIA CULTURE: CPT

## 2018-01-15 DIAGNOSIS — Z12.11 COLON CANCER SCREENING: Primary | ICD-10-CM

## 2018-01-19 ENCOUNTER — ANESTHESIA EVENT (OUTPATIENT)
Dept: ENDOSCOPY | Facility: HOSPITAL | Age: 78
End: 2018-01-19
Payer: MEDICARE

## 2018-01-22 ENCOUNTER — ANESTHESIA (OUTPATIENT)
Dept: ENDOSCOPY | Facility: HOSPITAL | Age: 78
End: 2018-01-22
Payer: MEDICARE

## 2018-01-22 ENCOUNTER — HOSPITAL ENCOUNTER (OUTPATIENT)
Facility: HOSPITAL | Age: 78
Discharge: HOME | End: 2018-01-22
Attending: INTERNAL MEDICINE | Admitting: INTERNAL MEDICINE
Payer: MEDICARE

## 2018-01-22 ENCOUNTER — SURGERY (OUTPATIENT)
Age: 78
End: 2018-01-22

## 2018-01-22 VITALS
DIASTOLIC BLOOD PRESSURE: 77 MMHG | OXYGEN SATURATION: 98 % | HEART RATE: 76 BPM | TEMPERATURE: 98 F | SYSTOLIC BLOOD PRESSURE: 148 MMHG | RESPIRATION RATE: 18 BRPM

## 2018-01-22 DIAGNOSIS — K92.1 BLOOD IN STOOL: ICD-10-CM

## 2018-01-22 PROCEDURE — 45380 COLONOSCOPY AND BIOPSY: CPT | Performed by: INTERNAL MEDICINE

## 2018-01-22 PROCEDURE — D9220A PRA ANESTHESIA: Mod: ANES,,, | Performed by: ANESTHESIOLOGY

## 2018-01-22 PROCEDURE — 37000009 HC ANESTHESIA EA ADD 15 MINS: Performed by: INTERNAL MEDICINE

## 2018-01-22 PROCEDURE — D9220A PRA ANESTHESIA: Mod: CRNA,,, | Performed by: NURSE ANESTHETIST, CERTIFIED REGISTERED

## 2018-01-22 PROCEDURE — 88305 TISSUE EXAM BY PATHOLOGIST: CPT | Mod: 26,,, | Performed by: PATHOLOGY

## 2018-01-22 PROCEDURE — 37000008 HC ANESTHESIA 1ST 15 MINUTES: Performed by: INTERNAL MEDICINE

## 2018-01-22 PROCEDURE — 82962 GLUCOSE BLOOD TEST: CPT | Performed by: INTERNAL MEDICINE

## 2018-01-22 PROCEDURE — 63600175 PHARM REV CODE 636 W HCPCS: Performed by: NURSE ANESTHETIST, CERTIFIED REGISTERED

## 2018-01-22 PROCEDURE — 25000003 PHARM REV CODE 250: Performed by: ANESTHESIOLOGY

## 2018-01-22 PROCEDURE — 27201012 HC FORCEPS, HOT/COLD, DISP: Performed by: INTERNAL MEDICINE

## 2018-01-22 PROCEDURE — 88305 TISSUE EXAM BY PATHOLOGIST: CPT | Performed by: PATHOLOGY

## 2018-01-22 RX ORDER — PROPOFOL 10 MG/ML
VIAL (ML) INTRAVENOUS
Status: COMPLETED
Start: 2018-01-22 | End: 2018-01-22

## 2018-01-22 RX ORDER — SODIUM CHLORIDE 9 MG/ML
INJECTION, SOLUTION INTRAVENOUS CONTINUOUS
Status: DISCONTINUED | OUTPATIENT
Start: 2018-01-22 | End: 2018-01-22 | Stop reason: HOSPADM

## 2018-01-22 RX ORDER — LIDOCAINE HCL/PF 100 MG/5ML
SYRINGE (ML) INTRAVENOUS
Status: DISCONTINUED | OUTPATIENT
Start: 2018-01-22 | End: 2018-01-22

## 2018-01-22 RX ORDER — LIDOCAINE HYDROCHLORIDE 10 MG/ML
1 INJECTION, SOLUTION EPIDURAL; INFILTRATION; INTRACAUDAL; PERINEURAL ONCE
Status: DISCONTINUED | OUTPATIENT
Start: 2018-01-22 | End: 2018-01-22 | Stop reason: HOSPADM

## 2018-01-22 RX ORDER — LIDOCAINE HYDROCHLORIDE 20 MG/ML
INJECTION, SOLUTION EPIDURAL; INFILTRATION; INTRACAUDAL; PERINEURAL
Status: DISCONTINUED
Start: 2018-01-22 | End: 2018-01-22 | Stop reason: HOSPADM

## 2018-01-22 RX ORDER — PROPOFOL 10 MG/ML
VIAL (ML) INTRAVENOUS
Status: DISCONTINUED | OUTPATIENT
Start: 2018-01-22 | End: 2018-01-22

## 2018-01-22 RX ADMIN — PROPOFOL 30 MG: 10 INJECTION, EMULSION INTRAVENOUS at 10:01

## 2018-01-22 RX ADMIN — PROPOFOL 20 MG: 10 INJECTION, EMULSION INTRAVENOUS at 11:01

## 2018-01-22 RX ADMIN — SODIUM CHLORIDE: 0.9 INJECTION, SOLUTION INTRAVENOUS at 10:01

## 2018-01-22 RX ADMIN — PROPOFOL 70 MG: 10 INJECTION, EMULSION INTRAVENOUS at 10:01

## 2018-01-22 RX ADMIN — LIDOCAINE HYDROCHLORIDE 60 MG: 20 INJECTION, SOLUTION INTRAVENOUS at 10:01

## 2018-01-22 RX ADMIN — PROPOFOL 40 MG: 10 INJECTION, EMULSION INTRAVENOUS at 11:01

## 2018-01-22 NOTE — DISCHARGE INSTRUCTIONS

## 2018-01-22 NOTE — TRANSFER OF CARE
Anesthesia Transfer of Care Note    Patient: Regan Alvarez    Procedure(s) Performed: Procedure(s) (LRB):  COLONOSCOPY (N/A)    Patient location: GI    Anesthesia Type: general    Transport from OR: Transported from OR on room air with adequate spontaneous ventilation    Post pain: adequate analgesia    Post assessment: no apparent anesthetic complications and tolerated procedure well    Post vital signs: stable    Level of consciousness: sedated and responds to stimulation    Nausea/Vomiting: no nausea/vomiting    Complications: none    Transfer of care protocol was followed      Last vitals:   Visit Vitals  /71 (BP Location: Left arm, Patient Position: Lying)   Pulse 86   Resp 19   SpO2 99%

## 2018-01-22 NOTE — H&P
Gastroenterology    CC: anemia with blood in stool    HPI 77 y.o. male with anemia (borderline iron def) and reported blood in stool      Past Medical History:   Diagnosis Date    Arthritis     COPD (chronic obstructive pulmonary disease)     Diabetes mellitus type II     Hypertension     Tuberculosis          Review of Systems  General ROS: negative for chills, fever or weight loss  Cardiovascular ROS: no chest pain or dyspnea on exertion    Physical Examination  There were no vitals taken for this visit.  General appearance: alert, cooperative, no distress  HENT: Normocephalic, atraumatic, neck symmetrical, no nasal discharge   Eyes: conjunctivae/corneas clear, no icterus, EOM's intact  Lungs: resp non-labored  Heart: regular rate   Abdomen: soft, non-tender; bowel sounds normoactive; no organomegaly  Extremities: extremities symmetric; no clubbing, cyanosis, or edema  Neurologic: Alert and oriented X 3, normal strength, normal coordination and gait    Assessment:     Anemia  Blood in stool    Plan:   Colonoscopy

## 2018-01-22 NOTE — ANESTHESIA PREPROCEDURE EVALUATION
01/22/2018  Regan Alvarez is a 77 y.o., male.    Pre-op Assessment    I have reviewed the Patient Summary Reports.      I have reviewed the Medications.     Review of Systems  Anesthesia Hx:  No problems with previous Anesthesia  History of prior surgery of interest to airway management or planning:  Denies Personal Hx of Anesthesia complications.   Social:  Former Smoker    Cardiovascular:   Denies Pacemaker. Hypertension Dysrhythmias (PVCs)  Echo 2/2017  TDS but EF 50-55%  No other abnormalities appreciated   Pulmonary:   COPD Shortness of breath    Hepatic/GI:   No Bowel Prep. GERD Denies Liver Disease. Denies Hepatitis.    Musculoskeletal:   Arthritis     Neurological:  Neurology Normal    Endocrine:   Diabetes, type 2 Denies Hypothyroidism. Denies Hyperthyroidism.        Physical Exam  General:  Well nourished    Airway/Jaw/Neck:  Airway Findings: Mouth Opening: Normal Tongue: Normal  General Airway Assessment: Adult, Average  Mallampati: II  TM Distance: Normal, at least 6 cm  edentulous    Dental:  Dental Findings: Upper Dentures, Lower Dentures   Chest/Lungs:  Chest/Lungs Findings: Clear to auscultation     Heart/Vascular:  Heart Findings: Rhythm: Regular Rhythm        Mental Status:  Mental Status Findings: Normal        Anesthesia Plan  Type of Anesthesia, risks & benefits discussed:  Anesthesia Type:  general  Patient's Preference:   Intra-op Monitoring Plan: standard ASA monitors  Intra-op Monitoring Plan Comments:   Post Op Pain Control Plan: IV/PO Opioids PRN  Post Op Pain Control Plan Comments:   Induction:   IV  Beta Blocker:  Patient is not currently on a Beta-Blocker (No further documentation required).       Informed Consent: Patient understands risks and agrees with Anesthesia plan.  Questions answered. Anesthesia consent signed with patient.  ASA Score: 3     Day of Surgery Review of  History & Physical:    H&P update referred to the provider.     Anesthesia Plan Notes:           Ready For Surgery From Anesthesia Perspective.

## 2018-01-23 LAB — POCT GLUCOSE: 203 MG/DL (ref 70–110)

## 2018-01-23 NOTE — ANESTHESIA POSTPROCEDURE EVALUATION
Anesthesia Post Evaluation    Patient: Regan Alvarez    Procedure(s) Performed: Procedure(s) (LRB):  COLONOSCOPY (N/A)    Final Anesthesia Type: general  Patient location during evaluation: GI PACU  Patient participation: Yes- Able to Participate  Level of consciousness: awake and alert and oriented  Post-procedure vital signs: reviewed and stable  Pain management: adequate  Airway patency: patent  PONV status at discharge: No PONV  Anesthetic complications: no      Cardiovascular status: blood pressure returned to baseline, hemodynamically stable and hypertensive  Respiratory status: unassisted, spontaneous ventilation and room air  Hydration status: euvolemic  Follow-up not needed.        Visit Vitals  BP (!) 148/77   Pulse 76   Temp 36.6 °C (97.8 °F)   Resp 18   SpO2 98%       Pain/Tolu Score: Pain Assessment Performed: Yes (1/22/2018 11:48 AM)  Presence of Pain: denies (1/22/2018 11:48 AM)  Tolu Score: 10 (1/22/2018 11:48 AM)

## 2018-02-05 ENCOUNTER — OFFICE VISIT (OUTPATIENT)
Dept: FAMILY MEDICINE | Facility: CLINIC | Age: 78
End: 2018-02-05
Payer: MEDICARE

## 2018-02-05 VITALS
DIASTOLIC BLOOD PRESSURE: 70 MMHG | HEART RATE: 86 BPM | WEIGHT: 197.06 LBS | OXYGEN SATURATION: 97 % | HEIGHT: 78 IN | SYSTOLIC BLOOD PRESSURE: 140 MMHG | TEMPERATURE: 98 F | RESPIRATION RATE: 17 BRPM | BODY MASS INDEX: 22.8 KG/M2

## 2018-02-05 DIAGNOSIS — D64.9 ANEMIA, UNSPECIFIED TYPE: ICD-10-CM

## 2018-02-05 DIAGNOSIS — J43.8 OTHER EMPHYSEMA: Primary | ICD-10-CM

## 2018-02-05 DIAGNOSIS — E11.65 UNCONTROLLED TYPE 2 DIABETES MELLITUS WITH HYPERGLYCEMIA, WITHOUT LONG-TERM CURRENT USE OF INSULIN: ICD-10-CM

## 2018-02-05 DIAGNOSIS — I10 ESSENTIAL HYPERTENSION: ICD-10-CM

## 2018-02-05 PROCEDURE — 1159F MED LIST DOCD IN RCRD: CPT | Mod: S$GLB,,, | Performed by: INTERNAL MEDICINE

## 2018-02-05 PROCEDURE — 99999 PR PBB SHADOW E&M-EST. PATIENT-LVL III: CPT | Mod: PBBFAC,,, | Performed by: INTERNAL MEDICINE

## 2018-02-05 PROCEDURE — 99214 OFFICE O/P EST MOD 30 MIN: CPT | Mod: S$GLB,,, | Performed by: INTERNAL MEDICINE

## 2018-02-05 PROCEDURE — 3008F BODY MASS INDEX DOCD: CPT | Mod: S$GLB,,, | Performed by: INTERNAL MEDICINE

## 2018-02-05 PROCEDURE — 1126F AMNT PAIN NOTED NONE PRSNT: CPT | Mod: S$GLB,,, | Performed by: INTERNAL MEDICINE

## 2018-02-05 RX ORDER — FLUTICASONE FUROATE AND VILANTEROL 200; 25 UG/1; UG/1
1 POWDER RESPIRATORY (INHALATION) DAILY
Qty: 60 EACH | Refills: 2 | Status: SHIPPED | OUTPATIENT
Start: 2018-02-05 | End: 2019-06-06

## 2018-02-05 NOTE — PROGRESS NOTES
"Subjective:       Patient ID: Regan Alvarez is a 77 y.o. male.    Chief Complaint: Diabetes and Follow-up (2 month )    F/u chronic conditions    HPI: 76 y/o w/ COPD DM h/o chronic MAC completed 12 months of rifbutin and moxifloxacin with no now culture negative AFB in sputum presents for follow up. Still with some exertional dyspnea using albuterol two tot hree times per week. Was unable to afford spiriva and not eligibile for cost savings card. No LABA/ICS he did have symptomatic esophageal canidiasis treated with two weeks of fluconazole no further problems with swallowing. Weight is up slightly appetite good. persistant anemia with negative cscope other cell lines WNL      Review of Systems   Constitutional: Negative for activity change, appetite change, fatigue, fever and unexpected weight change.   HENT: Negative for ear pain, rhinorrhea and sore throat.    Eyes: Negative for discharge and visual disturbance.   Respiratory: Positive for cough and shortness of breath. Negative for chest tightness and wheezing.    Cardiovascular: Negative for chest pain, palpitations and leg swelling.   Gastrointestinal: Negative for abdominal pain, constipation and diarrhea.   Endocrine: Negative for cold intolerance and heat intolerance.   Genitourinary: Negative for dysuria and hematuria.   Musculoskeletal: Negative for joint swelling and neck stiffness.   Skin: Negative for rash.   Neurological: Negative for dizziness, syncope, weakness and headaches.   Psychiatric/Behavioral: Negative for suicidal ideas.       Objective:     Vitals:    02/05/18 0911   BP: (!) 140/70   BP Location: Left arm   Patient Position: Sitting   BP Method: Medium (Manual)   Pulse: 86   Resp: 17   Temp: 97.7 °F (36.5 °C)   TempSrc: Oral   SpO2: 97%   Weight: 89.4 kg (197 lb 1.5 oz)   Height: 6' 7" (2.007 m)          Physical Exam   Constitutional: He is oriented to person, place, and time. He appears well-developed and well-nourished.   HENT: "   Head: Normocephalic and atraumatic.   Eyes: Conjunctivae are normal. Pupils are equal, round, and reactive to light.   Neck: Normal range of motion.   Cardiovascular: Normal rate and regular rhythm.  Exam reveals no gallop and no friction rub.    No murmur heard.  Pulmonary/Chest: Effort normal and breath sounds normal. He has no wheezes. He has no rales.   Abdominal: Soft. Bowel sounds are normal. There is no tenderness. There is no rebound and no guarding.   Musculoskeletal: Normal range of motion. He exhibits no edema or tenderness.   Neurological: He is alert and oriented to person, place, and time. No cranial nerve deficit.   Skin: Skin is warm and dry.   Psychiatric: He has a normal mood and affect.       Assessment:       1. Other emphysema    2. Uncontrolled type 2 diabetes mellitus with hyperglycemia, without long-term current use of insulin    3. Essential hypertension    4. Anemia, unspecified type        Plan:    1. Add formulary LABA/ICS once daily stressed importance of doing oral rinse and spti after using medication    2. Improved continue current medications    3. Just at goal on low dose acei continue    4. Possible related to chronic fluoroquinoloe repeat CBC with retic to ensure adequate marrow activity

## 2018-03-06 DIAGNOSIS — E11.65 UNCONTROLLED TYPE 2 DIABETES MELLITUS WITH HYPERGLYCEMIA, WITHOUT LONG-TERM CURRENT USE OF INSULIN: Primary | ICD-10-CM

## 2018-03-06 RX ORDER — LANCETS 28 GAUGE
1 EACH MISCELLANEOUS 3 TIMES DAILY
Qty: 300 EACH | Refills: 3 | Status: SHIPPED | OUTPATIENT
Start: 2018-03-06

## 2018-03-06 RX ORDER — DEXTROSE 4 G
TABLET,CHEWABLE ORAL
Qty: 1 EACH | Refills: 0 | Status: SHIPPED | OUTPATIENT
Start: 2018-03-06 | End: 2019-10-28

## 2018-03-06 NOTE — TELEPHONE ENCOUNTER
----- Message from Rahel Li LPN sent at 3/6/2018  9:55 AM CST -----  Contact: PHN  I have already pend orders  ----- Message -----  From: Sulma Borges  Sent: 3/5/2018   4:00 PM  To: [facility] Staff    PHN calling to get NEW orders for a [employer] meter, lancets and stripes. Please send along clinic notes and medical necessity to fax number [phone].

## 2018-03-15 LAB
ACID FAST MOD KINY STN SPEC: NORMAL
MYCOBACTERIUM SPEC QL CULT: NORMAL

## 2018-03-15 RX ORDER — AMLODIPINE BESYLATE 2.5 MG/1
2.5 TABLET ORAL DAILY
Qty: 30 TABLET | Refills: 11 | Status: SHIPPED | OUTPATIENT
Start: 2018-03-15 | End: 2018-12-19 | Stop reason: SDUPTHER

## 2018-04-02 ENCOUNTER — LAB VISIT (OUTPATIENT)
Dept: LAB | Facility: HOSPITAL | Age: 78
End: 2018-04-02
Attending: INTERNAL MEDICINE
Payer: MEDICARE

## 2018-04-02 DIAGNOSIS — D64.9 ANEMIA, UNSPECIFIED TYPE: ICD-10-CM

## 2018-04-02 DIAGNOSIS — E11.65 UNCONTROLLED TYPE 2 DIABETES MELLITUS WITH HYPERGLYCEMIA, WITHOUT LONG-TERM CURRENT USE OF INSULIN: ICD-10-CM

## 2018-04-02 LAB
ALBUMIN SERPL BCP-MCNC: 3.6 G/DL
ALP SERPL-CCNC: 65 U/L
ALT SERPL W/O P-5'-P-CCNC: 10 U/L
ANION GAP SERPL CALC-SCNC: 10 MMOL/L
AST SERPL-CCNC: 12 U/L
BASOPHILS # BLD AUTO: 0.06 K/UL
BASOPHILS NFR BLD: 0.5 %
BILIRUB SERPL-MCNC: 0.2 MG/DL
BUN SERPL-MCNC: 19 MG/DL
CALCIUM SERPL-MCNC: 9.8 MG/DL
CHLORIDE SERPL-SCNC: 104 MMOL/L
CO2 SERPL-SCNC: 25 MMOL/L
CREAT SERPL-MCNC: 1 MG/DL
DIFFERENTIAL METHOD: ABNORMAL
EOSINOPHIL # BLD AUTO: 0.7 K/UL
EOSINOPHIL NFR BLD: 5.9 %
ERYTHROCYTE [DISTWIDTH] IN BLOOD BY AUTOMATED COUNT: 17.6 %
EST. GFR  (AFRICAN AMERICAN): >60 ML/MIN/1.73 M^2
EST. GFR  (NON AFRICAN AMERICAN): >60 ML/MIN/1.73 M^2
ESTIMATED AVG GLUCOSE: 194 MG/DL
GLUCOSE SERPL-MCNC: 200 MG/DL
HBA1C MFR BLD HPLC: 8.4 %
HCT VFR BLD AUTO: 27.5 %
HGB BLD-MCNC: 8.6 G/DL
LYMPHOCYTES # BLD AUTO: 3.5 K/UL
LYMPHOCYTES NFR BLD: 28.7 %
MCH RBC QN AUTO: 24.2 PG
MCHC RBC AUTO-ENTMCNC: 31.3 G/DL
MCV RBC AUTO: 77 FL
MONOCYTES # BLD AUTO: 1 K/UL
MONOCYTES NFR BLD: 8.2 %
NEUTROPHILS # BLD AUTO: 6.8 K/UL
NEUTROPHILS NFR BLD: 56.7 %
PLATELET # BLD AUTO: 371 K/UL
PMV BLD AUTO: 10.5 FL
POTASSIUM SERPL-SCNC: 4.1 MMOL/L
PROT SERPL-MCNC: 7.6 G/DL
RBC # BLD AUTO: 3.56 M/UL
RETICS/RBC NFR AUTO: 1.2 %
SODIUM SERPL-SCNC: 139 MMOL/L
WBC # BLD AUTO: 12.07 K/UL

## 2018-04-02 PROCEDURE — 36415 COLL VENOUS BLD VENIPUNCTURE: CPT | Mod: PN

## 2018-04-02 PROCEDURE — 85025 COMPLETE CBC W/AUTO DIFF WBC: CPT

## 2018-04-02 PROCEDURE — 85045 AUTOMATED RETICULOCYTE COUNT: CPT

## 2018-04-02 PROCEDURE — 80053 COMPREHEN METABOLIC PANEL: CPT

## 2018-04-02 PROCEDURE — 83036 HEMOGLOBIN GLYCOSYLATED A1C: CPT

## 2018-04-06 ENCOUNTER — OFFICE VISIT (OUTPATIENT)
Dept: FAMILY MEDICINE | Facility: CLINIC | Age: 78
End: 2018-04-06
Payer: MEDICARE

## 2018-04-06 VITALS
RESPIRATION RATE: 16 BRPM | TEMPERATURE: 98 F | HEIGHT: 78 IN | DIASTOLIC BLOOD PRESSURE: 76 MMHG | HEART RATE: 72 BPM | OXYGEN SATURATION: 97 % | WEIGHT: 202.81 LBS | SYSTOLIC BLOOD PRESSURE: 132 MMHG | BODY MASS INDEX: 23.46 KG/M2

## 2018-04-06 DIAGNOSIS — E11.65 UNCONTROLLED TYPE 2 DIABETES MELLITUS WITH HYPERGLYCEMIA, WITHOUT LONG-TERM CURRENT USE OF INSULIN: ICD-10-CM

## 2018-04-06 DIAGNOSIS — R13.19 ESOPHAGEAL DYSPHAGIA: Primary | ICD-10-CM

## 2018-04-06 DIAGNOSIS — I10 ESSENTIAL HYPERTENSION: ICD-10-CM

## 2018-04-06 PROCEDURE — 3075F SYST BP GE 130 - 139MM HG: CPT | Mod: CPTII,S$GLB,, | Performed by: INTERNAL MEDICINE

## 2018-04-06 PROCEDURE — 99999 PR PBB SHADOW E&M-EST. PATIENT-LVL IV: CPT | Mod: PBBFAC,,, | Performed by: INTERNAL MEDICINE

## 2018-04-06 PROCEDURE — 3078F DIAST BP <80 MM HG: CPT | Mod: CPTII,S$GLB,, | Performed by: INTERNAL MEDICINE

## 2018-04-06 PROCEDURE — 99214 OFFICE O/P EST MOD 30 MIN: CPT | Mod: S$GLB,,, | Performed by: INTERNAL MEDICINE

## 2018-04-06 RX ORDER — FUROSEMIDE 20 MG/1
20 TABLET ORAL DAILY
Qty: 90 TABLET | Refills: 3 | Status: SHIPPED | OUTPATIENT
Start: 2018-04-06 | End: 2019-03-31 | Stop reason: SDUPTHER

## 2018-04-06 RX ORDER — GLIPIZIDE 5 MG/1
5 TABLET ORAL
Qty: 90 TABLET | Refills: 1 | Status: SHIPPED | OUTPATIENT
Start: 2018-04-06 | End: 2018-10-02 | Stop reason: SDUPTHER

## 2018-04-06 NOTE — PROGRESS NOTES
"Subjective:       Patient ID: Regan Alvarez is a 77 y.o. male.    Chief Complaint: Diabetes; Emphysema; and Follow-up (2 month)    F/u chronic conditions    HPI: 76 y/o w/ COPD, DM, HTN presents for scheduled follow up. Primary complaint is post pradnial coughing. This has been chronic problem initially treated for esophageal canidiasis one year ago with relief. Noted over last three months will have coughing spasms primarily after eating certain foods no sensation of food getting stuck no vomitting. No longer using inhaled steroid due to cost. Feels breathing is back to base line. No fevers chills. Moving bowels daily without melena or BRBPR      Review of Systems   Constitutional: Negative for activity change, appetite change, fatigue, fever and unexpected weight change.   HENT: Negative for ear pain, rhinorrhea and sore throat.    Eyes: Negative for discharge and visual disturbance.   Respiratory: Positive for cough. Negative for chest tightness, shortness of breath and wheezing.    Cardiovascular: Negative for chest pain, palpitations and leg swelling.   Gastrointestinal: Negative for abdominal pain, constipation and diarrhea.   Endocrine: Negative for cold intolerance and heat intolerance.   Genitourinary: Negative for dysuria and hematuria.   Musculoskeletal: Negative for joint swelling and neck stiffness.   Skin: Negative for rash.   Neurological: Negative for dizziness, syncope, weakness and headaches.   Psychiatric/Behavioral: Negative for suicidal ideas.       Objective:     Vitals:    04/06/18 0854 04/06/18 0922   BP: (!) 140/86 132/76   BP Location: Right arm    Patient Position: Sitting    BP Method: Medium (Manual)    Pulse: 92 72   Resp: 16    Temp: 97.8 °F (36.6 °C)    TempSrc: Oral    SpO2: 97%    Weight: 92 kg (202 lb 13.2 oz)    Height: 6' 7" (2.007 m)           Physical Exam   Constitutional: He is oriented to person, place, and time. He appears well-developed and well-nourished.   HENT: "   Head: Normocephalic and atraumatic.   Mouth/Throat: No oropharyngeal exudate.   Oropharynx pink no plaques or ulcers   Eyes: Conjunctivae are normal. Pupils are equal, round, and reactive to light.   Neck: Normal range of motion.   Cardiovascular: Normal rate and regular rhythm.  Exam reveals no gallop and no friction rub.    No murmur heard.  Pulmonary/Chest: Effort normal and breath sounds normal. He has no wheezes. He has no rales.   Good airmovement to bases w/o wheezing   Abdominal: Soft. Bowel sounds are normal. There is no tenderness. There is no rebound and no guarding.   Musculoskeletal: Normal range of motion. He exhibits no edema or tenderness.   Lymphadenopathy:     He has no cervical adenopathy.   Neurological: He is alert and oriented to person, place, and time. No cranial nerve deficit.   Skin: Skin is warm and dry.   Psychiatric: He has a normal mood and affect.       Lab Results   Component Value Date    HGBA1C 8.4 (H) 04/02/2018       Assessment:       1. Esophageal dysphagia    2. Essential hypertension    3. Uncontrolled type 2 diabetes mellitus with hyperglycemia, without long-term current use of insulin        Plan:    1. Needs repeat eval by GI for consideration of repeat EGD in known history of esophageal canidiasis. No evidence of oral canidiasis today, wieight is actually increasing no evidence of obstructive symptoms     2. bp at goal continue current medications    3. a1c tredning up restart sulfayurea repeat a1c in three months

## 2018-04-12 DIAGNOSIS — E11.9 DIABETES MELLITUS WITHOUT COMPLICATION: ICD-10-CM

## 2018-04-17 ENCOUNTER — TELEPHONE (OUTPATIENT)
Dept: FAMILY MEDICINE | Facility: CLINIC | Age: 78
End: 2018-04-17

## 2018-04-17 DIAGNOSIS — M54.50 ACUTE RIGHT-SIDED LOW BACK PAIN WITHOUT SCIATICA: Primary | ICD-10-CM

## 2018-04-17 RX ORDER — TIZANIDINE 4 MG/1
4 TABLET ORAL EVERY 12 HOURS PRN
Qty: 30 TABLET | Refills: 0 | Status: SHIPPED | OUTPATIENT
Start: 2018-04-17 | End: 2018-04-27

## 2018-04-17 NOTE — TELEPHONE ENCOUNTER
----- Message from Loida Farnsworth sent at 4/17/2018  2:28 PM CDT -----  Contact: Alix - Daughter   Patient has back pain that is moving to his side. Please call patient at [phone].

## 2018-04-17 NOTE — TELEPHONE ENCOUNTER
Patient contacted and ID confirmed by name and   Developed right lower back pain after helping wife out of bed no radiation to leg no loss of bowel or bladder worse with turning to left side or when coughing. No relief with OTC ibuprofen. Trial topical heat and nightly muscle relaxer. He was able to repeat back instructions prescription sent to his pharmacy

## 2018-04-20 ENCOUNTER — TELEPHONE (OUTPATIENT)
Dept: FAMILY MEDICINE | Facility: CLINIC | Age: 78
End: 2018-04-20

## 2018-04-20 NOTE — TELEPHONE ENCOUNTER
----- Message from Blanche Hill sent at 4/20/2018  7:10 AM CDT -----  Contact: daughter  Daughter calling regarding patient still having back pain. Please contact patient at   [phone].    Thanks!

## 2018-04-20 NOTE — TELEPHONE ENCOUNTER
Previous pain with motion now with difficulty breathing needs ov today    Please schedule ov with any provider

## 2018-04-20 NOTE — TELEPHONE ENCOUNTER
Spoke with patient and he informed me that when he lay flat on his back the pain; he can't breath due to the pain on both of his side. Patient said that he is still taking Zanaflex. Patient said that this medication was helping his back but now there new pain on his sides. He has also been using over the counter pain patches that helps during the day but pain is worse at night. Please advise.

## 2018-04-23 ENCOUNTER — APPOINTMENT (OUTPATIENT)
Dept: RADIOLOGY | Facility: HOSPITAL | Age: 78
End: 2018-04-23
Attending: INTERNAL MEDICINE
Payer: MEDICARE

## 2018-04-23 ENCOUNTER — TELEPHONE (OUTPATIENT)
Dept: FAMILY MEDICINE | Facility: CLINIC | Age: 78
End: 2018-04-23

## 2018-04-23 ENCOUNTER — OFFICE VISIT (OUTPATIENT)
Dept: FAMILY MEDICINE | Facility: CLINIC | Age: 78
End: 2018-04-23
Payer: MEDICARE

## 2018-04-23 VITALS
OXYGEN SATURATION: 97 % | RESPIRATION RATE: 20 BRPM | BODY MASS INDEX: 23.64 KG/M2 | WEIGHT: 200.19 LBS | HEIGHT: 77 IN | DIASTOLIC BLOOD PRESSURE: 82 MMHG | TEMPERATURE: 97 F | SYSTOLIC BLOOD PRESSURE: 138 MMHG | HEART RATE: 102 BPM

## 2018-04-23 DIAGNOSIS — E11.65 UNCONTROLLED TYPE 2 DIABETES MELLITUS WITH HYPERGLYCEMIA, WITHOUT LONG-TERM CURRENT USE OF INSULIN: ICD-10-CM

## 2018-04-23 DIAGNOSIS — A31.0 PULMONARY MYCOBACTERIUM AVIUM COMPLEX (MAC) INFECTION: Chronic | ICD-10-CM

## 2018-04-23 DIAGNOSIS — M19.90 OSTEOARTHRITIS, UNSPECIFIED OSTEOARTHRITIS TYPE, UNSPECIFIED SITE: ICD-10-CM

## 2018-04-23 DIAGNOSIS — D64.9 ANEMIA, UNSPECIFIED TYPE: ICD-10-CM

## 2018-04-23 DIAGNOSIS — M54.6 BILATERAL THORACIC BACK PAIN, UNSPECIFIED CHRONICITY: ICD-10-CM

## 2018-04-23 DIAGNOSIS — M54.6 BILATERAL THORACIC BACK PAIN, UNSPECIFIED CHRONICITY: Primary | ICD-10-CM

## 2018-04-23 DIAGNOSIS — R06.02 SOB (SHORTNESS OF BREATH): ICD-10-CM

## 2018-04-23 DIAGNOSIS — I10 ESSENTIAL HYPERTENSION: ICD-10-CM

## 2018-04-23 PROCEDURE — 99999 PR PBB SHADOW E&M-EST. PATIENT-LVL IV: CPT | Mod: PBBFAC,,, | Performed by: INTERNAL MEDICINE

## 2018-04-23 PROCEDURE — 72080 X-RAY EXAM THORACOLMB 2/> VW: CPT | Mod: TC,PN

## 2018-04-23 PROCEDURE — 81000 URINALYSIS NONAUTO W/SCOPE: CPT

## 2018-04-23 PROCEDURE — 87086 URINE CULTURE/COLONY COUNT: CPT

## 2018-04-23 PROCEDURE — 99213 OFFICE O/P EST LOW 20 MIN: CPT | Mod: S$GLB,,, | Performed by: INTERNAL MEDICINE

## 2018-04-23 PROCEDURE — 72080 X-RAY EXAM THORACOLMB 2/> VW: CPT | Mod: 26,,, | Performed by: RADIOLOGY

## 2018-04-23 RX ORDER — TRAMADOL HYDROCHLORIDE 50 MG/1
50 TABLET ORAL EVERY 8 HOURS PRN
Qty: 20 TABLET | Refills: 0 | Status: SHIPPED | OUTPATIENT
Start: 2018-04-23 | End: 2018-05-03

## 2018-04-23 NOTE — TELEPHONE ENCOUNTER
----- Message from Loida Farnsworth sent at 4/23/2018  7:36 AM CDT -----  Contact: Alix- Daughter   Patient's daughter says he is still having back pain and need to be seen today. Please call at [phone]

## 2018-04-23 NOTE — TELEPHONE ENCOUNTER
Spoke with patient's daughter per Dr Adamson's note; patient need a follow up visit and can be seen by any provider. Scheduled appointment today with Dr Rios.

## 2018-04-23 NOTE — PROGRESS NOTES
HISTORY OF PRESENT ILLNESS:  Regan Alvarez is a 77 y.o. male who presents to the clinic today for Back Pain (f/u)  Mr. Alvarez reports the pain began one week ago.  Located in mid back but radiates to bilateral flanks.  He thinks it started after he was helping his wife get dressed but he does not report any lifting, bending movement that lead to the pain.  It started in the morning.  It is described as being at times sharp and catches him as he lies down or coughs. Any general movement or coughing seem to make the pain worse.  Taking Excedrin relieves the pain.  He was prescribed Zanaflex and says it provided mild relief and otherwise made him sleep well.  Denies any weight loss, night sweats, fever/chills.  Denies any saddle anesthesia, numbness/focal weakness, shooting pain to legs.  No syncope or falls recently.  No dysuria, hematuria.  No recent prolonged steroid use.    He has recently had to move in with his daughter since he and his wife have recently been experiencing decline in health in the last couple years.  He does still drive.  Uses a cane to ambulate sometimes.      PAST MEDICAL HISTORY:  Past Medical History:   Diagnosis Date    Arthritis     COPD (chronic obstructive pulmonary disease)     Diabetes mellitus type II     Hypertension     Tuberculosis        PAST SURGICAL HISTORY:  Past Surgical History:   Procedure Laterality Date    COLONOSCOPY N/A 1/22/2018    Procedure: COLONOSCOPY;  Surgeon: Roel Mendez MD;  Location: [facility];  Service: Endoscopy;  Laterality: N/A;  Pt confirmend appt.    melanoma         SOCIAL HISTORY:  Social History     Social History    Marital status:      Spouse name: N/A    Number of children: N/A    Years of education: N/A     Occupational History    Not on file.     Social History Main Topics    Smoking status: Former Smoker     Types: Cigars    Smokeless tobacco: Never Used      Comment: uses cigars on ocassion    Alcohol use Yes       "Comment: socially    Drug use: No    Sexual activity: Not on file     Other Topics Concern    Not on file     Social History Narrative    No narrative on file       FAMILY HISTORY:  Family History   Problem Relation Age of Onset    Cancer Father      Tuberculosis.  Secondary scar was neoplastic.       ALLERGIES AND MEDICATIONS: updated and reviewed.  Review of patient's allergies indicates:  No Known Allergies  Medication List with Changes/Refills   New Medications    TRAMADOL (ULTRAM) 50 MG TABLET    Take 1 tablet (50 mg total) by mouth every 8 (eight) hours as needed for Pain.   Current Medications    ALBUTEROL 90 MCG/ACTUATION INHALER    Inhale 2 puffs into the lungs every 6 (six) hours as needed for Wheezing.    AMLODIPINE (NORVASC) 2.5 MG TABLET    TAKE 1 TABLET (2.5 MG TOTAL) BY MOUTH ONCE DAILY.    BD ULTRA-FINE TOMÁS PEN NEEDLES 32 GAUGE X 5/32" NDLE    USE QID UTD    BLOOD SUGAR DIAGNOSTIC (TRUE METRIX GLUCOSE TEST STRIP) STRP    Test blood sugar 3 times daily    BLOOD-GLUCOSE METER (TRUE METRIX AIR GLUCOSE METER) MISC    Test blood sugar 3 times daily    FLUTICASONE (FLONASE) 50 MCG/ACTUATION NASAL SPRAY    1 spray by Each Nare route once daily.    FLUTICASONE-VILANTEROL (BREO) 200-25 MCG/DOSE DSDV DISKUS INHALER    Inhale 1 puff into the lungs once daily. Controller    FUROSEMIDE (LASIX) 20 MG TABLET    Take 1 tablet (20 mg total) by mouth once daily.    GLIPIZIDE (GLUCOTROL) 5 MG TABLET    Take 1 tablet (5 mg total) by mouth daily with breakfast.    LANCETS 28 GAUGE MISC    1 lancet by Misc.(Non-Drug; Combo Route) route 3 (three) times daily. [employer] lancets    METFORMIN (GLUCOPHAGE) 1000 MG TABLET    Take 1 tablet (1,000 mg total) by mouth 2 (two) times daily with meals.    MULTIVITAMIN CAPSULE    Take 1 capsule by mouth once daily.    RAMIPRIL (ALTACE) 2.5 MG CAPSULE    Take 1 capsule (2.5 mg total) by mouth once daily.    TIZANIDINE (ZANAFLEX) 4 MG TABLET    Take 1 tablet (4 mg total) by mouth " "every 12 (twelve) hours as needed (back pain).    TRAZODONE (DESYREL) 50 MG TABLET    Take 1 tablet (50 mg total) by mouth every evening.          CARE TEAM:  Patient Care Team:  Ishaan Adamson MD as PCP - General (Internal Medicine)         REVIEW OF SYSTEMS:  Review of Systems   Constitutional: Negative for chills, fatigue and fever.   Eyes: Negative for pain and visual disturbance.   Respiratory: Positive for cough and shortness of breath. Negative for chest tightness.    Cardiovascular: Negative for chest pain, palpitations and leg swelling.   Gastrointestinal: Negative for abdominal pain, nausea and vomiting.   Endocrine: Negative for polyuria.   Genitourinary: Positive for flank pain. Negative for dysuria, hematuria and urgency.   Musculoskeletal: Positive for back pain and gait problem. Negative for neck pain and neck stiffness.   Neurological: Positive for dizziness. Negative for seizures, speech difficulty, weakness and light-headedness.   Psychiatric/Behavioral: Negative for confusion and dysphoric mood.         PHYSICAL EXAM:  Vitals:    04/23/18 1012   BP: 138/82   Pulse: 102   Resp: 20   Temp: 97.2 °F (36.2 °C)     Weight: 90.8 kg (200 lb 2.8 oz)   Height: 6' 5" (195.6 cm)   Body mass index is 23.74 kg/m².    Physical Examination: General appearance - alert, well appearing, and in no distress, oriented to person, place, and time and normal appearing weight  Chest - wheezing noted bilaterally throughout  Heart - normal rate and regular rhythm  Abdomen - soft, nontender, nondistended, no masses or organomegaly  no rebound tenderness noted  no CVA tenderness  Back exam - sacroiliac joints and sciatic notches nontender, normal reflexes and strength bilateral lower extremities, pain on palpation to thoracic spine area into surrounding soft tissue  Neurological - DTR's normal and symmetric, motor and sensory grossly normal bilaterally  Musculoskeletal - no muscular tenderness noted  Extremities - no pedal " edema noted       ASSESSMENT AND PLAN:  1. Bilateral thoracic back pain, unspecified chronicity    - URINALYSIS  - Urine culture  - CALCIUM; Future  - Vitamin D; Future  - HEPATIC FUNCTION PANEL; Future  - LIPASE; Future  - X-Ray Thoracolumbar Spine AP Lateral did not show any fracture; showed mild degenerative spine changes.  - No red flags at this time; advised that if there is sudden progression of symptoms, report to ED.  - traMADol (ULTRAM) 50 mg tablet; Take 1 tablet (50 mg total) by mouth every 8 (eight) hours as needed for Pain.  Dispense: 20 tablet; Refill: 0; Advised to continue PRN Tylenol, heat to back, stretching exercises  - If workup for UTI and other labs normal, suspect musculoskeletal pain and will refer for PT healthy back program     2. Osteoarthritis, unspecified osteoarthritis type, unspecified site  - CALCIUM; Future  - Vitamin D; Future    3. SOB (shortness of breath)  -Unchanged from baseline.     Follow up 2-3 months or sooner as needed.

## 2018-04-24 DIAGNOSIS — G89.29 CHRONIC BILATERAL BACK PAIN, UNSPECIFIED BACK LOCATION: Primary | ICD-10-CM

## 2018-04-24 DIAGNOSIS — M54.9 CHRONIC BILATERAL BACK PAIN, UNSPECIFIED BACK LOCATION: Primary | ICD-10-CM

## 2018-04-24 NOTE — TELEPHONE ENCOUNTER
Spoke with patient's daughter today to convey results of blood work as normal and Xray that did not show any thoracolumbar fracture.  She mentioned that he has been taking sulindac 200 mg up to twice a day and in between has been taking Aleve, Excedrin, and Rosalba.  I cautioned her regarding use and side effects of using multiple NSAID medications.  She voiced understanding.  They will try to use the Sulindac no more than 400 mg in a day, spaced with up to 3 doses of Tylenol 650 mg, and tramadol at night.  Will avoid other NSAIDs in between. Told her ok to use Zanaflex for spasm.  Patient referred to healthy back program and to f/u in late May.  Advised for back stretching exercises in between.

## 2018-04-25 LAB
BACTERIA #/AREA URNS HPF: NORMAL /HPF
BACTERIA UR CULT: NORMAL
BILIRUB UR QL STRIP: NEGATIVE
CAOX CRY URNS QL MICRO: NORMAL
CLARITY UR: CLEAR
COLOR UR: YELLOW
GLUCOSE UR QL STRIP: ABNORMAL
HGB UR QL STRIP: NEGATIVE
KETONES UR QL STRIP: NEGATIVE
LEUKOCYTE ESTERASE UR QL STRIP: NEGATIVE
MICROSCOPIC COMMENT: NORMAL
NITRITE UR QL STRIP: NEGATIVE
PH UR STRIP: 6 [PH] (ref 5–8)
PROT UR QL STRIP: NEGATIVE
RBC #/AREA URNS HPF: 1 /HPF (ref 0–4)
SP GR UR STRIP: 1.02 (ref 1–1.03)
SQUAMOUS #/AREA URNS HPF: 2 /HPF
URN SPEC COLLECT METH UR: ABNORMAL
UROBILINOGEN UR STRIP-ACNC: NEGATIVE EU/DL
YEAST URNS QL MICRO: NORMAL

## 2018-05-08 ENCOUNTER — TELEPHONE (OUTPATIENT)
Dept: FAMILY MEDICINE | Facility: CLINIC | Age: 78
End: 2018-05-08

## 2018-05-08 ENCOUNTER — OFFICE VISIT (OUTPATIENT)
Dept: FAMILY MEDICINE | Facility: CLINIC | Age: 78
End: 2018-05-08
Payer: MEDICARE

## 2018-05-08 VITALS
HEART RATE: 108 BPM | HEIGHT: 77 IN | TEMPERATURE: 98 F | WEIGHT: 198.63 LBS | DIASTOLIC BLOOD PRESSURE: 70 MMHG | OXYGEN SATURATION: 96 % | SYSTOLIC BLOOD PRESSURE: 118 MMHG | BODY MASS INDEX: 23.45 KG/M2

## 2018-05-08 DIAGNOSIS — M54.50 CHRONIC MIDLINE LOW BACK PAIN WITHOUT SCIATICA: Primary | ICD-10-CM

## 2018-05-08 DIAGNOSIS — J44.1 CHRONIC OBSTRUCTIVE PULMONARY DISEASE WITH ACUTE EXACERBATION: ICD-10-CM

## 2018-05-08 DIAGNOSIS — G89.29 CHRONIC MIDLINE LOW BACK PAIN WITHOUT SCIATICA: Primary | ICD-10-CM

## 2018-05-08 DIAGNOSIS — R13.19 ESOPHAGEAL DYSPHAGIA: ICD-10-CM

## 2018-05-08 DIAGNOSIS — E11.65 UNCONTROLLED TYPE 2 DIABETES MELLITUS WITH HYPERGLYCEMIA, WITHOUT LONG-TERM CURRENT USE OF INSULIN: ICD-10-CM

## 2018-05-08 PROCEDURE — 99214 OFFICE O/P EST MOD 30 MIN: CPT | Mod: S$GLB,,, | Performed by: INTERNAL MEDICINE

## 2018-05-08 PROCEDURE — 3078F DIAST BP <80 MM HG: CPT | Mod: CPTII,S$GLB,, | Performed by: INTERNAL MEDICINE

## 2018-05-08 PROCEDURE — 99999 PR PBB SHADOW E&M-EST. PATIENT-LVL III: CPT | Mod: PBBFAC,,, | Performed by: INTERNAL MEDICINE

## 2018-05-08 PROCEDURE — 3074F SYST BP LT 130 MM HG: CPT | Mod: CPTII,S$GLB,, | Performed by: INTERNAL MEDICINE

## 2018-05-08 NOTE — PROGRESS NOTES
"Subjective:       Patient ID: Regan Alvarez is a 77 y.o. male.    Chief Complaint: Follow-up (2 week f/u)    F/u back pain    HPI: 76 y/o w/ COPD DM presents for follow up of acute flare of chronic back pain. Seen by colleague two weeks ago for persistatn lower back pain u/a unremarkable plain films showed no osseous pathology. He took tramadol nightly and twice daily NSAID for one week last took one week ago. Pain has completely resolved. Still having sensation of food getting stuck in mid chest two to three times per week. Feels if he chews slowly does not have sensation but if rushing will get. Has had two episodes of spitting up undigested food and "strings of white" no abdominal pain no change in bowels. Weight is stable. Home blood glucose range 120-160's no symptoms of hypoglycemia      Review of Systems   Constitutional: Negative for activity change, appetite change, fatigue, fever and unexpected weight change.   HENT: Negative for ear pain, rhinorrhea and sore throat.    Eyes: Negative for discharge and visual disturbance.   Respiratory: Negative for chest tightness, shortness of breath and wheezing.    Cardiovascular: Negative for chest pain, palpitations and leg swelling.   Gastrointestinal: Negative for abdominal pain, constipation and diarrhea.   Endocrine: Negative for cold intolerance and heat intolerance.   Genitourinary: Negative for dysuria and hematuria.   Musculoskeletal: Negative for joint swelling and neck stiffness.   Skin: Negative for rash.   Neurological: Negative for dizziness, syncope, weakness and headaches.   Psychiatric/Behavioral: Negative for suicidal ideas.       Objective:     Vitals:    05/08/18 1258   BP: 118/70   Pulse: 108   Temp: 97.5 °F (36.4 °C)   TempSrc: Oral   SpO2: 96%   Weight: 90.1 kg (198 lb 10.2 oz)   Height: 6' 5" (1.956 m)          Physical Exam   Constitutional: He is oriented to person, place, and time. He appears well-developed and well-nourished.   HENT: "   Head: Normocephalic and atraumatic.   Moist oral mucosa withotu thrush/lesions or plaques   Eyes: Conjunctivae are normal. Pupils are equal, round, and reactive to light.   Neck: Normal range of motion.   Cardiovascular: Normal rate and regular rhythm.  Exam reveals no gallop and no friction rub.    No murmur heard.  Pulmonary/Chest: Effort normal and breath sounds normal. He has no wheezes. He has no rales.   Abdominal: Soft. Bowel sounds are normal. There is no tenderness. There is no rebound and no guarding.   Musculoskeletal: Normal range of motion. He exhibits no edema or tenderness.   Neurological: He is alert and oriented to person, place, and time. No cranial nerve deficit.   Skin: Skin is warm and dry.   Psychiatric: He has a normal mood and affect.       Assessment and Plan   1. Chronic obstructive pulmonary disease with acute exacerbation  Stable continue inhaled medications    2. Uncontrolled type 2 diabetes mellitus with hyperglycemia, without long-term current use of insulin  Add sulfayurea last month will repeat a1c in two more months    3. Esophageal dysphagia  Number given for patient to call no evidence of oral canidiasis. He is rinsing with water after use of ICS. Consider esophageal stricture   - Ambulatory consult to Gastroenterology    4. Chronic midline low back pain without sciatica  Resolved recommend stopping nsaids if no pain

## 2018-05-08 NOTE — TELEPHONE ENCOUNTER
Patient is wanting to d/c his portable oxygen. Pt. States he gets help with his humidifier when he has problems with breathing.     Per [facility], If approved please sent orders to [facility] to [phone]

## 2018-05-08 NOTE — TELEPHONE ENCOUNTER
Okay to discontinue home oxygen. Please contact patient to get DME company name and forward discontinuation order

## 2018-05-08 NOTE — TELEPHONE ENCOUNTER
----- Message from Bee Benson MA sent at 5/8/2018  2:34 PM CDT -----  Contact: self  [phone]      ----- Message -----  From: Omayra Clancy  Sent: 5/8/2018   1:35 PM  To: [facility] Staff    Pt is requesting to speak to you regarding gettig order to have his oxygen tanks to be picked up and he no longer needs the oxygen. Pls call pt [phone]. Thanks.....Amaya

## 2018-05-30 ENCOUNTER — CLINICAL SUPPORT (OUTPATIENT)
Dept: REHABILITATION | Facility: HOSPITAL | Age: 78
End: 2018-05-30
Attending: INTERNAL MEDICINE
Payer: MEDICARE

## 2018-05-30 DIAGNOSIS — M54.50 CHRONIC MIDLINE LOW BACK PAIN WITHOUT SCIATICA: ICD-10-CM

## 2018-05-30 DIAGNOSIS — G89.29 CHRONIC MIDLINE LOW BACK PAIN WITHOUT SCIATICA: ICD-10-CM

## 2018-05-30 PROCEDURE — G8979 MOBILITY GOAL STATUS: HCPCS | Mod: CJ,PN

## 2018-05-30 PROCEDURE — 97110 THERAPEUTIC EXERCISES: CPT | Mod: PN

## 2018-05-30 PROCEDURE — 97161 PT EVAL LOW COMPLEX 20 MIN: CPT | Mod: PN

## 2018-05-30 PROCEDURE — G8978 MOBILITY CURRENT STATUS: HCPCS | Mod: CK,PN

## 2018-05-30 NOTE — PLAN OF CARE
" [facility] - PHYSICAL THERAPY EVALUATION     Name: Regan CASIANO [facility] Number: [id number]    Regan is a 77 y.o. male evaluated on 05/30/2018.   Time In: 10:15 am  Time out: 11:30 am    Diagnosis: No diagnosis found.  Physician: Redd Rios MD  Treatment Orders: PT Eval and Treat    Past Medical History:   Diagnosis Date    Arthritis     COPD (chronic obstructive pulmonary disease)     Diabetes mellitus type II     Hypertension     Tuberculosis      Current Outpatient Prescriptions   Medication Sig    albuterol 90 mcg/actuation inhaler Inhale 2 puffs into the lungs every 6 (six) hours as needed for Wheezing.    amLODIPine (NORVASC) 2.5 MG tablet TAKE 1 TABLET (2.5 MG TOTAL) BY MOUTH ONCE DAILY.    BD ULTRA-FINE TOMÁS PEN NEEDLES 32 gauge x 5/32" Ndle USE QID UTD    blood sugar diagnostic (TRUE METRIX GLUCOSE TEST STRIP) Strp Test blood sugar 3 times daily    blood-glucose meter (TRUE METRIX AIR GLUCOSE METER) Misc Test blood sugar 3 times daily    fluticasone (FLONASE) 50 mcg/actuation nasal spray 1 spray by Each Nare route once daily.    fluticasone-vilanterol (BREO) 200-25 mcg/dose DsDv diskus inhaler Inhale 1 puff into the lungs once daily. Controller    furosemide (LASIX) 20 MG tablet Take 1 tablet (20 mg total) by mouth once daily.    glipiZIDE (GLUCOTROL) 5 MG tablet Take 1 tablet (5 mg total) by mouth daily with breakfast.    lancets 28 gauge Misc 1 lancet by Misc.(Non-Drug; Combo Route) route 3 (three) times daily. True Metrix lancets    metformin (GLUCOPHAGE) 1000 MG tablet Take 1 tablet (1,000 mg total) by mouth 2 (two) times daily with meals.    multivitamin capsule Take 1 capsule by mouth once daily.    ramipril (ALTACE) 2.5 MG capsule Take 1 capsule (2.5 mg total) by mouth once daily.    trazodone (DESYREL) 50 MG tablet Take 1 tablet (50 mg total) by mouth every evening.     No current facility-administered medications for this visit.      Review of patient's " allergies indicates:  No Known Allergies  Precautions: COPD     Pattern of pain determined: Pattern 1 (does not follow any specific pattern)   Visit # authorized:   Authorization period:   Plan of care Expiration: 8/30/2018  To Vendor Referred By By Location/POS By Department   none Redd Rios MD [facility] FAMILY MEDICINE/ INTERNAL MED   Priority Start Date Expiration Date Referral Entered By   Urgent 04/24/2018 04/24/2019 Redd Rios MD   Visits Requested Visits Authorized Visits Completed Visits Scheduled   1 1 1 0       HISTORY   History of Present Illness: Chronic lower back pain without sciatica. Onset of pain 30 years ago. 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 Pt states pain is localized in lower back. Pt denies lower back pain radiating towards legs. Pt describe lower back pain as aching pain. Pt reports pain is intermittent . Pt denies any bowel and bladder movement issues. Pt does not know what aggravates lower back pain but pt states standing period of time aggravates lower back pain. Pt took aspiring to decrease lower back pain.  Pt denies any lower back pain ascending and descending stairs and transfers. Pt did not have any chiropractor treatment. Pt denies any steroid or epidural injection in lower back. Pattern 1    Diagnostic Tests: From EPIC  None      Pain Scale: Regan rates pain on a scale of 0-10 to be 8 at worst; 0 currently; 0 at best using VAS.   Pain location: lower back    Aggravating factors: See  Above   Easing Factors: see above   Disturbed Sleep: no    Pattern of pain questions:  1.  Where is your pain the worst? Lower back   2.  Is your pain constant or intermittent?   3.  Does bending forward make your typical pain worse?   4.  Since the start of your back pain, has there been a change in your bowel or bladder? No  5.  What can't you do now that you use to be able to do? Pt does not know    Prior Treatment:   Prior functional status:  Independent   DME owned/used: No  Occupation:  Retired   Leisure: watching             Pts goals:  Decrease lower back pain     Red Flag Screening:   Cough  Sneeze  Strain: (--)  Bladder/ bowel: (--)  Falls: (--)  Night pain: (--)  Unexplained weight loss: (--)  General health: COPD    OBJECTIVE     Postural examination/scapula alignment: Rounded shoulder, Head forward, Trunk deviated right and Increased kyphosis  Sitting:  Rounded shoulder, Head forward, Trunk deviated right and Increased kyphosis  Standing Rounded shoulder, Head forward, Trunk deviated right and Increased kyphosis  Correction of posture: no effect with lumbar roll    MOVEMENT LOSS    ROM Loss   Flexion major loss   Extension major loss   Side bending Right major loss   Side bending Left major loss   Rotation Right major loss   Rotation Left major loss     Lower Extremity Strength  Right LE  Left LE    Hip flexion: 3+/5 Hip flexion: 3+/5   Hip extension:  3+/5 Hip extension: 3+/5   Hip abduction: 4+/5 Hip abduction: 4+/5   Hip adduction:  4+/5 Hip adduction:  4+/5   Hip Internal rotation   4+/5 Hip Internal rotation 4+/5   Knee Flexion 4+/5 Knee Flexion 4+/5   Knee Extension 4+/5 Knee Extension 4+/5   Ankle dorsiflexion: 4+/5 Ankle dorsiflexion: 4+/5   Ankle plantarflexion: 4+/5 Ankle plantarflexion: 4+/5       GAIT:  Assistive Device used: none  Level of Assistance: independent  Patient displays the following gait deviations:  decreased step length, decreased base of support and decreased weight shift.     Special Tests:   Test Name  Test Result   Prone Instability Test (--)   SI Joint Provocation Test (--)   Straight Leg Raise (--)   Neural Tension Test (--)   Crossed Straight Leg Raise (--)   Walking on toes (--)   Walking on heels  (--)       NEUROLOGICAL SCREENING     Sensory deficit: intact lower extremity     Reflexes:    Left Right   Patella Tendon 2+ 2+   Achilles Tendon 2+ 2+   Babinski  (--) (--)   Clonus (--) (--)     REPEATED TEST  MOVEMENTS:  Repeated Flexion in Standing no worse   Repeated Extension in Standing no worse   Repeated Flexion in lying no worse   Repeated Extension in lying  no worse       STATIC TESTS   Sitting slouched  no worse   Sitting erect no worse   Standing slouched no worse   Standing erect  no worse   Lying prone in extension  no worse   Long sitting   no worse       Baseline Isometric Testing on Med X equipment: Testing administered by PT  Date of testin2018  ROM 0 to 54 deg   Max Peak Torque 127   Min Peak Torque 17   Flex/Ext Ratio 7.4   % below normative data -72%   Counter weight 244   femur 4   Seat pad 0       CMS Impairment/Limitation/Restriction for FOTO Lumbar Spine Survey  Status Limitation G-Code CMS Severity Modifier  Intake 58% 42% Current Status CK - At least 40 percent but less than 60 percent  Predicted 72% 28% Goal Status+ CJ - At least 20 percent but less than 40 percent  D/C Status CK **only report if this is a one time visit  +Based on FOTO predicted change score  Score interpretation is as follows:     TEST SCORE  Modifier  Impairment Limitation Restriction    0/50  CH  0 % impaired, limited or restricted   1-9/50  CI  @ least 1% but less than 20% impaired, limited or restricted   10-19/50  CJ  @ least 20%<40% impaired, limited or restricted   20-29/50  CK  @ least 40%<60% impaired, limited or restricted   30-39/50  CL  @ least 60% <80% impaired, limited or restricted   40-49/50  CM  @ least 80%<100% impaired limited or restricted   50/50  CN  100% impaired, limited or restricted       Treatment   Time In: 10:50 am   Time Out: 11:00 am    PT Evaluation Completed? Yes  Discussed Plan of Care with patient: Yes    [facility] 2018   Visit Number 1   VAS Pain Rating 0   Lumbar Extension Seat Pad 0   Femur Restraint 4   Top Dead Center 24   Counterweight 244   Lumbar Flexion 54   Lumbar Extension 0   Lumbar Peak Torque 127   Min Torque 17   Percent From Norm -72   Ice - Z Lie (in  min.) 10       Home Exercise Program as follows:   Handouts were given to the patient. Pt demo good understanding of the education provided. Regan demonstrated good return demonstration of activities.     - Patient received education regarding proper posture and body mechanics.    - Robert roll tried, recommended, and purchase information was provided.    - Patient received a handout regarding anticipated muscular soreness following the isometric test and strategies for management were reviewed with patient including stretching, using ice and scheduled rest.       Pt was instructed in and performed the following:    Regan received therapeutic exercises to develop/improve posture, lumbar/cervical ROM, strength and muscular endurance for 10 minutes including the following exercises:   Hamstring stretch   Open book   Single knee to chest     Assessment   This is a 77 y.o. male referred to [facility] and presents with a medical diagnosis of Chronic lower back pain without sciatica  and demonstrates limitations as described below in the problem list. Pt rehab potential is Good. Pt presents with chronic lower back pain without sciatica. Pt have had lower back pain over 30 years. Lower back pain is increasingly aggravating over past weeks. Pt presented with decrease lumbar range of motion in all planes. Pt demonstrated increase in perispinal and hamstring tightness limiting range of motions. Pt demonstrated bilaterally hip flexors weakness. Pt has postured impairment deviated to right side, decrease lumbar lordosis, increase kyphosis, and slouch posture. Isometric test on [employer] lumbar extension machine demonstrated decrease in lumbar range of motion and weakness of lumbar extensors musculatures. Pt will benefit from skilled PT services to decrease functional limitations.     Pain Pattern:  Pattern 1 (does not follow any specific pattern)     Patient received education on the [facility] program, purpose of the  isometric test, progression of back strengthening as well as wellness approach and systemic strengthening.  Details of the program were discussed.  Reviewed that patient should feel support/pressure from med ex restraints but no pain or discomfort and patient expressed understanding.    Based on the above history and physical examination an active physical therapy program is recommended.  Pt will continue to benefit from skilled outpatient physical therapy to address the deficits listed below in the chart, provide pt/family education and to maximize pt's level of independence in the home and community environment. .     No environmental, cultural, spiritual, developmental or education needs expressed or noted    Medical necessity is demonstrated by the following problem list.    Pt presents with the following impairments:   History  Co-morbidities and personal factors that may impact the plan of care Examination  Body Structures and Functions, activity limitations and participation restrictions that may impact the plan of care Clinical Presentation   Decision Making/ Complexity Score   Co-morbidities:   COPD        Personal Factors:   no deficits Body Regions:   back    Body Systems:   ROM  strength    Activity limitations:   Learning and applying knowledge  no deficits    General Tasks and Commands  no deficits    Communication  no deficits    Mobility  lifting and carrying objects  walking    Self care  no deficits    Domestic Life  no deficits    Interactions/Relationships  no deficits    Life Areas  no deficits    Participation Restrictions:     Community and Social Life  community life       stable and uncomplicated   Complexity: low  See FOTO lumbar spine survey          GOALS: Pt is in agreement with the following goals.    Short term goals:  6 weeks or 10 visits   1.  Pt will demonstrate increased lumbar ROM by at least 3 degrees from the initial ROM value with improvements noted in functional ROM and  "ability to perform ADLs  2.  Pt will demonstrate increased maximum isometric torque value by 5% when compared to the initial value resulting in improved ability to perform bending, lifting, and carrying activities safely, confidently.  3.  Patient report a reduction in worst pain score by 1-2 points for improved tolerance during work and recreational activities  4.  Pt able to perform HEP correctly with minimal cueing or supervision for therapist      Long term goals: 13 weeks or 20 visits   1. Pt will demonstrate increased lumbar ROM by at least 10 degrees from initial ROM value, resulting in improved ability to perform functional fwd bending while standing and sitting.   2. Pt will demonstrate increased maximum isometric torque value by 10% when compared to the initial value resulting in improved ability to perform bending, lifting, and carrying activities safely, confidently.  3. Pt to demonstrate ability to independently control and reduce their pain through posture positioning and mechanical movements throughout a typical day.  4.  Patient will demonstrate improved overall function per FOTO Survey to CJ = at least 20% but < 40% impaired, limited or restricted score or less.      Plan   Outpatient physical therapy 2x week for 13 weeks or 20 visits to include the following:   - Patient education  - Therapeutic exercise  - Manual therapy  - Performance testing   - Neuromuscular Re-education  - Therapeutic activity   - Modalities  -Functional dry needling     Pt may be seen by PTA as part of the rehabilitation team.     Therapist: Dev Hartman, PT  5/30/2018    "I certify the need for these services furnished under this plan of treatment and while under my care."    ____________________________________  Physician/Referring Practitioner    _______________  Date of Signature              "

## 2018-06-09 RX ORDER — METFORMIN HYDROCHLORIDE 1000 MG/1
1000 TABLET ORAL 2 TIMES DAILY WITH MEALS
Qty: 180 TABLET | Refills: 3 | Status: SHIPPED | OUTPATIENT
Start: 2018-06-09 | End: 2018-12-03 | Stop reason: SDUPTHER

## 2018-06-19 ENCOUNTER — TELEPHONE (OUTPATIENT)
Dept: FAMILY MEDICINE | Facility: CLINIC | Age: 78
End: 2018-06-19

## 2018-06-19 NOTE — TELEPHONE ENCOUNTER
----- Message from Chelsy Conteh sent at 6/19/2018 11:38 AM CDT -----  Contact: Self   Pt says he was informed that the office did not want to fill out paperwork for his Eye surgery. He would like someone from the office to call him for a better explanation on why he cant have that paperwork filled out without being seen. Please call at [phone].

## 2018-06-20 ENCOUNTER — OFFICE VISIT (OUTPATIENT)
Dept: FAMILY MEDICINE | Facility: CLINIC | Age: 78
End: 2018-06-20
Payer: MEDICARE

## 2018-06-20 VITALS
TEMPERATURE: 98 F | HEART RATE: 95 BPM | OXYGEN SATURATION: 97 % | DIASTOLIC BLOOD PRESSURE: 76 MMHG | WEIGHT: 199.75 LBS | SYSTOLIC BLOOD PRESSURE: 127 MMHG | HEIGHT: 77 IN | BODY MASS INDEX: 23.59 KG/M2

## 2018-06-20 DIAGNOSIS — Z01.818 PREOPERATIVE CLEARANCE: Primary | ICD-10-CM

## 2018-06-20 DIAGNOSIS — H25.9 SENILE CATARACT, UNSPECIFIED AGE-RELATED CATARACT TYPE, UNSPECIFIED LATERALITY: ICD-10-CM

## 2018-06-20 PROCEDURE — 3074F SYST BP LT 130 MM HG: CPT | Mod: CPTII,S$GLB,, | Performed by: FAMILY MEDICINE

## 2018-06-20 PROCEDURE — 3078F DIAST BP <80 MM HG: CPT | Mod: CPTII,S$GLB,, | Performed by: FAMILY MEDICINE

## 2018-06-20 PROCEDURE — 99214 OFFICE O/P EST MOD 30 MIN: CPT | Mod: S$GLB,,, | Performed by: FAMILY MEDICINE

## 2018-06-20 PROCEDURE — 99999 PR PBB SHADOW E&M-EST. PATIENT-LVL III: CPT | Mod: PBBFAC,,, | Performed by: FAMILY MEDICINE

## 2018-06-20 RX ORDER — DUREZOL 0.5 MG/ML
EMULSION OPHTHALMIC
Refills: 0 | COMMUNITY
Start: 2018-06-14 | End: 2018-09-26

## 2018-06-20 RX ORDER — OFLOXACIN 3 MG/ML
SOLUTION/ DROPS OPHTHALMIC
Refills: 1 | COMMUNITY
Start: 2018-06-14 | End: 2018-09-26

## 2018-06-21 NOTE — PROGRESS NOTES
Routine Office Visit    Patient Name: Regan Alvarez    : 1940  MRN: [id number]    Subjective:  Regan is a 77 y.o. male who presents today for:   Chief Complaint   Patient presents with    Pre-op Exam       77-year-old male presents for surgical clearance for left cataract surgery.  Patient reports he ready had the right side done.  He reports he had no complication from the first surgery.  He reports no recent illnesses.  He reports no acute concerns.    Past Medical History  Past Medical History:   Diagnosis Date    Arthritis     COPD (chronic obstructive pulmonary disease)     Diabetes mellitus type II     Hypertension     Tuberculosis        Past Surgical History  Past Surgical History:   Procedure Laterality Date    COLONOSCOPY N/A 2018    Procedure: COLONOSCOPY;  Surgeon: Roel Mendez MD;  [facility];  Service: Endoscopy;  Laterality: N/A;  Pt confirmend appt.    melanoma          Family History  Family History   Problem Relation Age of Onset    Cancer Father         Tuberculosis.  Secondary scar was neoplastic.       Social History  Social History     Social History    Marital status:      Spouse name: N/A    Number of children: N/A    Years of education: N/A     Occupational History    Not on file.     Social History Main Topics    Smoking status: Former Smoker     Types: Cigars    Smokeless tobacco: Never Used      Comment: uses cigars on ocassion    Alcohol use Yes      Comment: socially    Drug use: No    Sexual activity: Not on file     Other Topics Concern    Not on file     Social History Narrative    No narrative on file       Current Medications  Current Outpatient Prescriptions on File Prior to Visit   Medication Sig Dispense Refill    albuterol 90 mcg/actuation inhaler Inhale 2 puffs into the lungs every 6 (six) hours as needed for Wheezing. 1 Inhaler 2    amLODIPine (NORVASC) 2.5 MG tablet TAKE 1 TABLET (2.5 MG TOTAL) BY MOUTH ONCE DAILY. 30  "tablet 11    fluticasone (FLONASE) 50 mcg/actuation nasal spray 1 spray by Each Nare route once daily.      furosemide (LASIX) 20 MG tablet Take 1 tablet (20 mg total) by mouth once daily. 90 tablet 3    glipiZIDE (GLUCOTROL) 5 MG tablet Take 1 tablet (5 mg total) by mouth daily with breakfast. 90 tablet 1    lancets 28 gauge Misc 1 lancet by Misc.(Non-Drug; Combo Route) route 3 (three) times daily. True Metrix lancets 300 each 3    metFORMIN (GLUCOPHAGE) 1000 MG tablet TAKE 1 TABLET (1,000 MG TOTAL) BY MOUTH 2 (TWO) TIMES DAILY WITH MEALS. 180 tablet 3    ramipril (ALTACE) 2.5 MG capsule Take 1 capsule (2.5 mg total) by mouth once daily. 90 capsule 3    BD ULTRA-FINE TOMÁS PEN NEEDLES 32 gauge x 5/32" Ndle USE QID UTD  5    blood sugar diagnostic (TRUE METRIX GLUCOSE TEST STRIP) Strp Test blood sugar 3 times daily 300 strip 3    blood-glucose meter (TRUE METRIX AIR GLUCOSE METER) Misc Test blood sugar 3 times daily 1 each 0    fluticasone-vilanterol (BREO) 200-25 mcg/dose DsDv diskus inhaler Inhale 1 puff into the lungs once daily. Controller 60 each 2    multivitamin capsule Take 1 capsule by mouth once daily.      trazodone (DESYREL) 50 MG tablet Take 1 tablet (50 mg total) by mouth every evening. 30 tablet 2     No current facility-administered medications on file prior to visit.        Allergies   Review of patient's allergies indicates:  No Known Allergies    Review of Systems   Constitutional: Negative for fever.   Respiratory: Negative for cough and shortness of breath.    Cardiovascular: Negative for chest pain and palpitations.   Gastrointestinal: Negative for abdominal pain.   Genitourinary: Negative for dysuria.       /76 (BP Location: Left arm, Patient Position: Sitting, BP Method: Medium (Automatic))   Pulse 95   Temp 97.9 °F (36.6 °C) (Oral)   Ht 6' 5" (1.956 m)   Wt 90.6 kg (199 lb 11.8 oz)   SpO2 97%   BMI 23.69 kg/m²     Physical Exam   Constitutional: He appears " well-developed and well-nourished.   HENT:   Head: Normocephalic.   Right Ear: External ear normal.   Left Ear: External ear normal.   Nose: Nose normal.   Mouth/Throat: No oropharyngeal exudate.   Neck: Normal range of motion. Neck supple. No tracheal deviation present.   Cardiovascular: Normal rate, regular rhythm, normal heart sounds and intact distal pulses.    No murmur heard.  Pulmonary/Chest: Effort normal and breath sounds normal. He has no wheezes. He has no rales.   Abdominal: Soft. Bowel sounds are normal. He exhibits no mass. There is no tenderness.   Musculoskeletal: He exhibits no edema.   Lymphadenopathy:     He has no cervical adenopathy.   Skin: He is not diaphoretic.   Vitals reviewed.      Assessment/Plan:  Regan was seen today for pre-op exam.  The patient is new to me.    Diagnoses and all orders for this visit:    Preoperative clearance    Senile cataract, unspecified age-related cataract type, unspecified laterality     Pre operative clearance paperwork was completed and faxed over.  He was given a copy of the clearance

## 2018-07-06 ENCOUNTER — LAB VISIT (OUTPATIENT)
Dept: LAB | Facility: HOSPITAL | Age: 78
End: 2018-07-06
Attending: INTERNAL MEDICINE
Payer: MEDICARE

## 2018-07-06 DIAGNOSIS — I10 ESSENTIAL HYPERTENSION: ICD-10-CM

## 2018-07-06 DIAGNOSIS — E11.65 UNCONTROLLED TYPE 2 DIABETES MELLITUS WITH HYPERGLYCEMIA, WITHOUT LONG-TERM CURRENT USE OF INSULIN: ICD-10-CM

## 2018-07-06 LAB
ANION GAP SERPL CALC-SCNC: 10 MMOL/L
BASOPHILS # BLD AUTO: 0.06 K/UL
BASOPHILS NFR BLD: 0.5 %
BUN SERPL-MCNC: 13 MG/DL
CALCIUM SERPL-MCNC: 9.7 MG/DL
CHLORIDE SERPL-SCNC: 104 MMOL/L
CO2 SERPL-SCNC: 26 MMOL/L
CREAT SERPL-MCNC: 1 MG/DL
DIFFERENTIAL METHOD: ABNORMAL
EOSINOPHIL # BLD AUTO: 0.9 K/UL
EOSINOPHIL NFR BLD: 7.1 %
ERYTHROCYTE [DISTWIDTH] IN BLOOD BY AUTOMATED COUNT: 17.3 %
EST. GFR  (AFRICAN AMERICAN): >60 ML/MIN/1.73 M^2
EST. GFR  (NON AFRICAN AMERICAN): >60 ML/MIN/1.73 M^2
ESTIMATED AVG GLUCOSE: 169 MG/DL
GLUCOSE SERPL-MCNC: 181 MG/DL
HBA1C MFR BLD HPLC: 7.5 %
HCT VFR BLD AUTO: 29.8 %
HGB BLD-MCNC: 9.5 G/DL
LYMPHOCYTES # BLD AUTO: 3.3 K/UL
LYMPHOCYTES NFR BLD: 26.8 %
MCH RBC QN AUTO: 25.6 PG
MCHC RBC AUTO-ENTMCNC: 31.9 G/DL
MCV RBC AUTO: 80 FL
MONOCYTES # BLD AUTO: 1.2 K/UL
MONOCYTES NFR BLD: 9.8 %
NEUTROPHILS # BLD AUTO: 6.8 K/UL
NEUTROPHILS NFR BLD: 55.5 %
PLATELET # BLD AUTO: 432 K/UL
PMV BLD AUTO: 9.8 FL
POTASSIUM SERPL-SCNC: 4 MMOL/L
RBC # BLD AUTO: 3.71 M/UL
SODIUM SERPL-SCNC: 140 MMOL/L
WBC # BLD AUTO: 12.29 K/UL

## 2018-07-06 PROCEDURE — 83036 HEMOGLOBIN GLYCOSYLATED A1C: CPT

## 2018-07-06 PROCEDURE — 85025 COMPLETE CBC W/AUTO DIFF WBC: CPT

## 2018-07-06 PROCEDURE — 36415 COLL VENOUS BLD VENIPUNCTURE: CPT | Mod: PN

## 2018-07-06 PROCEDURE — 80048 BASIC METABOLIC PNL TOTAL CA: CPT

## 2018-07-09 ENCOUNTER — OFFICE VISIT (OUTPATIENT)
Dept: FAMILY MEDICINE | Facility: CLINIC | Age: 78
End: 2018-07-09
Payer: MEDICARE

## 2018-07-09 VITALS
OXYGEN SATURATION: 97 % | SYSTOLIC BLOOD PRESSURE: 110 MMHG | WEIGHT: 196.63 LBS | RESPIRATION RATE: 17 BRPM | HEIGHT: 77 IN | DIASTOLIC BLOOD PRESSURE: 68 MMHG | HEART RATE: 95 BPM | TEMPERATURE: 98 F | BODY MASS INDEX: 23.22 KG/M2

## 2018-07-09 DIAGNOSIS — R13.19 ESOPHAGEAL DYSPHAGIA: ICD-10-CM

## 2018-07-09 DIAGNOSIS — E11.9 TYPE 2 DIABETES MELLITUS WITHOUT COMPLICATION, WITH LONG-TERM CURRENT USE OF INSULIN: Primary | ICD-10-CM

## 2018-07-09 DIAGNOSIS — I10 HYPERTENSION, ESSENTIAL: ICD-10-CM

## 2018-07-09 DIAGNOSIS — Z79.4 TYPE 2 DIABETES MELLITUS WITHOUT COMPLICATION, WITH LONG-TERM CURRENT USE OF INSULIN: Primary | ICD-10-CM

## 2018-07-09 PROBLEM — G89.29 CHRONIC MIDLINE LOW BACK PAIN WITHOUT SCIATICA: Status: RESOLVED | Noted: 2018-05-30 | Resolved: 2018-07-09

## 2018-07-09 PROBLEM — M54.6 BILATERAL THORACIC BACK PAIN: Status: RESOLVED | Noted: 2018-04-23 | Resolved: 2018-07-09

## 2018-07-09 PROBLEM — M54.50 CHRONIC MIDLINE LOW BACK PAIN WITHOUT SCIATICA: Status: RESOLVED | Noted: 2018-05-30 | Resolved: 2018-07-09

## 2018-07-09 PROCEDURE — 99999 PR PBB SHADOW E&M-EST. PATIENT-LVL III: CPT | Mod: PBBFAC,,, | Performed by: INTERNAL MEDICINE

## 2018-07-09 PROCEDURE — 3078F DIAST BP <80 MM HG: CPT | Mod: CPTII,S$GLB,, | Performed by: INTERNAL MEDICINE

## 2018-07-09 PROCEDURE — 99214 OFFICE O/P EST MOD 30 MIN: CPT | Mod: S$GLB,,, | Performed by: INTERNAL MEDICINE

## 2018-07-09 PROCEDURE — 3074F SYST BP LT 130 MM HG: CPT | Mod: CPTII,S$GLB,, | Performed by: INTERNAL MEDICINE

## 2018-07-09 NOTE — PROGRESS NOTES
"Subjective:       Patient ID: Regan Alvarez is a 77 y.o. male.    Chief Complaint: Diabetes; Hypertension; and Follow-up (3 month)    Diabetes   He presents for his follow-up diabetic visit. He has type 2 diabetes mellitus. His disease course has been improving. Pertinent negatives for hypoglycemia include no dizziness. Pertinent negatives for diabetes include no fatigue, no foot paresthesias, no foot ulcerations, no visual change, no weakness and no weight loss. Risk factors for coronary artery disease include diabetes mellitus, hypertension and male sex. Current diabetic treatment includes oral agent (dual therapy). His weight is stable. An ACE inhibitor/angiotensin II receptor blocker is being taken. He does not see a podiatrist.Eye exam is current.   breathing "good" not using any supplement O2. No cough no problems with swallowing overall feels well  Review of Systems   Constitutional: Negative for activity change, appetite change, fatigue, fever, unexpected weight change and weight loss.   HENT: Negative for ear pain, rhinorrhea and sore throat.    Eyes: Negative for discharge and visual disturbance.   Respiratory: Negative for chest tightness and wheezing.    Cardiovascular: Negative for leg swelling.   Gastrointestinal: Negative for abdominal pain, constipation and diarrhea.   Endocrine: Negative for cold intolerance and heat intolerance.   Genitourinary: Negative for dysuria and hematuria.   Musculoskeletal: Negative for joint swelling and neck stiffness.   Skin: Negative for rash.   Neurological: Negative for dizziness, syncope and weakness.   Psychiatric/Behavioral: Negative for suicidal ideas.       Objective:     Vitals:    07/09/18 0905   BP: 110/68   BP Location: Right arm   Patient Position: Sitting   BP Method: Medium (Manual)   Pulse: 95   Resp: 17   Temp: 97.9 °F (36.6 °C)   TempSrc: Oral   SpO2: 97%   Weight: 89.2 kg (196 lb 10.4 oz)   Height: 6' 5" (1.956 m)          Physical Exam "   Constitutional: He is oriented to person, place, and time. He appears well-developed and well-nourished.   HENT:   Head: Normocephalic and atraumatic.   Moist oral mucosa w/o plaque   Eyes: Conjunctivae are normal. Pupils are equal, round, and reactive to light.   Neck: Normal range of motion.   Cardiovascular: Normal rate and regular rhythm.  Exam reveals no gallop and no friction rub.    No murmur heard.  No LE edema   Pulmonary/Chest: Effort normal and breath sounds normal. He has no wheezes. He has no rales.   Abdominal: Soft. Bowel sounds are normal. There is no tenderness. There is no rebound and no guarding.   Musculoskeletal: Normal range of motion. He exhibits no edema or tenderness.   Lymphadenopathy:     He has no cervical adenopathy.   Neurological: He is alert and oriented to person, place, and time. No cranial nerve deficit.   Skin: Skin is warm and dry.   Psychiatric: He has a normal mood and affect.       Assessment and Plan   1. Type 2 diabetes mellitus without complication, with long-term current use of insulin  a1c at goal for age and with co morbidities contnue current medications fiber supplement to improve stool bulk UTD on DFE and foot exam  - Comprehensive metabolic panel; Future  - Hemoglobin A1c; Future  - CBC auto differential; Future  - Lipid panel; Future    Essential HTN: bp at goal continue curretn meidcations    3. Esophageal dysphagia: resolved release for last GI note

## 2018-08-06 ENCOUNTER — DOCUMENTATION ONLY (OUTPATIENT)
Dept: REHABILITATION | Facility: HOSPITAL | Age: 78
End: 2018-08-06

## 2018-08-06 NOTE — PROGRESS NOTES
PHYSICAL THERAPY DISCHARGE:    This patient was originally evaluated at our facility on: [facility]    Pt attended PT for a total of 1 Visits receiving: Manual Therapy, Therex, Postural Education, Body Mechanics Training, Home Exercise Instruction     This patient's last visit occurred on: 5/30/2018    Short term goals were achieved: Not met    Long term goals were achieved: Not met    Pt was DC'd from our care secondary to: Pt showed up to initial evaluation only. Pt has not been in therapy since 5/30/2018. Pt will be discharged today.        Therapist: Dev Hartman, PT  8/6/2018

## 2018-08-15 ENCOUNTER — OFFICE VISIT (OUTPATIENT)
Dept: FAMILY MEDICINE | Facility: CLINIC | Age: 78
End: 2018-08-15
Payer: MEDICARE

## 2018-08-15 VITALS
RESPIRATION RATE: 16 BRPM | HEIGHT: 77 IN | WEIGHT: 196.63 LBS | HEART RATE: 102 BPM | SYSTOLIC BLOOD PRESSURE: 118 MMHG | DIASTOLIC BLOOD PRESSURE: 66 MMHG | OXYGEN SATURATION: 97 % | TEMPERATURE: 98 F | BODY MASS INDEX: 23.22 KG/M2

## 2018-08-15 DIAGNOSIS — R19.7 DIARRHEA, UNSPECIFIED TYPE: Primary | ICD-10-CM

## 2018-08-15 PROCEDURE — 3074F SYST BP LT 130 MM HG: CPT | Mod: CPTII,S$GLB,, | Performed by: INTERNAL MEDICINE

## 2018-08-15 PROCEDURE — 99214 OFFICE O/P EST MOD 30 MIN: CPT | Mod: S$GLB,,, | Performed by: INTERNAL MEDICINE

## 2018-08-15 PROCEDURE — 99999 PR PBB SHADOW E&M-EST. PATIENT-LVL III: CPT | Mod: PBBFAC,,, | Performed by: INTERNAL MEDICINE

## 2018-08-15 PROCEDURE — 3078F DIAST BP <80 MM HG: CPT | Mod: CPTII,S$GLB,, | Performed by: INTERNAL MEDICINE

## 2018-08-15 RX ORDER — CHOLESTYRAMINE 4 G/9G
4 POWDER, FOR SUSPENSION ORAL
Qty: 270 PACKET | Refills: 3 | Status: SHIPPED | OUTPATIENT
Start: 2018-08-15 | End: 2019-01-30

## 2018-08-16 NOTE — PROGRESS NOTES
"Subjective:       Patient ID: Regan Alvarez is a 77 y.o. male.    Chief Complaint: Diarrhea    Diarrhea    HPI: 76 y/o w/ COPD DM presents for six weeks of intermittent diarrhea. He and daughter both report episodes of very loose watery stool can be up to ten times per day. There are periods where stools firm up if he limits milk/dairy intake. No abdominal pain or bloating. Otherwise he feels well no fevers/chills. He does note more diarrhea at night (he often drinks milk before bed). No nausea/vomitting. Of note he was treated for MAC pneumonia with triple antibiotics for 9 months completing treatment approximately six months ago diarrhea began six weeks ago       Review of Systems   Constitutional: Negative for activity change, fever and unexpected weight change.   HENT: Negative for congestion, rhinorrhea, sore throat and trouble swallowing.    Eyes: Negative for photophobia and redness.   Respiratory: Negative for cough, chest tightness, shortness of breath and wheezing.    Cardiovascular: Negative for chest pain, palpitations and leg swelling.   Gastrointestinal: Positive for diarrhea. Negative for abdominal pain, blood in stool, constipation, nausea and vomiting.   Endocrine: Negative for cold intolerance, heat intolerance and polyuria.   Genitourinary: Negative for decreased urine volume, difficulty urinating, dysuria and urgency.   Musculoskeletal: Negative for arthralgias and back pain.   Skin: Negative for rash.   Neurological: Negative for dizziness, syncope, weakness and headaches.   Psychiatric/Behavioral: Negative for dysphoric mood, sleep disturbance and suicidal ideas.       Objective:     Vitals:    08/15/18 1606   BP: 118/66   BP Location: Left arm   Patient Position: Sitting   BP Method: Medium (Automatic)   Pulse: 102   Resp: 16   Temp: 97.5 °F (36.4 °C)   TempSrc: Oral   SpO2: 97%   Weight: 89.2 kg (196 lb 10.4 oz)   Height: 6' 5" (1.956 m)          Physical Exam   Constitutional: He is " oriented to person, place, and time. He appears well-developed and well-nourished.   HENT:   Head: Normocephalic and atraumatic.   Eyes: Conjunctivae are normal. Pupils are equal, round, and reactive to light. No scleral icterus.   Neck: Normal range of motion.   Cardiovascular: Normal rate and regular rhythm. Exam reveals no gallop and no friction rub.   No murmur heard.  Pulmonary/Chest: Effort normal and breath sounds normal. He has no wheezes. He has no rales.   Abdominal: Soft. Bowel sounds are normal. There is no tenderness. There is no rebound and no guarding.   He has bowel sounds in all quadrents there is no distention or pain with deep palpaition   Musculoskeletal: Normal range of motion. He exhibits no edema or tenderness.   Neurological: He is alert and oriented to person, place, and time. No cranial nerve deficit.   Skin: Skin is warm and dry.   Psychiatric: He has a normal mood and affect.       Assessment and Plan   1. Diarrhea, unspecified type  Time courese and exam no consistent with c.diff suspect some component of dietarty intolerance (lactose) continuje to limit dairy short trial cholestrymine if no improvement will need GI evaluation  - cholestyramine (QUESTRAN) 4 gram packet; Take 1 packet (4 g total) by mouth 3 (three) times daily with meals.  Dispense: 270 packet; Refill: 3

## 2018-08-21 ENCOUNTER — TELEPHONE (OUTPATIENT)
Dept: FAMILY MEDICINE | Facility: CLINIC | Age: 78
End: 2018-08-21

## 2018-08-21 DIAGNOSIS — R19.7 DIARRHEA, UNSPECIFIED TYPE: Primary | ICD-10-CM

## 2018-08-21 NOTE — TELEPHONE ENCOUNTER
----- Message from Shad Dhaliwal sent at 8/21/2018  8:59 AM CDT -----  Contact: daughter  Nereida called stating cholestyramine (QUESTRAN) 4 gram packet is not helping. Pt has developed indigestion. Contact her at [phone].

## 2018-08-21 NOTE — TELEPHONE ENCOUNTER
Pt's daughter states pt is still having diarrhea and states since pt has started the new medication he has been having indigestion. She would like to know if he should see Gastro.

## 2018-08-22 NOTE — TELEPHONE ENCOUNTER
Pt's daughter called to find out if  put in a referral was informed referral was in and given the number to call and schedule. Pt's daughter states she understands

## 2018-09-26 ENCOUNTER — HOSPITAL ENCOUNTER (EMERGENCY)
Facility: HOSPITAL | Age: 78
Discharge: HOME | End: 2018-09-26
Attending: EMERGENCY MEDICINE
Payer: MEDICARE

## 2018-09-26 ENCOUNTER — TELEPHONE (OUTPATIENT)
Dept: FAMILY MEDICINE | Facility: CLINIC | Age: 78
End: 2018-09-26

## 2018-09-26 VITALS
HEIGHT: 78 IN | BODY MASS INDEX: 24.88 KG/M2 | DIASTOLIC BLOOD PRESSURE: 60 MMHG | WEIGHT: 215 LBS | RESPIRATION RATE: 18 BRPM | TEMPERATURE: 99 F | HEART RATE: 90 BPM | SYSTOLIC BLOOD PRESSURE: 119 MMHG | OXYGEN SATURATION: 99 %

## 2018-09-26 DIAGNOSIS — K52.9 COLITIS: Primary | ICD-10-CM

## 2018-09-26 LAB
ALBUMIN SERPL BCP-MCNC: 2.8 G/DL
ALP SERPL-CCNC: 52 U/L
ALT SERPL W/O P-5'-P-CCNC: 10 U/L
ANION GAP SERPL CALC-SCNC: 8 MMOL/L
AST SERPL-CCNC: 8 U/L
BASOPHILS # BLD AUTO: 0.01 K/UL
BASOPHILS NFR BLD: 0.1 %
BILIRUB SERPL-MCNC: 0.4 MG/DL
BUN SERPL-MCNC: 15 MG/DL
CALCIUM SERPL-MCNC: 9.3 MG/DL
CHLORIDE SERPL-SCNC: 102 MMOL/L
CO2 SERPL-SCNC: 27 MMOL/L
CREAT SERPL-MCNC: 1.1 MG/DL
DIFFERENTIAL METHOD: ABNORMAL
EOSINOPHIL # BLD AUTO: 0.1 K/UL
EOSINOPHIL NFR BLD: 1.1 %
ERYTHROCYTE [DISTWIDTH] IN BLOOD BY AUTOMATED COUNT: 16.2 %
EST. GFR  (AFRICAN AMERICAN): >60 ML/MIN/1.73 M^2
EST. GFR  (NON AFRICAN AMERICAN): >60 ML/MIN/1.73 M^2
GLUCOSE SERPL-MCNC: 199 MG/DL
HCT VFR BLD AUTO: 25.9 %
HGB BLD-MCNC: 8.5 G/DL
LACTATE SERPL-SCNC: 1.5 MMOL/L
LIPASE SERPL-CCNC: 69 U/L
LYMPHOCYTES # BLD AUTO: 2.3 K/UL
LYMPHOCYTES NFR BLD: 17.8 %
MCH RBC QN AUTO: 26.2 PG
MCHC RBC AUTO-ENTMCNC: 32.8 G/DL
MCV RBC AUTO: 80 FL
MONOCYTES # BLD AUTO: 1.6 K/UL
MONOCYTES NFR BLD: 12.4 %
NEUTROPHILS # BLD AUTO: 8.7 K/UL
NEUTROPHILS NFR BLD: 68.6 %
PLATELET # BLD AUTO: 477 K/UL
PMV BLD AUTO: 8.6 FL
POTASSIUM SERPL-SCNC: 4 MMOL/L
PROT SERPL-MCNC: 6.2 G/DL
RBC # BLD AUTO: 3.24 M/UL
SODIUM SERPL-SCNC: 137 MMOL/L
WBC # BLD AUTO: 12.63 K/UL

## 2018-09-26 PROCEDURE — 96361 HYDRATE IV INFUSION ADD-ON: CPT

## 2018-09-26 PROCEDURE — S0030 INJECTION, METRONIDAZOLE: HCPCS | Performed by: EMERGENCY MEDICINE

## 2018-09-26 PROCEDURE — 83605 ASSAY OF LACTIC ACID: CPT

## 2018-09-26 PROCEDURE — 87040 BLOOD CULTURE FOR BACTERIA: CPT

## 2018-09-26 PROCEDURE — 83690 ASSAY OF LIPASE: CPT

## 2018-09-26 PROCEDURE — 96367 TX/PROPH/DG ADDL SEQ IV INF: CPT

## 2018-09-26 PROCEDURE — 63600175 PHARM REV CODE 636 W HCPCS: Performed by: EMERGENCY MEDICINE

## 2018-09-26 PROCEDURE — 85025 COMPLETE CBC W/AUTO DIFF WBC: CPT

## 2018-09-26 PROCEDURE — 99285 EMERGENCY DEPT VISIT HI MDM: CPT | Mod: 25

## 2018-09-26 PROCEDURE — 96375 TX/PRO/DX INJ NEW DRUG ADDON: CPT

## 2018-09-26 PROCEDURE — 25500020 PHARM REV CODE 255: Performed by: EMERGENCY MEDICINE

## 2018-09-26 PROCEDURE — 80053 COMPREHEN METABOLIC PANEL: CPT

## 2018-09-26 PROCEDURE — 96365 THER/PROPH/DIAG IV INF INIT: CPT | Mod: 59

## 2018-09-26 PROCEDURE — 25000003 PHARM REV CODE 250: Performed by: EMERGENCY MEDICINE

## 2018-09-26 RX ORDER — METRONIDAZOLE 500 MG/1
500 TABLET ORAL 3 TIMES DAILY
Qty: 30 TABLET | Refills: 0 | Status: SHIPPED | OUTPATIENT
Start: 2018-09-26 | End: 2018-10-06

## 2018-09-26 RX ORDER — CIPROFLOXACIN 2 MG/ML
400 INJECTION, SOLUTION INTRAVENOUS
Status: COMPLETED | OUTPATIENT
Start: 2018-09-26 | End: 2018-09-26

## 2018-09-26 RX ORDER — METHYLPREDNISOLONE SOD SUCC 125 MG
125 VIAL (EA) INJECTION
Status: COMPLETED | OUTPATIENT
Start: 2018-09-26 | End: 2018-09-26

## 2018-09-26 RX ORDER — PREDNISONE 20 MG/1
20 TABLET ORAL DAILY
COMMUNITY
End: 2018-10-09 | Stop reason: SDUPTHER

## 2018-09-26 RX ORDER — CIPROFLOXACIN 500 MG/1
500 TABLET ORAL 2 TIMES DAILY
Qty: 20 TABLET | Refills: 0 | Status: SHIPPED | OUTPATIENT
Start: 2018-09-26 | End: 2018-10-06

## 2018-09-26 RX ORDER — METRONIDAZOLE 500 MG/100ML
500 INJECTION, SOLUTION INTRAVENOUS
Status: COMPLETED | OUTPATIENT
Start: 2018-09-26 | End: 2018-09-26

## 2018-09-26 RX ADMIN — CIPROFLOXACIN 400 MG: 2 INJECTION, SOLUTION INTRAVENOUS at 04:09

## 2018-09-26 RX ADMIN — METRONIDAZOLE 500 MG: 500 INJECTION, SOLUTION INTRAVENOUS at 05:09

## 2018-09-26 RX ADMIN — IOHEXOL 100 ML: 350 INJECTION, SOLUTION INTRAVENOUS at 02:09

## 2018-09-26 RX ADMIN — METHYLPREDNISOLONE SODIUM SUCCINATE 125 MG: 125 INJECTION, POWDER, FOR SOLUTION INTRAMUSCULAR; INTRAVENOUS at 04:09

## 2018-09-26 RX ADMIN — SODIUM CHLORIDE 1000 ML: 0.9 INJECTION, SOLUTION INTRAVENOUS at 01:09

## 2018-09-26 NOTE — ED TRIAGE NOTES
"Pt here via ems for diarrhea x3mo. Pt is "on pills for colitis but they are going right through him because he's not absorbing anything". Reports extreme weakness; decreased oral intake; "feeling like something is stuck in his throat".   "

## 2018-09-26 NOTE — TELEPHONE ENCOUNTER
----- Message from Shad Dhaliwal sent at 9/26/2018 11:30 AM CDT -----  Contact: Daughter  Alix called advising staff she is taking pt to [facility]. Contact her at [phone].

## 2018-09-26 NOTE — ED PROVIDER NOTES
Encounter Date: 9/26/2018    SCRIBE #1 NOTE: I, Juanaabril Frank, am scribing for, and in the presence of,  Mary Holt MD. I have scribed the following portions of the note - Other sections scribed: HPI, ROS and PE.       History     Chief Complaint   Patient presents with    Weakness     Generalized weakness and diarrhea x 1 mo. Denies blood in diarrhea. AAO x 4     HPI   This is a 78 y/o M, with a PMHx of diabetes, hypertension, COPD, 1 past surgical history, presents today with diarrhea.    Pt reports experiencing 3-4x episodes of watery diarrhea a day for the past 3x months, daughter reports that pt was diagnosed with ulcerative colitis 2-3x weeks ago and states that he was placed on prednisone for treatment, daughter notes that she has informed pt's gastroenterologist that there has been no relief with the medication, pt reports experiencing generalized weakness, fatigue, and weight loss, no recent antibiotic use.    No recent hospital admissions, patient is on steroids, no fevers, no chills, no nausea, no vomiting, no headaches chest pain shortness of breath, no urinary complaints, no history of cancer, no travel history, no history of Clostridium difficile colitis, no new medications, denies blood mucus or pus in stools.    Review of patient's allergies indicates:  No Known Allergies  Past Medical History:   Diagnosis Date    Arthritis     COPD (chronic obstructive pulmonary disease)     Diabetes mellitus type II     Hypertension     Tuberculosis      Past Surgical History:   Procedure Laterality Date    BIOPSY-LUNG N/A 12/14/2015    Performed [facility]    BRONCHOSCOPY N/A 2/23/2017    Performed by Pierre Ugalde MD at [facility] ENDO    COLONOSCOPY N/A 1/22/2018    Procedure: COLONOSCOPY;  Surgeon: Roel Mendez MD;  Location[facility];  Service: Endoscopy;  Laterality: N/A;  Pt confirmend appt.    COLONOSCOPY N/A 1/22/2018    Performed by [facility] at [facility] ENDO     COLONOSCOPY N/A 12/3/2013    Performed by [facility] ENDO    ESOPHAGOGASTRODUODENOSCOPY (EGD) N/A 10/9/2017    Performed by [facility] ENDO    melanoma       Family History   Problem Relation Age of Onset    Cancer Father         Tuberculosis.  Secondary scar was neoplastic.     Social History     Tobacco Use    Smoking status: Former Smoker     Types: Cigars    Smokeless tobacco: Never Used    Tobacco comment: uses cigars on ocassion   Substance Use Topics    Alcohol use: Yes     Comment: socially    Drug use: No     Review of Systems  All other systems negative barring HPI     Physical Exam     Initial Vitals [09/26/18 1238]   BP Pulse Resp Temp SpO2   (!) 107/59 101 19 97.8 °F (36.6 °C) 98 %      MAP       --         Physical Exam  Constitutional: Pt appears well-developed and well-nourished. No distress.   HENT:   Head: Normocephalic and atraumatic.   Mouth/Throat: No oropharyngeal exudate.   Eyes: Conjunctivae and EOM are normal. Pupils are equal, round, and reactive to light. No scleral icterus.   Neck: Normal range of motion. Neck supple. No JVD present.   Cardiovascular: Normal rate, regular rhythm and normal heart sounds. Exam reveals no gallop and no friction rub.  No murmur heard.  Pulmonary/Chest: Breath sounds normal. No stridor. No respiratory distress. Pt has no wheezes. Pt has no rhonchi.   Abdominal: Soft. Bowel sounds are normal. Pt exhibits no distension and no mass. There is no tenderness. There is no rebound and no guarding.   Musculoskeletal: Normal range of motion. Pt exhibits no edema or tenderness.   Lymphadenopathy:     Pt has no cervical adenopathy.   Neurological: Pt is alert and oriented to person, place, and time. Pt has normal strength and normal reflexes. Pt displays normal reflexes. No cranial nerve deficit or sensory deficit.   Skin: Skin is warm and dry. Capillary refill takes less than 2 seconds. No rash and no abscess noted. No  erythema    ED Course   Procedures  Labs Reviewed   COMPREHENSIVE METABOLIC PANEL - Abnormal; Notable for the following components:       Result Value    Glucose 199 (*)     Albumin 2.8 (*)     Alkaline Phosphatase 52 (*)     AST 8 (*)     All other components within normal limits   CBC W/ AUTO DIFFERENTIAL - Abnormal; Notable for the following components:    RBC 3.24 (*)     Hemoglobin 8.5 (*)     Hematocrit 25.9 (*)     MCV 80 (*)     MCH 26.2 (*)     RDW 16.2 (*)     Platelets 477 (*)     MPV 8.6 (*)     Gran # (ANC) 8.7 (*)     Mono # 1.6 (*)     Lymph% 17.8 (*)     All other components within normal limits   LIPASE - Abnormal; Notable for the following components:    Lipase 69 (*)     All other components within normal limits   CULTURE, BLOOD   CULTURE, BLOOD   LACTIC ACID, PLASMA   URINALYSIS          Imaging Results          CT Abdomen Pelvis With Contrast (Final result)     Abnormal  Result time 09/26/18 15:26:29    Final result by Garo Kelly MD (09/26/18 15:26:29)                 Impression:      1. CT findings most consistent with nonspecific pancolitis and proctitis.  Differential considerations include infectious or inflammatory including Crohn's or ulcerative colitis versus less likely ischemic given the long segment distribution.  2. Interval mild enlargement of a right cardiophrenic lymph node, nonspecific.  3. Right renal upper pole subcentimeter low attenuation cortical focus which is too small to characterize.  Left renal low-attenuation cortical foci which are suggestive of cyst.  4. Moderate to advanced atherosclerosis.  5. Interval decreased layering right pleural effusion, now trace, as well as interval decreased degree of mild bronchiectasis with peribronchial thickening and adjacent patchy mixed alveolar and interstitial opacities within the right lower lobe.  Correlate clinically.  6. Urinary bladder mild circumferential wall thickening which may be related to underdistention versus  cystitis.  7. Few additional findings as above.  This report was flagged in Epic as abnormal.      Electronically signed by: Garo Kelly MD  Date:    09/26/2018  Time:    15:26             Narrative:    EXAMINATION:  CT ABDOMEN PELVIS WITH CONTRAST    CLINICAL HISTORY:  Nausea, vomiting, diarrhea;    TECHNIQUE:  Low dose axial images, sagittal and coronal reformations were obtained from the lung bases to the pubic symphysis following the IV administration of 100 mL of Omnipaque 350 .  Oral contrast was not given.  Delayed images were obtained.    COMPARISON:  CT thorax 12/13/2017, renal ultrasound 02/22/2017    FINDINGS:  Included lung bases show trace layering right pleural effusion with mild associated overlying compressive atelectasis, decreased from most recent prior.  Mild bronchiectasis with mild peribronchial thickening noted within the right lower lobe with adjacent patchy mixed alveolar and interstitial opacities and mild volume loss with reticulation, also somewhat improved from most recent prior.  Scattered paraseptal emphysematous changes and left lower lobe small pulmonary cyst appear unchanged.    Base of the heart is normal in size without significant pericardial fluid.  Coronary arterial calcifications noted.    Interval enlargement of a right cardiophrenic lymph node which measures up to 11-12 mm in maximum short axis.    Small accessory splenules, unchanged.  No biliary ductal dilatation.  Liver, gallbladder, spleen, stomach, duodenum and bilateral adrenal glands are within normal limits.  Pancreas is somewhat atrophic grossly similar to prior.    Bilateral kidneys are stable in size, shape and location, concentrating and excreting contrast appropriately subcentimeter low-attenuation cortical focus at the right renal upper pole which is too small to characterize.  Grossly unchanged exophytic low-attenuation cortical mass arising from the anterior aspect of the left renal upper pole suggestive of a  cyst.  There is a 3.4 cm mostly exophytic low-attenuation cortical mass arising from the left renal lower pole suggestive of a cyst.  Bilateral renal vascular calcifications noted.  No hydronephrosis.  Bilateral minimal nonspecific perinephric stranding, unchanged.  Bilateral ureters are within normal limits.  Urinary bladder is mildly distended with mild circumferential wall thickening but no perivesicular stranding.  Prostate and seminal vesicles are within normal limits.  A few scattered small pelvic phleboliths noted.    No ascites, free air or lymphadenopathy within the abdomen or pelvis.    Moderate to advanced scattered atherosclerosis.  The abdominal aorta is atherosclerotic and mildly ectatic without aneurysm or dissection definitively seen.    Appendix is not identified.  Terminal ileum is within normal limits.  There is abnormal circumferential wall thickening of the cecum, entire colon and rectum with adjacent mild inflammatory stranding consistent with pancolitis and proctitis.  No evidence of perforation, abscess or bowel obstruction.  No discrete bowel wall mass seen.  Tiny fat containing umbilical hernia.  No pneumatosis or portal venous gas.    Included osseous structures show generalized osteopenia, degenerative related 2 mm retrolisthesis of L2 on 3, chronic appearing mild anterior wedge deformity of T12 through L2 vertebral bodies and degenerative change most prominent at L5-S1.  No acute displaced fracture-dislocation identified.                                            Scribe Attestation:   Scribe #1: I performed the above scribed service and the documentation accurately describes the services I performed. I attest to the accuracy of the note.    Attending Attestation:           Physician Attestation for Scribe:  Physician Attestation Statement for Scribe #1: I, Mary Holt MD, reviewed documentation, as scribed by Juana Frank in my presence, and it is both accurate and complete.                     Clinical Impression:   The encounter diagnosis was Colitis.    MD NOTE, 2:04pm:  Patient is stable no acute distress vital signs stable, patient has normal vital signs, nonsurgical abdomen, basic labs, lipase lactic acid, CT to assess for colitis, reassess     MD NOTE, 6:52pm:  Patient is stable no acute distress vital signs stable, CMP unimpressive, no leukocytosis, hemoglobin 8.5, lipase 69, lactic acid 1.5, CT consistent with nonspecific colitis, patient has known ulcerative colitis, patient presents as noted with diarrhea, his CT confirms colitis, given he has no fevers, no leukocytosis, and stable vital signs, I am amenable to discharging him with antibiotics, he was given IV ciprofloxacin as well as IV Flagyl, blood cultures were drawn, his repeat physical exam shows a nonsurgical abdomen, his blood pressure is 125/70, heart rate 90, satting 96% on room air, afebrile, respiratory rate 20, he is tolerating p.o. with no issues, I also gave him an IV dose of steroids, patient stable on discharge, he will follow up with his family doctor and his gastroenterologist tomorrow, of note patient has no recent hospital admissions and is not on antibiotics, he has no history of C diff colitis and I do not suspect it here, he has known ulcerative colitis, I did offer to test him for this but he did not give a stool sample here and he did not wish to wait for this and wished to go home, given that we are treating his colitis with Flagyl to begin with I am amenable to him leaving without testing (and given how reassuring his examination, vitals, and laboratory work are), he also has excellent follow-up tomorrow with this family doctor and at this gastroenterologist and assures me to have this tested there should clinical suspicion arise, I re-emphasized ER return precautions as noted below, of note is well patient initially complained of difficulty swallowing, he tolerated excellent p.o. last night for  dinner and was able to tolerate p.o. here, therefore I am reassured he will take his medication including his steroids, I also told him:    Please make sure to see your family doctor tomorrow, please also notify your gastroenterologist of your CT findings which are noted below, he developed any worsening diarrhea, blood in stools, fevers, vomiting, headaches chest pain or shortness of breath, return to the emergency department for evaluation, please continue the antibiotics as prescribed               Mary Holt MD  09/26/18 1859       Mary Holt MD  09/26/18 1900       Mary Holt MD  09/26/18 1901

## 2018-09-26 NOTE — DISCHARGE INSTRUCTIONS
Please make sure to see your family doctor tomorrow, please also notify your gastroenterologist of your CT findings which are noted below, he developed any worsening diarrhea, blood in stools, fevers, vomiting, headaches chest pain or shortness of breath, return to the emergency department for evaluation, please continue the antibiotics as prescribed

## 2018-10-01 LAB
BACTERIA BLD CULT: NORMAL
BACTERIA BLD CULT: NORMAL

## 2018-10-02 DIAGNOSIS — I50.22 CHRONIC SYSTOLIC HEART FAILURE: ICD-10-CM

## 2018-10-02 DIAGNOSIS — E11.65 UNCONTROLLED TYPE 2 DIABETES MELLITUS WITH HYPERGLYCEMIA, WITHOUT LONG-TERM CURRENT USE OF INSULIN: ICD-10-CM

## 2018-10-02 RX ORDER — RAMIPRIL 2.5 MG/1
2.5 CAPSULE ORAL DAILY
Qty: 90 CAPSULE | Refills: 3 | Status: SHIPPED | OUTPATIENT
Start: 2018-10-02 | End: 2019-09-30 | Stop reason: SDUPTHER

## 2018-10-02 RX ORDER — GLIPIZIDE 5 MG/1
5 TABLET ORAL
Qty: 90 TABLET | Refills: 1 | Status: SHIPPED | OUTPATIENT
Start: 2018-10-02 | End: 2019-03-31 | Stop reason: SDUPTHER

## 2018-10-03 ENCOUNTER — LAB VISIT (OUTPATIENT)
Dept: LAB | Facility: HOSPITAL | Age: 78
End: 2018-10-03
Attending: INTERNAL MEDICINE
Payer: MEDICARE

## 2018-10-03 DIAGNOSIS — Z79.4 TYPE 2 DIABETES MELLITUS WITHOUT COMPLICATION, WITH LONG-TERM CURRENT USE OF INSULIN: ICD-10-CM

## 2018-10-03 DIAGNOSIS — E11.9 TYPE 2 DIABETES MELLITUS WITHOUT COMPLICATION, WITH LONG-TERM CURRENT USE OF INSULIN: ICD-10-CM

## 2018-10-03 LAB
ALBUMIN SERPL BCP-MCNC: 2.6 G/DL
ALP SERPL-CCNC: 46 U/L
ALT SERPL W/O P-5'-P-CCNC: 10 U/L
ANION GAP SERPL CALC-SCNC: 9 MMOL/L
AST SERPL-CCNC: 9 U/L
BASOPHILS # BLD AUTO: 0.01 K/UL
BASOPHILS NFR BLD: 0.1 %
BILIRUB SERPL-MCNC: 0.2 MG/DL
BUN SERPL-MCNC: 14 MG/DL
CALCIUM SERPL-MCNC: 9 MG/DL
CHLORIDE SERPL-SCNC: 107 MMOL/L
CHOLEST SERPL-MCNC: 109 MG/DL
CHOLEST/HDLC SERPL: 2.2 {RATIO}
CO2 SERPL-SCNC: 24 MMOL/L
CREAT SERPL-MCNC: 0.9 MG/DL
DIFFERENTIAL METHOD: ABNORMAL
EOSINOPHIL # BLD AUTO: 0.1 K/UL
EOSINOPHIL NFR BLD: 0.9 %
ERYTHROCYTE [DISTWIDTH] IN BLOOD BY AUTOMATED COUNT: 16.7 %
EST. GFR  (AFRICAN AMERICAN): >60 ML/MIN/1.73 M^2
EST. GFR  (NON AFRICAN AMERICAN): >60 ML/MIN/1.73 M^2
GLUCOSE SERPL-MCNC: 120 MG/DL
HCT VFR BLD AUTO: 26 %
HDLC SERPL-MCNC: 49 MG/DL
HDLC SERPL: 45 %
HGB BLD-MCNC: 8.4 G/DL
LDLC SERPL CALC-MCNC: 35 MG/DL
LYMPHOCYTES # BLD AUTO: 3.5 K/UL
LYMPHOCYTES NFR BLD: 24.4 %
MCH RBC QN AUTO: 26.2 PG
MCHC RBC AUTO-ENTMCNC: 32.3 G/DL
MCV RBC AUTO: 81 FL
MONOCYTES # BLD AUTO: 1.4 K/UL
MONOCYTES NFR BLD: 9.8 %
NEUTROPHILS # BLD AUTO: 9.2 K/UL
NEUTROPHILS NFR BLD: 64.8 %
NONHDLC SERPL-MCNC: 60 MG/DL
PLATELET # BLD AUTO: 466 K/UL
PMV BLD AUTO: 8.8 FL
POTASSIUM SERPL-SCNC: 3.8 MMOL/L
PROT SERPL-MCNC: 6 G/DL
RBC # BLD AUTO: 3.21 M/UL
SODIUM SERPL-SCNC: 140 MMOL/L
TRIGL SERPL-MCNC: 125 MG/DL
WBC # BLD AUTO: 14.25 K/UL

## 2018-10-03 PROCEDURE — 85025 COMPLETE CBC W/AUTO DIFF WBC: CPT

## 2018-10-03 PROCEDURE — 80053 COMPREHEN METABOLIC PANEL: CPT

## 2018-10-03 PROCEDURE — 36415 COLL VENOUS BLD VENIPUNCTURE: CPT | Mod: PN

## 2018-10-03 PROCEDURE — 83036 HEMOGLOBIN GLYCOSYLATED A1C: CPT

## 2018-10-03 PROCEDURE — 80061 LIPID PANEL: CPT

## 2018-10-04 LAB
ESTIMATED AVG GLUCOSE: 189 MG/DL
HBA1C MFR BLD HPLC: 8.2 %

## 2018-10-09 ENCOUNTER — OFFICE VISIT (OUTPATIENT)
Dept: FAMILY MEDICINE | Facility: CLINIC | Age: 78
End: 2018-10-09
Payer: MEDICARE

## 2018-10-09 VITALS
HEIGHT: 77 IN | WEIGHT: 184.31 LBS | BODY MASS INDEX: 21.76 KG/M2 | HEART RATE: 68 BPM | DIASTOLIC BLOOD PRESSURE: 78 MMHG | OXYGEN SATURATION: 98 % | SYSTOLIC BLOOD PRESSURE: 118 MMHG | TEMPERATURE: 98 F

## 2018-10-09 DIAGNOSIS — Z23 NEED FOR INFLUENZA VACCINATION: ICD-10-CM

## 2018-10-09 DIAGNOSIS — Z79.4 TYPE 2 DIABETES MELLITUS WITHOUT COMPLICATION, WITH LONG-TERM CURRENT USE OF INSULIN: ICD-10-CM

## 2018-10-09 DIAGNOSIS — E11.9 TYPE 2 DIABETES MELLITUS WITHOUT COMPLICATION, WITH LONG-TERM CURRENT USE OF INSULIN: ICD-10-CM

## 2018-10-09 DIAGNOSIS — J41.0 SIMPLE CHRONIC BRONCHITIS: ICD-10-CM

## 2018-10-09 DIAGNOSIS — K52.9 COLITIS: Primary | ICD-10-CM

## 2018-10-09 PROCEDURE — 1101F PT FALLS ASSESS-DOCD LE1/YR: CPT | Mod: CPTII,,, | Performed by: INTERNAL MEDICINE

## 2018-10-09 PROCEDURE — 3078F DIAST BP <80 MM HG: CPT | Mod: CPTII,,, | Performed by: INTERNAL MEDICINE

## 2018-10-09 PROCEDURE — 99999 PR PBB SHADOW E&M-EST. PATIENT-LVL III: CPT | Mod: PBBFAC,,, | Performed by: INTERNAL MEDICINE

## 2018-10-09 PROCEDURE — 3074F SYST BP LT 130 MM HG: CPT | Mod: CPTII,,, | Performed by: INTERNAL MEDICINE

## 2018-10-09 PROCEDURE — 99214 OFFICE O/P EST MOD 30 MIN: CPT | Mod: S$PBB,,, | Performed by: INTERNAL MEDICINE

## 2018-10-09 PROCEDURE — 99213 OFFICE O/P EST LOW 20 MIN: CPT | Mod: PBBFAC,PN,25 | Performed by: INTERNAL MEDICINE

## 2018-10-09 PROCEDURE — 90662 IIV NO PRSV INCREASED AG IM: CPT | Mod: PBBFAC,PN

## 2018-10-09 RX ORDER — PREDNISONE 20 MG/1
20 TABLET ORAL DAILY
Qty: 14 TABLET | Refills: 0 | Status: SHIPPED | OUTPATIENT
Start: 2018-10-09 | End: 2018-11-23 | Stop reason: SDUPTHER

## 2018-10-09 NOTE — PROGRESS NOTES
"Subjective:       Patient ID: Regan Alvarez is a 77 y.o. male.    Chief Complaint: Diabetes and Diarrhea    F/u diarrhea    HPI: 76 y/o w/ COPD DM presents with daughter to follow up diarrhea. Had cscope at [facility] one month ago with severe diffuse colitis was discharge on PO prednisione but diarrhea continued (daughter stated pills were coming out in stool) seen in ED 9/26/18 and had CT scan showing pancolitis. Treated with two doses IV steroids and started on cipro and flagyl. Diarrhea significantly improved stillv srinath loose but only three to four movements per day (previously > 10) he feels much stronger. Completed antibiotics two days ago is scheduled to follow up with GI in two weeks. No nausea/vomitting   Breathing "good" not using supplemental oxygen or inhalers regularlly      Review of Systems   Constitutional: Negative for activity change, appetite change, fatigue, fever and unexpected weight change.   HENT: Negative for ear pain, rhinorrhea and sore throat.    Eyes: Negative for discharge and visual disturbance.   Respiratory: Negative for chest tightness, shortness of breath and wheezing.    Cardiovascular: Negative for chest pain, palpitations and leg swelling.   Gastrointestinal: Positive for diarrhea (improved in last two weeks). Negative for abdominal pain and constipation.   Endocrine: Negative for cold intolerance and heat intolerance.   Genitourinary: Negative for dysuria and hematuria.   Musculoskeletal: Negative for joint swelling and neck stiffness.   Skin: Negative for rash.   Neurological: Negative for dizziness, syncope, weakness and headaches.   Psychiatric/Behavioral: Negative for suicidal ideas.       Objective:     Vitals:    10/09/18 1107   BP: 118/78   Pulse: 68   Temp: 97.6 °F (36.4 °C)   SpO2: 98%   Weight: 83.6 kg (184 lb 4.9 oz)   Height: 6' 5" (1.956 m)          Physical Exam   Constitutional: He is oriented to person, place, and time. He appears well-developed and " well-nourished.   HENT:   Head: Normocephalic and atraumatic.   Eyes: Conjunctivae are normal. No scleral icterus.   Neck: Normal range of motion.   Cardiovascular: Normal rate and regular rhythm. Exam reveals no gallop and no friction rub.   No murmur heard.  Pulmonary/Chest: Effort normal and breath sounds normal. He has no wheezes. He has no rales.   Abdominal: Soft. Bowel sounds are normal. There is no tenderness. There is no rebound and no guarding.   Musculoskeletal: Normal range of motion. He exhibits no edema or tenderness.   Neurological: He is alert and oriented to person, place, and time. No cranial nerve deficit.   Skin: Skin is warm and dry.   Psychiatric: He has a normal mood and affect.       Assessment and Plan   1. Simple chronic bronchitis  Stable continue laba/ics    2. Type 2 diabetes mellitus without complication, with long-term current use of insulin  Glucose mildly elevated in setting of steroid use continue metformin and glipizide    3. Colitis  Continue prednsione until follow up with GI need release for outside colonoscopy to evaluate etiology of colitis  - predniSONE (DELTASONE) 20 MG tablet; Take 1 tablet (20 mg total) by mouth once daily.  Dispense: 14 tablet; Refill: 0    4. Need for influenza vaccination  Flu vaccine today  - Influenza - High Dose (65+) (PF) (IM)

## 2018-11-23 ENCOUNTER — OFFICE VISIT (OUTPATIENT)
Dept: FAMILY MEDICINE | Facility: CLINIC | Age: 78
End: 2018-11-23
Payer: MEDICARE

## 2018-11-23 VITALS
HEART RATE: 101 BPM | DIASTOLIC BLOOD PRESSURE: 78 MMHG | TEMPERATURE: 97 F | SYSTOLIC BLOOD PRESSURE: 126 MMHG | HEIGHT: 77 IN | WEIGHT: 188.5 LBS | RESPIRATION RATE: 16 BRPM | BODY MASS INDEX: 22.26 KG/M2 | OXYGEN SATURATION: 100 %

## 2018-11-23 DIAGNOSIS — M54.2 CERVICALGIA: Primary | ICD-10-CM

## 2018-11-23 DIAGNOSIS — K52.9 COLITIS: ICD-10-CM

## 2018-11-23 PROCEDURE — 99214 OFFICE O/P EST MOD 30 MIN: CPT | Mod: S$GLB,,, | Performed by: INTERNAL MEDICINE

## 2018-11-23 PROCEDURE — 99999 PR PBB SHADOW E&M-EST. PATIENT-LVL III: CPT | Mod: PBBFAC,,, | Performed by: INTERNAL MEDICINE

## 2018-11-23 PROCEDURE — 1101F PT FALLS ASSESS-DOCD LE1/YR: CPT | Mod: CPTII,S$GLB,, | Performed by: INTERNAL MEDICINE

## 2018-11-23 PROCEDURE — 3078F DIAST BP <80 MM HG: CPT | Mod: CPTII,S$GLB,, | Performed by: INTERNAL MEDICINE

## 2018-11-23 PROCEDURE — 3074F SYST BP LT 130 MM HG: CPT | Mod: CPTII,S$GLB,, | Performed by: INTERNAL MEDICINE

## 2018-11-23 RX ORDER — IBUPROFEN 800 MG/1
800 TABLET ORAL EVERY 8 HOURS PRN
Qty: 30 TABLET | Refills: 0 | Status: SHIPPED | OUTPATIENT
Start: 2018-11-23 | End: 2019-06-06 | Stop reason: SDUPTHER

## 2018-11-23 RX ORDER — PREDNISONE 20 MG/1
20 TABLET ORAL DAILY
Qty: 14 TABLET | Refills: 0 | Status: SHIPPED | OUTPATIENT
Start: 2018-11-23 | End: 2019-01-09 | Stop reason: ALTCHOICE

## 2018-11-23 RX ORDER — TIZANIDINE 4 MG/1
TABLET ORAL
Qty: 40 TABLET | Refills: 0 | Status: SHIPPED | OUTPATIENT
Start: 2018-11-23 | End: 2018-11-29 | Stop reason: SDUPTHER

## 2018-11-23 NOTE — PROGRESS NOTES
Subjective:       Patient ID: Regan Alvarez is a 78 y.o. male.    Chief Complaint: Neck Pain (neck pain 2 weeks ); Establish Care; and Medication Refill    Neck Pain    This is a new problem. The current episode started 1 to 4 weeks ago (x2 weeks). The problem occurs constantly. The problem has been unchanged. The pain is associated with nothing. The pain is present in the right side. The quality of the pain is described as cramping. The pain is at a severity of 7/10. The pain is severe. The symptoms are aggravated by twisting (extension). The pain is same all the time. Pertinent negatives include no chest pain, fever, headaches, photophobia, tingling, weakness or weight loss. Associated symptoms comments: No radiation to upper extremities/hands. He has tried NSAIDs and muscle relaxants for the symptoms. The treatment provided mild relief.   has history of UC recently stopped prednisone and started mesalamine, diarrhea has worsened since stopping prednisone no adbominal pain no melena or BRBPR.   Review of Systems   Constitutional: Negative for activity change, appetite change, fatigue, fever, unexpected weight change and weight loss.   HENT: Negative for ear pain, rhinorrhea and sore throat.    Eyes: Negative for photophobia, discharge and visual disturbance.   Respiratory: Negative for chest tightness, shortness of breath and wheezing.    Cardiovascular: Negative for chest pain, palpitations and leg swelling.   Gastrointestinal: Positive for diarrhea. Negative for abdominal pain and constipation.   Endocrine: Negative for cold intolerance and heat intolerance.   Genitourinary: Negative for dysuria and hematuria.   Musculoskeletal: Positive for neck pain and neck stiffness. Negative for joint swelling.   Skin: Negative for rash.   Neurological: Negative for dizziness, tingling, syncope, weakness and headaches.   Psychiatric/Behavioral: Negative for suicidal ideas.       Objective:     Vitals:    11/23/18 1411  "  BP: 126/78   BP Location: Right arm   Patient Position: Sitting   Pulse: 101   Resp: 16   Temp: 97 °F (36.1 °C)   TempSrc: Oral   SpO2: 100%   Weight: 85.5 kg (188 lb 7.9 oz)   Height: 6' 5" (1.956 m)          Physical Exam   Constitutional: He is oriented to person, place, and time. He appears well-developed and well-nourished.   HENT:   Head: Normocephalic and atraumatic.   Eyes: Conjunctivae are normal. No scleral icterus.   Neck: Normal range of motion.   Cardiovascular: Normal rate and regular rhythm. Exam reveals no gallop and no friction rub.   No murmur heard.  Pulmonary/Chest: Effort normal and breath sounds normal. He has no wheezes. He has no rales.   Abdominal: Soft. Bowel sounds are normal. There is no tenderness. There is no rebound and no guarding.   Musculoskeletal: Normal range of motion. He exhibits tenderness. He exhibits no edema.   No spinous process tenderness, some pain to right paraspinous musclualture right shoudler contracted. Full AROM bilateral shoulders   Neurological: He is alert and oriented to person, place, and time. No cranial nerve deficit.   5/5  strength bilaterally   Skin: Skin is warm and dry.   Psychiatric: He has a normal mood and affect.       Assessment and Plan   1. Cervicalgia  Muscle relaxer topical heat   - tiZANidine (ZANAFLEX) 4 MG tablet; One tab po tid prn for neck pain, may take two tabs at night before sleep (not to exceed five tabs in a day)  Dispense: 40 tablet; Refill: 0  - ibuprofen (ADVIL,MOTRIN) 800 MG tablet; Take 1 tablet (800 mg total) by mouth every 8 (eight) hours as needed for Pain (neck pain).  Dispense: 30 tablet; Refill: 0    2. Colitis  Restart low dose prednisone until follow up with GI  - predniSONE (DELTASONE) 20 MG tablet; Take 1 tablet (20 mg total) by mouth once daily.  Dispense: 14 tablet; Refill: 0  "

## 2018-11-27 DIAGNOSIS — M54.2 CERVICALGIA: ICD-10-CM

## 2018-11-27 RX ORDER — IBUPROFEN 800 MG/1
800 TABLET ORAL EVERY 8 HOURS PRN
Qty: 30 TABLET | Refills: 0 | OUTPATIENT
Start: 2018-11-27

## 2018-11-29 ENCOUNTER — TELEPHONE (OUTPATIENT)
Dept: FAMILY MEDICINE | Facility: CLINIC | Age: 78
End: 2018-11-29

## 2018-11-29 DIAGNOSIS — M54.2 CERVICALGIA: ICD-10-CM

## 2018-11-29 RX ORDER — TIZANIDINE 4 MG/1
TABLET ORAL
Qty: 40 TABLET | Refills: 0 | Status: SHIPPED | OUTPATIENT
Start: 2018-11-29 | End: 2019-10-28

## 2018-11-29 NOTE — TELEPHONE ENCOUNTER
----- Message from Amando Sheppard sent at 11/29/2018  8:08 AM CST -----  Contact: Eliezer/[phone]  The patients daughter is requesting to speak to the staff regarding the medication  predniSONE (DELTASONE) 20 MG tablet. She's stating the medication isn't working for the patient.        Thank you

## 2018-11-29 NOTE — TELEPHONE ENCOUNTER
Contacted patient's daughter stool still loose (more formed not as watery) but at least five times per day. Recommend stopping metformin as this could be worsening.

## 2018-11-29 NOTE — TELEPHONE ENCOUNTER
Please advise pt and daughter on predniSONE (DELTASONE) 20 MG tablet . Pt started medication 11/23/18.

## 2018-12-04 RX ORDER — METFORMIN HYDROCHLORIDE 1000 MG/1
1000 TABLET ORAL 2 TIMES DAILY WITH MEALS
Qty: 180 TABLET | Refills: 3 | Status: SHIPPED | OUTPATIENT
Start: 2018-12-04 | End: 2019-01-30

## 2018-12-19 RX ORDER — AMLODIPINE BESYLATE 2.5 MG/1
2.5 TABLET ORAL DAILY
Qty: 30 TABLET | Refills: 10 | Status: SHIPPED | OUTPATIENT
Start: 2018-12-19 | End: 2019-10-04 | Stop reason: SDUPTHER

## 2019-01-09 ENCOUNTER — OFFICE VISIT (OUTPATIENT)
Dept: FAMILY MEDICINE | Facility: CLINIC | Age: 79
End: 2019-01-09
Payer: MEDICARE

## 2019-01-09 VITALS
SYSTOLIC BLOOD PRESSURE: 112 MMHG | OXYGEN SATURATION: 98 % | BODY MASS INDEX: 21.58 KG/M2 | RESPIRATION RATE: 17 BRPM | TEMPERATURE: 98 F | DIASTOLIC BLOOD PRESSURE: 73 MMHG | HEIGHT: 77 IN | WEIGHT: 182.75 LBS | HEART RATE: 102 BPM

## 2019-01-09 DIAGNOSIS — J42 CHRONIC BRONCHITIS, UNSPECIFIED CHRONIC BRONCHITIS TYPE: ICD-10-CM

## 2019-01-09 DIAGNOSIS — K52.9 COLITIS: Primary | ICD-10-CM

## 2019-01-09 DIAGNOSIS — Z79.4 TYPE 2 DIABETES MELLITUS WITHOUT COMPLICATION, WITH LONG-TERM CURRENT USE OF INSULIN: ICD-10-CM

## 2019-01-09 DIAGNOSIS — E11.9 TYPE 2 DIABETES MELLITUS WITHOUT COMPLICATION, WITH LONG-TERM CURRENT USE OF INSULIN: ICD-10-CM

## 2019-01-09 PROCEDURE — 99999 PR PBB SHADOW E&M-EST. PATIENT-LVL III: CPT | Mod: PBBFAC,,, | Performed by: INTERNAL MEDICINE

## 2019-01-09 PROCEDURE — 3074F PR MOST RECENT SYSTOLIC BLOOD PRESSURE < 130 MM HG: ICD-10-PCS | Mod: CPTII,S$GLB,, | Performed by: INTERNAL MEDICINE

## 2019-01-09 PROCEDURE — 3074F SYST BP LT 130 MM HG: CPT | Mod: CPTII,S$GLB,, | Performed by: INTERNAL MEDICINE

## 2019-01-09 PROCEDURE — 99499 RISK ADDL DX/OHS AUDIT: ICD-10-PCS | Mod: S$GLB,,, | Performed by: INTERNAL MEDICINE

## 2019-01-09 PROCEDURE — 3078F DIAST BP <80 MM HG: CPT | Mod: CPTII,S$GLB,, | Performed by: INTERNAL MEDICINE

## 2019-01-09 PROCEDURE — 99499 UNLISTED E&M SERVICE: CPT | Mod: S$GLB,,, | Performed by: INTERNAL MEDICINE

## 2019-01-09 PROCEDURE — 1101F PT FALLS ASSESS-DOCD LE1/YR: CPT | Mod: CPTII,S$GLB,, | Performed by: INTERNAL MEDICINE

## 2019-01-09 PROCEDURE — 99214 PR OFFICE/OUTPT VISIT, EST, LEVL IV, 30-39 MIN: ICD-10-PCS | Mod: S$GLB,,, | Performed by: INTERNAL MEDICINE

## 2019-01-09 PROCEDURE — 99214 OFFICE O/P EST MOD 30 MIN: CPT | Mod: S$GLB,,, | Performed by: INTERNAL MEDICINE

## 2019-01-09 PROCEDURE — 99999 PR PBB SHADOW E&M-EST. PATIENT-LVL III: ICD-10-PCS | Mod: PBBFAC,,, | Performed by: INTERNAL MEDICINE

## 2019-01-09 PROCEDURE — 1101F PR PT FALLS ASSESS DOC 0-1 FALLS W/OUT INJ PAST YR: ICD-10-PCS | Mod: CPTII,S$GLB,, | Performed by: INTERNAL MEDICINE

## 2019-01-09 PROCEDURE — 3078F PR MOST RECENT DIASTOLIC BLOOD PRESSURE < 80 MM HG: ICD-10-PCS | Mod: CPTII,S$GLB,, | Performed by: INTERNAL MEDICINE

## 2019-01-09 NOTE — PROGRESS NOTES
"Subjective:       Patient ID: Regan Alvarez is a 78 y.o. male.    Chief Complaint: Follow-up and Establish Care    F/u diarrhea    HPI: 77 y/o w/ COPD DM HTN presents with daughter. Since last vsiit was started on budenoside for ulcerative colitis flare has had some improvement with less frequent stooling, but daughter tells me medication is not covered by insurance and will cost > $400 per month. No melena or BRBPR no nausea/vomitting no abdominal bloating no LE swelling no pain       Review of Systems   Constitutional: Negative for activity change, appetite change, fatigue, fever and unexpected weight change.   HENT: Negative for ear pain, rhinorrhea and sore throat.    Eyes: Negative for discharge and visual disturbance.   Respiratory: Negative for chest tightness, shortness of breath and wheezing.    Cardiovascular: Negative for chest pain, palpitations and leg swelling.   Gastrointestinal: Positive for diarrhea. Negative for abdominal pain and constipation.   Endocrine: Negative for cold intolerance and heat intolerance.   Genitourinary: Negative for dysuria and hematuria.   Musculoskeletal: Negative for joint swelling and neck stiffness.   Skin: Negative for rash.   Neurological: Negative for dizziness, syncope, weakness and headaches.   Psychiatric/Behavioral: Negative for suicidal ideas.       Objective:     Vitals:    01/09/19 1050   BP: 112/73   BP Location: Right arm   Patient Position: Sitting   Pulse: 102   Resp: 17   Temp: 97.9 °F (36.6 °C)   TempSrc: Oral   SpO2: 98%   Weight: 82.9 kg (182 lb 12.2 oz)   Height: 6' 5" (1.956 m)          Physical Exam   Constitutional: He is oriented to person, place, and time. He appears well-developed and well-nourished.   HENT:   Head: Normocephalic and atraumatic.   Neck: Normal range of motion.   Cardiovascular: Normal rate and regular rhythm. Exam reveals no gallop and no friction rub.   No murmur heard.  Pulmonary/Chest: Effort normal and breath sounds " normal. He has no wheezes. He has no rales.   Abdominal: Soft. Bowel sounds are normal. There is no tenderness. There is no rebound and no guarding.   No tenderness to deep palpation   Musculoskeletal: Normal range of motion. He exhibits no edema or tenderness.   Neurological: He is alert and oriented to person, place, and time. No cranial nerve deficit.   Skin: Skin is warm and dry.   Psychiatric: He has a normal mood and affect.       Assessment and Plan   1. Colitis  Request recent stool studies from [employer] will need follow up cscope per GI but daughter concerned about him being able to complete prep    2. Type 2 diabetes mellitus without complication, with long-term current use of insulin  Blood glucose up secondary to recent steroid use continue sulfayurea    3. Chronic bronchitis, unspecified chronic bronchitis type  Stable in inhalers continue

## 2019-01-10 ENCOUNTER — TELEPHONE (OUTPATIENT)
Dept: FAMILY MEDICINE | Facility: CLINIC | Age: 79
End: 2019-01-10

## 2019-01-10 NOTE — TELEPHONE ENCOUNTER
Patient contacted and ID confirmed by name and   Scheduled for repeat scope 1/15 with DR. Mendez. Stool studies without significant leukocytes no growth from any cultures

## 2019-01-22 ENCOUNTER — TELEPHONE (OUTPATIENT)
Dept: INFECTIOUS DISEASES | Facility: CLINIC | Age: 79
End: 2019-01-22

## 2019-01-22 DIAGNOSIS — A31.0 PULMONARY MYCOBACTERIUM AVIUM COMPLEX (MAC) INFECTION: Primary | Chronic | ICD-10-CM

## 2019-01-22 DIAGNOSIS — I49.3 PVC (PREMATURE VENTRICULAR CONTRACTION): ICD-10-CM

## 2019-01-22 NOTE — TELEPHONE ENCOUNTER
----- Message from Haritha Cobb MA sent at 1/22/2019  3:24 PM CST -----  appt 1/30, please add orders for annual exam       ----- Message -----  From: Getachew Valera MD  Sent: 1/22/2019   1:17 PM  To: Haritha Cobb MA    Needs to be seen in next week or so

## 2019-01-30 ENCOUNTER — HOSPITAL ENCOUNTER (OUTPATIENT)
Dept: RADIOLOGY | Facility: HOSPITAL | Age: 79
Discharge: HOME | End: 2019-01-30
Attending: INTERNAL MEDICINE
Payer: MEDICARE

## 2019-01-30 ENCOUNTER — HOSPITAL ENCOUNTER (OUTPATIENT)
Dept: CARDIOLOGY | Facility: CLINIC | Age: 79
Discharge: HOME | End: 2019-01-30
Attending: INTERNAL MEDICINE
Payer: MEDICARE

## 2019-01-30 ENCOUNTER — OFFICE VISIT (OUTPATIENT)
Dept: INFECTIOUS DISEASES | Facility: CLINIC | Age: 79
End: 2019-01-30
Payer: MEDICARE

## 2019-01-30 VITALS
HEIGHT: 77 IN | TEMPERATURE: 98 F | DIASTOLIC BLOOD PRESSURE: 58 MMHG | BODY MASS INDEX: 20.9 KG/M2 | HEART RATE: 87 BPM | SYSTOLIC BLOOD PRESSURE: 96 MMHG | WEIGHT: 177 LBS

## 2019-01-30 DIAGNOSIS — A31.0 PULMONARY MYCOBACTERIUM AVIUM COMPLEX (MAC) INFECTION: Primary | Chronic | ICD-10-CM

## 2019-01-30 DIAGNOSIS — Z79.4 TYPE 2 DIABETES MELLITUS WITHOUT COMPLICATION, WITH LONG-TERM CURRENT USE OF INSULIN: ICD-10-CM

## 2019-01-30 DIAGNOSIS — A31.0 PULMONARY MYCOBACTERIUM AVIUM COMPLEX (MAC) INFECTION: Chronic | ICD-10-CM

## 2019-01-30 DIAGNOSIS — J42 CHRONIC BRONCHITIS, UNSPECIFIED CHRONIC BRONCHITIS TYPE: ICD-10-CM

## 2019-01-30 DIAGNOSIS — K51.011 ULCERATIVE PANCOLITIS WITH RECTAL BLEEDING: ICD-10-CM

## 2019-01-30 DIAGNOSIS — E11.9 TYPE 2 DIABETES MELLITUS WITHOUT COMPLICATION, WITH LONG-TERM CURRENT USE OF INSULIN: ICD-10-CM

## 2019-01-30 DIAGNOSIS — I49.3 PVC (PREMATURE VENTRICULAR CONTRACTION): ICD-10-CM

## 2019-01-30 PROCEDURE — 93005 ELECTROCARDIOGRAM TRACING: CPT | Mod: S$GLB,,, | Performed by: INTERNAL MEDICINE

## 2019-01-30 PROCEDURE — 99999 PR PBB SHADOW E&M-EST. PATIENT-LVL III: CPT | Mod: PBBFAC,,, | Performed by: INTERNAL MEDICINE

## 2019-01-30 PROCEDURE — 99215 PR OFFICE/OUTPT VISIT, EST, LEVL V, 40-54 MIN: ICD-10-PCS | Mod: S$GLB,,, | Performed by: INTERNAL MEDICINE

## 2019-01-30 PROCEDURE — 93005 EKG 12-LEAD: ICD-10-PCS | Mod: S$GLB,,, | Performed by: INTERNAL MEDICINE

## 2019-01-30 PROCEDURE — 99499 UNLISTED E&M SERVICE: CPT | Mod: S$GLB,,, | Performed by: INTERNAL MEDICINE

## 2019-01-30 PROCEDURE — 3074F PR MOST RECENT SYSTOLIC BLOOD PRESSURE < 130 MM HG: ICD-10-PCS | Mod: CPTII,S$GLB,, | Performed by: INTERNAL MEDICINE

## 2019-01-30 PROCEDURE — 93000 EKG 12-LEAD: ICD-10-PCS | Mod: S$GLB,,, | Performed by: INTERNAL MEDICINE

## 2019-01-30 PROCEDURE — 71250 CT THORAX DX C-: CPT | Mod: TC

## 2019-01-30 PROCEDURE — 3078F DIAST BP <80 MM HG: CPT | Mod: CPTII,S$GLB,, | Performed by: INTERNAL MEDICINE

## 2019-01-30 PROCEDURE — 71250 CT THORAX DX C-: CPT | Mod: 26,,, | Performed by: RADIOLOGY

## 2019-01-30 PROCEDURE — 1101F PT FALLS ASSESS-DOCD LE1/YR: CPT | Mod: CPTII,S$GLB,, | Performed by: INTERNAL MEDICINE

## 2019-01-30 PROCEDURE — 99999 PR PBB SHADOW E&M-EST. PATIENT-LVL III: ICD-10-PCS | Mod: PBBFAC,,, | Performed by: INTERNAL MEDICINE

## 2019-01-30 PROCEDURE — 1101F PR PT FALLS ASSESS DOC 0-1 FALLS W/OUT INJ PAST YR: ICD-10-PCS | Mod: CPTII,S$GLB,, | Performed by: INTERNAL MEDICINE

## 2019-01-30 PROCEDURE — 3078F PR MOST RECENT DIASTOLIC BLOOD PRESSURE < 80 MM HG: ICD-10-PCS | Mod: CPTII,S$GLB,, | Performed by: INTERNAL MEDICINE

## 2019-01-30 PROCEDURE — 93000 ELECTROCARDIOGRAM COMPLETE: CPT | Mod: S$GLB,,, | Performed by: INTERNAL MEDICINE

## 2019-01-30 PROCEDURE — 99215 OFFICE O/P EST HI 40 MIN: CPT | Mod: S$GLB,,, | Performed by: INTERNAL MEDICINE

## 2019-01-30 PROCEDURE — 3074F SYST BP LT 130 MM HG: CPT | Mod: CPTII,S$GLB,, | Performed by: INTERNAL MEDICINE

## 2019-01-30 PROCEDURE — 99499 RISK ADDL DX/OHS AUDIT: ICD-10-PCS | Mod: S$GLB,,, | Performed by: INTERNAL MEDICINE

## 2019-01-30 PROCEDURE — 71250 CT CHEST WITHOUT CONTRAST: ICD-10-PCS | Mod: 26,,, | Performed by: RADIOLOGY

## 2019-01-30 RX ORDER — PREDNISONE 10 MG/1
35 TABLET ORAL DAILY
COMMUNITY
End: 2019-04-17

## 2019-01-30 NOTE — Clinical Note
Please call his daughter and tell her CT chest is stable. He can proceed with the treatment for his colitis

## 2019-01-30 NOTE — PROGRESS NOTES
Subjective:      Patient ID: Regan Alvarez is a 78 y.o. male.    Chief Complaint:   asked by Dr. 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 He also has fairly significant COPD. He remains with few pulmonary symptoms at the present time and is on no medications for COPD or MAC. He has diabetes which is not well controlled and is on glipizide and diet. Followed by Dr. Stu Adamson.    In summer 2018 he developed diarrhea which has subsequently been diagnosed as ulcerative colitis. He is being treated with Dr. Mendez at [facility]. It has been treated with high doses of steroids and he is  Currently on a steroid taper at present dose of 35 mg daily of prednisone. He is still having bloody stools and his daughter reports that Humira is the treatment being contemplated, pending our opinion about the status of his MAC and pulmonary infection.    He has lost 30 lb since colitis has developed and is very weak.    Labs today:  Lab Results   Component Value Date    WBC 17.36 (H) 01/30/2019    HGB 9.4 (L) 01/30/2019    HCT 30.6 (L) 01/30/2019    MCV 85 01/30/2019     (H) 01/30/2019     CMP  Sodium   Date Value Ref Range Status   01/30/2019 135 (L) 136 - 145 mmol/L Final     Potassium   Date Value Ref Range Status   01/30/2019 3.9 3.5 - 5.1 mmol/L Final     Chloride   Date Value Ref Range Status   01/30/2019 101 95 - 110 mmol/L Final     CO2   Date Value Ref Range Status   01/30/2019 25 23 - 29 mmol/L Final     Glucose   Date Value Ref Range Status   01/30/2019 268 (H) 70 - 110 mg/dL Final     BUN, Bld   Date Value Ref Range Status   01/30/2019 17 8 - 23 mg/dL Final     Creatinine   Date Value Ref Range Status   01/30/2019 1.1 0.5 - 1.4 mg/dL Final     Calcium   Date Value Ref Range Status    01/30/2019 9.2 8.7 - 10.5 mg/dL Final     Total Protein   Date Value Ref Range Status   01/30/2019 7.0 6.0 - 8.4 g/dL Final     Albumin   Date Value Ref Range Status   01/30/2019 2.9 (L) 3.5 - 5.2 g/dL Final     Total Bilirubin   Date Value Ref Range Status   01/30/2019 0.4 0.1 - 1.0 mg/dL Final     Comment:     Alkaline Phosphatase   Date Value Ref Range Status   01/30/2019 68 55 - 135 U/L Final     AST   Date Value Ref Range Status   01/30/2019 8 (L) 10 - 40 U/L Final     ALT   Date Value Ref Range Status   01/30/2019 9 (L) 10 - 44 U/L Final     Anion Gap   Date Value Ref Range Status   01/30/2019 9 8 - 16 mmol/L Final     eGFR if    Date Value Ref Range Status   01/30/2019 >60.0 >60 mL/min/1.73 m^2 Final     eGFR if non    Date Value Ref Range Status   01/30/2019 >60.0 >60 mL/min/1.73 m^2 Final     Comment:     Lab Results   Component Value Date    SEDRATE 30 (H) 01/30/2019     Lab Results   Component Value Date    CRP 26.4 (H) 01/30/2019     CT chest today stable:  Cavitary lesion in the right upper lobe measuring up to 8.7 cm with adjacent atelectatic changes and varicoid bronchiectasis, unchanged.  Temporal course of this disease is suggestive of inflammatory etiology in this patient with history of MAC.  Continued follow-up recommended.    Tubular opacity demonstrating calcification in the left upper lobe, unchanged. This is thought to likely represent an ectatic airway with chronic calcified inspissated secretions.    Interval improvement of the previously noted right-sided pleural fluid and improvement of the right lower lobe heterogeneous opacities.    Upper lung zone predominant bullous disease and emphysematous changes.    Calcific atherosclerosis of the aorta and coronary arteries.  Review of Systems   Constitution: Positive for weakness and malaise/fatigue. Negative for chills, decreased appetite, fever, night sweats, weight gain and weight loss.   HENT: Negative for  congestion, ear pain, hearing loss, hoarse voice, sore throat and tinnitus.    Eyes: Negative for blurred vision, redness and visual disturbance.   Cardiovascular: Negative for chest pain, leg swelling and palpitations.   Respiratory: Positive for shortness of breath and wheezing. Negative for cough, hemoptysis and sputum production.    Hematologic/Lymphatic: Negative for adenopathy. Does not bruise/bleed easily.   Skin: Negative for dry skin, itching, rash and suspicious lesions.   Musculoskeletal: Negative for back pain, joint pain, myalgias and neck pain.   Gastrointestinal: Positive for diarrhea and heartburn. Negative for abdominal pain, constipation, nausea and vomiting.   Genitourinary: Positive for frequency. Negative for dysuria, flank pain, hematuria, hesitancy and urgency.   Neurological: Negative for dizziness, headaches, numbness and paresthesias.   Psychiatric/Behavioral: Positive for memory loss. Negative for depression. The patient has insomnia. The patient is not nervous/anxious.      Objective:   Physical Exam   Constitutional: He is oriented to person, place, and time. He appears well-developed and well-nourished. No distress.   Appears pale and weak. Very thin compared to last time I saw him   Cardiovascular: Normal rate, regular rhythm and normal heart sounds. Exam reveals no gallop and no friction rub.   No murmur heard.  Pulmonary/Chest: Effort normal and breath sounds normal. No stridor. No respiratory distress. He has no wheezes. He has no rales.   Abdominal: Soft. Bowel sounds are normal. He exhibits no distension and no mass. There is no tenderness. There is no rebound and no guarding.   Neurological: He is alert and oriented to person, place, and time.   Skin: Skin is warm and dry.   Psychiatric: He has a normal mood and affect. His behavior is normal. Thought content normal.   Vitals reviewed.    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 Ulcerative  pancolitis with rectal bleeding    3. Chronic bronchitis, unspecified chronic bronchitis type    4. Type 2 diabetes mellitus without complication, with long-term current use of insulin          Plan:        1. No active MAC; would proceed with Humira to manage KRISTIAN

## 2019-03-22 ENCOUNTER — OFFICE VISIT (OUTPATIENT)
Dept: FAMILY MEDICINE | Facility: CLINIC | Age: 79
End: 2019-03-22
Payer: MEDICARE

## 2019-03-22 VITALS
SYSTOLIC BLOOD PRESSURE: 114 MMHG | HEART RATE: 100 BPM | RESPIRATION RATE: 17 BRPM | WEIGHT: 187.5 LBS | TEMPERATURE: 98 F | HEIGHT: 77 IN | DIASTOLIC BLOOD PRESSURE: 67 MMHG | OXYGEN SATURATION: 97 % | BODY MASS INDEX: 22.14 KG/M2

## 2019-03-22 DIAGNOSIS — R60.0 BILATERAL EDEMA OF LOWER EXTREMITY: Primary | ICD-10-CM

## 2019-03-22 PROCEDURE — 1101F PT FALLS ASSESS-DOCD LE1/YR: CPT | Mod: CPTII,S$GLB,, | Performed by: NURSE PRACTITIONER

## 2019-03-22 PROCEDURE — 3074F PR MOST RECENT SYSTOLIC BLOOD PRESSURE < 130 MM HG: ICD-10-PCS | Mod: CPTII,S$GLB,, | Performed by: NURSE PRACTITIONER

## 2019-03-22 PROCEDURE — 99214 OFFICE O/P EST MOD 30 MIN: CPT | Mod: S$GLB,,, | Performed by: NURSE PRACTITIONER

## 2019-03-22 PROCEDURE — 99999 PR PBB SHADOW E&M-EST. PATIENT-LVL III: CPT | Mod: PBBFAC,,, | Performed by: NURSE PRACTITIONER

## 2019-03-22 PROCEDURE — 3078F PR MOST RECENT DIASTOLIC BLOOD PRESSURE < 80 MM HG: ICD-10-PCS | Mod: CPTII,S$GLB,, | Performed by: NURSE PRACTITIONER

## 2019-03-22 PROCEDURE — 99499 RISK ADDL DX/OHS AUDIT: ICD-10-PCS | Mod: S$GLB,,, | Performed by: NURSE PRACTITIONER

## 2019-03-22 PROCEDURE — 3078F DIAST BP <80 MM HG: CPT | Mod: CPTII,S$GLB,, | Performed by: NURSE PRACTITIONER

## 2019-03-22 PROCEDURE — 1101F PR PT FALLS ASSESS DOC 0-1 FALLS W/OUT INJ PAST YR: ICD-10-PCS | Mod: CPTII,S$GLB,, | Performed by: NURSE PRACTITIONER

## 2019-03-22 PROCEDURE — 99214 PR OFFICE/OUTPT VISIT, EST, LEVL IV, 30-39 MIN: ICD-10-PCS | Mod: S$GLB,,, | Performed by: NURSE PRACTITIONER

## 2019-03-22 PROCEDURE — 3074F SYST BP LT 130 MM HG: CPT | Mod: CPTII,S$GLB,, | Performed by: NURSE PRACTITIONER

## 2019-03-22 PROCEDURE — 99999 PR PBB SHADOW E&M-EST. PATIENT-LVL III: ICD-10-PCS | Mod: PBBFAC,,, | Performed by: NURSE PRACTITIONER

## 2019-03-22 PROCEDURE — 99499 UNLISTED E&M SERVICE: CPT | Mod: S$GLB,,, | Performed by: NURSE PRACTITIONER

## 2019-03-22 NOTE — PROGRESS NOTES
Routine Office Visit    Patient Name: Regan CASIANO Bethesda    : 1940  MRN: [id number]    Chief Complaint:  Leg Swelling    Subjective:  Regan is a 78 y.o. male who presents today for:    1. Leg Swelling - presents today for bilateral leg swelling. He states that the swelling is present on both ankles and feet.  Denies any inciting event to the swelling. He states that the swelling is uncomfortable but not painful per se.  He presents with his daughter who states the patient is on infusions for his UC.  She followed up with the GI physician who told her that the swelling is not due to side effects of the infusions.  He does report some slight numbness and tingling to bilateral feet.  Denies any exertional claudication, denies any pain or tenderness to the back of the calf.  He has not tried anything for the symptoms.  Per the patient's daughter he has not been immobilized recently, she states that he is still getting out of the house frequently.    Of note, he does have frequent shortness of breath from COPD and a chronic avium complex infection.  The daughter states that he takes 20 mg of Lasix daily as well.  He states that his shortness of breath has not worsened however.    Past Medical History  Past Medical History:   Diagnosis Date    Arthritis     COPD (chronic obstructive pulmonary disease)     Diabetes mellitus type II     Hypertension     Tuberculosis        Past Surgical History  Past Surgical History:   Procedure Laterality Date    BIOPSY-LUNG N/A 2015    [facility]    BRONCHOSCOPY N/A 2017    [facility] at [facility] ENDO    COLONOSCOPY N/A 2018    [facility] ENDO    COLONOSCOPY N/A 12/3/2013    [facility] Sarwat Delgado [facility] at [facility] ENDO    ESOPHAGOGASTRODUODENOSCOPY (EGD) N/A 10/9/2017    Performed [facility] at [facility] ENDO    melanoma         Family History  Family History   Problem  "Relation Age of Onset    Cancer Father         Tuberculosis.  Secondary scar was neoplastic.       Social History  Social History     Socioeconomic History    Marital status:      Spouse name: Not on file    Number of children: Not on file    Years of education: Not on file    Highest education level: Not on file   Social Needs    Financial resource strain: Not on file    Food insecurity - worry: Not on file    Food insecurity - inability: Not on file    Transportation needs - medical: Not on file    Transportation needs - non-medical: Not on file   Occupational History    Not on file   Tobacco Use    Smoking status: Former Smoker     Types: Cigars    Smokeless tobacco: Never Used    Tobacco comment: uses cigars on ocassion   Substance and Sexual Activity    Alcohol use: Yes     Comment: socially    Drug use: No    Sexual activity: Not on file   Other Topics Concern    Not on file   Social History Narrative    Not on file       Current Medications  Current Outpatient Medications on File Prior to Visit   Medication Sig Dispense Refill    albuterol 90 mcg/actuation inhaler Inhale 2 puffs into the lungs every 6 (six) hours as needed for Wheezing. 1 Inhaler 2    amLODIPine (NORVASC) 2.5 MG tablet TAKE 1 TABLET (2.5 MG TOTAL) BY MOUTH ONCE DAILY. 30 tablet 10    BD ULTRA-FINE TOMÁS PEN NEEDLES 32 gauge x 5/32" Ndle USE QID UTD  5    blood sugar diagnostic (TRUE METRIX GLUCOSE TEST STRIP) Strp Test blood sugar 3 times daily 300 strip 3    blood-glucose meter (TRUE METRIX AIR GLUCOSE METER) Misc Test blood sugar 3 times daily 1 each 0    fluticasone (FLONASE) 50 mcg/actuation nasal spray 1 spray by Each Nare route once daily.      fluticasone-vilanterol (BREO) 200-25 mcg/dose DsDv diskus inhaler Inhale 1 puff into the lungs once daily. Controller 60 each 2    glipiZIDE (GLUCOTROL) 5 MG tablet TAKE 1 TABLET (5 MG TOTAL) BY MOUTH DAILY WITH BREAKFAST. 90 tablet 1    ibuprofen (ADVIL,MOTRIN) " "800 MG tablet Take 1 tablet (800 mg total) by mouth every 8 (eight) hours as needed for Pain (neck pain). 30 tablet 0    lancets 28 gauge Misc 1 lancet by Misc.(Non-Drug; Combo Route) route 3 (three) times daily. True Metrix lancets 300 each 3    multivitamin capsule Take 1 capsule by mouth once daily.      predniSONE (DELTASONE) 10 MG tablet Take 35 mg by mouth once daily.      ramipril (ALTACE) 2.5 MG capsule TAKE 1 CAPSULE (2.5 MG TOTAL) BY MOUTH ONCE DAILY. 90 capsule 3    tiZANidine (ZANAFLEX) 4 MG tablet One tab po tid prn for neck pain, may take two tabs at night before sleep (not to exceed five tabs in a day) 40 tablet 0    furosemide (LASIX) 20 MG tablet Take 1 tablet (20 mg total) by mouth once daily. 90 tablet 3     No current facility-administered medications on file prior to visit.        Allergies   Review of patient's allergies indicates:  No Known Allergies    Review of Systems (Pertinent positives)  Review of Systems   Constitutional: Negative for chills, diaphoresis, fever, malaise/fatigue and weight loss.   HENT: Negative.    Eyes: Negative.    Respiratory: Positive for shortness of breath. Negative for cough, hemoptysis, sputum production and wheezing.    Cardiovascular: Positive for leg swelling. Negative for chest pain, palpitations, orthopnea, claudication and PND.   Gastrointestinal: Negative for abdominal pain, diarrhea, heartburn, nausea and vomiting.   Genitourinary: Negative.    Musculoskeletal: Negative for back pain, falls, joint pain, myalgias and neck pain.   Skin: Negative.    Neurological: Negative.  Negative for weakness.   Endo/Heme/Allergies: Negative.    Psychiatric/Behavioral: Negative.        /67 (BP Location: Right arm, Patient Position: Sitting)   Pulse 100   Temp 97.7 °F (36.5 °C) (Oral)   Resp 17   Ht 6' 5" (1.956 m)   Wt 85 kg (187 lb 8 oz)   SpO2 97%   BMI 22.23 kg/m²     Physical Exam   Constitutional: He is oriented to person, place, and time. He " appears well-developed and well-nourished. No distress.   Eyes: Conjunctivae and EOM are normal. Pupils are equal, round, and reactive to light.   Neck: Normal range of motion. Neck supple.   Cardiovascular: Normal rate, regular rhythm, normal heart sounds and intact distal pulses. Exam reveals no gallop and no friction rub.   No murmur heard.  Pulses:       Dorsalis pedis pulses are 2+ on the right side.        Posterior tibial pulses are 2+ on the right side, and 2+ on the left side.   Bilateral pedal edema extending MCC up the tibia    Bilateral superficial varicosities located on ankles    Negative Homans sign bilaterally    2+ PT and DP pulses bilaterally   Pulmonary/Chest: Effort normal and breath sounds normal. No stridor. No respiratory distress. He has no wheezes. He has no rales. He exhibits no tenderness.   Abdominal: Soft. Bowel sounds are normal.   Musculoskeletal: Normal range of motion.   Neurological: He is alert and oriented to person, place, and time.   Skin: Skin is warm and dry. Capillary refill takes less than 2 seconds. He is not diaphoretic.        Assessment/Plan:  Regan Alvarez is a 78 y.o. male who presents today for :    Regan was seen today for joint swelling and establish care.    Diagnoses and all orders for this visit:    Bilateral edema of lower extremity  -     COMPRESSION STOCKINGS     Likely due to venous hypertension.  Patient was educated to wear compression stockings daily, and to lift legs when sitting every time he sits.  He was also instructed to sleep with his feet above the heart level to promote fluid return to the heart.  No signs of arterial occlusion on exam, no symptoms suggestive of this either.  No deemed to increase Lasix dose today, as his respiratory status is stable.  Patient is requesting an appointment to follow up with his PCP in 1 month.  Will provide patient with this appointment.  Call clinic if symptoms worsen or for the stockings do not improve  the symptoms in the meantime.        This office note has been dictated.  This dictation has been generated using M-Modal Fluency Direct dictation; some phonetic errors may occur.   My collaborating physician is Dr. Lm Ferrer.

## 2019-03-31 DIAGNOSIS — E11.65 UNCONTROLLED TYPE 2 DIABETES MELLITUS WITH HYPERGLYCEMIA, WITHOUT LONG-TERM CURRENT USE OF INSULIN: ICD-10-CM

## 2019-03-31 DIAGNOSIS — I10 ESSENTIAL HYPERTENSION: ICD-10-CM

## 2019-04-01 RX ORDER — FUROSEMIDE 20 MG/1
20 TABLET ORAL DAILY
Qty: 90 TABLET | Refills: 3 | Status: SHIPPED | OUTPATIENT
Start: 2019-04-01 | End: 2020-02-03 | Stop reason: ALTCHOICE

## 2019-04-01 RX ORDER — GLIPIZIDE 5 MG/1
TABLET ORAL
Qty: 90 TABLET | Refills: 1 | Status: SHIPPED | OUTPATIENT
Start: 2019-04-01 | End: 2019-10-24 | Stop reason: SDUPTHER

## 2019-04-17 ENCOUNTER — OFFICE VISIT (OUTPATIENT)
Dept: FAMILY MEDICINE | Facility: CLINIC | Age: 79
End: 2019-04-17
Payer: MEDICARE

## 2019-04-17 ENCOUNTER — LAB VISIT (OUTPATIENT)
Dept: LAB | Facility: HOSPITAL | Age: 79
End: 2019-04-17
Attending: INTERNAL MEDICINE
Payer: MEDICARE

## 2019-04-17 VITALS
OXYGEN SATURATION: 96 % | SYSTOLIC BLOOD PRESSURE: 104 MMHG | WEIGHT: 192 LBS | TEMPERATURE: 97 F | RESPIRATION RATE: 17 BRPM | HEIGHT: 77 IN | BODY MASS INDEX: 22.67 KG/M2 | DIASTOLIC BLOOD PRESSURE: 72 MMHG | HEART RATE: 108 BPM

## 2019-04-17 DIAGNOSIS — E11.9 TYPE 2 DIABETES MELLITUS WITHOUT COMPLICATION, WITH LONG-TERM CURRENT USE OF INSULIN: ICD-10-CM

## 2019-04-17 DIAGNOSIS — J42 CHRONIC BRONCHITIS, UNSPECIFIED CHRONIC BRONCHITIS TYPE: Primary | ICD-10-CM

## 2019-04-17 DIAGNOSIS — K51.011 ULCERATIVE PANCOLITIS WITH RECTAL BLEEDING: ICD-10-CM

## 2019-04-17 DIAGNOSIS — Z79.4 TYPE 2 DIABETES MELLITUS WITHOUT COMPLICATION, WITH LONG-TERM CURRENT USE OF INSULIN: ICD-10-CM

## 2019-04-17 LAB
ALBUMIN SERPL BCP-MCNC: 2.7 G/DL (ref 3.5–5.2)
ALP SERPL-CCNC: 47 U/L (ref 55–135)
ALT SERPL W/O P-5'-P-CCNC: 6 U/L (ref 10–44)
ANION GAP SERPL CALC-SCNC: 6 MMOL/L (ref 8–16)
AST SERPL-CCNC: 11 U/L (ref 10–40)
BASOPHILS # BLD AUTO: 0.05 K/UL (ref 0–0.2)
BASOPHILS NFR BLD: 0.6 % (ref 0–1.9)
BILIRUB SERPL-MCNC: 0.1 MG/DL (ref 0.1–1)
BUN SERPL-MCNC: 11 MG/DL (ref 8–23)
CALCIUM SERPL-MCNC: 8.7 MG/DL (ref 8.7–10.5)
CHLORIDE SERPL-SCNC: 104 MMOL/L (ref 95–110)
CO2 SERPL-SCNC: 28 MMOL/L (ref 23–29)
CREAT SERPL-MCNC: 1 MG/DL (ref 0.5–1.4)
DIFFERENTIAL METHOD: ABNORMAL
EOSINOPHIL # BLD AUTO: 1 K/UL (ref 0–0.5)
EOSINOPHIL NFR BLD: 11.7 % (ref 0–8)
ERYTHROCYTE [DISTWIDTH] IN BLOOD BY AUTOMATED COUNT: 16.6 % (ref 11.5–14.5)
EST. GFR  (AFRICAN AMERICAN): >60 ML/MIN/1.73 M^2
EST. GFR  (NON AFRICAN AMERICAN): >60 ML/MIN/1.73 M^2
GLUCOSE SERPL-MCNC: 200 MG/DL (ref 70–110)
HCT VFR BLD AUTO: 23.8 % (ref 40–54)
HGB BLD-MCNC: 7.2 G/DL (ref 14–18)
LYMPHOCYTES # BLD AUTO: 3 K/UL (ref 1–4.8)
LYMPHOCYTES NFR BLD: 37 % (ref 18–48)
MCH RBC QN AUTO: 25.4 PG (ref 27–31)
MCHC RBC AUTO-ENTMCNC: 30.3 G/DL (ref 32–36)
MCV RBC AUTO: 84 FL (ref 82–98)
MONOCYTES # BLD AUTO: 1.2 K/UL (ref 0.3–1)
MONOCYTES NFR BLD: 14.5 % (ref 4–15)
NEUTROPHILS # BLD AUTO: 2.9 K/UL (ref 1.8–7.7)
NEUTROPHILS NFR BLD: 36.2 % (ref 38–73)
PLATELET # BLD AUTO: 470 K/UL (ref 150–350)
PMV BLD AUTO: 8.5 FL (ref 9.2–12.9)
POTASSIUM SERPL-SCNC: 4.1 MMOL/L (ref 3.5–5.1)
PROT SERPL-MCNC: 6.6 G/DL (ref 6–8.4)
RBC # BLD AUTO: 2.84 M/UL (ref 4.6–6.2)
SODIUM SERPL-SCNC: 138 MMOL/L (ref 136–145)
WBC # BLD AUTO: 8.11 K/UL (ref 3.9–12.7)

## 2019-04-17 PROCEDURE — 83036 HEMOGLOBIN GLYCOSYLATED A1C: CPT

## 2019-04-17 PROCEDURE — 99999 PR PBB SHADOW E&M-EST. PATIENT-LVL III: ICD-10-PCS | Mod: PBBFAC,,, | Performed by: INTERNAL MEDICINE

## 2019-04-17 PROCEDURE — 1101F PT FALLS ASSESS-DOCD LE1/YR: CPT | Mod: CPTII,S$GLB,, | Performed by: INTERNAL MEDICINE

## 2019-04-17 PROCEDURE — 3078F PR MOST RECENT DIASTOLIC BLOOD PRESSURE < 80 MM HG: ICD-10-PCS | Mod: CPTII,S$GLB,, | Performed by: INTERNAL MEDICINE

## 2019-04-17 PROCEDURE — 3074F PR MOST RECENT SYSTOLIC BLOOD PRESSURE < 130 MM HG: ICD-10-PCS | Mod: CPTII,S$GLB,, | Performed by: INTERNAL MEDICINE

## 2019-04-17 PROCEDURE — 1101F PR PT FALLS ASSESS DOC 0-1 FALLS W/OUT INJ PAST YR: ICD-10-PCS | Mod: CPTII,S$GLB,, | Performed by: INTERNAL MEDICINE

## 2019-04-17 PROCEDURE — 3074F SYST BP LT 130 MM HG: CPT | Mod: CPTII,S$GLB,, | Performed by: INTERNAL MEDICINE

## 2019-04-17 PROCEDURE — 99214 OFFICE O/P EST MOD 30 MIN: CPT | Mod: S$GLB,,, | Performed by: INTERNAL MEDICINE

## 2019-04-17 PROCEDURE — 36415 COLL VENOUS BLD VENIPUNCTURE: CPT | Mod: PN

## 2019-04-17 PROCEDURE — 99214 PR OFFICE/OUTPT VISIT, EST, LEVL IV, 30-39 MIN: ICD-10-PCS | Mod: S$GLB,,, | Performed by: INTERNAL MEDICINE

## 2019-04-17 PROCEDURE — 3078F DIAST BP <80 MM HG: CPT | Mod: CPTII,S$GLB,, | Performed by: INTERNAL MEDICINE

## 2019-04-17 PROCEDURE — 85025 COMPLETE CBC W/AUTO DIFF WBC: CPT

## 2019-04-17 PROCEDURE — 80053 COMPREHEN METABOLIC PANEL: CPT

## 2019-04-17 PROCEDURE — 99999 PR PBB SHADOW E&M-EST. PATIENT-LVL III: CPT | Mod: PBBFAC,,, | Performed by: INTERNAL MEDICINE

## 2019-04-17 NOTE — PROGRESS NOTES
"Subjective:       Patient ID: Regan Alvarez is a 78 y.o. male.    Chief Complaint: Establish Care and Follow-up    F/u chronic conditions    HPI: 77 y/o w/ COPD DM and ulcerative colitis. IN Feb 2019 he started entyvio infusions for steroid resistant colitis. He feels the diarrhea is improved but still wearing diapers for loose stools (had four loose bowel movements yesterday prior to entyvio was having > 20 per day) no melena or BRBPR no abdominal pain. Regarding COPD he is not using maintenance inhaler regularlly ("I'm just lazy I guess"). persistant non productive cough no wheezing his weight is going up. He has been of prednisone for at least the last two weeks (was tapered from 50mg daily) reports home blood glucose readings  no hypoglycemic symptoms     Review of Systems   Constitutional: Negative for activity change, appetite change, fatigue, fever and unexpected weight change.   HENT: Negative for ear pain, rhinorrhea and sore throat.    Eyes: Negative for discharge and visual disturbance.   Respiratory: Negative for chest tightness, shortness of breath and wheezing.    Cardiovascular: Negative for chest pain, palpitations and leg swelling.   Gastrointestinal: Negative for abdominal pain, constipation and diarrhea.   Endocrine: Negative for cold intolerance and heat intolerance.   Genitourinary: Negative for dysuria and hematuria.   Musculoskeletal: Negative for joint swelling and neck stiffness.   Skin: Negative for rash.   Neurological: Negative for dizziness, syncope, weakness and headaches.   Psychiatric/Behavioral: Negative for suicidal ideas.       Objective:     Vitals:    04/17/19 1446   BP: 104/72   BP Location: Right arm   Patient Position: Sitting   Pulse: 108   Resp: 17   Temp: 97 °F (36.1 °C)   TempSrc: Oral   SpO2: 96%   Weight: 87.1 kg (192 lb 0.3 oz)   Height: 6' 5" (1.956 m)          Physical Exam   Constitutional: He is oriented to person, place, and time. He appears " well-developed and well-nourished.   HENT:   Head: Normocephalic and atraumatic.   Eyes: Pupils are equal, round, and reactive to light. Conjunctivae are normal.   Neck: Normal range of motion.   Cardiovascular: Normal rate and regular rhythm. Exam reveals no gallop and no friction rub.   No murmur heard.  Pulmonary/Chest: Effort normal. He has wheezes. He has no rales.   End exp wheeze right base cleared with coughing   Abdominal: Soft. Bowel sounds are normal. There is no tenderness. There is no rebound and no guarding.   Musculoskeletal: Normal range of motion. He exhibits no edema or tenderness.   Neurological: He is alert and oriented to person, place, and time. No cranial nerve deficit.   Skin: Skin is warm and dry.   Psychiatric: He has a normal mood and affect.       Assessment and Plan   1. Chronic bronchitis, unspecified chronic bronchitis type  Encourage to use daily BREO and rinse mouth after use    2. Ulcerative pancolitis with rectal bleeding  Continue follow up with GI for entyvio infusions    3. Type 2 diabetes mellitus without complication, with long-term current use of insulin  Repeat a1c suspect still some elevatation related to recent steroid use continue glipizide  - CBC auto differential; Future  - Comprehensive metabolic panel; Future  - Hemoglobin A1c; Future

## 2019-04-18 LAB
ESTIMATED AVG GLUCOSE: 174 MG/DL (ref 68–131)
HBA1C MFR BLD HPLC: 7.7 % (ref 4–5.6)

## 2019-05-29 ENCOUNTER — TELEPHONE (OUTPATIENT)
Dept: FAMILY MEDICINE | Facility: CLINIC | Age: 79
End: 2019-05-29

## 2019-05-29 NOTE — TELEPHONE ENCOUNTER
----- Message from Amando Sheppard sent at 5/29/2019  2:29 PM CDT -----  Contact: Alix caregiver/[phone]  Type: Patient Call Back    Who called:Alix caregiver    What is the request in detail:Alix would like to speak to the staff in regards to the patient health.    Would the patient rather a call back or a response via [facility]? Call back    Best call back number:[phone]      Thank you

## 2019-05-29 NOTE — TELEPHONE ENCOUNTER
Wife Alix states pt is in severe pain , which giving trouble in both legs . Stocking aren't helping and having trouble sleeping . She states pain has been since last apt. Please advise , does pt need apt? LOV 4/17/19

## 2019-06-06 ENCOUNTER — OFFICE VISIT (OUTPATIENT)
Dept: FAMILY MEDICINE | Facility: CLINIC | Age: 79
End: 2019-06-06
Payer: MEDICARE

## 2019-06-06 VITALS
BODY MASS INDEX: 20.98 KG/M2 | WEIGHT: 177.69 LBS | DIASTOLIC BLOOD PRESSURE: 58 MMHG | TEMPERATURE: 98 F | RESPIRATION RATE: 16 BRPM | SYSTOLIC BLOOD PRESSURE: 106 MMHG | HEART RATE: 103 BPM | OXYGEN SATURATION: 98 % | HEIGHT: 77 IN

## 2019-06-06 DIAGNOSIS — M79.2 NEUROPATHIC PAIN OF RIGHT LOWER EXTREMITY: Primary | ICD-10-CM

## 2019-06-06 DIAGNOSIS — M54.2 CERVICALGIA: ICD-10-CM

## 2019-06-06 DIAGNOSIS — E11.65 UNCONTROLLED TYPE 2 DIABETES MELLITUS WITH HYPERGLYCEMIA, WITHOUT LONG-TERM CURRENT USE OF INSULIN: ICD-10-CM

## 2019-06-06 DIAGNOSIS — J44.9 CHRONIC OBSTRUCTIVE PULMONARY DISEASE, UNSPECIFIED COPD TYPE: ICD-10-CM

## 2019-06-06 PROCEDURE — 99999 PR PBB SHADOW E&M-EST. PATIENT-LVL III: CPT | Mod: PBBFAC,,, | Performed by: NURSE PRACTITIONER

## 2019-06-06 PROCEDURE — 99499 RISK ADDL DX/OHS AUDIT: ICD-10-PCS | Mod: S$GLB,,, | Performed by: NURSE PRACTITIONER

## 2019-06-06 PROCEDURE — 99999 PR PBB SHADOW E&M-EST. PATIENT-LVL III: ICD-10-PCS | Mod: PBBFAC,,, | Performed by: NURSE PRACTITIONER

## 2019-06-06 PROCEDURE — 99214 OFFICE O/P EST MOD 30 MIN: CPT | Mod: S$GLB,,, | Performed by: NURSE PRACTITIONER

## 2019-06-06 PROCEDURE — 99499 UNLISTED E&M SERVICE: CPT | Mod: S$GLB,,, | Performed by: NURSE PRACTITIONER

## 2019-06-06 PROCEDURE — 99214 PR OFFICE/OUTPT VISIT, EST, LEVL IV, 30-39 MIN: ICD-10-PCS | Mod: S$GLB,,, | Performed by: NURSE PRACTITIONER

## 2019-06-06 PROCEDURE — 3078F PR MOST RECENT DIASTOLIC BLOOD PRESSURE < 80 MM HG: ICD-10-PCS | Mod: CPTII,S$GLB,, | Performed by: NURSE PRACTITIONER

## 2019-06-06 PROCEDURE — 3074F SYST BP LT 130 MM HG: CPT | Mod: CPTII,S$GLB,, | Performed by: NURSE PRACTITIONER

## 2019-06-06 PROCEDURE — 1101F PT FALLS ASSESS-DOCD LE1/YR: CPT | Mod: CPTII,S$GLB,, | Performed by: NURSE PRACTITIONER

## 2019-06-06 PROCEDURE — 3078F DIAST BP <80 MM HG: CPT | Mod: CPTII,S$GLB,, | Performed by: NURSE PRACTITIONER

## 2019-06-06 PROCEDURE — 3074F PR MOST RECENT SYSTOLIC BLOOD PRESSURE < 130 MM HG: ICD-10-PCS | Mod: CPTII,S$GLB,, | Performed by: NURSE PRACTITIONER

## 2019-06-06 PROCEDURE — 1101F PR PT FALLS ASSESS DOC 0-1 FALLS W/OUT INJ PAST YR: ICD-10-PCS | Mod: CPTII,S$GLB,, | Performed by: NURSE PRACTITIONER

## 2019-06-06 RX ORDER — FLUTICASONE PROPIONATE 50 MCG
1 SPRAY, SUSPENSION (ML) NASAL DAILY
Qty: 15.8 ML | Refills: 0 | Status: SHIPPED | OUTPATIENT
Start: 2019-06-06 | End: 2019-06-28 | Stop reason: SDUPTHER

## 2019-06-06 RX ORDER — GUAIFENESIN/DEXTROMETHORPHAN 100-10MG/5
5 SYRUP ORAL 3 TIMES DAILY PRN
Refills: 0 | COMMUNITY
Start: 2019-06-06 | End: 2019-06-16

## 2019-06-06 RX ORDER — LORATADINE 10 MG/1
10 TABLET ORAL DAILY
Refills: 0 | COMMUNITY
Start: 2019-06-06 | End: 2019-10-28

## 2019-06-06 RX ORDER — ALBUTEROL SULFATE 90 UG/1
2 AEROSOL, METERED RESPIRATORY (INHALATION) EVERY 6 HOURS PRN
Qty: 1 INHALER | Refills: 2 | Status: SHIPPED | OUTPATIENT
Start: 2019-06-06 | End: 2019-09-02 | Stop reason: SDUPTHER

## 2019-06-06 RX ORDER — PREDNISONE 20 MG/1
60 TABLET ORAL DAILY
Qty: 15 TABLET | Refills: 0 | Status: SHIPPED | OUTPATIENT
Start: 2019-06-06 | End: 2019-06-11

## 2019-06-06 RX ORDER — GABAPENTIN 100 MG/1
200 CAPSULE ORAL NIGHTLY
Qty: 90 CAPSULE | Refills: 0 | Status: SHIPPED | OUTPATIENT
Start: 2019-06-06 | End: 2019-07-18 | Stop reason: SDUPTHER

## 2019-06-06 RX ORDER — IBUPROFEN 800 MG/1
800 TABLET ORAL EVERY 8 HOURS PRN
Qty: 30 TABLET | Refills: 0 | Status: ON HOLD | OUTPATIENT
Start: 2019-06-06 | End: 2023-01-01 | Stop reason: HOSPADM

## 2019-06-06 NOTE — PROGRESS NOTES
Routine Office Visit    Patient Name: Regan CASIANO Kiana    : 1940  MRN: [id number]    Chief Complaint:  Leg swelling, cough    Subjective:  Regan is a 78 y.o. male who presents today for:    1.  Leg swelling - patient reports for evaluation of leg swelling. He states that he has been using the compression stockings and elevating his legs, however he is still having pain. He states that the swelling is not improved that much.  Endorses sharp pains to both sides of the legs which are intermittent and feels like a pins and needle sensation.  He is a diabetic and per his daughter's report he is not taking his glipizide daily.  No claudication symptoms.  The pain is independent of activity.  He has not tried anything specific for this pain.    2.  He reports with his daughter who states that patient has started to have a worsening cough in the last week.  She feels like this is due to his new treatment for ulcerative colitis and she feels like he is coming down with a cold.  Of note, the patient used to smoke for many years but does not smoke currenly.  He has a diagnosis of COPD.  He has not been taking the Breo as prescribed by his PCP because he states that it was too expensive.  The patient endorses shortness of breath but states that his has not worsened from his baseline.  He also endorses a runny nose and postnasal drip.  Denies any fevers or chills.  Cough is productive sometimes of clear to brown mucus.  For the cough he has taken NyQuil which has not helped him very much.    Past Medical History  Past Medical History:   Diagnosis Date    Arthritis     COPD (chronic obstructive pulmonary disease)     Diabetes mellitus type II     Hypertension     Tuberculosis        Past Surgical History  Past Surgical History:   Procedure Laterality Date    BIOPSY-LUNG N/A 2015    Performed [facility]    BRONCHOSCOPY N/A 2017    Performed by Pierre Ugalde MD at [facility] ENDO  "   COLONOSCOPY N/A 1/22/2018    [facility] at [facility]    COLONOSCOPY N/A 12/3/2013    Per[facility] by [facility]wat Delgado [facility] at [facility] ENDO    ESOPHAGOGASTRODUODENOSCOPY (EGD) N/A 10/9/2017    Performed by [facility] at [facility] ENDO    melanoma         Family History  Family History   Problem Relation Age of Onset    Cancer Father         Tuberculosis.  Secondary scar was neoplastic.       Social History  Social History     Socioeconomic History    Marital status:      Spouse name: Not on file    Number of children: Not on file    Years of education: Not on file    Highest education level: Not on file   Occupational History    Not on file   Social Needs    Financial resource strain: Not on file    Food insecurity:     Worry: Not on file     Inability: Not on file    Transportation needs:     Medical: Not on file     Non-medical: Not on file   Tobacco Use    Smoking status: Former Smoker     Types: Cigars    Smokeless tobacco: Never Used    Tobacco comment: uses cigars on ocassion   Substance and Sexual Activity    Alcohol use: Yes     Comment: socially    Drug use: No    Sexual activity: Not on file   Lifestyle    Physical activity:     Days per week: Not on file     Minutes per session: Not on file    Stress: Not on file   Relationships    Social connections:     Talks on phone: Not on file     Gets together: Not on file     Attends Nondenominational service: Not on file     Active member of club or organization: Not on file     Attends meetings of clubs or organizations: Not on file     Relationship status: Not on file   Other Topics Concern    Not on file   Social History Narrative    Not on file       Current Medications  Current Outpatient Medications on File Prior to Visit   Medication Sig Dispense Refill    amLODIPine (NORVASC) 2.5 MG tablet TAKE 1 TABLET (2.5 MG TOTAL) BY MOUTH ONCE DAILY. 30 tablet 10    BD ULTRA-FINE TOMÁS PEN NEEDLES 32 gauge x 5/32" Ndle USE QID UTD  " 5    blood sugar diagnostic (TRUE METRIX GLUCOSE TEST STRIP) Strp Test blood sugar 3 times daily 300 strip 3    blood-glucose meter (TRUE METRIX AIR GLUCOSE METER) Misc Test blood sugar 3 times daily 1 each 0    furosemide (LASIX) 20 MG tablet TAKE 1 TABLET (20 MG TOTAL) BY MOUTH ONCE DAILY. 90 tablet 3    glipiZIDE (GLUCOTROL) 5 MG tablet TAKE 1 TABLET BY MOUTH EVERY DAY WITH BREAKFAST 90 tablet 1    lancets 28 gauge Misc 1 lancet by Misc.(Non-Drug; Combo Route) route 3 (three) times daily. True Metrix lancets 300 each 3    multivitamin capsule Take 1 capsule by mouth once daily.      ramipril (ALTACE) 2.5 MG capsule TAKE 1 CAPSULE (2.5 MG TOTAL) BY MOUTH ONCE DAILY. 90 capsule 3    [DISCONTINUED] albuterol 90 mcg/actuation inhaler Inhale 2 puffs into the lungs every 6 (six) hours as needed for Wheezing. 1 Inhaler 2    [DISCONTINUED] fluticasone-vilanterol (BREO) 200-25 mcg/dose DsDv diskus inhaler Inhale 1 puff into the lungs once daily. Controller 60 each 2    [DISCONTINUED] ibuprofen (ADVIL,MOTRIN) 800 MG tablet Take 1 tablet (800 mg total) by mouth every 8 (eight) hours as needed for Pain (neck pain). 30 tablet 0    fluticasone (FLONASE) 50 mcg/actuation nasal spray 1 spray by Each Nare route once daily.      tiZANidine (ZANAFLEX) 4 MG tablet One tab po tid prn for neck pain, may take two tabs at night before sleep (not to exceed five tabs in a day) 40 tablet 0     No current facility-administered medications on file prior to visit.        Allergies   Review of patient's allergies indicates:  No Known Allergies    Review of Systems (Pertinent positives)  Review of Systems   Constitutional: Negative.    HENT: Positive for congestion. Negative for ear discharge, ear pain, hearing loss, nosebleeds, sore throat and tinnitus.         Runny nose, postnasal drip   Eyes: Negative for blurred vision, double vision, photophobia, pain, discharge and redness.   Respiratory: Positive for cough and sputum  "production. Negative for hemoptysis, shortness of breath, wheezing and stridor.    Cardiovascular: Positive for leg swelling. Negative for chest pain, palpitations, orthopnea, claudication and PND.   Gastrointestinal: Negative.    Genitourinary: Negative.    Musculoskeletal:        Pins and needles sensation to bilateral lower extremities   Skin: Negative.    Neurological: Negative.    Endo/Heme/Allergies: Negative.    Psychiatric/Behavioral: Negative.        BP (!) 106/58 (BP Location: Left arm, Patient Position: Sitting, BP Method: Small (Manual))   Pulse 103   Temp 98.1 °F (36.7 °C) (Oral)   Resp 16   Ht 6' 5" (1.956 m)   Wt 80.6 kg (177 lb 11.1 oz)   SpO2 98%   BMI 21.07 kg/m²     Physical Exam   Constitutional: He is oriented to person, place, and time. He appears well-developed and well-nourished. No distress.   HENT:   Head: Normocephalic and atraumatic.   Right Ear: Tympanic membrane, external ear and ear canal normal.   Left Ear: Tympanic membrane, external ear and ear canal normal.   Nose: Mucosal edema and rhinorrhea present. Right sinus exhibits no maxillary sinus tenderness and no frontal sinus tenderness. Left sinus exhibits no maxillary sinus tenderness and no frontal sinus tenderness.   Mouth/Throat: Uvula is midline, oropharynx is clear and moist and mucous membranes are normal. Tonsils are 0 on the right. Tonsils are 0 on the left. No tonsillar exudate.   Neck: Normal range of motion. Neck supple.   Cardiovascular: Normal rate, regular rhythm, normal heart sounds and intact distal pulses. Exam reveals no gallop and no friction rub.   No murmur heard.  Pulses:       Dorsalis pedis pulses are 2+ on the right side, and 2+ on the left side.        Posterior tibial pulses are 2+ on the right side, and 2+ on the left side.   Pulmonary/Chest: Effort normal. No stridor. No respiratory distress. He has decreased breath sounds in the right lower field and the left lower field. He has no wheezes. He " has no rales. He exhibits no tenderness.   Some decreased breath sounds noted bilaterally to lower lung fields no wheezing no rhonchi no evidence of consolidation   Abdominal: Soft. Bowel sounds are normal. He exhibits no distension and no mass. There is no tenderness. There is no rebound and no guarding. No hernia.   Musculoskeletal: Normal range of motion.        Right ankle: Normal.        Left ankle: Normal.   There is a small amount of appreciable edema on the dorsal aspects of the feet bilaterally. 2+ pulses bilaterally on lower extremities    No skin breakdown on bilateral lower extremity cap refill less than 2 sec no ulcerations   Neurological: He is alert and oriented to person, place, and time.   Skin: Skin is warm and dry. He is not diaphoretic.        Assessment/Plan:  Regan Alvarez is a 78 y.o. male who presents today for :    Regan was seen today for leg swelling and cough.    Diagnoses and all orders for this visit:    Neuropathic pain of right lower extremity  -     gabapentin (NEURONTIN) 100 MG capsule; Take 2 capsules (200 mg total) by mouth every evening.    Symptoms are more than likely due to neuropathic pain given patient's age and uncontrolled diabetes however will check ABIs to screen for vascular disease since the patient has a history of smoking.  Will start patient on gabapentin by mouth every evening educated patient regarding the side effects of this medication including drowsiness and dizziness.  Patient was instructed to only take this medication at night starting off until he knows how this medication affects him.    Chronic obstructive pulmonary disease, unspecified COPD type  -     albuterol (PROVENTIL/VENTOLIN HFA) 90 mcg/actuation inhaler; Inhale 2 puffs into the lungs every 6 (six) hours as needed for Wheezing.  -     Ambulatory Referral to Pharmacy Assistance  -     fluticasone propionate (FLONASE) 50 mcg/actuation nasal spray; 1 spray (50 mcg total) by Each Nare route once  daily.  -     predniSONE (DELTASONE) 20 MG tablet; Take 3 tablets (60 mg total) by mouth once daily. for 5 days  -     loratadine (CLARITIN) 10 mg tablet; Take 1 tablet (10 mg total) by mouth once daily.  -     fluticasone-umeclidin-vilanter (TRELEGY ELLIPTA) 100-62.5-25 mcg DsDv; Inhale 1 puff into the lungs once daily.  -     dextromethorphan-guaifenesin  mg/5 ml (ROBITUSSIN-DM)  mg/5 mL liquid; Take 5 mLs by mouth 3 (three) times daily as needed.    Patient has a worsening cough from baseline will treat with short course of daily steroids.  Will also give Flonase and Claritin for upper respiratory symptoms of postnasal drip and nasal congestion. Patient needs benefit from long-acting controller medication he states Breo was too expensive.  Will refer to pharmacology assistance program and send in trelegy as this may have a lower copay.  Patient was instructed to rinse his mouth out after use of this medication every single day.  Will refill albuterol per the patient's daughter's request.  Emphasize that trelegy is a daily medication while albuterol as an as-needed medication.    Cervicalgia  -     ibuprofen (ADVIL,MOTRIN) 800 MG tablet; Take 1 tablet (800 mg total) by mouth every 8 (eight) hours as needed for Pain (neck pain).    Refilled per the daughter's request.    Uncontrolled type 2 diabetes mellitus with hyperglycemia, without long-term current use of insulin    Likely contributing to symptoms of pins and needle sensation in legs emphasized importance of taking glipizide as prescribed.  Needs to follow up with PCP for management of this.    Go to the emergency room for any leg pain unrelieved by rest or other emergent signs or symptoms.  Follow-up to the clinic for any other concerning signs or symptoms.  Patient and daughter both verbalized understanding of all instructions.        This office note has been dictated.  This dictation has been generated using M-Modal Fluency Direct dictation;  some phonetic errors may occur.   My collaborating physician is Dr. Lm Ferrer.

## 2019-06-12 ENCOUNTER — TELEPHONE (OUTPATIENT)
Dept: PHARMACY | Facility: CLINIC | Age: 79
End: 2019-06-12

## 2019-06-12 ENCOUNTER — TELEPHONE (OUTPATIENT)
Dept: FAMILY MEDICINE | Facility: CLINIC | Age: 79
End: 2019-06-12

## 2019-06-12 DIAGNOSIS — R26.81 GAIT INSTABILITY: Primary | ICD-10-CM

## 2019-06-12 NOTE — TELEPHONE ENCOUNTER
----- Message from Chelsy Conteh sent at 6/12/2019  2:24 PM CDT -----  Contact: CAROLA Govea   Type: Patient Call Back    Who called: Xuan     What is the request in detail:Patient is requesting 4 legged cane. Asking for medical necessity form orders and clinical.      Can the clinic reply by [facility]?    Would the patient rather a call back or a response via [facility]? Call     Best call back number: [phone] Fax: [phone]

## 2019-06-20 ENCOUNTER — HOSPITAL ENCOUNTER (OUTPATIENT)
Dept: CARDIOLOGY | Facility: HOSPITAL | Age: 79
Discharge: HOME | End: 2019-06-20
Attending: NURSE PRACTITIONER
Payer: MEDICARE

## 2019-06-20 DIAGNOSIS — M79.2 NEUROPATHIC PAIN OF RIGHT LOWER EXTREMITY: ICD-10-CM

## 2019-06-20 LAB
LEFT ABI: 0.82
LEFT ARM BP: 106 MMHG
LEFT DORSALIS PEDIS: 90 MMHG
LEFT POSTERIOR TIBIAL: 89 MMHG
LEFT TBI: 0.4
LEFT TOE PRESSURE: 44 MMHG
RIGHT ABI: 0.99
RIGHT ARM BP: 110 MMHG
RIGHT DORSALIS PEDIS: 97 MMHG
RIGHT POSTERIOR TIBIAL: 109 MMHG
RIGHT TBI: 0.53
RIGHT TOE PRESSURE: 58 MMHG

## 2019-06-20 PROCEDURE — 93922 CV US ANKLE BRACHIAL INDICES WO STRESS W TOE TRACINGS (CUPID ONLY): ICD-10-PCS | Mod: 26,,, | Performed by: INTERNAL MEDICINE

## 2019-06-20 PROCEDURE — 93922 UPR/L XTREMITY ART 2 LEVELS: CPT | Mod: 26,,, | Performed by: INTERNAL MEDICINE

## 2019-06-20 PROCEDURE — 93922 UPR/L XTREMITY ART 2 LEVELS: CPT | Mod: 50

## 2019-06-21 ENCOUNTER — TELEPHONE (OUTPATIENT)
Dept: FAMILY MEDICINE | Facility: CLINIC | Age: 79
End: 2019-06-21

## 2019-06-21 DIAGNOSIS — R68.89 ABNORMAL ANKLE BRACHIAL INDEX (ABI): Primary | ICD-10-CM

## 2019-06-21 NOTE — TELEPHONE ENCOUNTER
Patient's daughter called and notified of VELIA results.  Will refer to vascular surgery given mild disease in the left side.  Patient's daughter verbalized understanding

## 2019-06-28 DIAGNOSIS — J44.9 CHRONIC OBSTRUCTIVE PULMONARY DISEASE, UNSPECIFIED COPD TYPE: ICD-10-CM

## 2019-06-28 RX ORDER — FLUTICASONE PROPIONATE 50 MCG
SPRAY, SUSPENSION (ML) NASAL
Qty: 16 ML | Refills: 0 | Status: SHIPPED | OUTPATIENT
Start: 2019-06-28 | End: 2019-08-21 | Stop reason: SDUPTHER

## 2019-07-11 RX ORDER — TIZANIDINE 4 MG/1
TABLET ORAL
COMMUNITY
Start: 2018-11-29 | End: 2019-10-28

## 2019-07-11 RX ORDER — BUDESONIDE 9 MG/1
6 TABLET, FILM COATED, EXTENDED RELEASE ORAL
COMMUNITY
End: 2022-03-11

## 2019-07-11 RX ORDER — ALBUTEROL SULFATE 90 UG/1
2 AEROSOL, METERED RESPIRATORY (INHALATION)
COMMUNITY
Start: 2017-11-28 | End: 2019-10-28

## 2019-07-11 RX ORDER — MESALAMINE 0.38 G/1
375 CAPSULE, EXTENDED RELEASE ORAL
COMMUNITY
End: 2019-10-28

## 2019-07-11 RX ORDER — PREDNISONE 10 MG/1
35 TABLET ORAL
COMMUNITY
End: 2019-10-28

## 2019-07-11 RX ORDER — GLIPIZIDE 5 MG/1
5 TABLET ORAL
COMMUNITY
Start: 2018-10-02 | End: 2019-10-02

## 2019-07-11 RX ORDER — RAMIPRIL 2.5 MG/1
2.5 CAPSULE ORAL
COMMUNITY
Start: 2018-10-02 | End: 2019-10-02

## 2019-07-11 RX ORDER — AMLODIPINE BESYLATE 2.5 MG/1
2.5 TABLET ORAL
COMMUNITY
Start: 2018-12-19 | End: 2019-10-28

## 2019-07-11 RX ORDER — FUROSEMIDE 20 MG/1
20 TABLET ORAL
COMMUNITY
Start: 2018-04-06 | End: 2019-10-28

## 2019-07-11 RX ORDER — IBUPROFEN 800 MG/1
800 TABLET ORAL
COMMUNITY
Start: 2018-11-23 | End: 2019-10-28

## 2019-07-11 RX ORDER — FLUTICASONE FUROATE AND VILANTEROL 200; 25 UG/1; UG/1
1 POWDER RESPIRATORY (INHALATION)
COMMUNITY
Start: 2018-02-05 | End: 2022-03-11

## 2019-07-18 DIAGNOSIS — M79.2 NEUROPATHIC PAIN OF RIGHT LOWER EXTREMITY: ICD-10-CM

## 2019-07-18 RX ORDER — GABAPENTIN 100 MG/1
200 CAPSULE ORAL NIGHTLY
Qty: 90 CAPSULE | Refills: 0 | Status: SHIPPED | OUTPATIENT
Start: 2019-07-18 | End: 2019-08-30 | Stop reason: SDUPTHER

## 2019-08-20 ENCOUNTER — PATIENT OUTREACH (OUTPATIENT)
Dept: ADMINISTRATIVE | Facility: OTHER | Age: 79
End: 2019-08-20

## 2019-08-21 DIAGNOSIS — J44.9 CHRONIC OBSTRUCTIVE PULMONARY DISEASE, UNSPECIFIED COPD TYPE: ICD-10-CM

## 2019-08-21 RX ORDER — FLUTICASONE PROPIONATE 50 MCG
SPRAY, SUSPENSION (ML) NASAL
Qty: 16 ML | Refills: 0 | Status: SHIPPED | OUTPATIENT
Start: 2019-08-21 | End: 2022-03-11

## 2019-08-30 DIAGNOSIS — M79.2 NEUROPATHIC PAIN OF RIGHT LOWER EXTREMITY: ICD-10-CM

## 2019-08-30 RX ORDER — GABAPENTIN 100 MG/1
200 CAPSULE ORAL NIGHTLY
Qty: 90 CAPSULE | Refills: 0 | Status: SHIPPED | OUTPATIENT
Start: 2019-08-30 | End: 2019-10-06 | Stop reason: SDUPTHER

## 2019-09-02 DIAGNOSIS — J44.9 CHRONIC OBSTRUCTIVE PULMONARY DISEASE, UNSPECIFIED COPD TYPE: ICD-10-CM

## 2019-09-03 RX ORDER — ALBUTEROL SULFATE 90 UG/1
AEROSOL, METERED RESPIRATORY (INHALATION)
Qty: 8.5 INHALER | Refills: 2 | Status: SHIPPED | OUTPATIENT
Start: 2019-09-03 | End: 2019-11-20 | Stop reason: SDUPTHER

## 2019-09-04 ENCOUNTER — PATIENT OUTREACH (OUTPATIENT)
Dept: ADMINISTRATIVE | Facility: OTHER | Age: 79
End: 2019-09-04

## 2019-09-05 ENCOUNTER — OFFICE VISIT (OUTPATIENT)
Dept: VASCULAR SURGERY | Facility: CLINIC | Age: 79
End: 2019-09-05
Payer: MEDICARE

## 2019-09-05 VITALS
DIASTOLIC BLOOD PRESSURE: 66 MMHG | HEART RATE: 107 BPM | BODY MASS INDEX: 22.15 KG/M2 | HEIGHT: 77 IN | WEIGHT: 187.63 LBS | SYSTOLIC BLOOD PRESSURE: 90 MMHG

## 2019-09-05 DIAGNOSIS — R09.89 LEFT CAROTID BRUIT: Primary | ICD-10-CM

## 2019-09-05 PROCEDURE — 99999 PR PBB SHADOW E&M-EST. PATIENT-LVL III: ICD-10-PCS | Mod: PBBFAC,,, | Performed by: SURGERY

## 2019-09-05 PROCEDURE — 1101F PT FALLS ASSESS-DOCD LE1/YR: CPT | Mod: CPTII,S$GLB,, | Performed by: SURGERY

## 2019-09-05 PROCEDURE — 3074F PR MOST RECENT SYSTOLIC BLOOD PRESSURE < 130 MM HG: ICD-10-PCS | Mod: CPTII,S$GLB,, | Performed by: SURGERY

## 2019-09-05 PROCEDURE — 3074F SYST BP LT 130 MM HG: CPT | Mod: CPTII,S$GLB,, | Performed by: SURGERY

## 2019-09-05 PROCEDURE — 99204 OFFICE O/P NEW MOD 45 MIN: CPT | Mod: S$GLB,,, | Performed by: SURGERY

## 2019-09-05 PROCEDURE — 99999 PR PBB SHADOW E&M-EST. PATIENT-LVL III: CPT | Mod: PBBFAC,,, | Performed by: SURGERY

## 2019-09-05 PROCEDURE — 1101F PR PT FALLS ASSESS DOC 0-1 FALLS W/OUT INJ PAST YR: ICD-10-PCS | Mod: CPTII,S$GLB,, | Performed by: SURGERY

## 2019-09-05 PROCEDURE — 99499 UNLISTED E&M SERVICE: CPT | Mod: S$GLB,,, | Performed by: SURGERY

## 2019-09-05 PROCEDURE — 3078F DIAST BP <80 MM HG: CPT | Mod: CPTII,S$GLB,, | Performed by: SURGERY

## 2019-09-05 PROCEDURE — 99499 RISK ADDL DX/OHS AUDIT: ICD-10-PCS | Mod: S$GLB,,, | Performed by: SURGERY

## 2019-09-05 PROCEDURE — 99204 PR OFFICE/OUTPT VISIT, NEW, LEVL IV, 45-59 MIN: ICD-10-PCS | Mod: S$GLB,,, | Performed by: SURGERY

## 2019-09-05 PROCEDURE — 3078F PR MOST RECENT DIASTOLIC BLOOD PRESSURE < 80 MM HG: ICD-10-PCS | Mod: CPTII,S$GLB,, | Performed by: SURGERY

## 2019-09-05 RX ORDER — PANTOPRAZOLE SODIUM 40 MG/1
40 TABLET, DELAYED RELEASE ORAL DAILY
Refills: 5 | COMMUNITY
Start: 2019-08-29 | End: 2020-01-28 | Stop reason: ALTCHOICE

## 2019-09-05 NOTE — PATIENT INSTRUCTIONS
Putting on Compression Stockings     Turn the stocking inside-out, then fit it over your toes and heel.          Roll the stocking up your leg.            Once stockings are on, make sure the top of the stocking is about two fingers width below the crease of the knee (or the groin if you wear thigh-high stockings).          Use equipment, such as a stocking lesley, or wear rubber gloves to make it easier to put on compression stockings.         Elastic compression stockings are prescribed to treat many vein problems. Wearing them may be the most important thing you do to manage your symptoms. The stockings fit tightly around your ankle, gradually reducing in pressure as they go up your legs. This helps keep blood flowing to your heart. As a result, swelling is reduced. Your healthcare provider will prescribe stockings at a safe pressure for you. He or she will also tell you how often to wear and remove the stockings. Follow these instructions closely. Also, do not buy or wear compression stockings without first seeing your healthcare provider.  Tips for wear and care  To wear stockings safely and to get the most benefit:  · Wear the length prescribed by your healthcare provider.  · Pull them to the designated height and no farther. Dont let them bunch at the top. This can restrict blood flow and increase swelling.  · Wear the stockings for the amount of time your healthcare provider recommends. Replace them when they start to feel loose. This will likely be every 3 to 6 months.  · Remove them as your healthcare provider directs. When removed, wash your legs. Then check your legs and feet for sores. Call your healthcare provider if you find a sore. Dont put the stockings back on unless your healthcare provider directs.   · Wash the stockings as instructed. They may need to be hand-washed.  Date Last Reviewed: 5/1/2016  © 5139-8567 [employer]. [address] [place] [address], [place] [address]. All rights  reserved. This information is not intended as a substitute for professional medical care. Always follow your healthcare professional's instructions.        Tips for Using Less Salt    Most people with heart problems need to eat less salt (sodium). Reducing the amount of salt you eat may help control your blood pressure. The higher your blood pressure, the greater your risk for heart disease, stroke, blindness, and kidney problems.  At the store  · Make low-salt choices by reading labels carefully. Look for the total amount of sodium per serving.  · Use more fresh food. Buy more fruits and vegetables. Select lean meats, fish, and poultry.  · Use fewer frozen, canned, and packaged foods which often contain a lot of sodium.  · Use plain frozen vegetables without sauces or toppings. These products are often low- or no-sodium.  · Opt for reduced-sodium or no-salt-added versions of canned vegetables and soups.  In the kitchen  · Don't add salt to food when you're cooking. Season with flavorings such as onion, garlic, pepper, salt-free herbal blends, and lemon or lime juice.  · Use a cookbook containing low-salt recipes. It can give you ideas for tasty meals that are healthy for your heart.  · Sprinkle salt-free herbal blends on vegetables and meat.  · Drain and rinse canned foods, such as canned beans and vegetables, before cooking or eating.  Eating out  · Tell the  you're on a low-salt diet. Ask questions about the menu.  · Order fish, chicken, and meat broiled, baked, poached, or grilled without salt, butter, or breading.  · Use lemon, pepper, and salt-free herb mixes to add flavor.  · Choose plain steamed rice, boiled noodles, and baked or boiled potatoes. Top potatoes with chives and a little sour cream.     Beware! Salt goes by many other names. Limit foods with these words listed as ingredients: salt, sodium, soy sauce, baking soda, baking powder, MSG, monosodium, Na (the chemical symbol for sodium). Some  antacids are also high in salt.   Date Last Reviewed: 6/19/2015  © 3040-1308 [employer]. [address] [place] [address], [place] [address]. All rights reserved. This information is not intended as a substitute for professional medical care. Always follow your healthcare professional's instructions.        Low-Salt Diet  This diet removes foods that are high in salt. It also limits the amount of salt you use when cooking. It is most often used for people with high blood pressure, edema (fluid retention), and kidney, liver, or heart disease.  Table salt contains the mineral sodium. Your body needs sodium to work normally. But too much sodium can make your health problems worse. Your healthcare provider is recommending a low-salt (also called low-sodium) diet for you. Your total daily allowance of salt is 1,500 to 2,300 milligrams (mg). It is less than 1 teaspoon of table salt. This means you can have only about 500 to 700 mg of sodium at each meal. People with certain health problems should limit salt intake to the lower end of the recommended range.    When you cook, dont add much salt. If you can cook without using salt, even better. Dont add salt to your food at the table.  When shopping, read food labels. Salt is often called sodium on the label. Choose foods that are salt-free, low salt, or very low salt. Note that foods with reduced salt may not lower your salt intake enough.    Beans, potatoes, and pasta  Ok: Dry beans, split peas, lentils, potatoes, rice, macaroni, pasta, spaghetti without added salt  Avoid: Potato chips, tortilla chips, and similar products  Breads and cereals  Ok: Low-sodium breads, rolls, cereals, and cakes; low-salt crackers, matzo crackers  Avoid: Salted crackers, pretzels, popcorn, Cameroonian toast, pancakes, muffins  Dairy  Ok: Milk, chocolate milk, hot chocolate mix, low-salt cheeses, and yogurt  Avoid: Processed cheese and cheese spreads; Roquefort, Camembert, and cottage cheese;  buttermilk, instant breakfast drink  Desserts  Ok: Ice cream, frozen yogurt, juice bars, gelatin, cookies and pies, sugar, honey, jelly, hard candy  Avoid: Most pies, cakes and cookies prepared or processed with salt; instant pudding  Drinks  Ok: Tea, coffee, fizzy (carbonated) drinks, juices  Avoid: Flavored coffees, electrolyte replacement drinks, sports drinks  Meats  Ok: All fresh meat, fish, poultry, low-salt tuna, eggs, egg substitute  Avoid: Smoked, pickled, brine-cured, or salted meats and fish. This includes huang, chipped beef, corned beef, hot dogs, deli meats, ham, kosher meats, salt pork, sausage, canned tuna, salted codfish, smoked salmon, herring, sardines, or anchovies.  Seasonings and spices  Ok: Most seasonings are okay. Good substitutes for salt include: fresh herb blends, hot sauce, lemon, garlic, nazario, vinegar, dry mustard, parsley, cilantro, horseradish, tomato paste, regular margarine, mayonnaise, unsalted butter, cream cheese, vegetable oil, cream, low-salt salad dressing and gravy.  Avoid: Regular ketchup, relishes, pickles, soy sauce, teriyaki sauce, Worcestershire sauce, BBQ sauce, tartar sauce, meat tenderizer, chili sauce, regular gravy, regular salad dressing, salted butter  Soups  Ok: Low-salt soups and broths made with allowed foods  Avoid: Bouillon cubes, soups with smoked or salted meats, regular soup and broth  Vegetables  Ok: Most vegetables are okay; also low-salt tomato and vegetable juices  Avoid: Sauerkraut and other brine-soaked vegetables; pickles and other pickled vegetables; tomato juice, olives  Date Last Reviewed: 8/1/2016  © 2401-3092 [employer]. [address] [place] [address]. All rights reserved. This information is not intended as a substitute for professional medical care. Always follow your healthcare professional's instructions.        Low-Salt Choices  Eating salt (sodium) can make your body retain too much water. Excess water makes  your heart work harder. Canned, packaged, and frozen foods are easy to prepare, but they are often high in sodium. Here are some ideas for low-salt foods you can easily prepare yourself.    For breakfast  · Fruit or 100% fruit juice  · Whole-wheat bread or an English muffin. Compare sodium content on labels.  · Low-fat milk or yogurt  · Unsalted eggs  · Shredded wheat  · Corn tortillas  · Unsalted steamed rice  · Regular (not instant) hot cereal, made without salt  Stay away from:  · Sausage, huang, and ham  · Flour tortillas  · Packaged muffins, pancakes, and biscuits  · Instant hot cereals  · Cottage cheese  For lunch and dinner  · Fresh fish, chicken, turkey, or meat--baked, broiled, or roasted without salt  · Dry beans, cooked without salt  · Tofu, stir-fried without salt  · Unsalted fresh fruit and vegetables, or frozen or canned fruit and vegetables with no added salt  Stay away from:  · Lunch or deli meat that is cured or smoked  · Cheese  · Tomato juice and catsup  · Canned vegetables, soups, and fish not labeled as no-salt-added or reduced sodium  · Packaged gravies and sauces  · Olives, pickles, and relish  · Bottled salad dressings  For snacks and desserts  · Yogurt  · Unsalted, air popped popcorn  · Unsalted nuts or seeds  Stay away from:  · Pies and cakes  · Packaged dessert mixes  · Pizza  · Canned and packaged puddings  · Pretzels, chips, crackers, and nuts--unless the label says unsalted  Date Last Reviewed: 6/17/2015  © 8652-9219 [employer]. [address] [place] [address], [place] [address]. All rights reserved. This information is not intended as a substitute for professional medical care. Always follow your healthcare professional's instructions.          Understanding Peripheral Arterial Disease    Peripheral arteries deliver oxygen-rich blood to the tissues outside the heart. As you age, your arteries become stiffer and thicker. In addition, risk factors, such as smoking and high  cholesterol, can damage the artery lining. This allows a buildup of fat and other materials (plaque) to form within the artery walls. The buildup of plaque narrows the space inside the artery and sometimes blocks blood flow. Peripheral arterial disease (PAD) happens when blood flow through the arteries is reduced because of plaque buildup. It often happens in the legs and feet, but can also happen elsewhere in the body. If this buildup happens in the a large artery in the neck (carotid artery), it can be a major contributor to stroke.  A healthy artery  An artery is a muscular tube that carries oxgen rich blood and nutrients from the heart to the rest of the body. It has a smooth lining and flexible walls that allow blood to pass freely. When active, muscles need more oxygen. This increases blood flow. Healthy arteries can adapt to meet this need.  A damaged artery    PAD begins when the lining of an artery is damaged. This is often because of risk factors, such as smoking, older age, or diabetes. Plaque then starts to form within the artery wall. At this stage, blood flows normally, so youre not likely to have symptoms.  A narrowed artery    If plaque continues to build up, the space inside the artery narrows. The artery walls become less able to expand. The artery still provides enough blood and oxygen to your muscles during rest. But when youre active, the increased demand for blood cant be met. As a result, your leg may cramp or ache when you walk.  A blocked artery    An artery can become blocked by plaque or by a blood clot lodged in a narrowed section. When this happens, oxygen cant reach the muscle below the blockage. Then you may feel pain when lying down or when you are not active (rest pain). This type of pain is especially common at night when youre lying flat. In time, the affected tissue can die. This can lead to the loss of a toe or foot.  Date Last Reviewed: 5/1/2016  © 8871-3013 [employer]. [address] [place] [address], [place] [address]. All rights reserved. This information is not intended as a substitute for professional medical care. Always follow your healthcare professional's instructions.        Peripheral Artery Disease (PAD)     Peripheral artery disease (PAD) occurs when the arteries that carry blood to the limbs are narrowed or blocked. This is usually due to a buildup of a fatty substance called plaque in the walls of the arteries.  PAD most often affects the arteries in the legs. When these arteries are narrowed or blocked, blood flow to the legs is reduced. This can cause leg and foot pain and other symptoms. If severe enough, reduced blood flow to the legs can lead to tissue death (gangrene) and the loss of a toe, foot, or leg. Having PAD also makes it more likely that arteries in other body areas are blocked. For instance, arteries that carry blood to the heart or brain may be affected. This raises the chances of heart attack and stroke.  Risk factors  Certain factors can make PAD more likely. They include:  · Smoking  · Diabetes  · High blood pressure  · Unhealthy cholesterol levels  · Obesity  · Inactive lifestyle  · Older age  · Family history of PAD  Symptoms  Many people with PAD have no symptoms. If symptoms do occur, they can include:  · Pain in the muscles of the calves, thighs or hips that gets worse with activity and better with rest (intermittent claudication)  · Achy, tired, or heavy feeling in the legs  · Weakness, numbness, tingling, or loss of feeling in the legs  · Changes in skin color of the legs  · Sores on the legs and feet  · Cold leg, feet, or toes  · Pain the feet or toes even when lying down (rest pain)  Home care  PAD is a chronic (lifelong) condition. Treatment is focused on managing your condition and lowering your health risks. This may include doing the following:  · If you smoke, quit. This helps prevent further damage to your arteries and lowers  your health risks. Ask your provider about medicines or products that can help you quit smoking. Also consider joining a stop-smoking program or support group.  · Be more active. This helps you lose weight and manage problems such as high blood pressure and unhealthy cholesterol levels. Start a walking program if advised to by your provider. Your provider may also help you form a safe exercise program that is right for your needs.  · Make healthy eating changes. This includes eating less fat, salt, and sugar.  · Take medicines for high blood pressure, unhealthy cholesterol levels, and diabetes as directed.  · Have your blood pressure and cholesterol levels checked as often as directed.  · If you have diabetes, try to keep your blood sugar well controlled. Test your blood sugar as directed.  · If you are overweight, talk to your provider about forming a weight-loss plan.  · Watch for cuts, scrapes, or open sores on your feet. Poor blood flow to the feet may slow healing and increase the risk of infection from these problems.   Follow-up care  Follow up with your healthcare provider, or as advised. If imaging tests such as ultrasound were done, they will be reviewed by a doctor. You will be told the results and any new findings that may affect your care.  When to seek medical advice   Call your healthcare provider right away if any of these occur:  · Sudden severe pain in the legs or feet  · Sudden cold, pale or blue color in the legs or feet  · Weakness or numbness in the legs or feet that worsens  · Any sore or wound in the legs or feet that wont heal  · Weak pulse in your legs or feet  Know the Signs of Heart Attack and Stroke  People with PAD are at high risk for heart attack and stroke. Knowing the signs of these problems can help you protect your health and get help when you need it. Call 911 right away if you have any of the following:  Signs of a Heart Attack  · Chest discomfort, such as pain, aching,  tightness, or pressure that lasts more than a few minutes, or that comes and goes  · Pain or discomfort in the arms, back, shoulders, neck, or jaw  · Shortness of breath  · Sweating (often a cold, clammy sweat)  · Nausea  · Lightheadedness  Signs of a Stroke  · Sudden numbness or weakness of the face, arms, or legs, especially on one side  · Sudden confusion or trouble speaking or understanding  · Sudden trouble seeing in one or both eyes  · Sudden trouble walking, dizziness, or loss of balance  · Sudden, severe headache with no known cause   Date Last Reviewed: 9/21/2015  © 0249-6042 [employer]. [address] [place] [address], [place] [address]. All rights reserved. This information is not intended as a substitute for professional medical care. Always follow your healthcare professional's instructions.        A Walking Program for Peripheral Arterial Disease (PAD)  Peripheral arterial disease (PAD) is a condition where the arteries in the legs are severely damaged. When you have PAD, walking can be painful. So you may start to walk less. Walking less makes your leg muscles weaker. It then becomes harder and more painful to walk. A walking program can break this cycle. This sheet gives you tips on how to get started.     Walking farther without pain  Exercise strengthens leg muscles that are out of shape. It also trains these muscles to work with less oxygen. This helps your leg muscles work better even with reduced blood flow to your legs. When you have PAD, a walking program can be helpful. Your program can:  · Let you walk longer and farther without claudication. This is an ache in your legs during exercise that goes away with rest.  · Let you do more and be more active  · Add to your overall health and well-being  · Help you control your blood sugar and blood pressure  · Help you become healthier with no risk and at little or no cost  Getting started  Your local hospital, vascular center, or cardiac rehab  center may have a special walking program for people with PAD. If so, this is your best option. But if you cant find a program, or its not covered by insurance, you can still walk on your own. Follow these steps at each session:  · Step 1. Start at a pace that lets you walk for 5 to 10 minutes before you start to feel claudication. This feeling is unpleasant, but it doesnt hurt you. Keep going until the pain makes you feel that you need to stop.  · Step 2. Stop and rest for 3 to 5 minutes, just long enough for the pain to go away. You can rest standing or sitting. (Some people like to bring along a cane or a lightweight folding chair.)  · Step 3. Again walk at a pace that lets you walk for 5 to 10 minutes more before you feel pain. This may be slower than your starting pace in step 1. Then rest again.  · Step 4. Repeat this process until youve walked for 45 minutes. This should be about 60 to 80 minutes total, including resting time. You may not be able to do a full 45 minutes at first. Do as much as you can, and increase your time as you improve.  Making the most of your program  · Walk at least 3 times a week. Take no more than 2 days off between sessions. If you stop walking, even for a week or two, you can lose the health benefits of your program.  · Find a good place to walk. A treadmill or a track may be better at first. That way, you wont run the risk of going too far and finding that you cant walk back. Be sure to have a place to walk indoors in bad weather, such as a gym or a mall.  · Wear shoes with sturdy, flexible soles. The heel should fit without slipping. You should have room to wiggle your toes.  · Keep track of how long you walk. A pedometer will show your daily progress. It can also show how much farther you can walk as time goes by.  · Ask a friend to keep you company. You may enjoy walking with someone else. Or you may want to make your walking sessions a time to relax by yourself.  · Make  it fun. Listen to music while you walk and rest. Walk in a favorite park. Get to know the people at the gym, or the folks that you pass on your route. While you rest, you can window-shop, read, or feed the birds. Do whatever will help you enjoy your exercise sessions.  Date Last Reviewed: 6/1/2016  © 2010-0908 [employer]. [address] [place] [address], [place] [address]. All rights reserved. This information is not intended as a substitute for professional medical care. Always follow your healthcare professional's instructions.

## 2019-09-05 NOTE — PROGRESS NOTES
Lalit Woodson MD [facility]                         09/05/2019    HPI:  Regan Alvarez is a 78 y.o. male with   Patient Active Problem List   Diagnosis    Type 2 diabetes mellitus without complication, with long-term current use of insulin    Osteoarthritis    SOB (shortness of breath)    PVC (premature ventricular contraction)    Simple chronic bronchitis    Pulmonary Mycobacterium avium complex (MAC) infection    COPD (chronic obstructive pulmonary disease)    Essential hypertension    Esophageal dysphagia    Anemia    Blood in stool    Ulcerative pancolitis with rectal bleeding    Uncontrolled type 2 diabetes mellitus with hyperglycemia, without long-term current use of insulin    being managed by PCP and specialists who is here today for evaluation of PVD.  No claudication, PEREIRA at 89 yds limits pt.  No rest pain or wounds BLE.  No BLE edema.      no MI  no Stroke  Tobacco use: quit many yrs ago, prev 1 ppd 30 yrs    Past Medical History:   Diagnosis Date    Arthritis     COPD (chronic obstructive pulmonary disease)     Diabetes mellitus type II     Hypertension     Tuberculosis     Ulcerative colitis      Past Surgical History:   Procedure Laterality Date    BIOPSY-LUNG N/A 12/14/2015    Performed by [facility]    BRONCHOSCOPY N/A 2/23/2017    Performed by Pierre Ugalde MD at [facility] ENDO    COLONOSCOPY N/A 1/22/2018    Performed by Roel Mendez MD at [facility] ENDO    COLONOSCOPY N/A 12/3/2013    Performed by Sarwat Deglado MD at [facility] ENDO    ESOPHAGOGASTRODUODENOSCOPY (EGD) N/A 10/9/2017    Performed by [facility]el [facility]endez [facility] at [facility] ENDO    melanoma       Family History   Problem Relation Age of Onset    Cancer Father         Tuberculosis.  Secondary scar was neoplastic.     Social History     Socioeconomic History    Marital status:      Spouse name: Not on file    Number of children: Not on file     "Years of education: Not on file    Highest education level: Not on file   Occupational History    Not on file   Social Needs    Financial resource strain: Not on file    Food insecurity:     Worry: Not on file     Inability: Not on file    Transportation needs:     Medical: Not on file     Non-medical: Not on file   Tobacco Use    Smoking status: Former Smoker     Types: Cigarettes    Smokeless tobacco: Never Used    Tobacco comment: uses cigars on ocassion   Substance and Sexual Activity    Alcohol use: Yes     Comment: socially    Drug use: No    Sexual activity: Not on file   Lifestyle    Physical activity:     Days per week: Not on file     Minutes per session: Not on file    Stress: Not on file   Relationships    Social connections:     Talks on phone: Not on file     Gets together: Not on file     Attends Yarsanism service: Not on file     Active member of club or organization: Not on file     Attends meetings of clubs or organizations: Not on file     Relationship status: Not on file   Other Topics Concern    Not on file   Social History Narrative    Not on file       Current Outpatient Medications:     albuterol (PROVENTIL/VENTOLIN HFA) 90 mcg/actuation inhaler, Inhale 2 puffs into the lungs., Disp: , Rfl:     albuterol (PROVENTIL/VENTOLIN HFA) 90 mcg/actuation inhaler, TAKE 2 PUFFS BY MOUTH EVERY 6 HOURS AS NEEDED FOR WHEEZE, Disp: 8.5 Inhaler, Rfl: 2    amLODIPine (NORVASC) 2.5 MG tablet, TAKE 1 TABLET (2.5 MG TOTAL) BY MOUTH ONCE DAILY., Disp: 30 tablet, Rfl: 10    amLODIPine (NORVASC) 2.5 MG tablet, Take 2.5 mg by mouth., Disp: , Rfl:     BD ULTRA-FINE TOMÁS PEN NEEDLES 32 gauge x 5/32" Ndle, USE QID UTD, Disp: , Rfl: 5    blood sugar diagnostic (TRUE METRIX GLUCOSE TEST STRIP) Strp, Test blood sugar 3 times daily, Disp: 300 strip, Rfl: 3    blood-glucose meter (TRUE METRIX AIR GLUCOSE METER) Misc, Test blood sugar 3 times daily, Disp: 1 each, Rfl: 0    fluticasone (FLONASE) 50 " mcg/actuation nasal spray, 1 spray by Each Nare route once daily., Disp: , Rfl:     fluticasone furoate-vilanterol (BREO) 200-25 mcg/dose DsDv diskus inhaler, Inhale 1 puff into the lungs., Disp: , Rfl:     fluticasone propionate (FLONASE) 50 mcg/actuation nasal spray, SPRAY 1 SPRAY (50 MCG TOTAL) BY EACH NARE ROUTE ONCE DAILY., Disp: 16 mL, Rfl: 0    fluticasone-umeclidin-vilanter (TRELEGY ELLIPTA) 100-62.5-25 mcg DsDv, Inhale 1 puff into the lungs once daily., Disp: 60 each, Rfl: 1    furosemide (LASIX) 20 MG tablet, TAKE 1 TABLET (20 MG TOTAL) BY MOUTH ONCE DAILY., Disp: 90 tablet, Rfl: 3    furosemide (LASIX) 20 MG tablet, Take 20 mg by mouth., Disp: , Rfl:     gabapentin (NEURONTIN) 100 MG capsule, TAKE 2 CAPSULES (200 MG TOTAL) BY MOUTH EVERY EVENING., Disp: 90 capsule, Rfl: 0    glipiZIDE (GLUCOTROL) 5 MG tablet, TAKE 1 TABLET BY MOUTH EVERY DAY WITH BREAKFAST, Disp: 90 tablet, Rfl: 1    glipiZIDE (GLUCOTROL) 5 MG tablet, Take 5 mg by mouth., Disp: , Rfl:     ibuprofen (ADVIL,MOTRIN) 800 MG tablet, Take 800 mg by mouth., Disp: , Rfl:     lancets 28 gauge Misc, 1 lancet by Misc.(Non-Drug; Combo Route) route 3 (three) times daily. True Metrix lancets, Disp: 300 each, Rfl: 3    multivitamin capsule, Take 1 capsule by mouth once daily., Disp: , Rfl:     ramipril (ALTACE) 2.5 MG capsule, TAKE 1 CAPSULE (2.5 MG TOTAL) BY MOUTH ONCE DAILY., Disp: 90 capsule, Rfl: 3    ramipril (ALTACE) 2.5 MG capsule, Take 2.5 mg by mouth., Disp: , Rfl:     budesonide (UCERIS) 9 mg TaDE, Take 6 mg by mouth., Disp: , Rfl:     ibuprofen (ADVIL,MOTRIN) 800 MG tablet, Take 1 tablet (800 mg total) by mouth every 8 (eight) hours as needed for Pain (neck pain)., Disp: 30 tablet, Rfl: 0    loratadine (CLARITIN) 10 mg tablet, Take 1 tablet (10 mg total) by mouth once daily., Disp: , Rfl: 0    mesalamine (APRISO) 0.375 gram Cp24, Take 375 mg by mouth., Disp: , Rfl:     pantoprazole (PROTONIX) 40 MG tablet, Take 40 mg by  mouth once daily., Disp: , Rfl: 5    predniSONE (DELTASONE) 10 MG tablet, Take 35 mg by mouth., Disp: , Rfl:     tiZANidine (ZANAFLEX) 4 MG tablet, One tab po tid prn for neck pain, may take two tabs at night before sleep (not to exceed five tabs in a day), Disp: 40 tablet, Rfl: 0    tiZANidine (ZANAFLEX) 4 MG tablet, One tab po tid prn for neck pain, may take two tabs at night before sleep (not to exceed five tabs in a day), Disp: , Rfl:     REVIEW OF SYSTEMS:  General: No fevers or chills; ENT: No sore throat; Allergy and Immunology: no persistent infections; Hematological and Lymphatic: No history of bleeding or easy bruising; Endocrine: negative; Respiratory: no cough, shortness of breath, or wheezing; Cardiovascular: no chest pain or dyspnea on exertion; Gastrointestinal: no abdominal pain/back, change in bowel habits, or bloody stools; Genito-Urinary: no dysuria, trouble voiding, or hematuria; Musculoskeletal: negative; Neurological: no TIA or stroke symptoms; Psychiatric: no nervousness, anxiety or depression.    PHYSICAL EXAM:      Pulse: 107         General appearance:  Alert, well-appearing, and in no distress.  Oriented to person, place, and time                    Neurological: Normal speech, no focal findings noted; CN II - XII grossly intact. RLE with sensation to light touch, LLE with sensation to light touch.            Musculoskeletal: Digits/nail without cyanosis/clubbing.  Strength 5/5 BLE.                    Neck: Supple, no significant adenopathy, L carotid bruit can be auscultated                  Chest:  Clear to auscultation, no wheezes, rales or rhonchi, symmetric air entry. No use of accessory muscles               Cardiac: Normal rate and regular rhythm, S1 and S2 normal            Abdomen: Soft, nontender, nondistended, no masses or organomegaly, no hernia     No rebound tenderness noted; bowel sounds normal     Pulsatile aortic mass is non palpable.     No groin adenopathy       Extremities:        2+ R DP pulse, 2+ L DP pulse     1+ RLE edema, 1+ LLE edema    Skin: RLE without tissue loss; LLE without tissue loss    LAB RESULTS:  No results found for: CBC  Lab Results   Component Value Date    LABPROT 12.5 02/21/2017    INR 1.2 02/21/2017     Lab Results   Component Value Date     04/17/2019    K 4.1 04/17/2019     04/17/2019    CO2 28 04/17/2019     (H) 04/17/2019    BUN 11 04/17/2019    CREATININE 1.0 04/17/2019    CALCIUM 8.7 04/17/2019    ANIONGAP 6 (L) 04/17/2019    EGFRNONAA >60 04/17/2019     Lab Results   Component Value Date    WBC 8.11 04/17/2019    RBC 2.84 (L) 04/17/2019    HGB 7.2 (L) 04/17/2019    HCT 23.8 (L) 04/17/2019    MCV 84 04/17/2019    MCH 25.4 (L) 04/17/2019    MCHC 30.3 (L) 04/17/2019    RDW 16.6 (H) 04/17/2019     (H) 04/17/2019    MPV 8.5 (L) 04/17/2019    GRAN 2.9 04/17/2019    GRAN 36.2 (L) 04/17/2019    LYMPH 3.0 04/17/2019    LYMPH 37.0 04/17/2019    MONO 1.2 (H) 04/17/2019    MONO 14.5 04/17/2019    EOS 1.0 (H) 04/17/2019    BASO 0.05 04/17/2019    EOSINOPHIL 11.7 (H) 04/17/2019    BASOPHIL 0.6 04/17/2019    DIFFMETHOD Automated 04/17/2019     .  Lab Results   Component Value Date    HGBA1C 7.7 (H) 04/17/2019       IMAGING:  All pertinent imaging has been reviewed and interpreted independently.    6/2019: VELIA 1/0.8    IMP/PLAN:  78 y.o. male with   Patient Active Problem List   Diagnosis    Type 2 diabetes mellitus without complication, with long-term current use of insulin    Osteoarthritis    SOB (shortness of breath)    PVC (premature ventricular contraction)    Simple chronic bronchitis    Pulmonary Mycobacterium avium complex (MAC) infection    COPD (chronic obstructive pulmonary disease)    Essential hypertension    Esophageal dysphagia    Anemia    Blood in stool    Ulcerative pancolitis with rectal bleeding    Uncontrolled type 2 diabetes mellitus with hyperglycemia, without long-term current use of insulin     being managed by PCP and specialists who is here today for evaluation of PVD.    -Asymptomatic mild PVD LLE - rec ASA, statin and heart healthy lifestyle with optimization of COPD which is his main limiting factor to functional status  -L carotid bruit - will obtain carotid US  -2.6 cm infrarenal AAA on CT 2019 - will cont routine surveillance with AAA US in 5 yrs  -RTC 4 weeks to discuss carotid US results    I spent 20 minutes evaluating this patient and greater than 50% of the time was spent counseling, coordinator care and discussing the plan of care.  All questions were answered and patient stated understanding with agreement with the above treatment plan.    Lalit Woodson MD [facility]  Vascular and Endovascular Surgery

## 2019-09-05 NOTE — LETTER
September 5, 2019      Mario Alberto Mendez, NP  [address]  [facility] [address]           [facility]  [address] [facility] [address]  [facility] [address]  Phone: [phone]  Fax: [phone]          Patient: Regan Alvarez   MR Number: [id number]   YOB: 1940   Date of Visit: 9/5/2019       Dear Mario Alberto Mendez:    Thank you for referring Regan Alvarez to me for evaluation. Attached you will find relevant portions of my assessment and plan of care.    If you have questions, please do not hesitate to call me. I look forward to following Regan Alvarez along with you.    Sincerely,    Lalit Woodson MD    Enclosure  CC:  No Recipients    If you would like to receive this communication electronically, please contact [email or web address] or [phone] to request more information on [employer] Exchange Link access.    For providers and/or their staff who would like to refer a patient to [facility], please contact us through our one-stop-shop provider referral line, [facility] , at [phone].    If you feel you have received this communication in error or would no longer like to receive these types of communications, please e-mail [email or web address]

## 2019-09-09 ENCOUNTER — TELEPHONE (OUTPATIENT)
Dept: FAMILY MEDICINE | Facility: CLINIC | Age: 79
End: 2019-09-09

## 2019-09-09 DIAGNOSIS — Z23 NEED FOR PNEUMOCOCCAL VACCINE: Primary | ICD-10-CM

## 2019-09-09 NOTE — TELEPHONE ENCOUNTER
----- Message from Karon Hudson MA sent at 9/9/2019  1:16 PM CDT -----  Contact: Daughter-Alix      ----- Message -----  From: Taylor Hoover  Sent: 9/9/2019  12:45 PM  To: [facility] Staff    Type:  Injection Appointment Request    Caller is requesting an injection appointment. Please place Orders and  contact patient to schedule.          Name of Caller: Alix-Daughter    Preferred appointment date and time? any  Appointment Location? [facility]    Type of Injection:  FLU and Pneumonia       Would the patient rather a call back or a response via [facility]? call      Best Call Back Number:  [phone]

## 2019-09-27 ENCOUNTER — CLINICAL SUPPORT (OUTPATIENT)
Dept: FAMILY MEDICINE | Facility: CLINIC | Age: 79
End: 2019-09-27
Payer: MEDICARE

## 2019-09-27 DIAGNOSIS — Z23 NEED FOR INFLUENZA VACCINATION: Primary | ICD-10-CM

## 2019-09-27 DIAGNOSIS — Z23 NEED FOR PNEUMOCOCCAL VACCINE: ICD-10-CM

## 2019-09-27 PROCEDURE — 90662 IIV NO PRSV INCREASED AG IM: CPT | Mod: S$GLB,,, | Performed by: INTERNAL MEDICINE

## 2019-09-27 PROCEDURE — 99499 NO LOS: ICD-10-PCS | Mod: S$GLB,,, | Performed by: INTERNAL MEDICINE

## 2019-09-27 PROCEDURE — G0009 PNEUMOCOCCAL CONJUGATE VACCINE 13-VALENT LESS THAN 5YO & GREATER THAN: ICD-10-PCS | Mod: S$GLB,,, | Performed by: INTERNAL MEDICINE

## 2019-09-27 PROCEDURE — G0009 ADMIN PNEUMOCOCCAL VACCINE: HCPCS | Mod: S$GLB,,, | Performed by: INTERNAL MEDICINE

## 2019-09-27 PROCEDURE — 90670 PCV13 VACCINE IM: CPT | Mod: S$GLB,,, | Performed by: INTERNAL MEDICINE

## 2019-09-27 PROCEDURE — 99499 UNLISTED E&M SERVICE: CPT | Mod: S$GLB,,, | Performed by: INTERNAL MEDICINE

## 2019-09-27 PROCEDURE — G0008 ADMIN INFLUENZA VIRUS VAC: HCPCS | Mod: S$GLB,,, | Performed by: INTERNAL MEDICINE

## 2019-09-27 PROCEDURE — 90662 FLU VACCINE - HIGH DOSE (65+) PRESERVATIVE FREE IM: ICD-10-PCS | Mod: S$GLB,,, | Performed by: INTERNAL MEDICINE

## 2019-09-27 PROCEDURE — 90670 PNEUMOCOCCAL CONJUGATE VACCINE 13-VALENT LESS THAN 5YO & GREATER THAN: ICD-10-PCS | Mod: S$GLB,,, | Performed by: INTERNAL MEDICINE

## 2019-09-27 PROCEDURE — G0008 FLU VACCINE - HIGH DOSE (65+) PRESERVATIVE FREE IM: ICD-10-PCS | Mod: S$GLB,,, | Performed by: INTERNAL MEDICINE

## 2019-09-27 NOTE — PROGRESS NOTES
IM injection administered as ordered.  Pt. tolerated well with no complaints.  Patient tolerated flu med. administration well.  Instructed to wait for 15 minutes to monitor for any adverse reactions.

## 2019-09-30 DIAGNOSIS — I50.22 CHRONIC SYSTOLIC HEART FAILURE: ICD-10-CM

## 2019-09-30 RX ORDER — RAMIPRIL 2.5 MG/1
2.5 CAPSULE ORAL DAILY
Qty: 90 CAPSULE | Refills: 3 | Status: SHIPPED | OUTPATIENT
Start: 2019-09-30 | End: 2021-07-28

## 2019-10-04 RX ORDER — AMLODIPINE BESYLATE 2.5 MG/1
TABLET ORAL
Qty: 90 TABLET | Refills: 3 | Status: SHIPPED | OUTPATIENT
Start: 2019-10-04 | End: 2020-02-03 | Stop reason: ALTCHOICE

## 2019-10-06 DIAGNOSIS — M79.2 NEUROPATHIC PAIN OF RIGHT LOWER EXTREMITY: ICD-10-CM

## 2019-10-07 RX ORDER — GABAPENTIN 100 MG/1
CAPSULE ORAL
Qty: 90 CAPSULE | Refills: 0 | Status: SHIPPED | OUTPATIENT
Start: 2019-10-07 | End: 2019-11-22 | Stop reason: SDUPTHER

## 2019-10-08 ENCOUNTER — HOSPITAL ENCOUNTER (OUTPATIENT)
Dept: CARDIOLOGY | Facility: HOSPITAL | Age: 79
Discharge: HOME | End: 2019-10-08
Attending: SURGERY
Payer: MEDICARE

## 2019-10-08 DIAGNOSIS — R09.89 LEFT CAROTID BRUIT: ICD-10-CM

## 2019-10-08 LAB
LEFT CBA DIAS: 36 CM/S
LEFT CBA SYS: 241 CM/S
LEFT CCA DIST DIAS: 16 CM/S
LEFT CCA DIST SYS: 67 CM/S
LEFT CCA MID DIAS: 11 CM/S
LEFT CCA MID SYS: 61 CM/S
LEFT CCA PROX DIAS: 9 CM/S
LEFT CCA PROX SYS: 74 CM/S
LEFT ECA DIAS: 0 CM/S
LEFT ECA SYS: 74 CM/S
LEFT ICA DIST DIAS: 14 CM/S
LEFT ICA DIST SYS: 49 CM/S
LEFT ICA MID DIAS: 14 CM/S
LEFT ICA MID SYS: 57 CM/S
LEFT ICA PROX DIAS: 10 CM/S
LEFT ICA PROX SYS: 89 CM/S
LEFT VERTEBRAL DIAS: 13 CM/S
LEFT VERTEBRAL SYS: 61 CM/S
OHS CV CAROTID RIGHT ICA EDV HIGHEST: 15
OHS CV CAROTID ULTRASOUND LEFT ICA/CCA RATIO: 1.2
OHS CV CAROTID ULTRASOUND RIGHT ICA/CCA RATIO: 0.64
OHS CV PV CAROTID LEFT HIGHEST CCA: 74
OHS CV PV CAROTID LEFT HIGHEST ICA: 89
OHS CV PV CAROTID RIGHT HIGHEST CCA: 80
OHS CV PV CAROTID RIGHT HIGHEST ICA: 51
OHS CV US CAROTID LEFT HIGHEST EDV: 14
RIGHT CBA DIAS: 21 CM/S
RIGHT CBA SYS: 58 CM/S
RIGHT CCA DIST DIAS: 13 CM/S
RIGHT CCA DIST SYS: 80 CM/S
RIGHT CCA MID DIAS: 13 CM/S
RIGHT CCA MID SYS: 67 CM/S
RIGHT CCA PROX DIAS: 7 CM/S
RIGHT CCA PROX SYS: 58 CM/S
RIGHT ECA DIAS: 0 CM/S
RIGHT ECA SYS: 53 CM/S
RIGHT ICA DIST DIAS: 14 CM/S
RIGHT ICA DIST SYS: 45 CM/S
RIGHT ICA MID DIAS: 13 CM/S
RIGHT ICA MID SYS: 51 CM/S
RIGHT ICA PROX DIAS: 15 CM/S
RIGHT ICA PROX SYS: 50 CM/S
RIGHT VERTEBRAL DIAS: 9 CM/S
RIGHT VERTEBRAL SYS: 55 CM/S

## 2019-10-08 PROCEDURE — 93880 EXTRACRANIAL BILAT STUDY: CPT | Mod: 26,,, | Performed by: INTERNAL MEDICINE

## 2019-10-08 PROCEDURE — 93880 EXTRACRANIAL BILAT STUDY: CPT

## 2019-10-08 PROCEDURE — 93880 CV US DOPPLER CAROTID (CUPID ONLY): ICD-10-PCS | Mod: 26,,, | Performed by: INTERNAL MEDICINE

## 2019-10-23 ENCOUNTER — PATIENT OUTREACH (OUTPATIENT)
Dept: ADMINISTRATIVE | Facility: OTHER | Age: 79
End: 2019-10-23

## 2019-10-23 DIAGNOSIS — E11.9 DIABETES MELLITUS WITHOUT COMPLICATION: Primary | ICD-10-CM

## 2019-10-23 NOTE — LETTER
AUTHORIZATION FOR RELEASE OF   CONFIDENTIAL INFORMATION    Dear Dr Mckeon,    We are seeing Regan Alvarez, date of birth 1940, in the clinic at [facility]. Ishaan Adamson MD is the patient's PCP. Regan Alvarez has an outstanding lab/procedure at the time we reviewed his chart. In order to help keep his health information updated, he has authorized us to request the following medical record(s):        (  )  MAMMOGRAM                                      (  )  COLONOSCOPY      (  )  PAP SMEAR                                          (  )  OUTSIDE LAB RESULTS     (  )  DEXA SCAN                                          ( X )  EYE EXAM            (  )  FOOT EXAM                                          (  )  ENTIRE RECORD     (  )  OUTSIDE IMMUNIZATIONS                 (  )  _______________         Please fax records to [facility], Joshua S Fowler, MD, [phone]     *.           Patient Name: Regan Alvarez  : 1940  Patient Phone #: [phone]

## 2019-10-24 DIAGNOSIS — E11.65 UNCONTROLLED TYPE 2 DIABETES MELLITUS WITH HYPERGLYCEMIA, WITHOUT LONG-TERM CURRENT USE OF INSULIN: ICD-10-CM

## 2019-10-24 RX ORDER — GLIPIZIDE 5 MG/1
TABLET ORAL
Qty: 90 TABLET | Refills: 1 | Status: SHIPPED | OUTPATIENT
Start: 2019-10-24 | End: 2021-04-06 | Stop reason: SDUPTHER

## 2019-10-24 NOTE — PATIENT INSTRUCTIONS
Patient may have had cataract extraction. Efax request sent to [facility] for last eye exam

## 2019-10-28 ENCOUNTER — OFFICE VISIT (OUTPATIENT)
Dept: VASCULAR SURGERY | Facility: CLINIC | Age: 79
End: 2019-10-28
Payer: MEDICARE

## 2019-10-28 VITALS
HEART RATE: 84 BPM | WEIGHT: 194.75 LBS | HEIGHT: 77 IN | SYSTOLIC BLOOD PRESSURE: 82 MMHG | BODY MASS INDEX: 23 KG/M2 | DIASTOLIC BLOOD PRESSURE: 54 MMHG

## 2019-10-28 DIAGNOSIS — I65.23 CAROTID STENOSIS, ASYMPTOMATIC, BILATERAL: Primary | ICD-10-CM

## 2019-10-28 PROCEDURE — 99214 PR OFFICE/OUTPT VISIT, EST, LEVL IV, 30-39 MIN: ICD-10-PCS | Mod: S$GLB,,, | Performed by: SURGERY

## 2019-10-28 PROCEDURE — 99499 UNLISTED E&M SERVICE: CPT | Mod: S$GLB,,, | Performed by: SURGERY

## 2019-10-28 PROCEDURE — 3074F SYST BP LT 130 MM HG: CPT | Mod: CPTII,S$GLB,, | Performed by: SURGERY

## 2019-10-28 PROCEDURE — 99214 OFFICE O/P EST MOD 30 MIN: CPT | Mod: S$GLB,,, | Performed by: SURGERY

## 2019-10-28 PROCEDURE — 99999 PR PBB SHADOW E&M-EST. PATIENT-LVL III: CPT | Mod: PBBFAC,,, | Performed by: SURGERY

## 2019-10-28 PROCEDURE — 99999 PR PBB SHADOW E&M-EST. PATIENT-LVL III: ICD-10-PCS | Mod: PBBFAC,,, | Performed by: SURGERY

## 2019-10-28 PROCEDURE — 1101F PT FALLS ASSESS-DOCD LE1/YR: CPT | Mod: CPTII,S$GLB,, | Performed by: SURGERY

## 2019-10-28 PROCEDURE — 3078F PR MOST RECENT DIASTOLIC BLOOD PRESSURE < 80 MM HG: ICD-10-PCS | Mod: CPTII,S$GLB,, | Performed by: SURGERY

## 2019-10-28 PROCEDURE — 1101F PR PT FALLS ASSESS DOC 0-1 FALLS W/OUT INJ PAST YR: ICD-10-PCS | Mod: CPTII,S$GLB,, | Performed by: SURGERY

## 2019-10-28 PROCEDURE — 99499 RISK ADDL DX/OHS AUDIT: ICD-10-PCS | Mod: S$GLB,,, | Performed by: SURGERY

## 2019-10-28 PROCEDURE — 3074F PR MOST RECENT SYSTOLIC BLOOD PRESSURE < 130 MM HG: ICD-10-PCS | Mod: CPTII,S$GLB,, | Performed by: SURGERY

## 2019-10-28 PROCEDURE — 3078F DIAST BP <80 MM HG: CPT | Mod: CPTII,S$GLB,, | Performed by: SURGERY

## 2019-10-28 RX ORDER — ISOSORBIDE MONONITRATE 30 MG/1
30 TABLET, EXTENDED RELEASE ORAL
COMMUNITY
Start: 2019-10-25 | End: 2020-04-24 | Stop reason: SDUPTHER

## 2019-10-28 NOTE — PROGRESS NOTES
Lalit Woodson MD VI                       [facility]                         10/28/2019    HPI:  Regan Alvarez is a 78 y.o. male with   Patient Active Problem List   Diagnosis    Type 2 diabetes mellitus without complication, with long-term current use of insulin    Osteoarthritis    SOB (shortness of breath)    PVC (premature ventricular contraction)    Simple chronic bronchitis    Pulmonary Mycobacterium avium complex (MAC) infection    COPD (chronic obstructive pulmonary disease)    Essential hypertension    Esophageal dysphagia    Anemia    Blood in stool    Ulcerative pancolitis with rectal bleeding    Uncontrolled type 2 diabetes mellitus with hyperglycemia, without long-term current use of insulin    being managed by PCP and specialists who is here today for evaluation of PVD.  No claudication, PEREIRA at 89 yds limits pt.  No rest pain or wounds BLE.  No BLE edema.      no MI  no Stroke  Tobacco use: quit many yrs ago, prev 1 ppd 30 yrs    Interval history:   Worsening PEREIRA.  No other complaints or new issues today.  BLE pain has resolved with Gabapentin    Past Medical History:   Diagnosis Date    Arthritis     COPD (chronic obstructive pulmonary disease)     Diabetes mellitus type II     Hypertension     Tuberculosis     Ulcerative colitis      Past Surgical History:   Procedure Laterality Date    COLONOSCOPY N/A 1/22/2018    Procedure: COLONOSCOPY;  Surgeon: Roel Mendez MD;  Location: [facility];  Service: Endoscopy;  Laterality: N/A;  Pt confirmend appt.    melanoma       Family History   Problem Relation Age of Onset    Cancer Father         Tuberculosis.  Secondary scar was neoplastic.     Social History     Socioeconomic History    Marital status:      Spouse name: Not on file    Number of children: Not on file    Years of education: Not on file    Highest education level: Not on file   Occupational History    Not on file   Social Needs  "   Financial resource strain: Not on file    Food insecurity:     Worry: Not on file     Inability: Not on file    Transportation needs:     Medical: Not on file     Non-medical: Not on file   Tobacco Use    Smoking status: Former Smoker     Types: Cigarettes    Smokeless tobacco: Never Used    Tobacco comment: uses cigars on ocassion   Substance and Sexual Activity    Alcohol use: Yes     Comment: socially    Drug use: No    Sexual activity: Not on file   Lifestyle    Physical activity:     Days per week: Not on file     Minutes per session: Not on file    Stress: Not on file   Relationships    Social connections:     Talks on phone: Not on file     Gets together: Not on file     Attends [employer] service: Not on file     Active member of club or organization: Not on file     Attends meetings of clubs or organizations: Not on file     Relationship status: Not on file   Other Topics Concern    Not on file   Social History Narrative    Not on file       Current Outpatient Medications:     albuterol (PROVENTIL/VENTOLIN HFA) 90 mcg/actuation inhaler, Inhale 2 puffs into the lungs., Disp: , Rfl:     albuterol (PROVENTIL/VENTOLIN HFA) 90 mcg/actuation inhaler, TAKE 2 PUFFS BY MOUTH EVERY 6 HOURS AS NEEDED FOR WHEEZE, Disp: 8.5 Inhaler, Rfl: 2    amLODIPine (NORVASC) 2.5 MG tablet, Take 2.5 mg by mouth., Disp: , Rfl:     amLODIPine (NORVASC) 2.5 MG tablet, TAKE 1 TABLET BY MOUTH EVERY DAY, Disp: 90 tablet, Rfl: 3    BD ULTRA-FINE TOMÁS PEN NEEDLES 32 gauge x 5/32" Ndle, USE QID UTD, Disp: , Rfl: 5    blood sugar diagnostic (TRUE METRIX GLUCOSE TEST STRIP) Strp, Test blood sugar 3 times daily, Disp: 300 strip, Rfl: 3    blood-glucose meter (TRUE METRIX AIR GLUCOSE METER) Misc, Test blood sugar 3 times daily, Disp: 1 each, Rfl: 0    budesonide (UCERIS) 9 mg TaDE, Take 6 mg by mouth., Disp: , Rfl:     fluticasone (FLONASE) 50 mcg/actuation nasal spray, 1 spray by Each Nare route once daily., Disp: , " Rfl:     fluticasone furoate-vilanterol (BREO) 200-25 mcg/dose DsDv diskus inhaler, Inhale 1 puff into the lungs., Disp: , Rfl:     fluticasone propionate (FLONASE) 50 mcg/actuation nasal spray, SPRAY 1 SPRAY (50 MCG TOTAL) BY EACH NARE ROUTE ONCE DAILY., Disp: 16 mL, Rfl: 0    fluticasone-umeclidin-vilanter (TRELEGY ELLIPTA) 100-62.5-25 mcg DsDv, Inhale 1 puff into the lungs once daily., Disp: 60 each, Rfl: 1    furosemide (LASIX) 20 MG tablet, TAKE 1 TABLET (20 MG TOTAL) BY MOUTH ONCE DAILY., Disp: 90 tablet, Rfl: 3    furosemide (LASIX) 20 MG tablet, Take 20 mg by mouth., Disp: , Rfl:     gabapentin (NEURONTIN) 100 MG capsule, TAKE 2 CAPSULES BY MOUTH EVERY EVENING, Disp: 90 capsule, Rfl: 0    glipiZIDE (GLUCOTROL) 5 MG tablet, TAKE 1 TABLET BY MOUTH EVERY DAY WITH BREAKFAST, Disp: 90 tablet, Rfl: 1    ibuprofen (ADVIL,MOTRIN) 800 MG tablet, Take 1 tablet (800 mg total) by mouth every 8 (eight) hours as needed for Pain (neck pain)., Disp: 30 tablet, Rfl: 0    ibuprofen (ADVIL,MOTRIN) 800 MG tablet, Take 800 mg by mouth., Disp: , Rfl:     lancets 28 gauge Misc, 1 lancet by Misc.(Non-Drug; Combo Route) route 3 (three) times daily. True Metrix lancets, Disp: 300 each, Rfl: 3    loratadine (CLARITIN) 10 mg tablet, Take 1 tablet (10 mg total) by mouth once daily., Disp: , Rfl: 0    mesalamine (APRISO) 0.375 gram Cp24, Take 375 mg by mouth., Disp: , Rfl:     multivitamin capsule, Take 1 capsule by mouth once daily., Disp: , Rfl:     pantoprazole (PROTONIX) 40 MG tablet, Take 40 mg by mouth once daily., Disp: , Rfl: 5    predniSONE (DELTASONE) 10 MG tablet, Take 35 mg by mouth., Disp: , Rfl:     ramipril (ALTACE) 2.5 MG capsule, TAKE 1 CAPSULE (2.5 MG TOTAL) BY MOUTH ONCE DAILY., Disp: 90 capsule, Rfl: 3    tiZANidine (ZANAFLEX) 4 MG tablet, One tab po tid prn for neck pain, may take two tabs at night before sleep (not to exceed five tabs in a day), Disp: 40 tablet, Rfl: 0    tiZANidine (ZANAFLEX) 4 MG  tablet, One tab po tid prn for neck pain, may take two tabs at night before sleep (not to exceed five tabs in a day), Disp: , Rfl:     REVIEW OF SYSTEMS:  General: No fevers or chills; ENT: No sore throat; Allergy and Immunology: no persistent infections; Hematological and Lymphatic: No history of bleeding or easy bruising; Endocrine: negative; Respiratory: no cough, shortness of breath, or wheezing; Cardiovascular: no chest pain or dyspnea on exertion; Gastrointestinal: no abdominal pain/back, change in bowel habits, or bloody stools; Genito-Urinary: no dysuria, trouble voiding, or hematuria; Musculoskeletal: negative; Neurological: no TIA or stroke symptoms; Psychiatric: no nervousness, anxiety or depression.    PHYSICAL EXAM:                General appearance:  Alert, well-appearing, and in no distress.  Oriented to person, place, and time                    Neurological: Normal speech, no focal findings noted; CN II - XII grossly intact. RLE with sensation to light touch, LLE with sensation to light touch.            Musculoskeletal: Digits/nail without cyanosis/clubbing.  Strength 5/5 BLE.                    Neck: Supple, no significant adenopathy, L carotid bruit can be auscultated                  Chest:  Clear to auscultation, no wheezes, rales or rhonchi, symmetric air entry. No use of accessory muscles               Cardiac: Normal rate and regular rhythm, S1 and S2 normal            Abdomen: Soft, nontender, nondistended, no masses or organomegaly, no hernia     No rebound tenderness noted; bowel sounds normal     Pulsatile aortic mass is non palpable.     No groin adenopathy      Extremities:        2+ R DP pulse, 2+ L DP pulse     1+ RLE edema, 1+ LLE edema    Skin: RLE without tissue loss; LLE without tissue loss    LAB RESULTS:  No results found for: CBC  Lab Results   Component Value Date    LABPROT 12.5 02/21/2017    INR 1.2 02/21/2017     Lab Results   Component Value Date     04/17/2019    K  4.1 04/17/2019     04/17/2019    CO2 28 04/17/2019     (H) 04/17/2019    BUN 11 04/17/2019    CREATININE 1.0 04/17/2019    CALCIUM 8.7 04/17/2019    ANIONGAP 6 (L) 04/17/2019    EGFRNONAA >60 04/17/2019     Lab Results   Component Value Date    WBC 8.11 04/17/2019    RBC 2.84 (L) 04/17/2019    HGB 7.2 (L) 04/17/2019    HCT 23.8 (L) 04/17/2019    MCV 84 04/17/2019    MCH 25.4 (L) 04/17/2019    MCHC 30.3 (L) 04/17/2019    RDW 16.6 (H) 04/17/2019     (H) 04/17/2019    MPV 8.5 (L) 04/17/2019    GRAN 2.9 04/17/2019    GRAN 36.2 (L) 04/17/2019    LYMPH 3.0 04/17/2019    LYMPH 37.0 04/17/2019    MONO 1.2 (H) 04/17/2019    MONO 14.5 04/17/2019    EOS 1.0 (H) 04/17/2019    BASO 0.05 04/17/2019    EOSINOPHIL 11.7 (H) 04/17/2019    BASOPHIL 0.6 04/17/2019    DIFFMETHOD Automated 04/17/2019     .  Lab Results   Component Value Date    HGBA1C 7.7 (H) 04/17/2019       IMAGING:  All pertinent imaging has been reviewed and interpreted independently.    6/2019: VELIA 1/0.8    10/8/19 Carotid US:  · There is 0-19% right Internal Carotid Stenosis.  · There is 60-69% left Internal Carotid Stenosis.    IMP/PLAN:  78 y.o. male with   Patient Active Problem List   Diagnosis    Type 2 diabetes mellitus without complication, with long-term current use of insulin    Osteoarthritis    SOB (shortness of breath)    PVC (premature ventricular contraction)    Simple chronic bronchitis    Pulmonary Mycobacterium avium complex (MAC) infection    COPD (chronic obstructive pulmonary disease)    Essential hypertension    Esophageal dysphagia    Anemia    Blood in stool    Ulcerative pancolitis with rectal bleeding    Uncontrolled type 2 diabetes mellitus with hyperglycemia, without long-term current use of insulin    being managed by PCP and specialists who is here today for evaluation of PVD.    -Asymptomatic mild PVD LLE - rec ASA, statin and heart healthy lifestyle with optimization of COPD which is his main limiting  factor to functional status; vascular surgical intervention indicated at this time  -Asymptomatic L>R carotid stenosis - rec starting statin by Dr. Adamson;  Will cont ASA and routine surveillance with US in 1 year  -2.6 cm infrarenal AAA on CT 2019 - will cont routine surveillance with AAA US in 5 yrs  -RTC 1 yr     I spent 20 minutes evaluating this patient and greater than 50% of the time was spent counseling, coordinator care and discussing the plan of care.  All questions were answered and patient stated understanding with agreement with the above treatment plan.    Lalit Woodson MD [facility]  Vascular and Endovascular Surgery

## 2019-10-28 NOTE — LETTER
October 28, 2019        Ishaan Adamson MD  [address] [facility] [address]             [facility]  [address]  [place] [address]  Phone: [phone]  Fax: [phone]   Patient: Regan Alvarez   MR Number: [id number]   YOB: 1940   Date of Visit: 10/28/2019       Dear Dr. Adamson:    Thank you for referring Regan Alvarez to me for evaluation. Below are the relevant portions of my assessment and plan of care.            If you have questions, please do not hesitate to call me. I look forward to following Regan along with you.    Sincerely,      Lalit Woodson MD           CC  No Recipients

## 2019-10-28 NOTE — PATIENT INSTRUCTIONS
Understanding Peripheral Arterial Disease    Peripheral arteries deliver oxygen-rich blood to the tissues outside the heart. As you age, your arteries become stiffer and thicker. In addition, risk factors, such as smoking and high cholesterol, can damage the artery lining. This allows a buildup of fat and other materials (plaque) to form within the artery walls. The buildup of plaque narrows the space inside the artery and sometimes blocks blood flow. Peripheral arterial disease (PAD) happens when blood flow through the arteries is reduced because of plaque buildup. It often happens in the legs and feet, but can also happen elsewhere in the body. If this buildup happens in the a large artery in the neck (carotid artery), it can be a major contributor to stroke.  A healthy artery  An artery is a muscular tube that carries oxgen rich blood and nutrients from the heart to the rest of the body. It has a smooth lining and flexible walls that allow blood to pass freely. When active, muscles need more oxygen. This increases blood flow. Healthy arteries can adapt to meet this need.  A damaged artery    PAD begins when the lining of an artery is damaged. This is often because of risk factors, such as smoking, older age, or diabetes. Plaque then starts to form within the artery wall. At this stage, blood flows normally, so youre not likely to have symptoms.  A narrowed artery    If plaque continues to build up, the space inside the artery narrows. The artery walls become less able to expand. The artery still provides enough blood and oxygen to your muscles during rest. But when youre active, the increased demand for blood cant be met. As a result, your leg may cramp or ache when you walk.  A blocked artery    An artery can become blocked by plaque or by a blood clot lodged in a narrowed section. When this happens, oxygen cant reach the muscle below the blockage. Then you may feel pain when lying down or when you are not  active (rest pain). This type of pain is especially common at night when youre lying flat. In time, the affected tissue can die. This can lead to the loss of a toe or foot.  Date Last Reviewed: 5/1/2016 © 2000-2017 [employer]. [address] [place] [id number]. All rights reserved. This information is not intended as a substitute for professional medical care. Always follow your healthcare professional's instructions.        Peripheral Artery Disease (PAD)     Peripheral artery disease (PAD) occurs when the arteries that carry blood to the limbs are narrowed or blocked. This is usually due to a buildup of a fatty substance called plaque in the walls of the arteries.  PAD most often affects the arteries in the legs. When these arteries are narrowed or blocked, blood flow to the legs is reduced. This can cause leg and foot pain and other symptoms. If severe enough, reduced blood flow to the legs can lead to tissue death (gangrene) and the loss of a toe, foot, or leg. Having PAD also makes it more likely that arteries in other body areas are blocked. For instance, arteries that carry blood to the heart or brain may be affected. This raises the chances of heart attack and stroke.  Risk factors  Certain factors can make PAD more likely. They include:  · Smoking  · Diabetes  · High blood pressure  · Unhealthy cholesterol levels  · Obesity  · Inactive lifestyle  · Older age  · Family history of PAD  Symptoms  Many people with PAD have no symptoms. If symptoms do occur, they can include:  · Pain in the muscles of the calves, thighs or hips that gets worse with activity and better with rest (intermittent claudication)  · Achy, tired, or heavy feeling in the legs  · Weakness, numbness, tingling, or loss of feeling in the legs  · Changes in skin color of the legs  · Sores on the legs and feet  · Cold leg, feet, or toes  · Pain the feet or toes even when lying down (rest pain)  Home care  PAD is a  chronic (lifelong) condition. Treatment is focused on managing your condition and lowering your health risks. This may include doing the following:  · If you smoke, quit. This helps prevent further damage to your arteries and lowers your health risks. Ask your provider about medicines or products that can help you quit smoking. Also consider joining a stop-smoking program or support group.  · Be more active. This helps you lose weight and manage problems such as high blood pressure and unhealthy cholesterol levels. Start a walking program if advised to by your provider. Your provider may also help you form a safe exercise program that is right for your needs.  · Make healthy eating changes. This includes eating less fat, salt, and sugar.  · Take medicines for high blood pressure, unhealthy cholesterol levels, and diabetes as directed.  · Have your blood pressure and cholesterol levels checked as often as directed.  · If you have diabetes, try to keep your blood sugar well controlled. Test your blood sugar as directed.  · If you are overweight, talk to your provider about forming a weight-loss plan.  · Watch for cuts, scrapes, or open sores on your feet. Poor blood flow to the feet may slow healing and increase the risk of infection from these problems.   Follow-up care  Follow up with your healthcare provider, or as advised. If imaging tests such as ultrasound were done, they will be reviewed by a doctor. You will be told the results and any new findings that may affect your care.  When to seek medical advice   Call your healthcare provider right away if any of these occur:  · Sudden severe pain in the legs or feet  · Sudden cold, pale or blue color in the legs or feet  · Weakness or numbness in the legs or feet that worsens  · Any sore or wound in the legs or feet that wont heal  · Weak pulse in your legs or feet  Know the Signs of Heart Attack and Stroke  People with PAD are at high risk for heart attack and  stroke. Knowing the signs of these problems can help you protect your health and get help when you need it. Call 911 right away if you have any of the following:  Signs of a Heart Attack  · Chest discomfort, such as pain, aching, tightness, or pressure that lasts more than a few minutes, or that comes and goes  · Pain or discomfort in the arms, back, shoulders, neck, or jaw  · Shortness of breath  · Sweating (often a cold, clammy sweat)  · Nausea  · Lightheadedness  Signs of a Stroke  · Sudden numbness or weakness of the face, arms, or legs, especially on one side  · Sudden confusion or trouble speaking or understanding  · Sudden trouble seeing in one or both eyes  · Sudden trouble walking, dizziness, or loss of balance  · Sudden, severe headache with no known cause   Date Last Reviewed: 9/21/2015  © 4975-5288 [employer]. [address] [place] [address]. All rights reserved. This information is not intended as a substitute for professional medical care. Always follow your healthcare professional's instructions.        A Walking Program for Peripheral Arterial Disease (PAD)  Peripheral arterial disease (PAD) is a condition where the arteries in the legs are severely damaged. When you have PAD, walking can be painful. So you may start to walk less. Walking less makes your leg muscles weaker. It then becomes harder and more painful to walk. A walking program can break this cycle. This sheet gives you tips on how to get started.     Walking farther without pain  Exercise strengthens leg muscles that are out of shape. It also trains these muscles to work with less oxygen. This helps your leg muscles work better even with reduced blood flow to your legs. When you have PAD, a walking program can be helpful. Your program can:  · Let you walk longer and farther without claudication. This is an ache in your legs during exercise that goes away with rest.  · Let you do more and be more active  · Add to  your overall health and well-being  · Help you control your blood sugar and blood pressure  · Help you become healthier with no risk and at little or no cost  Getting started  Your local hospital, vascular center, or cardiac rehab center may have a special walking program for people with PAD. If so, this is your best option. But if you cant find a program, or its not covered by insurance, you can still walk on your own. Follow these steps at each session:  · Step 1. Start at a pace that lets you walk for 5 to 10 minutes before you start to feel claudication. This feeling is unpleasant, but it doesnt hurt you. Keep going until the pain makes you feel that you need to stop.  · Step 2. Stop and rest for 3 to 5 minutes, just long enough for the pain to go away. You can rest standing or sitting. (Some people like to bring along a cane or a lightweight folding chair.)  · Step 3. Again walk at a pace that lets you walk for 5 to 10 minutes more before you feel pain. This may be slower than your starting pace in step 1. Then rest again.  · Step 4. Repeat this process until youve walked for 45 minutes. This should be about 60 to 80 minutes total, including resting time. You may not be able to do a full 45 minutes at first. Do as much as you can, and increase your time as you improve.  Making the most of your program  · Walk at least 3 times a week. Take no more than 2 days off between sessions. If you stop walking, even for a week or two, you can lose the health benefits of your program.  · Find a good place to walk. A treadmill or a track may be better at first. That way, you wont run the risk of going too far and finding that you cant walk back. Be sure to have a place to walk indoors in bad weather, such as a gym or a mall.  · Wear shoes with sturdy, flexible soles. The heel should fit without slipping. You should have room to wiggle your toes.  · Keep track of how long you walk. A pedometer will show your daily  progress. It can also show how much farther you can walk as time goes by.  · Ask a friend to keep you company. You may enjoy walking with someone else. Or you may want to make your walking sessions a time to relax by yourself.  · Make it fun. Listen to music while you walk and rest. Walk in a favorite park. Get to know the people at the gym, or the folks that you pass on your route. While you rest, you can window-shop, read, or feed the birds. Do whatever will help you enjoy your exercise sessions.  Date Last Reviewed: 6/1/2016 © 2000-2017 [employer]. [address] [place] [address], [place] [address]. All rights reserved. This information is not intended as a substitute for professional medical care. Always follow your healthcare professional's instructions.          Carotid Artery Problems: Blockage     A healthy carotid artery lets blood flow easily to the brain.   The blood carries oxygen and nutrients throughout the body. The carotid arteries are large blood vessels that carry blood to the brain. Certain health problems can make the inside of the carotid arteries narrow and rough. Over time, this damage increases the chances of having a stroke. A stroke is a sudden loss of brain function.   Open carotid arteries  In a healthy carotid artery, the inside of the artery is open. The lining of the artery wall is also smooth. This lets blood flow freely from the heart to the brain. The brain gets all the oxygen and nutrients it needs to function well.  Narrowed carotid arteries  Health factors, such as high blood pressure, high cholesterol, smoking, and diabetes, can damage artery walls and make them rough. This allows cholesterol and other particles in the blood to stick to the artery walls and form plaque or fatty deposits. As the plaque builds up, it can narrow the artery. Blood may also collect on the plaque and form blood clots.  How a stroke happens     Emboli can enter the bloodstream and travel to the  brain. Brain tissue is damaged when emboli block arteries in the brain.   A stroke can happen when plaque in the carotid artery ruptures. This can allow small pieces of plaque to break off into the bloodstream. At the same time, rupture can make more blood clots. The pieces of plaque and tiny blood clots or emboli can flow in the blood until they get stuck in a small blood vessel in the brain. This blocks blood flow to part of the brain and causes a stroke.  Date Last Reviewed: 6/8/2015 © 2000-2017 [employer]. [address] [place] [id number]. All rights reserved. This information is not intended as a substitute for professional medical care. Always follow your healthcare professional's instructions.

## 2019-11-02 ENCOUNTER — PATIENT OUTREACH (OUTPATIENT)
Dept: ADMINISTRATIVE | Facility: OTHER | Age: 79
End: 2019-11-02

## 2019-11-20 DIAGNOSIS — J44.9 CHRONIC OBSTRUCTIVE PULMONARY DISEASE, UNSPECIFIED COPD TYPE: ICD-10-CM

## 2019-11-20 RX ORDER — ALBUTEROL SULFATE 90 UG/1
AEROSOL, METERED RESPIRATORY (INHALATION)
Qty: 8.5 INHALER | Refills: 2 | Status: SHIPPED | OUTPATIENT
Start: 2019-11-20 | End: 2021-04-13 | Stop reason: SDUPTHER

## 2019-11-22 DIAGNOSIS — M79.2 NEUROPATHIC PAIN OF RIGHT LOWER EXTREMITY: ICD-10-CM

## 2019-11-25 RX ORDER — GABAPENTIN 100 MG/1
CAPSULE ORAL
Qty: 90 CAPSULE | Refills: 0 | Status: SHIPPED | OUTPATIENT
Start: 2019-11-25 | End: 2020-01-08

## 2020-01-08 DIAGNOSIS — M79.2 NEUROPATHIC PAIN OF RIGHT LOWER EXTREMITY: ICD-10-CM

## 2020-01-08 RX ORDER — GABAPENTIN 100 MG/1
CAPSULE ORAL
Qty: 90 CAPSULE | Refills: 0 | Status: SHIPPED | OUTPATIENT
Start: 2020-01-08 | End: 2020-02-14

## 2020-01-10 ENCOUNTER — OFFICE VISIT (OUTPATIENT)
Dept: FAMILY MEDICINE | Facility: CLINIC | Age: 80
End: 2020-01-10
Payer: MEDICARE

## 2020-01-10 VITALS
SYSTOLIC BLOOD PRESSURE: 122 MMHG | HEART RATE: 105 BPM | BODY MASS INDEX: 21.61 KG/M2 | OXYGEN SATURATION: 99 % | RESPIRATION RATE: 25 BRPM | WEIGHT: 183 LBS | TEMPERATURE: 98 F | DIASTOLIC BLOOD PRESSURE: 80 MMHG | HEIGHT: 77 IN

## 2020-01-10 DIAGNOSIS — M54.9 UPPER BACK PAIN: ICD-10-CM

## 2020-01-10 DIAGNOSIS — J44.1 COPD WITH ACUTE EXACERBATION: Primary | ICD-10-CM

## 2020-01-10 PROCEDURE — 1125F AMNT PAIN NOTED PAIN PRSNT: CPT | Mod: S$GLB,,, | Performed by: NURSE PRACTITIONER

## 2020-01-10 PROCEDURE — 1125F PR PAIN SEVERITY QUANTIFIED, PAIN PRESENT: ICD-10-PCS | Mod: S$GLB,,, | Performed by: NURSE PRACTITIONER

## 2020-01-10 PROCEDURE — 3079F PR MOST RECENT DIASTOLIC BLOOD PRESSURE 80-89 MM HG: ICD-10-PCS | Mod: CPTII,S$GLB,, | Performed by: NURSE PRACTITIONER

## 2020-01-10 PROCEDURE — 99499 RISK ADDL DX/OHS AUDIT: ICD-10-PCS | Mod: S$GLB,,, | Performed by: NURSE PRACTITIONER

## 2020-01-10 PROCEDURE — 99214 PR OFFICE/OUTPT VISIT, EST, LEVL IV, 30-39 MIN: ICD-10-PCS | Mod: S$GLB,,, | Performed by: NURSE PRACTITIONER

## 2020-01-10 PROCEDURE — 99999 PR PBB SHADOW E&M-EST. PATIENT-LVL IV: ICD-10-PCS | Mod: PBBFAC,,, | Performed by: NURSE PRACTITIONER

## 2020-01-10 PROCEDURE — 3074F PR MOST RECENT SYSTOLIC BLOOD PRESSURE < 130 MM HG: ICD-10-PCS | Mod: CPTII,S$GLB,, | Performed by: NURSE PRACTITIONER

## 2020-01-10 PROCEDURE — 1101F PR PT FALLS ASSESS DOC 0-1 FALLS W/OUT INJ PAST YR: ICD-10-PCS | Mod: CPTII,S$GLB,, | Performed by: NURSE PRACTITIONER

## 2020-01-10 PROCEDURE — 1101F PT FALLS ASSESS-DOCD LE1/YR: CPT | Mod: CPTII,S$GLB,, | Performed by: NURSE PRACTITIONER

## 2020-01-10 PROCEDURE — 1159F PR MEDICATION LIST DOCUMENTED IN MEDICAL RECORD: ICD-10-PCS | Mod: S$GLB,,, | Performed by: NURSE PRACTITIONER

## 2020-01-10 PROCEDURE — 99499 UNLISTED E&M SERVICE: CPT | Mod: S$GLB,,, | Performed by: NURSE PRACTITIONER

## 2020-01-10 PROCEDURE — 99999 PR PBB SHADOW E&M-EST. PATIENT-LVL IV: CPT | Mod: PBBFAC,,, | Performed by: NURSE PRACTITIONER

## 2020-01-10 PROCEDURE — 3079F DIAST BP 80-89 MM HG: CPT | Mod: CPTII,S$GLB,, | Performed by: NURSE PRACTITIONER

## 2020-01-10 PROCEDURE — 99214 OFFICE O/P EST MOD 30 MIN: CPT | Mod: S$GLB,,, | Performed by: NURSE PRACTITIONER

## 2020-01-10 PROCEDURE — 3074F SYST BP LT 130 MM HG: CPT | Mod: CPTII,S$GLB,, | Performed by: NURSE PRACTITIONER

## 2020-01-10 PROCEDURE — 1159F MED LIST DOCD IN RCRD: CPT | Mod: S$GLB,,, | Performed by: NURSE PRACTITIONER

## 2020-01-10 RX ORDER — TIZANIDINE 4 MG/1
4 TABLET ORAL EVERY 8 HOURS PRN
Qty: 30 TABLET | Refills: 0 | Status: SHIPPED | OUTPATIENT
Start: 2020-01-10 | End: 2020-01-20

## 2020-01-10 RX ORDER — PREDNISONE 20 MG/1
60 TABLET ORAL DAILY
Qty: 15 TABLET | Refills: 0 | Status: SHIPPED | OUTPATIENT
Start: 2020-01-10 | End: 2020-01-28 | Stop reason: ALTCHOICE

## 2020-01-10 RX ORDER — PANTOPRAZOLE SODIUM 40 MG/1
40 TABLET, DELAYED RELEASE ORAL
COMMUNITY
End: 2020-01-28

## 2020-01-10 RX ORDER — RAMIPRIL 2.5 MG/1
2.5 CAPSULE ORAL
COMMUNITY
Start: 2018-10-02 | End: 2020-01-28 | Stop reason: SDUPTHER

## 2020-01-10 RX ORDER — DOXYCYCLINE HYCLATE 100 MG
100 TABLET ORAL 2 TIMES DAILY
Qty: 14 TABLET | Refills: 0 | Status: SHIPPED | OUTPATIENT
Start: 2020-01-10 | End: 2020-01-28 | Stop reason: ALTCHOICE

## 2020-01-10 NOTE — PROGRESS NOTES
Routine Office Visit    Patient Name: Regan Owensbrook    : 1940  MRN: [id number]    Chief Complaint:  Nasal congestion, cough, upper back pain    Subjective:  Regan is a 79 y.o. male who presents today for:    1.  Patient with COPD and chronic MAC infection reporting with his daughter today for productive cough x2 weeks.  Has history of COPD report reports that the productivity of this cough is worse than normal.  Also endorses nasal congestion, runny nose, and postnasal drip in the last 2 weeks as well.  He states that the coughing has gotten so bad that now he is having bilateral upper back pain only when coughing as well as muscle soreness of the upper back.  He states that his shortness of breath is at baseline and denies any fevers, chills, or wheezing.  Denies any chest pain or palpitations.  Denies any nausea, vomiting, diarrhea, or abdominal pain. Reports compliance with his daily Breo and only has to use his albuterol once or twice per week.  He has not taken any antibiotics for this chronic MAC infection because his daughter states that the antibiotics caused him to get ulcerative colitis.  He is getting entyvio infusions for his UC.  Denies any significant nausea, vomiting, diarrhea, or abdominal pain. Per his daughter who is with him today the patient does have a follow-up appointment with infectious disease on .  Patient is known to me and this is the extent of his concerns at this time.    Past Medical History  Past Medical History:   Diagnosis Date    Arthritis     COPD (chronic obstructive pulmonary disease)     Diabetes mellitus type II     Hypertension     Tuberculosis     Ulcerative colitis        Past Surgical History  Past Surgical History:   Procedure Laterality Date    COLONOSCOPY N/A 2018    Procedure: COLONOSCOPY;  Surgeon: Roel Mendez MD;  Location: [facility];  Service: Endoscopy;  Laterality: N/A;  Pt confirmend appt.    melanoma         Family  "History  Family History   Problem Relation Age of Onset    Cancer Father         Tuberculosis.  Secondary scar was neoplastic.       Social History  Social History     Socioeconomic History    Marital status:      Spouse name: Not on file    Number of children: Not on file    Years of education: Not on file    Highest education level: Not on file   Occupational History    Not on file   Social Needs    Financial resource strain: Not on file    Food insecurity:     Worry: Not on file     Inability: Not on file    Transportation needs:     Medical: Not on file     Non-medical: Not on file   Tobacco Use    Smoking status: Former Smoker     Types: Cigarettes    Smokeless tobacco: Never Used    Tobacco comment: uses cigars on ocassion   Substance and Sexual Activity    Alcohol use: Yes     Comment: socially    Drug use: No    Sexual activity: Not on file   Lifestyle    Physical activity:     Days per week: Not on file     Minutes per session: Not on file    Stress: Not on file   Relationships    Social connections:     Talks on phone: Not on file     Gets together: Not on file     Attends Jainism service: Not on file     Active member of club or organization: Not on file     Attends meetings of clubs or organizations: Not on file     Relationship status: Not on file   Other Topics Concern    Not on file   Social History Narrative    Not on file       Current Medications  Current Outpatient Medications on File Prior to Visit   Medication Sig Dispense Refill    albuterol (PROVENTIL/VENTOLIN HFA) 90 mcg/actuation inhaler INHALE 2 PUFFS BY MOUTH EVERY 6 HOURS AS NEEDED FOR WHEEZE 8.5 Inhaler 2    amLODIPine (NORVASC) 2.5 MG tablet TAKE 1 TABLET BY MOUTH EVERY DAY 90 tablet 3    BD ULTRA-FINE TOMÁS PEN NEEDLES 32 gauge x 5/32" Ndle USE QID UTD  5    blood sugar diagnostic (TRUE METRIX GLUCOSE TEST STRIP) Strp Test blood sugar 3 times daily 300 strip 3    budesonide (UCERIS) 9 mg TaDE Take 6 mg " by mouth.      fluticasone furoate-vilanterol (BREO) 200-25 mcg/dose DsDv diskus inhaler Inhale 1 puff into the lungs.      fluticasone propionate (FLONASE) 50 mcg/actuation nasal spray SPRAY 1 SPRAY (50 MCG TOTAL) BY EACH NARE ROUTE ONCE DAILY. 16 mL 0    fluticasone-umeclidin-vilanter (TRELEGY ELLIPTA) 100-62.5-25 mcg DsDv Inhale 1 puff into the lungs once daily. 60 each 1    furosemide (LASIX) 20 MG tablet TAKE 1 TABLET (20 MG TOTAL) BY MOUTH ONCE DAILY. 90 tablet 3    gabapentin (NEURONTIN) 100 MG capsule TAKE 2 CAPSULES BY MOUTH EVERY EVENING 90 capsule 0    glipiZIDE (GLUCOTROL) 5 MG tablet TAKE 1 TABLET BY MOUTH EVERY DAY WITH BREAKFAST 90 tablet 1    ibuprofen (ADVIL,MOTRIN) 800 MG tablet Take 1 tablet (800 mg total) by mouth every 8 (eight) hours as needed for Pain (neck pain). 30 tablet 0    isosorbide mononitrate (IMDUR) 30 MG 24 hr tablet Take 30 mg by mouth.      lancets 28 gauge Misc 1 lancet by Misc.(Non-Drug; Combo Route) route 3 (three) times daily. True Metrix lancets 300 each 3    multivitamin capsule Take 1 capsule by mouth once daily.      pantoprazole (PROTONIX) 40 MG tablet Take 40 mg by mouth once daily.  5    pantoprazole (PROTONIX) 40 MG tablet Take 40 mg by mouth.      ramipril (ALTACE) 2.5 MG capsule TAKE 1 CAPSULE (2.5 MG TOTAL) BY MOUTH ONCE DAILY. 90 capsule 3    ramipril (ALTACE) 2.5 MG capsule Take 2.5 mg by mouth.       No current facility-administered medications on file prior to visit.        Allergies   Review of patient's allergies indicates:  No Known Allergies    Review of Systems (Pertinent positives)  Review of Systems   Constitutional: Negative for chills, diaphoresis, fever, malaise/fatigue and weight loss.   HENT: Positive for congestion. Negative for ear discharge, ear pain, hearing loss, nosebleeds, sinus pain, sore throat and tinnitus.    Eyes: Negative.    Respiratory: Positive for cough (worsened from usual), sputum production (worsened from usual) and  "shortness of breath (baseline). Negative for hemoptysis, wheezing and stridor.    Cardiovascular: Negative for chest pain, palpitations, orthopnea and claudication.   Gastrointestinal: Negative.    Genitourinary: Negative.    Musculoskeletal: Positive for back pain and myalgias. Negative for falls, joint pain and neck pain.   Skin: Negative.    Neurological: Negative.    Endo/Heme/Allergies: Negative.    Psychiatric/Behavioral: Negative.        /80 (BP Location: Right arm, Patient Position: Sitting, BP Method: Medium (Manual))   Pulse 105   Temp 97.5 °F (36.4 °C) (Oral)   Resp (!) 25   Ht 6' 5" (1.956 m)   Wt 83 kg (182 lb 15.7 oz)   SpO2 99%   BMI 21.70 kg/m²     Physical Exam   Constitutional: He is oriented to person, place, and time. He appears well-developed and well-nourished.  Non-toxic appearance. He does not have a sickly appearance. He does not appear ill. No distress.   HENT:   Head: Normocephalic and atraumatic.   Right Ear: Tympanic membrane, external ear and ear canal normal.   Left Ear: Tympanic membrane, external ear and ear canal normal.   Nose: Mucosal edema and rhinorrhea present.   Mouth/Throat: Uvula is midline, oropharynx is clear and moist and mucous membranes are normal. Tonsils are 0 on the right. Tonsils are 0 on the left. No tonsillar exudate.   Eyes: Pupils are equal, round, and reactive to light. Conjunctivae and EOM are normal.   Neck: Normal range of motion. Neck supple. No JVD present.   Cardiovascular: Normal rate, regular rhythm, normal heart sounds and intact distal pulses. Exam reveals no gallop and no friction rub.   No murmur heard.  Pulmonary/Chest: No accessory muscle usage or stridor. Tachypnea noted. No respiratory distress. He has decreased breath sounds in the right middle field, the right lower field, the left middle field and the left lower field. He has no wheezes. He has no rhonchi. He has no rales. He exhibits tenderness.   Breath sounds decreased in " bilateral middle and lower lung fields but patient has good air movement to bases bilaterally; no wheezing no rhonchi no rales no other adventitious sounds   Abdominal: Soft. Bowel sounds are normal. He exhibits no distension and no mass. There is no tenderness. There is no rebound and no guarding. No hernia.   Musculoskeletal:        Cervical back: Normal.        Thoracic back: Normal.        Lumbar back: Normal.        Back:    Lymphadenopathy:     He has no cervical adenopathy.   Neurological: He is alert and oriented to person, place, and time.   Skin: Skin is warm and dry. Capillary refill takes less than 2 seconds. He is not diaphoretic.        Assessment/Plan:  Regan Alvarez is a 79 y.o. male who presents today for :    Regan was seen today for nasal congestion.    Diagnoses and all orders for this visit:    COPD with acute exacerbation  -     doxycycline (VIBRA-TABS) 100 MG tablet; Take 1 tablet (100 mg total) by mouth 2 (two) times daily.  -     predniSONE (DELTASONE) 20 MG tablet; Take 3 tablets (60 mg total) by mouth once daily.    Shortness of breath at baseline.  Patient mildly tachypneic with good air movement to bases bilaterally.  No wheezing no rhonchi no rales on examination.  Treat as standard COPD exacerbation with doxycycline and prednisone.  Warned patient about potential side effects of prednisone and doxycycline.  Instructed patient not to take doxycycline within 1 hr of milk and or multivitamins.  Warned patient about affect of light sensitivity/sunburn with doxycycline.  Continue use of p.r.n. albuterol as well as daily Breo.    Upper back pain  -     tiZANidine (ZANAFLEX) 4 MG tablet; Take 1 tablet (4 mg total) by mouth every 8 (eight) hours as needed.    Muscle strain from coughing.  Patient's daughter is requesting a muscle relaxer for the patient's symptoms.  Will send p.r.n. muscle relaxer. Patient was educated regarding the side effects of muscle relaxers, including drowsiness,  dizziness, and sleepiness.  The patient was educated to not drive or operate heavy machinery until the patient knows how this medication affects him/her.  I did warn the patient and his daughter not to take the muscle relaxer with the gabapentin at night as this can cause worsening dizziness.  Patient's daughter agrees to only give the patient the muscle relaxer during the day.  Recommended topical heat as well.    If his breathing worsens or if he develops fevers, chest pain, or other acutely worsening signs or symptoms he needs to go to the emergency room.  Both patient and his daughter verbalized understanding of instructions.        This office note has been dictated.  This dictation has been generated using M-Modal Fluency Direct dictation; some phonetic errors may occur.   My collaborating physician is Dr. Lm Ferrer.

## 2020-01-26 ENCOUNTER — PATIENT OUTREACH (OUTPATIENT)
Dept: ADMINISTRATIVE | Facility: OTHER | Age: 80
End: 2020-01-26

## 2020-01-28 ENCOUNTER — TELEPHONE (OUTPATIENT)
Dept: FAMILY MEDICINE | Facility: CLINIC | Age: 80
End: 2020-01-28

## 2020-01-28 ENCOUNTER — HOSPITAL ENCOUNTER (OUTPATIENT)
Dept: RADIOLOGY | Facility: HOSPITAL | Age: 80
Discharge: HOME | End: 2020-01-28
Attending: INTERNAL MEDICINE
Payer: MEDICARE

## 2020-01-28 ENCOUNTER — TELEPHONE (OUTPATIENT)
Dept: INFECTIOUS DISEASES | Facility: CLINIC | Age: 80
End: 2020-01-28

## 2020-01-28 ENCOUNTER — HOSPITAL ENCOUNTER (OUTPATIENT)
Facility: HOSPITAL | Age: 80
Discharge: HOME | End: 2020-01-29
Attending: EMERGENCY MEDICINE | Admitting: EMERGENCY MEDICINE
Payer: MEDICARE

## 2020-01-28 ENCOUNTER — OFFICE VISIT (OUTPATIENT)
Dept: INFECTIOUS DISEASES | Facility: CLINIC | Age: 80
End: 2020-01-28
Payer: MEDICARE

## 2020-01-28 VITALS
BODY MASS INDEX: 21.61 KG/M2 | HEIGHT: 77 IN | HEART RATE: 104 BPM | DIASTOLIC BLOOD PRESSURE: 60 MMHG | SYSTOLIC BLOOD PRESSURE: 90 MMHG | TEMPERATURE: 98 F | WEIGHT: 183 LBS

## 2020-01-28 DIAGNOSIS — R06.02 SOB (SHORTNESS OF BREATH): ICD-10-CM

## 2020-01-28 DIAGNOSIS — D64.9 ANEMIA, UNSPECIFIED TYPE: ICD-10-CM

## 2020-01-28 DIAGNOSIS — R05.9 COUGH: ICD-10-CM

## 2020-01-28 DIAGNOSIS — R07.9 CHEST PAIN, UNSPECIFIED TYPE: ICD-10-CM

## 2020-01-28 DIAGNOSIS — I95.9 HYPOTENSION, UNSPECIFIED HYPOTENSION TYPE: Primary | ICD-10-CM

## 2020-01-28 DIAGNOSIS — K51.00 ULCERATIVE PANCOLITIS WITHOUT COMPLICATION: Chronic | ICD-10-CM

## 2020-01-28 DIAGNOSIS — D64.9 SYMPTOMATIC ANEMIA: Primary | ICD-10-CM

## 2020-01-28 DIAGNOSIS — S22.000A COMPRESSION FRACTURE OF THORACIC VERTEBRA, INITIAL ENCOUNTER, UNSPECIFIED THORACIC VERTEBRAL LEVEL: ICD-10-CM

## 2020-01-28 DIAGNOSIS — J42 CHRONIC BRONCHITIS, UNSPECIFIED CHRONIC BRONCHITIS TYPE: Chronic | ICD-10-CM

## 2020-01-28 DIAGNOSIS — E11.9 TYPE 2 DIABETES MELLITUS WITHOUT COMPLICATION, WITH LONG-TERM CURRENT USE OF INSULIN: Chronic | ICD-10-CM

## 2020-01-28 DIAGNOSIS — K51.011 ULCERATIVE PANCOLITIS WITH RECTAL BLEEDING: ICD-10-CM

## 2020-01-28 DIAGNOSIS — A31.0 PULMONARY MYCOBACTERIUM AVIUM COMPLEX (MAC) INFECTION: Chronic | ICD-10-CM

## 2020-01-28 DIAGNOSIS — A31.0 PULMONARY MYCOBACTERIUM AVIUM COMPLEX (MAC) INFECTION: ICD-10-CM

## 2020-01-28 DIAGNOSIS — Z79.4 TYPE 2 DIABETES MELLITUS WITHOUT COMPLICATION, WITH LONG-TERM CURRENT USE OF INSULIN: Chronic | ICD-10-CM

## 2020-01-28 DIAGNOSIS — I10 ESSENTIAL HYPERTENSION: Chronic | ICD-10-CM

## 2020-01-28 PROBLEM — K92.1 BLOOD IN STOOL: Status: RESOLVED | Noted: 2018-01-22 | Resolved: 2020-01-28

## 2020-01-28 LAB
ABO + RH BLD: NORMAL
BLD GP AB SCN CELLS X3 SERPL QL: NORMAL
BLD PROD TYP BPU: NORMAL
BLD PROD TYP BPU: NORMAL
BLOOD UNIT EXPIRATION DATE: NORMAL
BLOOD UNIT EXPIRATION DATE: NORMAL
BLOOD UNIT TYPE CODE: 5100
BLOOD UNIT TYPE CODE: 5100
BLOOD UNIT TYPE: NORMAL
BLOOD UNIT TYPE: NORMAL
BNP SERPL-MCNC: 76 PG/ML (ref 0–99)
CODING SYSTEM: NORMAL
CODING SYSTEM: NORMAL
DISPENSE STATUS: NORMAL
DISPENSE STATUS: NORMAL
POCT GLUCOSE: 154 MG/DL (ref 70–110)
TRANS ERYTHROCYTES VOL PATIENT: NORMAL ML
TRANS ERYTHROCYTES VOL PATIENT: NORMAL ML
TROPONIN I SERPL DL<=0.01 NG/ML-MCNC: <0.006 NG/ML (ref 0–0.03)

## 2020-01-28 PROCEDURE — 63600175 PHARM REV CODE 636 W HCPCS: Performed by: HOSPITALIST

## 2020-01-28 PROCEDURE — 1125F AMNT PAIN NOTED PAIN PRSNT: CPT | Mod: S$GLB,,, | Performed by: INTERNAL MEDICINE

## 2020-01-28 PROCEDURE — 86920 COMPATIBILITY TEST SPIN: CPT | Mod: 59

## 2020-01-28 PROCEDURE — 99499 RISK ADDL DX/OHS AUDIT: ICD-10-PCS | Mod: S$GLB,,, | Performed by: INTERNAL MEDICINE

## 2020-01-28 PROCEDURE — 84484 ASSAY OF TROPONIN QUANT: CPT

## 2020-01-28 PROCEDURE — 25000003 PHARM REV CODE 250: Performed by: HOSPITALIST

## 2020-01-28 PROCEDURE — 3074F PR MOST RECENT SYSTOLIC BLOOD PRESSURE < 130 MM HG: ICD-10-PCS | Mod: CPTII,S$GLB,, | Performed by: INTERNAL MEDICINE

## 2020-01-28 PROCEDURE — 72070 XR THORACIC SPINE AP LATERAL: ICD-10-PCS | Mod: 26,,, | Performed by: RADIOLOGY

## 2020-01-28 PROCEDURE — 1101F PT FALLS ASSESS-DOCD LE1/YR: CPT | Mod: CPTII,S$GLB,, | Performed by: INTERNAL MEDICINE

## 2020-01-28 PROCEDURE — 3078F PR MOST RECENT DIASTOLIC BLOOD PRESSURE < 80 MM HG: ICD-10-PCS | Mod: CPTII,S$GLB,, | Performed by: INTERNAL MEDICINE

## 2020-01-28 PROCEDURE — 1159F PR MEDICATION LIST DOCUMENTED IN MEDICAL RECORD: ICD-10-PCS | Mod: S$GLB,,, | Performed by: INTERNAL MEDICINE

## 2020-01-28 PROCEDURE — 99999 PR PBB SHADOW E&M-EST. PATIENT-LVL III: CPT | Mod: PBBFAC,,, | Performed by: INTERNAL MEDICINE

## 2020-01-28 PROCEDURE — G0378 HOSPITAL OBSERVATION PER HR: HCPCS

## 2020-01-28 PROCEDURE — 87015 SPECIMEN INFECT AGNT CONCNTJ: CPT

## 2020-01-28 PROCEDURE — 99215 PR OFFICE/OUTPT VISIT, EST, LEVL V, 40-54 MIN: ICD-10-PCS | Mod: S$GLB,,, | Performed by: INTERNAL MEDICINE

## 2020-01-28 PROCEDURE — 1101F PR PT FALLS ASSESS DOC 0-1 FALLS W/OUT INJ PAST YR: ICD-10-PCS | Mod: CPTII,S$GLB,, | Performed by: INTERNAL MEDICINE

## 2020-01-28 PROCEDURE — 87206 SMEAR FLUORESCENT/ACID STAI: CPT

## 2020-01-28 PROCEDURE — 93010 ELECTROCARDIOGRAM REPORT: CPT | Mod: ,,, | Performed by: INTERNAL MEDICINE

## 2020-01-28 PROCEDURE — 3074F SYST BP LT 130 MM HG: CPT | Mod: CPTII,S$GLB,, | Performed by: INTERNAL MEDICINE

## 2020-01-28 PROCEDURE — 1159F MED LIST DOCD IN RCRD: CPT | Mod: S$GLB,,, | Performed by: INTERNAL MEDICINE

## 2020-01-28 PROCEDURE — 99499 UNLISTED E&M SERVICE: CPT | Mod: S$GLB,,, | Performed by: INTERNAL MEDICINE

## 2020-01-28 PROCEDURE — 87116 MYCOBACTERIA CULTURE: CPT

## 2020-01-28 PROCEDURE — 99285 EMERGENCY DEPT VISIT HI MDM: CPT | Mod: 25

## 2020-01-28 PROCEDURE — 94640 AIRWAY INHALATION TREATMENT: CPT

## 2020-01-28 PROCEDURE — 86850 RBC ANTIBODY SCREEN: CPT

## 2020-01-28 PROCEDURE — 83880 ASSAY OF NATRIURETIC PEPTIDE: CPT

## 2020-01-28 PROCEDURE — 94761 N-INVAS EAR/PLS OXIMETRY MLT: CPT

## 2020-01-28 PROCEDURE — 96374 THER/PROPH/DIAG INJ IV PUSH: CPT | Mod: 59

## 2020-01-28 PROCEDURE — 93010 EKG 12-LEAD: ICD-10-PCS | Mod: ,,, | Performed by: INTERNAL MEDICINE

## 2020-01-28 PROCEDURE — 36430 TRANSFUSION BLD/BLD COMPNT: CPT

## 2020-01-28 PROCEDURE — 3078F DIAST BP <80 MM HG: CPT | Mod: CPTII,S$GLB,, | Performed by: INTERNAL MEDICINE

## 2020-01-28 PROCEDURE — 93005 ELECTROCARDIOGRAM TRACING: CPT

## 2020-01-28 PROCEDURE — 25000242 PHARM REV CODE 250 ALT 637 W/ HCPCS: Performed by: EMERGENCY MEDICINE

## 2020-01-28 PROCEDURE — 72070 X-RAY EXAM THORAC SPINE 2VWS: CPT | Mod: 26,,, | Performed by: RADIOLOGY

## 2020-01-28 PROCEDURE — 99999 PR PBB SHADOW E&M-EST. PATIENT-LVL III: ICD-10-PCS | Mod: PBBFAC,,, | Performed by: INTERNAL MEDICINE

## 2020-01-28 PROCEDURE — 99215 OFFICE O/P EST HI 40 MIN: CPT | Mod: S$GLB,,, | Performed by: INTERNAL MEDICINE

## 2020-01-28 PROCEDURE — P9021 RED BLOOD CELLS UNIT: HCPCS

## 2020-01-28 PROCEDURE — 1125F PR PAIN SEVERITY QUANTIFIED, PAIN PRESENT: ICD-10-PCS | Mod: S$GLB,,, | Performed by: INTERNAL MEDICINE

## 2020-01-28 PROCEDURE — 72070 X-RAY EXAM THORAC SPINE 2VWS: CPT | Mod: TC

## 2020-01-28 RX ORDER — IBUPROFEN 200 MG
16 TABLET ORAL
Status: DISCONTINUED | OUTPATIENT
Start: 2020-01-28 | End: 2020-01-29 | Stop reason: HOSPADM

## 2020-01-28 RX ORDER — ISOSORBIDE MONONITRATE 30 MG/1
30 TABLET, EXTENDED RELEASE ORAL DAILY
Status: DISCONTINUED | OUTPATIENT
Start: 2020-01-29 | End: 2020-01-29 | Stop reason: HOSPADM

## 2020-01-28 RX ORDER — TALC
6 POWDER (GRAM) TOPICAL NIGHTLY PRN
Status: DISCONTINUED | OUTPATIENT
Start: 2020-01-28 | End: 2020-01-29 | Stop reason: HOSPADM

## 2020-01-28 RX ORDER — FUROSEMIDE 10 MG/ML
20 INJECTION INTRAMUSCULAR; INTRAVENOUS ONCE
Status: COMPLETED | OUTPATIENT
Start: 2020-01-28 | End: 2020-01-28

## 2020-01-28 RX ORDER — ACETAMINOPHEN 325 MG/1
650 TABLET ORAL EVERY 6 HOURS PRN
Status: DISCONTINUED | OUTPATIENT
Start: 2020-01-28 | End: 2020-01-29 | Stop reason: HOSPADM

## 2020-01-28 RX ORDER — DOXYCYCLINE HYCLATE 100 MG
100 TABLET ORAL EVERY 12 HOURS
Status: DISCONTINUED | OUTPATIENT
Start: 2020-01-28 | End: 2020-01-29 | Stop reason: HOSPADM

## 2020-01-28 RX ORDER — AMLODIPINE BESYLATE 2.5 MG/1
2.5 TABLET ORAL DAILY
Status: DISCONTINUED | OUTPATIENT
Start: 2020-01-29 | End: 2020-01-29 | Stop reason: HOSPADM

## 2020-01-28 RX ORDER — INSULIN ASPART 100 [IU]/ML
0-5 INJECTION, SOLUTION INTRAVENOUS; SUBCUTANEOUS
Status: DISCONTINUED | OUTPATIENT
Start: 2020-01-28 | End: 2020-01-29 | Stop reason: HOSPADM

## 2020-01-28 RX ORDER — IPRATROPIUM BROMIDE AND ALBUTEROL SULFATE 2.5; .5 MG/3ML; MG/3ML
3 SOLUTION RESPIRATORY (INHALATION)
Status: COMPLETED | OUTPATIENT
Start: 2020-01-28 | End: 2020-01-28

## 2020-01-28 RX ORDER — HYDROCODONE BITARTRATE AND ACETAMINOPHEN 500; 5 MG/1; MG/1
TABLET ORAL
Status: DISCONTINUED | OUTPATIENT
Start: 2020-01-28 | End: 2020-01-29 | Stop reason: HOSPADM

## 2020-01-28 RX ORDER — IBUPROFEN 200 MG
24 TABLET ORAL
Status: DISCONTINUED | OUTPATIENT
Start: 2020-01-28 | End: 2020-01-29 | Stop reason: HOSPADM

## 2020-01-28 RX ORDER — SODIUM CHLORIDE 0.9 % (FLUSH) 0.9 %
10 SYRINGE (ML) INJECTION
Status: DISCONTINUED | OUTPATIENT
Start: 2020-01-28 | End: 2020-01-29 | Stop reason: HOSPADM

## 2020-01-28 RX ORDER — IPRATROPIUM BROMIDE AND ALBUTEROL SULFATE 2.5; .5 MG/3ML; MG/3ML
3 SOLUTION RESPIRATORY (INHALATION)
Status: DISCONTINUED | OUTPATIENT
Start: 2020-01-29 | End: 2020-01-29 | Stop reason: HOSPADM

## 2020-01-28 RX ORDER — GABAPENTIN 100 MG/1
200 CAPSULE ORAL NIGHTLY
Status: DISCONTINUED | OUTPATIENT
Start: 2020-01-28 | End: 2020-01-29 | Stop reason: HOSPADM

## 2020-01-28 RX ORDER — GLUCAGON 1 MG
1 KIT INJECTION
Status: DISCONTINUED | OUTPATIENT
Start: 2020-01-28 | End: 2020-01-29 | Stop reason: HOSPADM

## 2020-01-28 RX ORDER — IPRATROPIUM BROMIDE AND ALBUTEROL SULFATE 2.5; .5 MG/3ML; MG/3ML
3 SOLUTION RESPIRATORY (INHALATION) EVERY 4 HOURS PRN
Status: DISCONTINUED | OUTPATIENT
Start: 2020-01-28 | End: 2020-01-29 | Stop reason: HOSPADM

## 2020-01-28 RX ORDER — PANTOPRAZOLE SODIUM 40 MG/1
40 TABLET, DELAYED RELEASE ORAL DAILY
Status: DISCONTINUED | OUTPATIENT
Start: 2020-01-29 | End: 2020-01-29 | Stop reason: HOSPADM

## 2020-01-28 RX ORDER — TIZANIDINE 2 MG/1
2 TABLET ORAL EVERY 8 HOURS PRN
Status: DISCONTINUED | OUTPATIENT
Start: 2020-01-28 | End: 2020-01-29 | Stop reason: HOSPADM

## 2020-01-28 RX ORDER — PANTOPRAZOLE SODIUM 40 MG/1
40 TABLET, DELAYED RELEASE ORAL DAILY
Qty: 30 TABLET | Refills: 11 | Status: SHIPPED | OUTPATIENT
Start: 2020-01-28 | End: 2021-01-27

## 2020-01-28 RX ORDER — GUAIFENESIN 100 MG/5ML
200 SOLUTION ORAL EVERY 4 HOURS PRN
Status: DISCONTINUED | OUTPATIENT
Start: 2020-01-28 | End: 2020-01-29 | Stop reason: HOSPADM

## 2020-01-28 RX ORDER — RAMIPRIL 2.5 MG/1
2.5 CAPSULE ORAL DAILY
Status: DISCONTINUED | OUTPATIENT
Start: 2020-01-29 | End: 2020-01-29 | Stop reason: HOSPADM

## 2020-01-28 RX ORDER — FUROSEMIDE 20 MG/1
20 TABLET ORAL DAILY
Status: DISCONTINUED | OUTPATIENT
Start: 2020-01-29 | End: 2020-01-29 | Stop reason: HOSPADM

## 2020-01-28 RX ADMIN — METHYLPREDNISOLONE SODIUM SUCCINATE 40 MG: 40 INJECTION, POWDER, FOR SOLUTION INTRAMUSCULAR; INTRAVENOUS at 10:01

## 2020-01-28 RX ADMIN — GABAPENTIN 200 MG: 100 CAPSULE ORAL at 10:01

## 2020-01-28 RX ADMIN — IPRATROPIUM BROMIDE AND ALBUTEROL SULFATE 3 ML: .5; 3 SOLUTION RESPIRATORY (INHALATION) at 04:01

## 2020-01-28 RX ADMIN — DOXYCYCLINE HYCLATE 100 MG: 100 TABLET, COATED ORAL at 10:01

## 2020-01-28 RX ADMIN — FUROSEMIDE 20 MG: 10 INJECTION, SOLUTION INTRAMUSCULAR; INTRAVENOUS at 10:01

## 2020-01-28 RX ADMIN — IPRATROPIUM BROMIDE AND ALBUTEROL SULFATE 3 ML: .5; 3 SOLUTION RESPIRATORY (INHALATION) at 08:01

## 2020-01-28 NOTE — TELEPHONE ENCOUNTER
----- Message from Ishaan Adamson MD sent at 1/28/2020 11:07 AM CST -----  Please schedule ov with me for next week. Okay to overbook  ----- Message -----  From: Getachew Valera MD  Sent: 1/28/2020  11:01 AM CST  To: MD Stu Matute   I saw him today and he is hypotensive 90/60. Not too symptomatic, but I told him to hold furosemide, amlodipine and ramipril and see you back in F/u SOON. Having spine, rib pain from coughing hard. CT chest and xray T spine ordered to evalutate other complaints   thanks  Brayden Valera

## 2020-01-28 NOTE — TELEPHONE ENCOUNTER
Called both number listed in patient's chart and there was no answer. Left a very detailed message on both voicemails. Informing pt that his hemoglobin in low that that it's urgent he goes to the ER.

## 2020-01-28 NOTE — ED PROVIDER NOTES
"Encounter Date: 1/28/2020    SCRIBE #1 NOTE: I, Carey Denney, am scribing for, and in the presence of,  Angely Holcomb MD. I have scribed the following portions of the note - Other sections scribed: HPI, ROS.       History     Chief Complaint   Patient presents with    Abnormal Lab     Pt sent from Dr. Reyez office. Had labs drawn today with Hgb 6.7. Pt reports cough and SOB. Also reports elevated white count. "They sent him over here to see if he's bleeding."      This is a 79 y.o. male with a PMHx of Pulmonary MAC infection (completed 2 years of triple antibiotic treatment), COPD, Anemia, and Ulcerative Colitis who presents to the ED complaining of SOB and productive cough that started 3 weeks ago. He reports that he visited Dr. Reyez today and was referred to the ED due to low H&H. He states that he visited his PCP on 1/10/2020 for the cough and was prescribed steroids for treatment, but it didn't provide any relief. He notes that he's also been having back pain that he believes is due to the coughing. He reports that the cough is worse at night and the SOB is worse with exertion. He notes having associated fatigue and generalized weakness. He states that he's been taking OTC cough medicine at night and using his inhaler for treatment. He denies having at home oxygen or a nebulizer. He states that he's had a blood transfusion in the past but does not recall the date, but says no transfusion in the last 3 years. He notes taking a muscle relaxer and Aspirin daily and this helps with back pain. He denies any allergies to medications. Denies fever, chest pain, melena, and blood in stool. He does report chronic diarrhea with ulcerative colitis. No other associated symptoms.    The history is provided by the patient. No  was used.     Review of patient's allergies indicates:  No Known Allergies  Past Medical History:   Diagnosis Date    Arthritis     COPD (chronic obstructive " pulmonary disease)     Diabetes mellitus type II     Hypertension     MAC     Ulcerative colitis      Past Surgical History:   Procedure Laterality Date    COLONOSCOPY N/A 1/22/2018    Procedure: COLONOSCOPY;  Surgeon: Roel Mendez MD;  Location: [facility];  Service: Endoscopy;  Laterality: N/A;  Pt confirmend appt.    melanoma      forehead     Family History   Problem Relation Age of Onset    Cancer Father         Tuberculosis.  Secondary scar was neoplastic.     Social History     Tobacco Use    Smoking status: Former Smoker     Types: Cigarettes    Smokeless tobacco: Never Used    Tobacco comment: uses cigars on ocassion   Substance Use Topics    Alcohol use: Yes     Comment: socially    Drug use: No     Review of Systems   Constitutional: Positive for fatigue. Negative for fever.   HENT: Negative for sore throat.    Respiratory: Positive for cough (productive) and shortness of breath.    Cardiovascular: Negative for chest pain.   Gastrointestinal: Negative for blood in stool and nausea.        (-) Melena   Genitourinary: Negative for dysuria.   Musculoskeletal: Positive for back pain.   Skin: Negative for rash.   Neurological: Positive for weakness (generalized).   Hematological: Does not bruise/bleed easily.       Physical Exam     Initial Vitals [01/28/20 1548]   BP Pulse Resp Temp SpO2   (!) 150/70 102 (!) 24 97.5 °F (36.4 °C) 96 %      MAP       --         Physical Exam    Nursing note and vitals reviewed.  Constitutional: He appears well-developed and well-nourished. He is not diaphoretic. He is active. No distress.   HENT:   Head: Normocephalic.   Eyes: Pupils are equal, round, and reactive to light. No scleral icterus.   Pale conjunctiva   Neck: Neck supple.   Cardiovascular: Normal rate and regular rhythm.   Pulmonary/Chest:   Patient with tachypnea, upper files with expiratory wheezes, able to speak in complete sentences   Abdominal: Soft. Bowel sounds are normal. He exhibits no distension.  There is no tenderness.   Genitourinary: Rectal exam shows guaiac negative stool. Guaiac negative stool. : Acceptable.  Genitourinary Comments: There are multiple external hemorrhoids that are noninflamed, no bleeding.  The skin on his buttocks is erythematous and dry and flaky.  No skin breakdown noted. Patient wears a diaper.   Musculoskeletal: He exhibits no edema.   Neurological: He is alert and oriented to person, place, and time. GCS score is 15. GCS eye subscore is 4. GCS verbal subscore is 5. GCS motor subscore is 6.   Skin: Skin is warm and dry. There is pallor.   Psychiatric: He has a normal mood and affect.         ED Course   Procedures  Labs Reviewed   TROPONIN I   B-TYPE NATRIURETIC PEPTIDE   TYPE & SCREEN   PREPARE RBC SOFT   PREPARE RBC SOFT     EKG Readings: (Independently Interpreted)   Normal sinus rhythm, rate 87 beats per minute, normal [place] interval, , no STEMI, no malignant dysrhythmia.       Imaging Results          X-Ray Chest AP Portable (Final result)  Result time 01/28/20 16:48:23    Final result by Garo Kelly MD (01/28/20 16:48:23)                 Impression:      Grossly stable abnormal radiographic appearance of the chest as above without radiographic acute intrathoracic process seen or definite new focal opacity.      Electronically signed by: Garo Kelly MD  Date:    01/28/2020  Time:    16:48             Narrative:    EXAMINATION:  XR CHEST AP PORTABLE    CLINICAL HISTORY:  cough;    TECHNIQUE:  Single frontal view of the chest was performed.    COMPARISON:  Chest radiograph 10/02/2017 and CT thorax 01/30/2019    FINDINGS:  Monitoring leads overlie the chest.  Patient is rotated.    Cardiomediastinal silhouette appears stable without evidence of failure noting calcific atherosclerosis of the aorta.    Allowing for differences in positioning and technique, no significant interval change in the cardiopulmonary status from most recent prior including known  cavitary lesion within the right upper lobe with adjacent atelectatic changes and architectural distortion.  There is grossly similar chronic suspected loculated right sided pleural effusion with right basilar platelike scarring/atelectasis.  Grossly similar tubular opacity with calcification in the left upper lobe favored to represent an ectatic airway with chronic calcified inspissated secretions.  No new focal opacity, left-sided pleural effusion or pneumothorax definitively seen.  No acute osseous process seen.  PA and lateral views can be obtained.                                 Medical Decision Making:   Initial Assessment:   79-year-old male with history of COPD, history of MAC status post treatment, anemia, presents for evaluation of anemia.  He endorses a cough for the past several weeks, no fever, no chest pain. He was evaluated by his infectious disease doctor today and was referred to the ER for anemia.  He also had an x-ray that shows compression fracture of the thoracic spine.  On exam, patient initially with mild respiratory distress and wheezes. He appears pale.  There is erythematous skin on his buttocks consistent with a contact dermatitis.  He has multiple external hemorrhoids, they are not inflamed or actively bleeding.  His stool is negative for occult blood.  After breathing treatment, his respiratory status is improved and he is no longer tachypneic or wheezing.  His O2 sats are 100% on room air.  Labs reviewed from today show H/H 6.7/24.9. No recent values for comparison but in the past Hgb levels 8-9. Cardiac markers negative. CXR abnormal but grossly stable. Patient consented for blood transfusion.  Will transfuse 2 units and admit to observation, will continue Q 4 hr DuoNeb treatments if needed.  Clinical Tests:   Radiological Study: Ordered and Reviewed  Medical Tests: Ordered and Reviewed            Scribe Attestation:   Scribe #1: I performed the above scribed service and the  documentation accurately describes the services I performed. I attest to the accuracy of the note.            ED Course as of Jan 28 1938   Tue Jan 28, 2020 1825 Case discussed with Emigdio Denis for placement in obs unit.     [LH]      ED Course User Index  [LH] Angely Holcomb MD                Clinical Impression:       ICD-10-CM ICD-9-CM   1. Symptomatic anemia D64.9 285.9   2. SOB (shortness of breath) R06.02 786.05   3. Cough R05 786.2            Scribe Attestation: I, Angely Holcomb MD, personally performed the services described in this documentation. All medical record entries made by the scribe were at my direction and in my presence. I have reviewed the chart and agree that the record reflects my personal performance and is accurate and complete.                 Angely Holcomb MD  01/28/20 2000

## 2020-01-28 NOTE — PROGRESS NOTES
Subjective:      Patient ID: Regan Alvarez is a 79 y.o. male.    Chief Complaint:  Increased cough and severe back/chest pain for 2 weeks      History of Present Illness    Pt was treated in past for pulmonary MAC infection and severe COPD. He was treated for MAC from Jan 2016 to Dec 2017 with a triple antibiotic regimen. He was AFB culture negative at the end of the treatment period. He has had an increase in his cough over the last month or so and has had violent coughing paroxysms with subsequent severe mid thoracic spine pain with coughing. He saw Dr. Stu Adamson his PCP on 1/10 and was given rx with 5 day course of prednisone, 7 days of doxycyline and zanaflex for the pain. He is no better and came in to see me. He denied increased dyspnea or wheezing, but has a fairly severe deep cough which is productive.  He had very low BP (90/60) and has not taken his BP meds today. Denies blood loss or dark stools.    Labs came back of concern with severe anemia:  Lab Results   Component Value Date    WBC 14.21 (H) 01/28/2020    HGB 6.7 (L) 01/28/2020    HCT 24.9 (L) 01/28/2020    MCV 73 (L) 01/28/2020     (H) 01/28/2020     CRP has gone from 26 to 73.  CMP  Sodium   Date Value Ref Range Status   01/28/2020 141 136 - 145 mmol/L Final     Potassium   Date Value Ref Range Status   01/28/2020 4.1 3.5 - 5.1 mmol/L Final     Chloride   Date Value Ref Range Status   01/28/2020 105 95 - 110 mmol/L Final     CO2   Date Value Ref Range Status   01/28/2020 25 23 - 29 mmol/L Final     Glucose   Date Value Ref Range Status   01/28/2020 179 (H) 70 - 110 mg/dL Final     BUN, Bld   Date Value Ref Range Status   01/28/2020 15 8 - 23 mg/dL Final     Creatinine   Date Value Ref Range Status   01/28/2020 1.3 0.5 - 1.4 mg/dL Final     Calcium   Date Value Ref Range Status   01/28/2020 9.3 8.7 - 10.5 mg/dL Final     Total Protein   Date Value Ref Range Status   01/28/2020 7.9 6.0 - 8.4 g/dL Final     Albumin   Date Value Ref Range  Status   01/28/2020 3.1 (L) 3.5 - 5.2 g/dL Final     Total Bilirubin   Date Value Ref Range Status   01/28/2020 0.2 0.1 - 1.0 mg/dL Final     Comment:     Alkaline Phosphatase   Date Value Ref Range Status   01/28/2020 95 55 - 135 U/L Final     AST   Date Value Ref Range Status   01/28/2020 9 (L) 10 - 40 U/L Final     ALT   Date Value Ref Range Status   01/28/2020 7 (L) 10 - 44 U/L Final     Anion Gap   Date Value Ref Range Status   01/28/2020 11 8 - 16 mmol/L Final     eGFR if    Date Value Ref Range Status   01/28/2020 59.2 (A) >60 mL/min/1.73 m^2 Final     eGFR if non    Date Value Ref Range Status   01/28/2020 51.2 (A) >60 mL/min/1.73 m^2 Final     Comment:       T spine film: There are 2 midthoracic compression fractures with moderate height loss, one of which was present on prior study dated 01/30/2019.  No suspicious appearing lytic or blastic lesions.    Review of Systems   Constitution: Positive for malaise/fatigue. Negative for chills, decreased appetite, fever, night sweats, weight gain and weight loss.   HENT: Positive for congestion. Negative for ear pain, hearing loss, hoarse voice, sore throat and tinnitus.    Eyes: Negative for blurred vision, redness and visual disturbance.   Cardiovascular: Negative for chest pain, leg swelling and palpitations.   Respiratory: Positive for cough. Negative for hemoptysis, shortness of breath and sputum production.    Hematologic/Lymphatic: Negative for adenopathy. Does not bruise/bleed easily.   Skin: Negative for dry skin, itching, rash and suspicious lesions.   Musculoskeletal: Negative for back pain, joint pain, myalgias and neck pain.   Gastrointestinal: Negative for abdominal pain, constipation, diarrhea, heartburn, nausea and vomiting.   Genitourinary: Negative for dysuria, flank pain, frequency, hematuria, hesitancy and urgency.   Neurological: Negative for dizziness, headaches, numbness, paresthesias and weakness.    Psychiatric/Behavioral: Negative for depression and memory loss. The patient does not have insomnia and is not nervous/anxious.      Objective:   Physical Exam   Constitutional: He is oriented to person, place, and time. He appears well-developed and well-nourished. No distress.   Alert, no acute distress, but appeared in pain when he would cough   Cardiovascular: Normal rate, regular rhythm and normal heart sounds. Exam reveals no gallop and no friction rub.   No murmur heard.  Pulmonary/Chest: Effort normal. He has wheezes.   Few scattered wheezes at right lung base   Musculoskeletal:   No pain to percussion of spine   Neurological: He is alert and oriented to person, place, and time.   Skin: Skin is warm and dry.   Psychiatric: He has a normal mood and affect. His behavior is normal.   Vitals reviewed.    Assessment:       1. Hypotension and anemia; R/O acute blood loss   2. Pulmonary Mycobacterium avium complex (MAC) infection history   3. Chest / backpain, possibly due to T vertebral compression fracture   4. Cough    5. Anemia, unspecified type    6. Compression fracture of thoracic vertebra     7.    Ulcerative colitis on Entyvio    Plan:        advised his daughter to bring him to ER for admission. She will take him to ER at [facility] and I called the ER and spoke with the ER physician

## 2020-01-28 NOTE — ED TRIAGE NOTES
Pt states hes been sob for years, he was seen by his infectious disease dr today and his h/h is low

## 2020-01-29 VITALS
OXYGEN SATURATION: 98 % | TEMPERATURE: 98 F | DIASTOLIC BLOOD PRESSURE: 80 MMHG | SYSTOLIC BLOOD PRESSURE: 149 MMHG | RESPIRATION RATE: 20 BRPM | HEART RATE: 82 BPM | BODY MASS INDEX: 20.48 KG/M2 | WEIGHT: 177 LBS | HEIGHT: 78 IN

## 2020-01-29 LAB
BASOPHILS # BLD AUTO: 0.04 K/UL (ref 0–0.2)
BASOPHILS NFR BLD: 0.3 % (ref 0–1.9)
BLD PROD TYP BPU: NORMAL
BLD PROD TYP BPU: NORMAL
BLOOD UNIT EXPIRATION DATE: NORMAL
BLOOD UNIT EXPIRATION DATE: NORMAL
BLOOD UNIT TYPE CODE: 5100
BLOOD UNIT TYPE CODE: 5100
BLOOD UNIT TYPE: NORMAL
BLOOD UNIT TYPE: NORMAL
CODING SYSTEM: NORMAL
CODING SYSTEM: NORMAL
DIFFERENTIAL METHOD: ABNORMAL
DISPENSE STATUS: NORMAL
DISPENSE STATUS: NORMAL
EOSINOPHIL # BLD AUTO: 0 K/UL (ref 0–0.5)
EOSINOPHIL NFR BLD: 0.1 % (ref 0–8)
ERYTHROCYTE [DISTWIDTH] IN BLOOD BY AUTOMATED COUNT: 19.7 % (ref 11.5–14.5)
ERYTHROCYTE [DISTWIDTH] IN BLOOD BY AUTOMATED COUNT: 19.7 % (ref 11.5–14.5)
ESTIMATED AVG GLUCOSE: 137 MG/DL (ref 68–131)
HBA1C MFR BLD HPLC: 6.4 % (ref 4–5.6)
HCT VFR BLD AUTO: 26 % (ref 40–54)
HCT VFR BLD AUTO: 30.2 % (ref 40–54)
HGB BLD-MCNC: 7.8 G/DL (ref 14–18)
HGB BLD-MCNC: 9.2 G/DL (ref 14–18)
IMM GRANULOCYTES # BLD AUTO: 0.06 K/UL (ref 0–0.04)
IMM GRANULOCYTES NFR BLD AUTO: 0.4 % (ref 0–0.5)
LYMPHOCYTES # BLD AUTO: 0.8 K/UL (ref 1–4.8)
LYMPHOCYTES NFR BLD: 5.4 % (ref 18–48)
MCH RBC QN AUTO: 21.1 PG (ref 27–31)
MCH RBC QN AUTO: 21.9 PG (ref 27–31)
MCHC RBC AUTO-ENTMCNC: 30 G/DL (ref 32–36)
MCHC RBC AUTO-ENTMCNC: 30.5 G/DL (ref 32–36)
MCV RBC AUTO: 71 FL (ref 82–98)
MCV RBC AUTO: 72 FL (ref 82–98)
MONOCYTES # BLD AUTO: 0.1 K/UL (ref 0.3–1)
MONOCYTES NFR BLD: 0.7 % (ref 4–15)
NEUTROPHILS # BLD AUTO: 12.9 K/UL (ref 1.8–7.7)
NEUTROPHILS NFR BLD: 93.1 % (ref 38–73)
NRBC BLD-RTO: 0 /100 WBC
PLATELET # BLD AUTO: 445 K/UL (ref 150–350)
PLATELET # BLD AUTO: 446 K/UL (ref 150–350)
PMV BLD AUTO: 9.2 FL (ref 9.2–12.9)
PMV BLD AUTO: 9.3 FL (ref 9.2–12.9)
POCT GLUCOSE: 234 MG/DL (ref 70–110)
POCT GLUCOSE: 362 MG/DL (ref 70–110)
RBC # BLD AUTO: 3.69 M/UL (ref 4.6–6.2)
RBC # BLD AUTO: 4.2 M/UL (ref 4.6–6.2)
TRANS ERYTHROCYTES VOL PATIENT: NORMAL ML
TRANS ERYTHROCYTES VOL PATIENT: NORMAL ML
WBC # BLD AUTO: 13.84 K/UL (ref 3.9–12.7)
WBC # BLD AUTO: 9.47 K/UL (ref 3.9–12.7)

## 2020-01-29 PROCEDURE — 36415 COLL VENOUS BLD VENIPUNCTURE: CPT

## 2020-01-29 PROCEDURE — 94761 N-INVAS EAR/PLS OXIMETRY MLT: CPT

## 2020-01-29 PROCEDURE — 63600175 PHARM REV CODE 636 W HCPCS: Performed by: EMERGENCY MEDICINE

## 2020-01-29 PROCEDURE — 94640 AIRWAY INHALATION TREATMENT: CPT

## 2020-01-29 PROCEDURE — 85027 COMPLETE CBC AUTOMATED: CPT

## 2020-01-29 PROCEDURE — 85025 COMPLETE CBC W/AUTO DIFF WBC: CPT

## 2020-01-29 PROCEDURE — 27000221 HC OXYGEN, UP TO 24 HOURS

## 2020-01-29 PROCEDURE — 96376 TX/PRO/DX INJ SAME DRUG ADON: CPT

## 2020-01-29 PROCEDURE — 25000003 PHARM REV CODE 250: Performed by: HOSPITALIST

## 2020-01-29 PROCEDURE — G0378 HOSPITAL OBSERVATION PER HR: HCPCS

## 2020-01-29 PROCEDURE — 25000242 PHARM REV CODE 250 ALT 637 W/ HCPCS: Performed by: HOSPITALIST

## 2020-01-29 PROCEDURE — 83036 HEMOGLOBIN GLYCOSYLATED A1C: CPT

## 2020-01-29 PROCEDURE — 25000003 PHARM REV CODE 250: Performed by: NURSE PRACTITIONER

## 2020-01-29 PROCEDURE — 96372 THER/PROPH/DIAG INJ SC/IM: CPT | Mod: 59

## 2020-01-29 PROCEDURE — P9021 RED BLOOD CELLS UNIT: HCPCS

## 2020-01-29 PROCEDURE — 63600175 PHARM REV CODE 636 W HCPCS: Performed by: HOSPITALIST

## 2020-01-29 RX ORDER — GLIPIZIDE 5 MG/1
5 TABLET ORAL ONCE
Status: COMPLETED | OUTPATIENT
Start: 2020-01-29 | End: 2020-01-29

## 2020-01-29 RX ORDER — DOXYCYCLINE HYCLATE 100 MG
100 TABLET ORAL EVERY 12 HOURS
Qty: 10 TABLET | Refills: 0 | Status: SHIPPED | OUTPATIENT
Start: 2020-01-29 | End: 2020-02-03

## 2020-01-29 RX ADMIN — Medication 6 MG: at 02:01

## 2020-01-29 RX ADMIN — INSULIN ASPART 5 UNITS: 100 INJECTION, SOLUTION INTRAVENOUS; SUBCUTANEOUS at 12:01

## 2020-01-29 RX ADMIN — METHYLPREDNISOLONE SODIUM SUCCINATE 40 MG: 40 INJECTION, POWDER, FOR SOLUTION INTRAMUSCULAR; INTRAVENOUS at 06:01

## 2020-01-29 RX ADMIN — RAMIPRIL 2.5 MG: 2.5 CAPSULE ORAL at 09:01

## 2020-01-29 RX ADMIN — IPRATROPIUM BROMIDE AND ALBUTEROL SULFATE 3 ML: .5; 3 SOLUTION RESPIRATORY (INHALATION) at 11:01

## 2020-01-29 RX ADMIN — IPRATROPIUM BROMIDE AND ALBUTEROL SULFATE 3 ML: .5; 3 SOLUTION RESPIRATORY (INHALATION) at 08:01

## 2020-01-29 RX ADMIN — FUROSEMIDE 20 MG: 20 TABLET ORAL at 09:01

## 2020-01-29 RX ADMIN — ISOSORBIDE MONONITRATE 30 MG: 30 TABLET, EXTENDED RELEASE ORAL at 09:01

## 2020-01-29 RX ADMIN — GLIPIZIDE 5 MG: 5 TABLET ORAL at 12:01

## 2020-01-29 RX ADMIN — DOXYCYCLINE HYCLATE 100 MG: 100 TABLET, COATED ORAL at 09:01

## 2020-01-29 RX ADMIN — PANTOPRAZOLE SODIUM 40 MG: 40 TABLET, DELAYED RELEASE ORAL at 09:01

## 2020-01-29 RX ADMIN — AMLODIPINE BESYLATE 2.5 MG: 2.5 TABLET ORAL at 09:01

## 2020-01-29 NOTE — PLAN OF CARE
Problem: Adult Inpatient Plan of Care  Goal: Plan of Care Review  Outcome: Ongoing, Progressing  Pt remained free of falls during current shift. Denied pain and did not receive any prn pain medications. Pt's hbg/hct: 6.7/24.9, therefore 2 units of PRCB's administered. Also, pt had a wet, congestive cough during shift, therefore IV lasix and a 1x dose of IV steroid administered. Plan of care and fall precautions reviewed with pt and verbalized understanding. Bed locked, lowered, SR up x2 and call light placed within reach.

## 2020-01-29 NOTE — ASSESSMENT & PLAN NOTE
H/H 6.7/24.9 with complaint of dyspnea worse than baseline and reduced activity tolerance, noted to be pale on exam.  Stool guaiac negative in the ED, no obvious bleeding.  Known chronic microcytic anemia with baseline hemoglobin of 9 to 7.  Transfusion of 2 units PRBC was initiated in the ED.  Repeat CBC after transfusion.

## 2020-01-29 NOTE — PROGRESS NOTES
WRITTEN HEALTHCARE DISCHARGE INFORMATION      Things that YOU are RESPONSIBLE for to Manage Your Care At Home:     1. Getting your prescriptions filled.  2. Taking you medications as directed. DO NOT MISS ANY DOSES!  3. Going to your follow-up doctor appointments. This is important because it allows the doctor to monitor your progress and to determine if any changes need to be made to your treatment plan.     If you are unable to make your follow up appointments, please call the number listed and reschedule this appointment.      ____________HELP AT HOME____________________     Experiencing any SIGNS or SYMPTOMS: YOU CAN     Schedule a same day appopintment with your Primary Care Doctor or  you can call [facility] On Call Nurse Care Line for 24/7 assistance at [phone]     If you are experience any signs or symptoms that have become severe, Call 911 and come to your nearest Emergency Room.     Thank you for choosing [facility] and allowing us to care for you.   From your care management team:      You should receive a call from [facility] Discharge Department within 48-72 hours to help manage your care after discharge. Please try to make sure that you answer your phone for this important phone call.    Follow-up Information     Ishaan Adamson MD On 2/3/2020.    Specialties:  Internal Medicine, Wound Care  Why:  Appointment scheduled for February 3rd at 8:40am  Contact information:  [address]  [place] [address]  [phone]

## 2020-01-29 NOTE — NURSING TRANSFER
Nursing Transfer Note      1/28/2020    Transfer From: ED; To: Obs room 328B    Transfer via wheelchair    Transfer with cardiac monitoring and on 3.5LNC of oxygen    Transported by Transport tech    Medicines sent: No    Chart send with patient: Yes    Notified: daughter    Patient reassessed at: 2054    Upon arrival to floor: Pt was able to scoot from stretcher to bed x1 assist. Cardiac monitor applied, patient oriented to room, call bell in reach and bed in lowest position and SR up X3. Pt is AAO x4. Pt denies having any pain or discomfort during current shift and appears in no distress. Skin is clean, dry and intact. Plan of care reviewed with pt and pt verbalized understanding. Admit completed per Navigator. Will continue to monitor pt closely.

## 2020-01-29 NOTE — HOSPITAL COURSE
Very pleasant 79-year-old elderly male placed in observation for symptomatic anemia.  Per the patient he is a patient of Dr. Reyez recently had labs drawn and was found to have hemoglobin of 6.7 was advised by his physician to present to the emergency room.  Upon arrival repeat labs were drawn his H&H = 6.7/24.9.  Patient was consented and typed and matched and transfused 2 units packed red blood cells with expected response H&H now = 9.2/30.2.  He is noted to have some microcytic hypochromic anemia that has been apparent for some time now.  Guaiac in the emergency room was negative he denies any evidence of melena, hematochezia, hemoptysis, hematemesis.  He reports a history of colitis, he had recent colonoscopy in 2018 and reports that he has had 2 additional scopes since that time.  Additionally the patient has had about with MAC outpatient in has had extended treatment for that infection for which he has resolved since that time.  This morning the patient appears at baseline conjunctiva pink, vitals within normal limits heart rate normal, oxygen sats remain greater than 98% on room air.  He is anxious for discharge. I will refer the patient back to primary care who can refer the patient to Hematology/Gastroenterology as warranted.  Additionally I will row the patient's discharge summary to his primary care.  His vitals are stable.  He will discharge to home in stable condition.

## 2020-01-29 NOTE — ASSESSMENT & PLAN NOTE
Reports frequent loose stools at baseline, though has not observed any blood, stool guaiac negative in ED, no acute issue

## 2020-01-29 NOTE — ASSESSMENT & PLAN NOTE
Last HgbA1c   Lab Results   Component Value Date    HGBA1C 7.7 (H) 04/17/2019     Hold oral antihyperglycemics while inpatient  PRN sliding scale insulin  ACHS glucose monitoring   ADA diet

## 2020-01-29 NOTE — PLAN OF CARE
01/29/20 1152   Final Note   Assessment Type Final Discharge Note   Anticipated Discharge Disposition Home   Hospital Follow Up  Appt(s) scheduled? Yes   Discharge plans and expectations educations in teach back method with documentation complete? Yes   Right Care Referral Info   Post Acute Recommendation No Care   pts nurse Rea notified that pt can d/c from CM standpoint.

## 2020-01-29 NOTE — HPI
79 y.o. male with dm 2, COPD, hypertension, ulcerative colitis, and history of pulmonary mycobacterium avium complex infection directed to the ED by outpatient provider for evaluation anemia on outpatient blood work.  Labs drawn today showed hemoglobin of 6.7.  The patient complains of increased cough over the past month associated with back soreness when coughing.  PCP prescribed prednisone, doxycycline and tizanidine but he has not yet filled these prescriptions.  He denies fever, chills, palpitations, orthopnea, PND, dizziness, syncope, nausea, vomiting, diarrhea, abdominal pain, bloody or black stool, hematemesis, coffee-ground emesis, any observed bleeding, dysuria, hematuria, frequency, or urgency.  He was consented, type, and screened for transfusion of 2 units PRBC in the ED.  Placed in observation.

## 2020-01-29 NOTE — ASSESSMENT & PLAN NOTE
He was treated for MAC from Jan 2016 to Dec 2017 with a triple antibiotic regimen. He was AFB culture negative at the end of the treatment period.

## 2020-01-29 NOTE — PLAN OF CARE
01/29/20 1024   Discharge Assessment   Assessment Type Discharge Planning Assessment   Assessment information obtained from? Medical Record   Prior to hospitilization cognitive status: Alert/Oriented   Prior to hospitalization functional status: Independent;Assistive Equipment   Current cognitive status: Alert/Oriented   Current Functional Status: Independent;Assistive Equipment   Facility Arrived From: home   Lives With other (see comments)   Able to Return to Prior Arrangements yes   Is patient able to care for self after discharge? Yes   Who are your caregiver(s) and their phone number(s)? [facility] [phone]   Patient's perception of discharge disposition home or selfcare   Readmission Within the Last 30 Days no previous admission in last 30 days   Patient currently being followed by outpatient case management? No   Patient currently receives any other outside agency services? No   Equipment Currently Used at Home cane, quad;shower chair   Do you have any problems affording any of your prescribed medications? No   Is the patient taking medications as prescribed? yes   Does the patient have transportation home? Yes   Transportation Anticipated family or friend will provide   Does the patient receive services at the [facility]? No   Discharge Plan A Home with family  (with follow up appointment)   DME Needed Upon Discharge  none   Patient/Family in Agreement with Plan yes     [id number] [place] - [address]  [place] [address]  Phone: [phone] Fax: [phone]

## 2020-01-29 NOTE — DISCHARGE SUMMARY
[facility] Hospital Medicine  Discharge Summary      Patient Name: Regan Alvarez  MRN: [id number]  Admission Date: 1/28/2020  Hospital Length of Stay: 0 days  Discharge Date and Time:  01/29/2020 11:11 AM  Attending Physician: Sunita Waethers MD   Discharging Provider: MATY Castaneda  Primary Care Provider: Ishaan Adamson MD      HPI:   79 y.o. male with dm 2, COPD, hypertension, ulcerative colitis, and history of pulmonary mycobacterium avium complex infection directed to the ED by outpatient provider for evaluation anemia on outpatient blood work.  Labs drawn today showed hemoglobin of 6.7.  The patient complains of increased cough over the past month associated with back soreness when coughing.  PCP prescribed prednisone, doxycycline and tizanidine but he has not yet filled these prescriptions.  He denies fever, chills, palpitations, orthopnea, PND, dizziness, syncope, nausea, vomiting, diarrhea, abdominal pain, bloody or black stool, hematemesis, coffee-ground emesis, any observed bleeding, dysuria, hematuria, frequency, or urgency.  He was consented, type, and screened for transfusion of 2 units PRBC in the ED.  Placed in observation.    * No surgery found *      Hospital Course:   Very pleasant 79-year-old elderly male placed in observation for symptomatic anemia.  Per the patient he is a patient of Dr. Reyez recently had labs drawn and was found to have hemoglobin of 6.7 was advised by his physician to present to the emergency room.  Upon arrival repeat labs were drawn his H&H = 6.7/24.9.  Patient was consented and typed and matched and transfused 2 units packed red blood cells with expected response H&H now = 9.2/30.2.  He is noted to have some microcytic hypochromic anemia that has been apparent for some time now.  Guaiac in the emergency room was negative he denies any evidence of melena, hematochezia, hemoptysis, hematemesis.  He reports a history of colitis, he had  recent colonoscopy in 2018 and reports that he has had 2 additional scopes since that time.  Additionally the patient has had about with MAC outpatient in has had extended treatment for that infection for which he has resolved since that time.  This morning the patient appears at baseline conjunctiva pink, vitals within normal limits heart rate normal, oxygen sats remain greater than 98% on room air.  He is anxious for discharge. I will refer the patient back to primary care who can refer the patient to Hematology/Gastroenterology as warranted.  Additionally I will row the patient's discharge summary to his primary care.  His vitals are stable.  He will discharge to home in stable condition.     Consults:   Consults (From admission, onward)        Status Ordering Provider     IP consult to case management  Once     Provider:  (Not yet assigned)    Acknowledged JOSEPH MAYS          No new Assessment & Plan notes have been filed under this hospital service since the last note was generated.  Service: Hospital Medicine    Final Active Diagnoses:    Diagnosis Date Noted POA    PRINCIPAL PROBLEM:  Symptomatic anemia [D64.9] 01/28/2020 Yes    Ulcerative pancolitis without complication [K51.00] 01/30/2019 Yes     Chronic    Essential hypertension [I10] 01/29/2017 Yes     Chronic    COPD (chronic obstructive pulmonary disease) [J44.9] 06/21/2016 Yes     Chronic    Pulmonary Mycobacterium avium complex (MAC) infection [A31.0] 01/14/2016 Yes     Chronic    Type 2 diabetes mellitus without complication, with long-term current use of insulin [E11.9, Z79.4] 01/15/2013 Not Applicable     Chronic      Problems Resolved During this Admission:       Discharged Condition: stable    Disposition: Home or Self Care    Follow Up:  Follow-up Information     Ishaan Adamson MD On 2/3/2020.    Specialties:  Internal Medicine, Wound Care  Why:  Appointment scheduled for February 3rd at 8:40am  Contact information:  [address]  [place] [address]  [phone]                 Patient Instructions:      Diet Cardiac     Activity as tolerated       Significant Diagnostic Studies: Labs:   CMP   Recent Labs   Lab 01/28/20  1124      K 4.1      CO2 25   *   BUN 15   CREATININE 1.3   CALCIUM 9.3   PROT 7.9   ALBUMIN 3.1*   BILITOT 0.2   ALKPHOS 95   AST 9*   ALT 7*   ANIONGAP 11   ESTGFRAFRICA 59.2*   EGFRNONAA 51.2*   , CBC   Recent Labs   Lab 01/28/20  1124 01/29/20  0425 01/29/20  0816   WBC 14.21* 13.84* 9.47   HGB 6.7* 7.8* 9.2*   HCT 24.9* 26.0* 30.2*   * 445* 446*   , INR   Lab Results   Component Value Date    INR 1.2 02/21/2017    INR 1.0 06/01/2016    INR 1.0 12/10/2015   , Lipid Panel   Lab Results   Component Value Date    CHOL 109 (L) 10/03/2018    HDL 49 10/03/2018    LDLCALC 35.0 (L) 10/03/2018    TRIG 125 10/03/2018    CHOLHDL 45.0 10/03/2018   , Troponin   Recent Labs   Lab 01/28/20  1700   TROPONINI <0.006    and A1C:   Recent Labs   Lab 01/29/20  0425   HGBA1C 6.4*       Pending Diagnostic Studies:     None         Medications:  Reconciled Home Medications:      Medication List      START taking these medications    doxycycline 100 MG tablet  Commonly known as:  VIBRA-TABS  Take 1 tablet (100 mg total) by mouth every 12 (twelve) hours. for 5 days        CONTINUE taking these medications    albuterol 90 mcg/actuation inhaler  Commonly known as:  PROVENTIL/VENTOLIN HFA  INHALE 2 PUFFS BY MOUTH EVERY 6 HOURS AS NEEDED FOR WHEEZE     amLODIPine 2.5 MG tablet  Commonly known as:  NORVASC  TAKE 1 TABLET BY MOUTH EVERY DAY     BD Ultra-Fine Cathi Pen Needle 32 gauge x 5/32" Ndle  Generic drug:  pen needle, diabetic  USE QID UTD     blood sugar diagnostic Strp  Commonly known as:  True Metrix Glucose Test Strip  Test blood sugar 3 times daily     budesonide 9 mg Tade  Commonly known as:  UCERIS  Take 6 mg by mouth.     fluticasone furoate-vilanterol 200-25 mcg/dose Dsdv diskus inhaler  Commonly known as:  " BREO  Inhale 1 puff into the lungs.     fluticasone propionate 50 mcg/actuation nasal spray  Commonly known as:  FLONASE  SPRAY 1 SPRAY (50 MCG TOTAL) BY EACH NARE ROUTE ONCE DAILY.     fluticasone-umeclidin-vilanter 100-62.5-25 mcg Dsdv  Commonly known as:  Trelegy Ellipta  Inhale 1 puff into the lungs once daily.     furosemide 20 MG tablet  Commonly known as:  LASIX  TAKE 1 TABLET (20 MG TOTAL) BY MOUTH ONCE DAILY.     gabapentin 100 MG capsule  Commonly known as:  NEURONTIN  TAKE 2 CAPSULES BY MOUTH EVERY EVENING     glipiZIDE 5 MG tablet  Commonly known as:  GLUCOTROL  TAKE 1 TABLET BY MOUTH EVERY DAY WITH BREAKFAST     ibuprofen 800 MG tablet  Commonly known as:  ADVIL,MOTRIN  Take 1 tablet (800 mg total) by mouth every 8 (eight) hours as needed for Pain (neck pain).     isosorbide mononitrate 30 MG 24 hr tablet  Commonly known as:  IMDUR  Take 30 mg by mouth.     lancets 28 gauge Misc  1 lancet by Misc.(Non-Drug; Combo Route) route 3 (three) times daily. True Metrix lancets     multivitamin capsule  Take 1 capsule by mouth once daily.     pantoprazole 40 MG tablet  Commonly known as:  PROTONIX  Take 1 tablet (40 mg total) by mouth once daily.     ramipril 2.5 MG capsule  Commonly known as:  ALTACE  TAKE 1 CAPSULE (2.5 MG TOTAL) BY MOUTH ONCE DAILY.            Indwelling Lines/Drains at time of discharge:   Lines/Drains/Airways     None                 Time spent on the discharge of patient: 35 minutes  Patient was seen and examined on the date of discharge and determined to be suitable for discharge.         GRANT Ruth, FNP-C  Hospitalist - Department of Hospital Medicine  [address] [place] [address]  Office [phone]; Pager [phone]

## 2020-01-29 NOTE — SUBJECTIVE & OBJECTIVE
"Past Medical History:   Diagnosis Date    Ambulates with cane     Arthritis     COPD (chronic obstructive pulmonary disease)     Diabetes mellitus type II     Hypertension     Mycobacterium avium complex     X's 2    Symptomatic anemia 01/28/2020    Tuberculosis     Ulcerative colitis        Past Surgical History:   Procedure Laterality Date    COLONOSCOPY N/A 1/22/2018    Procedure: COLONOSCOPY;  Surgeon: Roel Mendez MD;  Location: [facility];  Service: Endoscopy;  Laterality: N/A;  Pt confirmend appt.    melanoma      forehead       Review of patient's allergies indicates:  No Known Allergies    No current facility-administered medications on file prior to encounter.      Current Outpatient Medications on File Prior to Encounter   Medication Sig    albuterol (PROVENTIL/VENTOLIN HFA) 90 mcg/actuation inhaler INHALE 2 PUFFS BY MOUTH EVERY 6 HOURS AS NEEDED FOR WHEEZE    amLODIPine (NORVASC) 2.5 MG tablet TAKE 1 TABLET BY MOUTH EVERY DAY    furosemide (LASIX) 20 MG tablet TAKE 1 TABLET (20 MG TOTAL) BY MOUTH ONCE DAILY.    gabapentin (NEURONTIN) 100 MG capsule TAKE 2 CAPSULES BY MOUTH EVERY EVENING    glipiZIDE (GLUCOTROL) 5 MG tablet TAKE 1 TABLET BY MOUTH EVERY DAY WITH BREAKFAST    isosorbide mononitrate (IMDUR) 30 MG 24 hr tablet Take 30 mg by mouth.    multivitamin capsule Take 1 capsule by mouth once daily.    pantoprazole (PROTONIX) 40 MG tablet Take 1 tablet (40 mg total) by mouth once daily.    ramipril (ALTACE) 2.5 MG capsule TAKE 1 CAPSULE (2.5 MG TOTAL) BY MOUTH ONCE DAILY.    BD ULTRA-FINE TOMÁS PEN NEEDLES 32 gauge x 5/32" Ndle USE QID UTD    blood sugar diagnostic (TRUE METRIX GLUCOSE TEST STRIP) Strp Test blood sugar 3 times daily    budesonide (UCERIS) 9 mg TaDE Take 6 mg by mouth.    fluticasone furoate-vilanterol (BREO) 200-25 mcg/dose DsDv diskus inhaler Inhale 1 puff into the lungs.    fluticasone propionate (FLONASE) 50 mcg/actuation nasal spray SPRAY 1 SPRAY (50 MCG " TOTAL) BY EACH NARE ROUTE ONCE DAILY.    fluticasone-umeclidin-vilanter (TRELEGY ELLIPTA) 100-62.5-25 mcg DsDv Inhale 1 puff into the lungs once daily.    ibuprofen (ADVIL,MOTRIN) 800 MG tablet Take 1 tablet (800 mg total) by mouth every 8 (eight) hours as needed for Pain (neck pain).    lancets 28 gauge Misc 1 lancet by Misc.(Non-Drug; Combo Route) route 3 (three) times daily. True Metrix lancets    [DISCONTINUED] doxycycline (VIBRA-TABS) 100 MG tablet Take 1 tablet (100 mg total) by mouth 2 (two) times daily.    [DISCONTINUED] pantoprazole (PROTONIX) 40 MG tablet Take 40 mg by mouth once daily.    [DISCONTINUED] pantoprazole (PROTONIX) 40 MG tablet Take 40 mg by mouth.    [DISCONTINUED] predniSONE (DELTASONE) 20 MG tablet Take 3 tablets (60 mg total) by mouth once daily.    [DISCONTINUED] ramipril (ALTACE) 2.5 MG capsule Take 2.5 mg by mouth.     Family History     Problem Relation (Age of Onset)    Cancer Father        Tobacco Use    Smoking status: Former Smoker     Types: Cigarettes    Smokeless tobacco: Never Used    Tobacco comment: uses cigars on ocassion   Substance and Sexual Activity    Alcohol use: Yes     Comment: socially    Drug use: No    Sexual activity: Not Currently     Review of Systems   Constitutional: Negative for chills and fever.   Eyes: Negative for photophobia and visual disturbance.   Respiratory: Positive for cough and shortness of breath.    Cardiovascular: Negative for chest pain, palpitations and leg swelling.   Gastrointestinal: Negative for abdominal pain, diarrhea, nausea and vomiting.   Genitourinary: Negative for frequency, hematuria and urgency.   Musculoskeletal: Positive for back pain.   Skin: Negative for pallor, rash and wound.   Neurological: Negative for light-headedness and headaches.   Psychiatric/Behavioral: Negative for confusion and decreased concentration.     Objective:     Vital Signs (Most Recent):  Temp: 97.9 °F (36.6 °C) (01/28/20 2006)  Pulse: 86  (01/28/20 2006)  Resp: 18 (01/28/20 2006)  BP: 132/71 (01/28/20 2006)  SpO2: 97 % (01/28/20 2006) Vital Signs (24h Range):  Temp:  [97.5 °F (36.4 °C)-98.6 °F (37 °C)] 97.9 °F (36.6 °C)  Pulse:  [] 86  Resp:  [18-42] 18  SpO2:  [95 %-100 %] 97 %  BP: ()/(35-87) 132/71     Weight: 80.3 kg (177 lb 0.5 oz)  Body mass index is 19.94 kg/m².    Physical Exam   Constitutional: He is oriented to person, place, and time. He appears well-developed and well-nourished. No distress.   HENT:   Head: Normocephalic and atraumatic.   Right Ear: External ear normal.   Left Ear: External ear normal.   Nose: Nose normal.   Mouth/Throat: Oropharynx is clear and moist.   Eyes: Pupils are equal, round, and reactive to light. Conjunctivae and EOM are normal.   Neck: Normal range of motion. Neck supple.   Cardiovascular: Normal rate, regular rhythm and intact distal pulses.   Pulmonary/Chest: Effort normal. No respiratory distress. He has no wheezes. He has rhonchi. He has no rales.   Abdominal: Soft. Bowel sounds are normal. He exhibits no distension. There is no tenderness.   No palpable hepatomegaly or splenomegaly   Musculoskeletal: Normal range of motion. He exhibits no edema or tenderness.   Neurological: He is alert and oriented to person, place, and time.   Skin: Skin is warm and dry. There is pallor.   Psychiatric: He has a normal mood and affect. Thought content normal.   Nursing note and vitals reviewed.        CRANIAL NERVES     CN III, IV, VI   Pupils are equal, round, and reactive to light.  Extraocular motions are normal.        Significant Labs: All pertinent labs within the past 24 hours have been reviewed.    Significant Imaging: I have reviewed all pertinent imaging results/findings within the past 24 hours.

## 2020-01-29 NOTE — H&P
"[facility] Hospital Medicine  History & Physical    Patient Name: Regan Alvarez  MRN: [id number]  Admission Date: 1/28/2020  Attending Physician: Sunita Weathers MD   Primary Care Provider: Ishaan Adamson MD         Patient information was obtained from patient, past medical records and ER records.     Subjective:     Principal Problem:Symptomatic anemia    Chief Complaint:   Chief Complaint   Patient presents with    Abnormal Lab     Pt sent from Dr. Reyez office. Had labs drawn today with Hgb 6.7. Pt reports cough and SOB. Also reports elevated white count. "They sent him over here to see if he's bleeding."         HPI: 79 y.o. male with dm 2, COPD, hypertension, ulcerative colitis, and history of pulmonary mycobacterium avium complex infection directed to the ED by outpatient provider for evaluation anemia on outpatient blood work.  Labs drawn today showed hemoglobin of 6.7.  The patient complains of increased cough over the past month associated with back soreness when coughing.  PCP prescribed prednisone, doxycycline and tizanidine but he has not yet filled these prescriptions.  He denies fever, chills, palpitations, orthopnea, PND, dizziness, syncope, nausea, vomiting, diarrhea, abdominal pain, bloody or black stool, hematemesis, coffee-ground emesis, any observed bleeding, dysuria, hematuria, frequency, or urgency.  He was consented, type, and screened for transfusion of 2 units PRBC in the ED.  Placed in observation.    Past Medical History:   Diagnosis Date    Ambulates with cane     Arthritis     COPD (chronic obstructive pulmonary disease)     Diabetes mellitus type II     Hypertension     Mycobacterium avium complex     X's 2    Symptomatic anemia 01/28/2020    Tuberculosis     Ulcerative colitis        Past Surgical History:   Procedure Laterality Date    COLONOSCOPY N/A 1/22/2018    Procedure: COLONOSCOPY;  Surgeon: Roel Mendez MD;  [facility]: [facility] ENDO;  " "Service: Endoscopy;  Laterality: N/A;  Pt confirmend appt.    melanoma      forehead       Review of patient's allergies indicates:  No Known Allergies    No current facility-administered medications on file prior to encounter.      Current Outpatient Medications on File Prior to Encounter   Medication Sig    albuterol (PROVENTIL/VENTOLIN HFA) 90 mcg/actuation inhaler INHALE 2 PUFFS BY MOUTH EVERY 6 HOURS AS NEEDED FOR WHEEZE    amLODIPine (NORVASC) 2.5 MG tablet TAKE 1 TABLET BY MOUTH EVERY DAY    furosemide (LASIX) 20 MG tablet TAKE 1 TABLET (20 MG TOTAL) BY MOUTH ONCE DAILY.    gabapentin (NEURONTIN) 100 MG capsule TAKE 2 CAPSULES BY MOUTH EVERY EVENING    glipiZIDE (GLUCOTROL) 5 MG tablet TAKE 1 TABLET BY MOUTH EVERY DAY WITH BREAKFAST    isosorbide mononitrate (IMDUR) 30 MG 24 hr tablet Take 30 mg by mouth.    multivitamin capsule Take 1 capsule by mouth once daily.    pantoprazole (PROTONIX) 40 MG tablet Take 1 tablet (40 mg total) by mouth once daily.    ramipril (ALTACE) 2.5 MG capsule TAKE 1 CAPSULE (2.5 MG TOTAL) BY MOUTH ONCE DAILY.    BD ULTRA-FINE TOMÁS PEN NEEDLES 32 gauge x 5/32" Ndle USE QID UTD    blood sugar diagnostic (TRUE METRIX GLUCOSE TEST STRIP) Strp Test blood sugar 3 times daily    budesonide (UCERIS) 9 mg TaDE Take 6 mg by mouth.    fluticasone furoate-vilanterol (BREO) 200-25 mcg/dose DsDv diskus inhaler Inhale 1 puff into the lungs.    fluticasone propionate (FLONASE) 50 mcg/actuation nasal spray SPRAY 1 SPRAY (50 MCG TOTAL) BY EACH NARE ROUTE ONCE DAILY.    fluticasone-umeclidin-vilanter (TRELEGY ELLIPTA) 100-62.5-25 mcg DsDv Inhale 1 puff into the lungs once daily.    ibuprofen (ADVIL,MOTRIN) 800 MG tablet Take 1 tablet (800 mg total) by mouth every 8 (eight) hours as needed for Pain (neck pain).    lancets 28 gauge Misc 1 lancet by Misc.(Non-Drug; Combo Route) route 3 (three) times daily. True Metrix lancets    [DISCONTINUED] doxycycline (VIBRA-TABS) 100 MG tablet " Take 1 tablet (100 mg total) by mouth 2 (two) times daily.    [DISCONTINUED] pantoprazole (PROTONIX) 40 MG tablet Take 40 mg by mouth once daily.    [DISCONTINUED] pantoprazole (PROTONIX) 40 MG tablet Take 40 mg by mouth.    [DISCONTINUED] predniSONE (DELTASONE) 20 MG tablet Take 3 tablets (60 mg total) by mouth once daily.    [DISCONTINUED] ramipril (ALTACE) 2.5 MG capsule Take 2.5 mg by mouth.     Family History     Problem Relation (Age of Onset)    Cancer Father        Tobacco Use    Smoking status: Former Smoker     Types: Cigarettes    Smokeless tobacco: Never Used    Tobacco comment: uses cigars on ocassion   Substance and Sexual Activity    Alcohol use: Yes     Comment: socially    Drug use: No    Sexual activity: Not Currently     Review of Systems   Constitutional: Negative for chills and fever.   Eyes: Negative for photophobia and visual disturbance.   Respiratory: Positive for cough and shortness of breath.    Cardiovascular: Negative for chest pain, palpitations and leg swelling.   Gastrointestinal: Negative for abdominal pain, diarrhea, nausea and vomiting.   Genitourinary: Negative for frequency, hematuria and urgency.   Musculoskeletal: Positive for back pain.   Skin: Negative for pallor, rash and wound.   Neurological: Negative for light-headedness and headaches.   Psychiatric/Behavioral: Negative for confusion and decreased concentration.     Objective:     Vital Signs (Most Recent):  Temp: 97.9 °F (36.6 °C) (01/28/20 2006)  Pulse: 86 (01/28/20 2006)  Resp: 18 (01/28/20 2006)  BP: 132/71 (01/28/20 2006)  SpO2: 97 % (01/28/20 2006) Vital Signs (24h Range):  Temp:  [97.5 °F (36.4 °C)-98.6 °F (37 °C)] 97.9 °F (36.6 °C)  Pulse:  [] 86  Resp:  [18-42] 18  SpO2:  [95 %-100 %] 97 %  BP: ()/(35-87) 132/71     Weight: 80.3 kg (177 lb 0.5 oz)  Body mass index is 19.94 kg/m².    Physical Exam   Constitutional: He is oriented to person, place, and time. He appears well-developed and  well-nourished. No distress.   HENT:   Head: Normocephalic and atraumatic.   Right Ear: External ear normal.   Left Ear: External ear normal.   Nose: Nose normal.   Mouth/Throat: Oropharynx is clear and moist.   Eyes: Pupils are equal, round, and reactive to light. Conjunctivae and EOM are normal.   Neck: Normal range of motion. Neck supple.   Cardiovascular: Normal rate, regular rhythm and intact distal pulses.   Pulmonary/Chest: Effort normal. No respiratory distress. He has no wheezes. He has rhonchi. He has no rales.   Abdominal: Soft. Bowel sounds are normal. He exhibits no distension. There is no tenderness.   No palpable hepatomegaly or splenomegaly   Musculoskeletal: Normal range of motion. He exhibits no edema or tenderness.   Neurological: He is alert and oriented to person, place, and time.   Skin: Skin is warm and dry. There is pallor.   Psychiatric: He has a normal mood and affect. Thought content normal.   Nursing note and vitals reviewed.        CRANIAL NERVES     CN III, IV, VI   Pupils are equal, round, and reactive to light.  Extraocular motions are normal.        Significant Labs: All pertinent labs within the past 24 hours have been reviewed.    Significant Imaging: I have reviewed all pertinent imaging results/findings within the past 24 hours.    Assessment/Plan:     * Symptomatic anemia  H/H 6.7/24.9 with complaint of dyspnea worse than baseline and reduced activity tolerance, noted to be pale on exam.  Stool guaiac negative in the ED, no obvious bleeding.  Known chronic microcytic anemia with baseline hemoglobin of 9 to 7.  Transfusion of 2 units PRBC was initiated in the ED.  Repeat CBC after transfusion.    Ulcerative pancolitis without complication  Reports frequent loose stools at baseline, though has not observed any blood, stool guaiac negative in ED, no acute issue    Essential hypertension  Well controlled, continue home medications and monitor blood pressure, adjust as needed.      COPD (chronic obstructive pulmonary disease)  Continue corticosteroids, nebs, and doxycycline.    Pulmonary Mycobacterium avium complex (MAC) infection  He was treated for MAC from Jan 2016 to Dec 2017 with a triple antibiotic regimen. He was AFB culture negative at the end of the treatment period.     Type 2 diabetes mellitus without complication, with long-term current use of insulin  Last HgbA1c   Lab Results   Component Value Date    HGBA1C 7.7 (H) 04/17/2019     Hold oral antihyperglycemics while inpatient  PRN sliding scale insulin  ACHS glucose monitoring   ADA diet       VTE Risk Mitigation (From admission, onward)         Ordered     IP VTE HIGH RISK PATIENT  Once      01/28/20 1956     Place sequential compression device  Until discontinued      01/28/20 1956              Emigdio Denis Jr., APRN, AGACN-BC  Hospitalist - Department of Hospital Medicine  [facility] [address] [place] [address]  Office #: [phone]; Pager #: [phone]

## 2020-02-03 ENCOUNTER — OFFICE VISIT (OUTPATIENT)
Dept: FAMILY MEDICINE | Facility: CLINIC | Age: 80
End: 2020-02-03
Payer: MEDICARE

## 2020-02-03 ENCOUNTER — TELEPHONE (OUTPATIENT)
Dept: INFECTIOUS DISEASES | Facility: CLINIC | Age: 80
End: 2020-02-03

## 2020-02-03 ENCOUNTER — HOSPITAL ENCOUNTER (OUTPATIENT)
Dept: RADIOLOGY | Facility: HOSPITAL | Age: 80
Discharge: HOME | End: 2020-02-03
Attending: INTERNAL MEDICINE
Payer: MEDICARE

## 2020-02-03 ENCOUNTER — TELEPHONE (OUTPATIENT)
Dept: FAMILY MEDICINE | Facility: CLINIC | Age: 80
End: 2020-02-03

## 2020-02-03 VITALS
SYSTOLIC BLOOD PRESSURE: 93 MMHG | OXYGEN SATURATION: 97 % | RESPIRATION RATE: 17 BRPM | TEMPERATURE: 98 F | HEIGHT: 78 IN | DIASTOLIC BLOOD PRESSURE: 60 MMHG | BODY MASS INDEX: 20.74 KG/M2 | HEART RATE: 107 BPM | WEIGHT: 179.25 LBS

## 2020-02-03 DIAGNOSIS — A31.0 PULMONARY MYCOBACTERIUM AVIUM COMPLEX (MAC) INFECTION: ICD-10-CM

## 2020-02-03 DIAGNOSIS — D50.8 OTHER IRON DEFICIENCY ANEMIA: Primary | ICD-10-CM

## 2020-02-03 DIAGNOSIS — A31.0 PULMONARY MYCOBACTERIUM AVIUM COMPLEX (MAC) INFECTION: Chronic | ICD-10-CM

## 2020-02-03 PROCEDURE — 99214 PR OFFICE/OUTPT VISIT, EST, LEVL IV, 30-39 MIN: ICD-10-PCS | Mod: S$GLB,,, | Performed by: INTERNAL MEDICINE

## 2020-02-03 PROCEDURE — 99214 OFFICE O/P EST MOD 30 MIN: CPT | Mod: S$GLB,,, | Performed by: INTERNAL MEDICINE

## 2020-02-03 PROCEDURE — 71250 CT THORAX DX C-: CPT | Mod: 26,,, | Performed by: RADIOLOGY

## 2020-02-03 PROCEDURE — 71250 CT THORAX DX C-: CPT | Mod: TC

## 2020-02-03 PROCEDURE — 99999 PR PBB SHADOW E&M-EST. PATIENT-LVL III: ICD-10-PCS | Mod: PBBFAC,,, | Performed by: INTERNAL MEDICINE

## 2020-02-03 PROCEDURE — 71250 CT CHEST WITHOUT CONTRAST: ICD-10-PCS | Mod: 26,,, | Performed by: RADIOLOGY

## 2020-02-03 PROCEDURE — 3074F SYST BP LT 130 MM HG: CPT | Mod: CPTII,S$GLB,, | Performed by: INTERNAL MEDICINE

## 2020-02-03 PROCEDURE — 1101F PT FALLS ASSESS-DOCD LE1/YR: CPT | Mod: CPTII,S$GLB,, | Performed by: INTERNAL MEDICINE

## 2020-02-03 PROCEDURE — 1126F PR PAIN SEVERITY QUANTIFIED, NO PAIN PRESENT: ICD-10-PCS | Mod: S$GLB,,, | Performed by: INTERNAL MEDICINE

## 2020-02-03 PROCEDURE — 3078F DIAST BP <80 MM HG: CPT | Mod: CPTII,S$GLB,, | Performed by: INTERNAL MEDICINE

## 2020-02-03 PROCEDURE — 3078F PR MOST RECENT DIASTOLIC BLOOD PRESSURE < 80 MM HG: ICD-10-PCS | Mod: CPTII,S$GLB,, | Performed by: INTERNAL MEDICINE

## 2020-02-03 PROCEDURE — 99999 PR PBB SHADOW E&M-EST. PATIENT-LVL III: CPT | Mod: PBBFAC,,, | Performed by: INTERNAL MEDICINE

## 2020-02-03 PROCEDURE — 99499 RISK ADDL DX/OHS AUDIT: ICD-10-PCS | Mod: S$GLB,,, | Performed by: INTERNAL MEDICINE

## 2020-02-03 PROCEDURE — 1101F PR PT FALLS ASSESS DOC 0-1 FALLS W/OUT INJ PAST YR: ICD-10-PCS | Mod: CPTII,S$GLB,, | Performed by: INTERNAL MEDICINE

## 2020-02-03 PROCEDURE — 1126F AMNT PAIN NOTED NONE PRSNT: CPT | Mod: S$GLB,,, | Performed by: INTERNAL MEDICINE

## 2020-02-03 PROCEDURE — 3074F PR MOST RECENT SYSTOLIC BLOOD PRESSURE < 130 MM HG: ICD-10-PCS | Mod: CPTII,S$GLB,, | Performed by: INTERNAL MEDICINE

## 2020-02-03 PROCEDURE — 1159F MED LIST DOCD IN RCRD: CPT | Mod: S$GLB,,, | Performed by: INTERNAL MEDICINE

## 2020-02-03 PROCEDURE — 1159F PR MEDICATION LIST DOCUMENTED IN MEDICAL RECORD: ICD-10-PCS | Mod: S$GLB,,, | Performed by: INTERNAL MEDICINE

## 2020-02-03 PROCEDURE — 99499 UNLISTED E&M SERVICE: CPT | Mod: S$GLB,,, | Performed by: INTERNAL MEDICINE

## 2020-02-03 RX ORDER — ALBUTEROL SULFATE 0.63 MG/3ML
0.63 SOLUTION RESPIRATORY (INHALATION) EVERY 6 HOURS PRN
Qty: 1 BOX | Refills: 1 | Status: SHIPPED | OUTPATIENT
Start: 2020-02-03 | End: 2020-04-01

## 2020-02-03 NOTE — TELEPHONE ENCOUNTER
Contacted patient's daughter blood count stable continue to hold antihypertensives until short follow up in two weeks

## 2020-02-03 NOTE — PROGRESS NOTES
"Subjective:       Patient ID: Regan Alvarez is a 79 y.o. male.    Chief Complaint: Hospital Follow Up (bleeding ); Establish Care; and Cough (ongoing)    F/u hospitalization    HPI: 78 y/o w/ severe COPD h/o pulmonary mac (off treatment x one year) DM and ulcerative collitis (followed by Dr. sahu at [facility] with every 8 week ENTYVIO) admitted last week when labs showed acute drop in hemoglobin. Moves bowels two to four times per day denies any BRBPR or melanotic stools. Daughter arranges his medication and continues with amlodipine, rampiril and lasix. Cough is main complaint for several months worse at night using     Review of Systems   Constitutional: Negative for activity change, appetite change, fatigue, fever and unexpected weight change.   HENT: Negative for ear pain, rhinorrhea and sore throat.    Eyes: Negative for discharge and visual disturbance.   Respiratory: Positive for cough. Negative for chest tightness, shortness of breath and wheezing.    Cardiovascular: Negative for chest pain, palpitations and leg swelling.   Gastrointestinal: Positive for diarrhea. Negative for abdominal pain and constipation.   Endocrine: Negative for cold intolerance and heat intolerance.   Genitourinary: Negative for dysuria, flank pain, frequency and hematuria.   Musculoskeletal: Negative for joint swelling and neck stiffness.   Skin: Negative for rash.   Neurological: Negative for dizziness, syncope, weakness and headaches.   Psychiatric/Behavioral: Negative for suicidal ideas.       Objective:     Vitals:    02/03/20 0900   BP: 93/60   BP Location: Left arm   Patient Position: Sitting   BP Method: Small (Automatic)   Pulse: 107   Resp: 17   Temp: 97.5 °F (36.4 °C)   TempSrc: Oral   SpO2: 97%   Weight: 81.3 kg (179 lb 3.7 oz)   Height: 6' 7" (2.007 m)          Physical Exam   Constitutional: He is oriented to person, place, and time. He appears well-developed and well-nourished.   HENT:   Head: Normocephalic and " atraumatic.   Neck: Normal range of motion.   Cardiovascular: Normal rate and regular rhythm. Exam reveals no gallop and no friction rub.   No murmur heard.  Pulse in 80's and regular   Pulmonary/Chest: Effort normal and breath sounds normal. He has no wheezes. He has no rales.   Abdominal: Soft. Bowel sounds are normal. There is no tenderness. There is no rebound and no guarding.   Musculoskeletal: Normal range of motion. He exhibits no edema or tenderness.   Neurological: He is alert and oriented to person, place, and time. No cranial nerve deficit.   Skin: Skin is warm and dry.   Psychiatric: He has a normal mood and affect.       Assessment and Plan   1. Other iron deficiency anemia  Repeat cbc today to monitor for stability high probablity of GI loss in light of known inflammatory bowel disease versus some component of AOCD   - CBC auto differential; Future    2. Pulmonary Mycobacterium avium complex (MAC) infection  Poor inspiratory effort likely getting minimal benefit from MDI switch to nebulizer therapies which were apparently more helpful during recent [facility] short follow up  - NEBULIZER FOR HOME USE  - albuterol (ACCUNEB) 0.63 mg/3 mL Nebu; Take 3 mLs (0.63 mg total) by nebulization every 6 (six) hours as needed (coughing). Rescue  Dispense: 1 Box; Refill: 1

## 2020-02-03 NOTE — TELEPHONE ENCOUNTER
----- Message from Nakita Grullon sent at 2/3/2020  2:00 PM CST -----  Contact: Carmen  [facility]  Type: Patient Call Back    What is the request in detail: Carmen requesting pt clinical notes to be faxed.     Can the clinic reply by [facility]? No    Would the patient rather a call back or a response via [facility]? Call back     Best call back number:    Fax # [phone]

## 2020-02-03 NOTE — TELEPHONE ENCOUNTER
----- Message from Getachew Valera MD sent at 2/3/2020  4:56 PM CST -----  Pippa Juarez is not on the portal. Would you call his daughter who is his caretaker and tell her that he has a compression fracture of a thoracic vertebrae. This is the cause of his pain. He would benefit from seeing pain medicine for a kyphoplasty. He should contact his PCP to set this up ( or maybe you can help him do that  tom

## 2020-02-05 ENCOUNTER — TELEPHONE (OUTPATIENT)
Dept: FAMILY MEDICINE | Facility: CLINIC | Age: 80
End: 2020-02-05

## 2020-02-05 DIAGNOSIS — S22.060G COMPRESSION FRACTURE OF T7 VERTEBRA WITH DELAYED HEALING, SUBSEQUENT ENCOUNTER: Primary | ICD-10-CM

## 2020-02-05 DIAGNOSIS — A31.0 MYCOBACTERIUM AVIUM COMPLEX: ICD-10-CM

## 2020-02-05 DIAGNOSIS — J98.4 PULMONARY LESION OF LEFT SIDE OF CHEST: ICD-10-CM

## 2020-02-05 RX ORDER — HYDROCODONE BITARTRATE AND ACETAMINOPHEN 5; 325 MG/1; MG/1
1 TABLET ORAL EVERY 8 HOURS PRN
Qty: 21 TABLET | Refills: 0 | Status: SHIPPED | OUTPATIENT
Start: 2020-02-05 | End: 2020-02-06

## 2020-02-05 NOTE — TELEPHONE ENCOUNTER
----- Message from Karon Hudson MA sent at 2/5/2020  9:32 AM CST -----  Contact: Daughter Alix Anderson [phone]  Please Advise  ----- Message -----  From: Selam Hanks  Sent: 2/5/2020   9:25 AM CST  To: [facility] Staff    Type: Patient Call Back    Who called: Patient    What is the request in detail: Was told to contact  PCP about getting pain medication sent in to pharmacy for a cracked vertebrae, per the patient's Infectious Disease Physician. States the results were sent to Dr Adamson. Please send in a Rx for pain medication.  .  [id number]  [place] - [address]  [place] [address]  Phone: [phone] Fax: [phone]    Would the patient rather a call back or a response via [facility]?  Call back    Best call back number: [phone]    Additional Information: Patient can not wait until appointment on Monday, 02-10-20. Please call when this is done.

## 2020-02-05 NOTE — TELEPHONE ENCOUNTER
lvm for daughter. New thoracic compression fracture referral to pain management for consideration of kyphoplasty. Short course of pain medication sent.

## 2020-02-06 ENCOUNTER — TELEPHONE (OUTPATIENT)
Dept: FAMILY MEDICINE | Facility: CLINIC | Age: 80
End: 2020-02-06

## 2020-02-06 DIAGNOSIS — S22.060G COMPRESSION FRACTURE OF T7 VERTEBRA WITH DELAYED HEALING, SUBSEQUENT ENCOUNTER: ICD-10-CM

## 2020-02-06 RX ORDER — HYDROCODONE BITARTRATE AND ACETAMINOPHEN 5; 325 MG/1; MG/1
1 TABLET ORAL EVERY 8 HOURS PRN
Qty: 21 TABLET | Refills: 0 | Status: SHIPPED | OUTPATIENT
Start: 2020-02-06 | End: 2022-03-18

## 2020-02-06 NOTE — TELEPHONE ENCOUNTER
Spoke with patient's daughter has gotten pain medication through [facility]. nebulizers are improving cough. They would like to see a different interventional pain specialist that her mother has seen. Will discuss this further at follow up on Monday's visit.

## 2020-02-06 NOTE — TELEPHONE ENCOUNTER
----- Message from Rosa Joe MA sent at 2/6/2020 10:37 AM CST -----  Contact: Daughter-       ----- Message -----  From: Chelsy Conteh  Sent: 2/5/2020   4:48 PM CST  To: [facility] Staff    Type: Patient Call Back    Who called: Daughter   What is the request in detail:  Asking to have medication sent to [facility] instead. Its the Norco. She is asking for a call back as well. Please advise   Can the clinic reply by [facility]?  Call   Would the patient rather a call back or a response via [facility]? Call   Best call back number: [phone]  Additional Information:       [id number] - [place] - [address]  [address]  [place] [address]  Phone: [phone] Fax: [phone]

## 2020-02-06 NOTE — TELEPHONE ENCOUNTER
[facility] pharmacist name: Nathaniel  Refuse to fill in pain medication Norco.  Reason:  Pt. Has transfer all refills to [facility] in [place],                   And he will not just fill in Pain medication.

## 2020-02-10 ENCOUNTER — OFFICE VISIT (OUTPATIENT)
Dept: FAMILY MEDICINE | Facility: CLINIC | Age: 80
End: 2020-02-10
Payer: MEDICARE

## 2020-02-10 VITALS
OXYGEN SATURATION: 94 % | TEMPERATURE: 98 F | HEIGHT: 78 IN | RESPIRATION RATE: 22 BRPM | DIASTOLIC BLOOD PRESSURE: 68 MMHG | BODY MASS INDEX: 21.17 KG/M2 | SYSTOLIC BLOOD PRESSURE: 138 MMHG | WEIGHT: 183 LBS | HEART RATE: 109 BPM

## 2020-02-10 DIAGNOSIS — A31.0 PULMONARY MYCOBACTERIUM AVIUM COMPLEX (MAC) INFECTION: Chronic | ICD-10-CM

## 2020-02-10 DIAGNOSIS — J42 CHRONIC BRONCHITIS, UNSPECIFIED CHRONIC BRONCHITIS TYPE: Primary | Chronic | ICD-10-CM

## 2020-02-10 DIAGNOSIS — S22.060D COMPRESSION FRACTURE OF T7 VERTEBRA WITH ROUTINE HEALING: ICD-10-CM

## 2020-02-10 PROCEDURE — 99999 PR PBB SHADOW E&M-EST. PATIENT-LVL III: ICD-10-PCS | Mod: PBBFAC,,, | Performed by: INTERNAL MEDICINE

## 2020-02-10 PROCEDURE — 3075F SYST BP GE 130 - 139MM HG: CPT | Mod: CPTII,S$GLB,, | Performed by: INTERNAL MEDICINE

## 2020-02-10 PROCEDURE — 99499 RISK ADDL DX/OHS AUDIT: ICD-10-PCS | Mod: S$GLB,,, | Performed by: INTERNAL MEDICINE

## 2020-02-10 PROCEDURE — 1159F MED LIST DOCD IN RCRD: CPT | Mod: S$GLB,,, | Performed by: INTERNAL MEDICINE

## 2020-02-10 PROCEDURE — 3078F PR MOST RECENT DIASTOLIC BLOOD PRESSURE < 80 MM HG: ICD-10-PCS | Mod: CPTII,S$GLB,, | Performed by: INTERNAL MEDICINE

## 2020-02-10 PROCEDURE — 99499 UNLISTED E&M SERVICE: CPT | Mod: S$GLB,,, | Performed by: INTERNAL MEDICINE

## 2020-02-10 PROCEDURE — 99214 PR OFFICE/OUTPT VISIT, EST, LEVL IV, 30-39 MIN: ICD-10-PCS | Mod: S$GLB,,, | Performed by: INTERNAL MEDICINE

## 2020-02-10 PROCEDURE — 1159F PR MEDICATION LIST DOCUMENTED IN MEDICAL RECORD: ICD-10-PCS | Mod: S$GLB,,, | Performed by: INTERNAL MEDICINE

## 2020-02-10 PROCEDURE — 99214 OFFICE O/P EST MOD 30 MIN: CPT | Mod: S$GLB,,, | Performed by: INTERNAL MEDICINE

## 2020-02-10 PROCEDURE — 99999 PR PBB SHADOW E&M-EST. PATIENT-LVL III: CPT | Mod: PBBFAC,,, | Performed by: INTERNAL MEDICINE

## 2020-02-10 PROCEDURE — 1126F PR PAIN SEVERITY QUANTIFIED, NO PAIN PRESENT: ICD-10-PCS | Mod: S$GLB,,, | Performed by: INTERNAL MEDICINE

## 2020-02-10 PROCEDURE — 1126F AMNT PAIN NOTED NONE PRSNT: CPT | Mod: S$GLB,,, | Performed by: INTERNAL MEDICINE

## 2020-02-10 PROCEDURE — 1101F PR PT FALLS ASSESS DOC 0-1 FALLS W/OUT INJ PAST YR: ICD-10-PCS | Mod: CPTII,S$GLB,, | Performed by: INTERNAL MEDICINE

## 2020-02-10 PROCEDURE — 3078F DIAST BP <80 MM HG: CPT | Mod: CPTII,S$GLB,, | Performed by: INTERNAL MEDICINE

## 2020-02-10 PROCEDURE — 3075F PR MOST RECENT SYSTOLIC BLOOD PRESS GE 130-139MM HG: ICD-10-PCS | Mod: CPTII,S$GLB,, | Performed by: INTERNAL MEDICINE

## 2020-02-10 PROCEDURE — 1101F PT FALLS ASSESS-DOCD LE1/YR: CPT | Mod: CPTII,S$GLB,, | Performed by: INTERNAL MEDICINE

## 2020-02-10 NOTE — PROGRESS NOTES
"Subjective:       Patient ID: eRgan Alvarez is a 79 y.o. male.    Chief Complaint: Follow-up    F/u cough    HPI: 78 y/o w/ COPD h/o MAC presents with daughter for scheduled follow up. Repeat CT of chest showed worsening bronchiectasis and incidental thoracic compression fracture had been having increased coughing. Began using nebulized albuterol one to three times daily iwht singificant improvement in cough. No further back pain. He is not interested in interventional procedures. Sputum for AFB still pending culture.     Review of Systems   Constitutional: Negative for activity change, appetite change, fatigue, fever and unexpected weight change.   HENT: Negative for ear pain, rhinorrhea and sore throat.    Eyes: Negative for discharge and visual disturbance.   Respiratory: Positive for shortness of breath. Negative for chest tightness and wheezing.    Cardiovascular: Negative for chest pain, palpitations and leg swelling.   Gastrointestinal: Negative for abdominal pain, constipation and diarrhea.   Endocrine: Negative for cold intolerance and heat intolerance.   Genitourinary: Negative for dysuria and hematuria.   Musculoskeletal: Negative for joint swelling and neck stiffness.   Skin: Negative for rash.   Neurological: Negative for dizziness, syncope, weakness and headaches.   Psychiatric/Behavioral: Negative for suicidal ideas.       Objective:     Vitals:    02/10/20 1018   BP: 138/68   BP Location: Right arm   Patient Position: Sitting   BP Method: Medium (Manual)   Pulse: 109   Resp: (!) 22   Temp: 97.6 °F (36.4 °C)   TempSrc: Oral   SpO2: (!) 94%   Weight: 83 kg (182 lb 15.7 oz)   Height: 6' 7" (2.007 m)          Physical Exam   Constitutional: He is oriented to person, place, and time. He appears well-developed and well-nourished.   HENT:   Head: Normocephalic and atraumatic.   Eyes: Conjunctivae are normal. No scleral icterus.   Neck: Normal range of motion.   Cardiovascular: Normal rate and regular " rhythm. Exam reveals no gallop and no friction rub.   No murmur heard.  Pulmonary/Chest: Effort normal and breath sounds normal. He has no wheezes. He has no rales.   Good air movement to bases bilaterally   Abdominal: Soft. Bowel sounds are normal. There is no tenderness. There is no rebound and no guarding.   Musculoskeletal: Normal range of motion. He exhibits no edema or tenderness.   Neurological: He is alert and oriented to person, place, and time. No cranial nerve deficit.   Skin: Skin is warm and dry.   Psychiatric: He has a normal mood and affect.       Assessment and Plan   1. Chronic bronchitis, unspecified chronic bronchitis type  Continue laba/ics and prn albuteroal    2. Pulmonary Mycobacterium avium complex (MAC) infection  Pending afb culture if no growth at four weeks may require bronchoscopy for deeper sampling. Patient and daughter would like to avoid invasive procedures if possible and breathing has improved with above therapies    3. Compression fracture of T7 vertebra with routine healing  Improved with relief of coughing patient declines interventional pain evaluation no palpable pain and good range of motion will monitor for now

## 2020-02-14 DIAGNOSIS — M79.2 NEUROPATHIC PAIN OF RIGHT LOWER EXTREMITY: ICD-10-CM

## 2020-02-14 RX ORDER — GABAPENTIN 100 MG/1
CAPSULE ORAL
Qty: 90 CAPSULE | Refills: 0 | Status: SHIPPED | OUTPATIENT
Start: 2020-02-14 | End: 2020-04-25

## 2020-02-18 DIAGNOSIS — A31.0 PULMONARY MYCOBACTERIUM AVIUM COMPLEX (MAC) INFECTION: Primary | Chronic | ICD-10-CM

## 2020-02-19 ENCOUNTER — PATIENT OUTREACH (OUTPATIENT)
Dept: ADMINISTRATIVE | Facility: OTHER | Age: 80
End: 2020-02-19

## 2020-02-19 ENCOUNTER — TELEPHONE (OUTPATIENT)
Dept: PULMONOLOGY | Facility: CLINIC | Age: 80
End: 2020-02-19

## 2020-02-19 NOTE — TELEPHONE ENCOUNTER
Spoke with patient daughter informed her that I understand that it was last minute, also explained to patient daughter that he will need to have these studies completed prior to his appointment and that I wanted to also let them know that they were added to his visit so that they wouldn't be alarmed at the  when checking in for his visit. Daughter has verbalized that she understand

## 2020-02-19 NOTE — TELEPHONE ENCOUNTER
----- Message from Debra Rodriguez sent at 2/19/2020 10:15 AM CST -----  Contact: pt  Patient's daughter is calling to advise staff that her father is running late for his appointment.        Call back [phone] Alix Anderson

## 2020-02-19 NOTE — TELEPHONE ENCOUNTER
Patient daughter stated that they are stuck in traffic, have been stuck for 40 minutes, coming down of [facility] down ramp by the [employer]. Patient have be informed per Dr. Vera that the visit will need to be canceled and rescheduled. Patient daughter has verbalized that she does understand.

## 2020-02-19 NOTE — TELEPHONE ENCOUNTER
----- Message from Demetria Pinedo sent at 2/19/2020  8:57 AM CST -----  Contact: Claude      tel:   [phone]   Caller says why would you wait until the last minute to call them about coming in for testing early.    [place]   Pt. Says he will get here as soon as he can.

## 2020-03-04 ENCOUNTER — PATIENT OUTREACH (OUTPATIENT)
Dept: ADMINISTRATIVE | Facility: OTHER | Age: 80
End: 2020-03-04

## 2020-03-06 ENCOUNTER — HOSPITAL ENCOUNTER (OUTPATIENT)
Dept: PULMONOLOGY | Facility: CLINIC | Age: 80
Discharge: HOME | End: 2020-03-06
Payer: MEDICARE

## 2020-03-06 ENCOUNTER — OFFICE VISIT (OUTPATIENT)
Dept: PULMONOLOGY | Facility: CLINIC | Age: 80
End: 2020-03-06
Payer: MEDICARE

## 2020-03-06 VITALS
BODY MASS INDEX: 20.83 KG/M2 | HEIGHT: 78 IN | WEIGHT: 180 LBS | OXYGEN SATURATION: 97 % | HEART RATE: 102 BPM | SYSTOLIC BLOOD PRESSURE: 140 MMHG | DIASTOLIC BLOOD PRESSURE: 82 MMHG

## 2020-03-06 DIAGNOSIS — J98.4 PULMONARY LESION OF LEFT SIDE OF CHEST: ICD-10-CM

## 2020-03-06 DIAGNOSIS — A31.0 MYCOBACTERIUM AVIUM COMPLEX: ICD-10-CM

## 2020-03-06 DIAGNOSIS — R91.1 LUNG NODULE: ICD-10-CM

## 2020-03-06 DIAGNOSIS — J47.9 BRONCHIECTASIS WITHOUT COMPLICATION: ICD-10-CM

## 2020-03-06 DIAGNOSIS — A31.0 PULMONARY MYCOBACTERIUM AVIUM COMPLEX (MAC) INFECTION: Chronic | ICD-10-CM

## 2020-03-06 DIAGNOSIS — R91.1 PULMONARY NODULE: ICD-10-CM

## 2020-03-06 DIAGNOSIS — J44.9 COPD, SEVERE: Primary | ICD-10-CM

## 2020-03-06 LAB
DLCO ADJ PRE: 10.31 ML/(MIN*MMHG) (ref 22.47–36.32)
DLCO SINGLE BREATH LLN: 22.47
DLCO SINGLE BREATH PRE REF: 35.1 %
DLCO SINGLE BREATH REF: 29.39
DLCOC SBVA LLN: 2.6
DLCOC SBVA PRE REF: 65.5 %
DLCOC SBVA REF: 3.59
DLCOC SINGLE BREATH LLN: 22.47
DLCOC SINGLE BREATH PRE REF: 35.1 %
DLCOC SINGLE BREATH REF: 29.39
DLCOCSBVAULN: 4.58
DLCOCSINGLEBREATHULN: 36.32
DLCOSINGLEBREATHULN: 36.32
DLCOVA LLN: 2.6
DLCOVA PRE REF: 65.5 %
DLCOVA PRE: 2.35 ML/(MIN*MMHG*L) (ref 2.6–4.58)
DLCOVA REF: 3.59
DLCOVAULN: 4.58
DLVAADJ PRE: 2.35 ML/(MIN*MMHG*L) (ref 2.6–4.58)
ERVN2 LLN: 1.05
ERVN2 PRE REF: 127.1 %
ERVN2 PRE: 1.34 L (ref 1.05–1.05)
ERVN2 REF: 1.05
ERVN2ULN: 1.05
FEF 25 75 LLN: 0.83
FEF 25 75 PRE REF: 24.3 %
FEF 25 75 REF: 2.24
FET100 CHG: -3.3 %
FEV05 LLN: 1.81
FEV05 REF: 2.94
FEV1 CHG: 1.5 %
FEV1 FVC LLN: 59
FEV1 FVC PRE REF: 66.2 %
FEV1 FVC REF: 74
FEV1 LLN: 2.29
FEV1 PRE REF: 40.4 %
FEV1 REF: 3.33
FEV1 VOL CHG: 0.02
FRCN2 LLN: 3.12
FRCN2 PRE REF: 75.2 %
FRCN2 REF: 4.11
FRCN2ULN: 5.1
FVC CHG: -1.8 %
FVC LLN: 3.29
FVC PRE REF: 60.3 %
FVC REF: 4.58
FVC VOL CHG: -0.05
IVC PRE: 2.8 L (ref 3.29–5.86)
IVC SINGLE BREATH LLN: 3.29
IVC SINGLE BREATH PRE REF: 61.2 %
IVC SINGLE BREATH REF: 4.58
IVCSINGLEBREATHULN: 5.86
PEF LLN: 5.65
PEF PRE REF: 33.6 %
PEF REF: 8.32
POST FEF 25 75: 0.54 L/S (ref 0.83–3.64)
POST FET 100: 6.58 SEC
POST FEV1 FVC: 50.42 % (ref 58.88–88.42)
POST FEV1: 1.36 L (ref 2.29–4.36)
POST FEV5: 0.93 L (ref 1.81–4.08)
POST FVC: 2.71 L (ref 3.29–5.86)
POST PEF: 3.07 L/S (ref 5.65–11)
PRE DLCO: 10.31 ML/(MIN*MMHG) (ref 22.47–36.32)
PRE FEF 25 75: 0.54 L/S (ref 0.83–3.64)
PRE FET 100: 6.81 SEC
PRE FEV05 REF: 32.1 %
PRE FEV1 FVC: 48.78 % (ref 58.88–88.42)
PRE FEV1: 1.34 L (ref 2.29–4.36)
PRE FEV5: 0.94 L (ref 1.81–4.08)
PRE FRC N2: 3.09 L
PRE FVC: 2.76 L (ref 3.29–5.86)
PRE PEF: 2.8 L/S (ref 5.65–11)
RVN2 LLN: 2.38
RVN2 PRE REF: 57.3 %
RVN2 PRE: 1.75 L (ref 2.38–3.73)
RVN2 REF: 3.05
RVN2TLCN2 LLN: 36.57
RVN2TLCN2 PRE REF: 78.2 %
RVN2TLCN2 PRE: 35.64 % (ref 36.57–54.53)
RVN2TLCN2 REF: 45.55
RVN2TLCN2ULN: 54.53
RVN2ULN: 3.73
TLCN2 LLN: 7.03
TLCN2 PRE REF: 60 %
TLCN2 PRE: 4.91 L (ref 7.03–9.33)
TLCN2 REF: 8.18
TLCN2ULN: 9.33
VA PRE: 4.38 L (ref 8.03–8.03)
VA SINGLE BREATH LLN: 8.03
VA SINGLE BREATH PRE REF: 54.6 %
VA SINGLE BREATH REF: 8.03
VASINGLEBREATHULN: 8.03
VCMAXN2 LLN: 3.29
VCMAXN2 PRE REF: 69.1 %
VCMAXN2 PRE: 3.16 L (ref 3.29–5.86)
VCMAXN2 REF: 4.58
VCMAXN2ULN: 5.86

## 2020-03-06 PROCEDURE — 94727 PR PULM FUNCTION TEST BY GAS: ICD-10-PCS | Mod: S$GLB,,, | Performed by: INTERNAL MEDICINE

## 2020-03-06 PROCEDURE — 94727 GAS DIL/WSHOT DETER LNG VOL: CPT | Mod: S$GLB,,, | Performed by: INTERNAL MEDICINE

## 2020-03-06 PROCEDURE — 94729 DIFFUSING CAPACITY: CPT | Mod: S$GLB,,, | Performed by: INTERNAL MEDICINE

## 2020-03-06 PROCEDURE — 1101F PR PT FALLS ASSESS DOC 0-1 FALLS W/OUT INJ PAST YR: ICD-10-PCS | Mod: CPTII,GC,S$GLB, | Performed by: INTERNAL MEDICINE

## 2020-03-06 PROCEDURE — 99999 PR PBB SHADOW E&M-EST. PATIENT-LVL IV: CPT | Mod: PBBFAC,GC,, | Performed by: INTERNAL MEDICINE

## 2020-03-06 PROCEDURE — 94060 PR EVAL OF BRONCHOSPASM: ICD-10-PCS | Mod: S$GLB,,, | Performed by: INTERNAL MEDICINE

## 2020-03-06 PROCEDURE — 94060 EVALUATION OF WHEEZING: CPT | Mod: S$GLB,,, | Performed by: INTERNAL MEDICINE

## 2020-03-06 PROCEDURE — 3077F SYST BP >= 140 MM HG: CPT | Mod: CPTII,GC,S$GLB, | Performed by: INTERNAL MEDICINE

## 2020-03-06 PROCEDURE — 99999 PR PBB SHADOW E&M-EST. PATIENT-LVL IV: ICD-10-PCS | Mod: PBBFAC,GC,, | Performed by: INTERNAL MEDICINE

## 2020-03-06 PROCEDURE — 3077F PR MOST RECENT SYSTOLIC BLOOD PRESSURE >= 140 MM HG: ICD-10-PCS | Mod: CPTII,GC,S$GLB, | Performed by: INTERNAL MEDICINE

## 2020-03-06 PROCEDURE — 1159F PR MEDICATION LIST DOCUMENTED IN MEDICAL RECORD: ICD-10-PCS | Mod: GC,S$GLB,, | Performed by: INTERNAL MEDICINE

## 2020-03-06 PROCEDURE — 99204 PR OFFICE/OUTPT VISIT, NEW, LEVL IV, 45-59 MIN: ICD-10-PCS | Mod: GC,S$GLB,, | Performed by: INTERNAL MEDICINE

## 2020-03-06 PROCEDURE — 99204 OFFICE O/P NEW MOD 45 MIN: CPT | Mod: GC,S$GLB,, | Performed by: INTERNAL MEDICINE

## 2020-03-06 PROCEDURE — 1159F MED LIST DOCD IN RCRD: CPT | Mod: GC,S$GLB,, | Performed by: INTERNAL MEDICINE

## 2020-03-06 PROCEDURE — 94729 PR C02/MEMBANE DIFFUSE CAPACITY: ICD-10-PCS | Mod: S$GLB,,, | Performed by: INTERNAL MEDICINE

## 2020-03-06 PROCEDURE — 3079F DIAST BP 80-89 MM HG: CPT | Mod: CPTII,GC,S$GLB, | Performed by: INTERNAL MEDICINE

## 2020-03-06 PROCEDURE — 3079F PR MOST RECENT DIASTOLIC BLOOD PRESSURE 80-89 MM HG: ICD-10-PCS | Mod: CPTII,GC,S$GLB, | Performed by: INTERNAL MEDICINE

## 2020-03-06 PROCEDURE — 1101F PT FALLS ASSESS-DOCD LE1/YR: CPT | Mod: CPTII,GC,S$GLB, | Performed by: INTERNAL MEDICINE

## 2020-03-06 RX ORDER — FERROUS SULFATE 325(65) MG
1 TABLET ORAL 2 TIMES DAILY
COMMUNITY
Start: 2020-02-28 | End: 2022-01-01 | Stop reason: SDUPTHER

## 2020-03-06 RX ORDER — TIOTROPIUM BROMIDE 18 UG/1
18 CAPSULE ORAL; RESPIRATORY (INHALATION) DAILY
Qty: 30 CAPSULE | Refills: 11 | Status: SHIPPED | OUTPATIENT
Start: 2020-03-06 | End: 2022-03-11

## 2020-03-06 RX ORDER — BUDESONIDE AND FORMOTEROL FUMARATE DIHYDRATE 160; 4.5 UG/1; UG/1
2 AEROSOL RESPIRATORY (INHALATION) EVERY 12 HOURS
Qty: 1 INHALER | Refills: 6 | Status: SHIPPED | OUTPATIENT
Start: 2020-03-06 | End: 2021-02-26 | Stop reason: SDUPTHER

## 2020-03-06 RX ORDER — SODIUM CHLORIDE FOR INHALATION 3 %
4 VIAL, NEBULIZER (ML) INHALATION 2 TIMES DAILY
Qty: 240 ML | Refills: 6 | Status: SHIPPED | OUTPATIENT
Start: 2020-03-06 | End: 2020-04-05

## 2020-03-06 NOTE — PROGRESS NOTES
tteHistory & Physical  [facility]        SUBJECTIVE:     Chief Complaint: consult for new GUTIERREZ pulmonary nodule    History of Present Illness:  Regan Alvarez is a 79 y.o. male with PMH of ulcerative colitis (on entyvio infusions), h/o pulmonary MAC (last positive sputum 12/14/15), bronchiectasis and severe COPD who presents as a new patient for the above problems. In regards to pt's pulmonary MAC, he see's Dr. Valera. His last sputum culture has been 12/14/15. He was treated with triple therapy from 1/2016 to 12/2017 and has been on surveillance therapy since. He has a sputum from 1/28/20 that has been negative thus far. He does have bronchiectasis and states that lately it's been hard for him to bring up his sputum. He denies any fever, chills, chest pain, sob but states his sputum is thick. He currently is using albuterol nebs bid for this. In regards to his severe copd, he is an ex-smoker (quit 5 years ago). Smoked 1ppd for 58 years. His PFT today 3/6/20 shows severe obstruction with FEV1 40% and no significant bronchodilator response. He also has mild restrictive lung disease with TLC 60, and severe reduction of diffusion capacity with DLCO 35%. His CT chest (2/3/20) does show emphysema but also shows a new GUTIERREZ pulmonary nodule of 1.3cm and a lingula nodule of 1.3cm. GUTIERREZ is new compared to CT from 1/30/19. He states that he is only using albuterol rescue inhaler about 4 times a week. He's not taking any of his maintenance inhalers. He denies any hospitalizations in the past year for respiratory issues.    Review of patient's allergies indicates:  No Known Allergies    Past Medical History:   Diagnosis Date    Ambulates with cane     Arthritis     COPD (chronic obstructive pulmonary disease)     Diabetes mellitus type II     Hypertension     Mycobacterium avium complex     X's 2    Symptomatic anemia 01/28/2020    Tuberculosis     Ulcerative colitis      Past Surgical History:   Procedure  Laterality Date    COLONOSCOPY N/A 1/22/2018    Procedure: COLONOSCOPY;  Surgeon: Roel Mendez MD;  Location: [facility];  Service: Endoscopy;  Laterality: N/A;  Pt confirmend appt.    melanoma      forehead     Family History   Problem Relation Age of Onset    Cancer Father         Tuberculosis.  Secondary scar was neoplastic.     Social History     Socioeconomic History    Marital status:      Spouse name: Not on file    Number of children: Not on file    Years of education: Not on file    Highest education level: Not on file   Occupational History    Not on file   Social Needs    Financial resource strain: Not on file    Food insecurity:     Worry: Not on file     Inability: Not on file    Transportation needs:     Medical: Not on file     Non-medical: Not on file   Tobacco Use    Smoking status: Former Smoker     Types: Cigarettes    Smokeless tobacco: Never Used    Tobacco comment: uses cigars on ocassion   Substance and Sexual Activity    Alcohol use: Yes     Comment: socially    Drug use: No    Sexual activity: Not Currently   Lifestyle    Physical activity:     Days per week: Not on file     Minutes per session: Not on file    Stress: Not on file   Relationships    Social connections:     Talks on phone: Not on file     Gets together: Not on file     Attends Holiness service: Not on file     Active member of club or organization: Not on file     Attends meetings of clubs or organizations: Not on file     Relationship status: Not on file   Other Topics Concern    Not on file   Social History Narrative    Not on file       Review of Systems:  CV: no syncope  ENT: no sore throat  Resp: per hpi  Eyes: no eye pain  Gastrointestinal: no nausea or vomiting  Integument/Breast: no rash  Musculoskeletal: no arthralgias  Neurological: no headaches  Behavioral/Psych: no confusion or depression  Heme: no bleeding      OBJECTIVE:     Vital Signs  Vitals:    03/06/20 1302   BP: (!) 140/82   BP  "Location: Left arm   Patient Position: Sitting   Pulse: 102   SpO2: 97%   Weight: 81.6 kg (180 lb)   Height: 6' 7" (2.007 m)     Body mass index is 20.28 kg/m².    Physical Exam:  General: no distress  Eyes:  conjunctivae/corneas clear  Nose: no discharge  Neck: trachea midline with no masses appreciated  Lungs:  normal respiratory effort, no wheezes, no rales, severely diminished lungs b/l fields  Heart: regular rate and rhythm and no murmur  Abdomen: non-distended  Extremities: no cyanosis, no edema, no clubbing  Skin: No rashes or lesions. good skin turgor  Neurologic: alert, oriented, thought content appropriate    Laboratory:  Lab Results   Component Value Date    WBC 11.87 02/03/2020    HGB 9.2 (L) 02/03/2020    HCT 31.5 (L) 02/03/2020    MCV 75 (L) 02/03/2020     (H) 02/03/2020       Chest Imaging, My Impression: Ct showing severe emphysema with pulmonary nodules. Also seen is consolidation from MAC.    Diagnostic Results:  CT: Reviewed    ASSESSMENT/PLAN:     Problem Noted   Bronchiectasis 3/6/2020    Pt having thick productive sputum with difficulty clearance  - continue his albuterol nebs bid  - will add hypertonic saline nebs bid  - adding acapella device as well     Pulmonary Nodule 3/6/2020    Pt has a new 1.3cm GUTIERREZ pulmonary nodule that wasn't present 1 year ago. Pt is an ex-smoker, quit 5 years ago. He however is high risk for complications if he undergoes a biopsy (pneumothorax) given the extend of his emphysema. Pt also not interested in any treatment if it were malignancy  - will however repeat ct chest in 3 months for surveillance       Copd, Severe 6/21/2016    Pt has severe COPD. FEV1 is 40%, DLCO 35. CT showing severe emphysema. Pt not in an exacerbation but is using albuterol inhaler about 4-5 times a week. Isn't using his maintenance inhalers.  - will prescribe him spiriva and symbicort for maintenance   - advised to use albuterol inhaler prn     Pulmonary Mycobacterium Avium Complex " (Mac) Infection 1/14/2016    Currently being watched surveillance. Last sputum was negative on 1/2020       RTC in 3 months    Joe Gordon MD  [employer]  [facility]

## 2020-03-13 ENCOUNTER — TELEPHONE (OUTPATIENT)
Dept: PULMONOLOGY | Facility: CLINIC | Age: 80
End: 2020-03-13

## 2020-03-13 NOTE — TELEPHONE ENCOUNTER
----- Message from Selam Haji sent at 3/13/2020  9:21 AM CDT -----  Contact:  Alix Justin pt Daughter [phone]  [facility] delivered Nebulizer kit which is the tube.  Pt has tube, he needs mask.

## 2020-03-13 NOTE — TELEPHONE ENCOUNTER
I spoke patient's daughter. I called [facility] and they over looked the order for the mask for patient and will contact them for delivery.

## 2020-03-13 NOTE — TELEPHONE ENCOUNTER
----- Message from Dunia Kim MA sent at 3/13/2020 11:23 AM CDT -----  Contact: [phone]  Patient returning your call.  ----- Message -----  From: Juany Cruz MA  Sent: 3/13/2020  11:16 AM CDT  To: Evan Diaz Staff    Pt daughter is returning call.  Requesting call back.

## 2020-03-31 LAB
ACID FAST MOD KINY STN SPEC: NORMAL
MYCOBACTERIUM SPEC QL CULT: NORMAL

## 2020-04-01 DIAGNOSIS — A31.0 PULMONARY MYCOBACTERIUM AVIUM COMPLEX (MAC) INFECTION: Chronic | ICD-10-CM

## 2020-04-01 RX ORDER — ALBUTEROL SULFATE 0.63 MG/3ML
SOLUTION RESPIRATORY (INHALATION)
Qty: 90 ML | Refills: 0 | Status: SHIPPED | OUTPATIENT
Start: 2020-04-01 | End: 2020-05-14

## 2020-04-08 ENCOUNTER — TELEPHONE (OUTPATIENT)
Dept: PULMONOLOGY | Facility: CLINIC | Age: 80
End: 2020-04-08

## 2020-04-08 NOTE — TELEPHONE ENCOUNTER
----- Message from Debby Rios sent at 4/7/2020  4:07 PM CDT -----  Contact: Pt   Tube isn't fitting properly on pt and the dog is the only one getting a breathing treatment (LOL) Please call back to discuss a mask option from [facility].

## 2020-04-08 NOTE — TELEPHONE ENCOUNTER
I left the patient a voicemail to let him know I called [facility] at [phone] and spoke to Jenae. She stated they will mail the patient 2 mask for his nebulizer out today and he will receive in 2-3 days. Patient to call me back if he does not receive them by next week. Also, patient to call back if he has any questions.

## 2020-04-22 DIAGNOSIS — M79.2 NEUROPATHIC PAIN OF RIGHT LOWER EXTREMITY: ICD-10-CM

## 2020-04-24 DIAGNOSIS — M79.2 NEUROPATHIC PAIN OF RIGHT LOWER EXTREMITY: ICD-10-CM

## 2020-04-24 NOTE — TELEPHONE ENCOUNTER
"----- Message from Lashaun Stuart sent at 4/24/2020 10:29 AM CDT -----  Contact: Alix Anderson "Daughter" [phone]  Type: RX Refill Request    Who Called: Alix Anderson "Daughter"    Have you contacted your pharmacy: yes     Refill or New Rx: refill     RX Name and Strength: gabapentin (NEURONTIN) 100 MG capsule & isosorbide mononitrate (IMDUR) 30 MG 24 hr tablet    Is this a 30 day or 90 day RX: 90 day     Preferred Pharmacy with phone number: [facility] - [place], [place] - [address] [phone] (Phone)  [phone] (Fax)    Local or Mail Order: local     Would the patient rather a call back or a response via [facility]? Call back     Best Call Back Number: [phone]      "

## 2020-04-25 RX ORDER — GABAPENTIN 100 MG/1
CAPSULE ORAL
Qty: 90 CAPSULE | Refills: 0 | Status: SHIPPED | OUTPATIENT
Start: 2020-04-25 | End: 2022-03-11

## 2020-04-25 RX ORDER — ISOSORBIDE MONONITRATE 30 MG/1
30 TABLET, EXTENDED RELEASE ORAL DAILY
Qty: 30 TABLET | Refills: 0 | Status: SHIPPED | OUTPATIENT
Start: 2020-04-25 | End: 2020-09-08

## 2020-04-25 RX ORDER — GABAPENTIN 100 MG/1
200 CAPSULE ORAL NIGHTLY
Qty: 90 CAPSULE | Refills: 0 | OUTPATIENT
Start: 2020-04-25

## 2020-04-29 ENCOUNTER — TELEPHONE (OUTPATIENT)
Dept: FAMILY MEDICINE | Facility: CLINIC | Age: 80
End: 2020-04-29

## 2020-04-29 NOTE — TELEPHONE ENCOUNTER
----- Message from Janice Anaya sent at 4/28/2020  4:51 PM CDT -----  Contact:  Nereida   Name of Who is Calling: Nereida pt daughter       What is the request in detail: marco antonio pt daughter is requesting a call back she states her father is talking incoherent and she would like to discuss this matter       Can the clinic reply by [facility]: no      What Number to Call Back if not in [facility]: [phone]

## 2020-04-29 NOTE — TELEPHONE ENCOUNTER
"----- Message from Katherine Cruz LPN sent at 4/29/2020 11:01 AM CDT -----  Contact: Alix"daughter"  [phone]      ----- Message -----  From: Liseth Swift  Sent: 4/29/2020  10:41 AM CDT  To: Juan Diego Quiros Staff    Type: Patient Call Back    Who called:Alix"daughter"    What is the request in detail: calling to give an update on patient's vitals. She had been speaking with JAVI Neal about her father. His vitals are sugar 124 BP is 127/85 Pulse 97 Temp. 96.3 and totally coherent.    Can the clinic reply by [facility]? Call back    Would the patient rather a call back or a response via [facility]? Call back    Best call back number: [phone]          "

## 2020-04-29 NOTE — TELEPHONE ENCOUNTER
Yesterday evening 4-420 pm, she told her dad she cooked a big meal so they been eating from it. She asked what he wanted to eat that night. He responded not making sense. SHe sat next to him and try to get him to make sense. She then called a nurse aunt. Vitals were fine. Sugar 145 163/88 temp 96.8.     The nurse evaluated him and   He was able to follow commands. He still wasn't answering questions appropriately.    She gave him Gatorade in case he was dehydrated.   Called another aunt who is a nurse as well and was told to give him 2 ASA. She also did a breathing treatment.    Finally Responded normally around 6 pm.  Daughter said history of bleed but recent transfusion  Pt more fatigue x 2 days per daughter.   He is still sleeping and was aware this morning per daughter.     I spoke with Dr. Adamson to see if he agreed with ER referral due to his underlying conditions. He does.   Daughter states she does not think the will go bc her father wont want to go. Advised her ER is  to covid and non covid areas  At [facility]. She is concerned as she will not be able to go in with him. Pt is aware this is our recommendation but she will get his vitals and assess him this am and go from there. SHe does not have pulse ox.     She will call back with any concerns and understands we recommend the ER at this time due to his multiple conditions which could be the cause of his change.

## 2020-04-29 NOTE — TELEPHONE ENCOUNTER
Please advise..Call pt daughter regarding request pt daughter requesting a call back from provider..states father was incoherent and would like to discuss this matter  advice only

## 2020-04-29 NOTE — TELEPHONE ENCOUNTER
Spoke with daughter  She is aware to continue to monitor  If he remains normal, ok.   If return ER.     Pt states dad was without gabapentin for 2 days. She gave it to him again last night. Denies pain 2 day wihtout it so they will trial him off. Told them that was ok

## 2020-05-14 DIAGNOSIS — A31.0 PULMONARY MYCOBACTERIUM AVIUM COMPLEX (MAC) INFECTION: Chronic | ICD-10-CM

## 2020-05-14 RX ORDER — ALBUTEROL SULFATE 0.63 MG/3ML
SOLUTION RESPIRATORY (INHALATION)
Qty: 90 ML | Refills: 0 | Status: SHIPPED | OUTPATIENT
Start: 2020-05-14 | End: 2020-07-02

## 2020-05-26 ENCOUNTER — HOSPITAL ENCOUNTER (OUTPATIENT)
Dept: RADIOLOGY | Facility: HOSPITAL | Age: 80
Discharge: HOME | End: 2020-05-26
Attending: INTERNAL MEDICINE
Payer: MEDICARE

## 2020-05-26 DIAGNOSIS — J98.4 PULMONARY LESION OF LEFT SIDE OF CHEST: ICD-10-CM

## 2020-05-26 DIAGNOSIS — R91.1 LUNG NODULE: ICD-10-CM

## 2020-05-26 DIAGNOSIS — J44.9 COPD, SEVERE: ICD-10-CM

## 2020-05-26 DIAGNOSIS — A31.0 MYCOBACTERIUM AVIUM COMPLEX: ICD-10-CM

## 2020-05-26 PROCEDURE — 71250 CT THORAX DX C-: CPT | Mod: TC

## 2020-05-26 PROCEDURE — 71250 CT THORAX DX C-: CPT | Mod: 26,,, | Performed by: RADIOLOGY

## 2020-05-26 PROCEDURE — 71250 CT CHEST WITHOUT CONTRAST: ICD-10-PCS | Mod: 26,,, | Performed by: RADIOLOGY

## 2020-05-27 ENCOUNTER — PATIENT OUTREACH (OUTPATIENT)
Dept: ADMINISTRATIVE | Facility: OTHER | Age: 80
End: 2020-05-27

## 2020-05-29 ENCOUNTER — OFFICE VISIT (OUTPATIENT)
Dept: PULMONOLOGY | Facility: CLINIC | Age: 80
End: 2020-05-29
Payer: MEDICARE

## 2020-05-29 VITALS
HEIGHT: 78 IN | SYSTOLIC BLOOD PRESSURE: 140 MMHG | DIASTOLIC BLOOD PRESSURE: 72 MMHG | OXYGEN SATURATION: 95 % | WEIGHT: 189.13 LBS | HEART RATE: 116 BPM | BODY MASS INDEX: 21.88 KG/M2

## 2020-05-29 DIAGNOSIS — A31.0 PULMONARY MYCOBACTERIUM AVIUM COMPLEX (MAC) INFECTION: Chronic | ICD-10-CM

## 2020-05-29 DIAGNOSIS — J44.9 COPD, SEVERE: ICD-10-CM

## 2020-05-29 DIAGNOSIS — J47.9 BRONCHIECTASIS WITHOUT COMPLICATION: Primary | ICD-10-CM

## 2020-05-29 PROCEDURE — 1159F PR MEDICATION LIST DOCUMENTED IN MEDICAL RECORD: ICD-10-PCS | Mod: GC,S$GLB,, | Performed by: INTERNAL MEDICINE

## 2020-05-29 PROCEDURE — 1101F PR PT FALLS ASSESS DOC 0-1 FALLS W/OUT INJ PAST YR: ICD-10-PCS | Mod: CPTII,GC,S$GLB, | Performed by: INTERNAL MEDICINE

## 2020-05-29 PROCEDURE — 99499 RISK ADDL DX/OHS AUDIT: ICD-10-PCS | Mod: S$GLB,,, | Performed by: INTERNAL MEDICINE

## 2020-05-29 PROCEDURE — 99999 PR PBB SHADOW E&M-EST. PATIENT-LVL IV: CPT | Mod: PBBFAC,GC,, | Performed by: INTERNAL MEDICINE

## 2020-05-29 PROCEDURE — 3077F SYST BP >= 140 MM HG: CPT | Mod: CPTII,GC,S$GLB, | Performed by: INTERNAL MEDICINE

## 2020-05-29 PROCEDURE — 3078F PR MOST RECENT DIASTOLIC BLOOD PRESSURE < 80 MM HG: ICD-10-PCS | Mod: CPTII,GC,S$GLB, | Performed by: INTERNAL MEDICINE

## 2020-05-29 PROCEDURE — 1101F PT FALLS ASSESS-DOCD LE1/YR: CPT | Mod: CPTII,GC,S$GLB, | Performed by: INTERNAL MEDICINE

## 2020-05-29 PROCEDURE — 99214 OFFICE O/P EST MOD 30 MIN: CPT | Mod: GC,S$GLB,, | Performed by: INTERNAL MEDICINE

## 2020-05-29 PROCEDURE — 3078F DIAST BP <80 MM HG: CPT | Mod: CPTII,GC,S$GLB, | Performed by: INTERNAL MEDICINE

## 2020-05-29 PROCEDURE — 99214 PR OFFICE/OUTPT VISIT, EST, LEVL IV, 30-39 MIN: ICD-10-PCS | Mod: GC,S$GLB,, | Performed by: INTERNAL MEDICINE

## 2020-05-29 PROCEDURE — 99499 UNLISTED E&M SERVICE: CPT | Mod: S$GLB,,, | Performed by: INTERNAL MEDICINE

## 2020-05-29 PROCEDURE — 1159F MED LIST DOCD IN RCRD: CPT | Mod: GC,S$GLB,, | Performed by: INTERNAL MEDICINE

## 2020-05-29 PROCEDURE — 99999 PR PBB SHADOW E&M-EST. PATIENT-LVL IV: ICD-10-PCS | Mod: PBBFAC,GC,, | Performed by: INTERNAL MEDICINE

## 2020-05-29 PROCEDURE — 3077F PR MOST RECENT SYSTOLIC BLOOD PRESSURE >= 140 MM HG: ICD-10-PCS | Mod: CPTII,GC,S$GLB, | Performed by: INTERNAL MEDICINE

## 2020-05-29 RX ORDER — BUDESONIDE AND FORMOTEROL FUMARATE DIHYDRATE 160; 4.5 UG/1; UG/1
AEROSOL RESPIRATORY (INHALATION)
COMMUNITY
Start: 2020-04-14 | End: 2021-07-28 | Stop reason: CLARIF

## 2020-05-29 RX ORDER — GABAPENTIN 100 MG/1
CAPSULE ORAL
COMMUNITY
Start: 2020-04-25 | End: 2021-07-28 | Stop reason: SDUPTHER

## 2020-05-29 RX ORDER — PANTOPRAZOLE SODIUM 40 MG
TABLET, DELAYED RELEASE (ENTERIC COATED) ORAL DAILY
COMMUNITY
Start: 2020-04-29

## 2020-05-29 RX ORDER — SODIUM CHLORIDE FOR INHALATION 3 %
4 VIAL, NEBULIZER (ML) INHALATION 2 TIMES DAILY
Qty: 15 ML | Refills: 11 | Status: SHIPPED | OUTPATIENT
Start: 2020-05-29 | End: 2020-06-28

## 2020-05-29 RX ORDER — ALBUTEROL SULFATE 0.63 MG/3ML
SOLUTION RESPIRATORY (INHALATION)
COMMUNITY
Start: 2020-05-14 | End: 2021-11-12

## 2020-05-29 NOTE — PROGRESS NOTES
tteHistory & Physical  [facility]        SUBJECTIVE:     Chief Complaint: consult for new GUTIERREZ pulmonary nodule    History of Present Illness:  Regan Alvarez is a 79 y.o. 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 In regards to pt's pulmonary MAC, he see's Dr. Valera. His last positive sputum culture has been 12/14/15. He was treated with triple therapy from 1/2016 to 12/2017 and has been on surveillance therapy since. He has a sputum from 1/28/20 that has been negative. He does have bronchiectasis and states that he has thick productive sputum. He however states that it hasn't been too hard to bring up. Since our last visit, he continues to use albuterol nebs bid. We prescribed him acapella but they weren't able to get it. State the [employer] company never sent it to them despite having the prescription. I also prescribed hypertonic nebs but his daughter states that he never got it. He might need prior authorization for it? He denies any fever, chills, chest pain, sob. In regards to his severe copd, he is an ex-smoker (quit 5 years ago). Smoked 1ppd for 58 years. His PFT on 3/6/20 shows severe obstruction with FEV1 40% and no significant bronchodilator response. He also has mild restrictive lung disease with TLC 60, and severe reduction of diffusion capacity with DLCO 35%. He states that he is only using albuterol rescue inhaler about once a day. I prescribed him spiriva and symbicort after last visit but his daughter states that he stopped using spiriva as it was causing him a sore throat. He's still using symbicort. He denies any hospitalizations in the past year for respiratory issues. His CT chest (2/3/20) does show emphysema but also shows a new GUTIERREZ pulmonary nodule of 1.3cm and a lingula nodule of 1.3cm. GUTIERREZ is new compared to CT from 1/30/19. He didn't want any further  intervention such as a biopsy so we elected to do a repeat CT chest which he did on 5/26/20 which showed stable lung nodule without any change in size in both the GUTIERREZ and lingula. Also stable cavitary lesion with bronchiectasis and bronchial wall thickening.    Review of patient's allergies indicates:  No Known Allergies    Past Medical History:   Diagnosis Date    Ambulates with cane     Arthritis     COPD (chronic obstructive pulmonary disease)     Diabetes mellitus type II     Hypertension     Mycobacterium avium complex     X's 2    Symptomatic anemia 01/28/2020    Tuberculosis     Ulcerative colitis      Past Surgical History:   Procedure Laterality Date    COLONOSCOPY N/A 1/22/2018    Procedure: COLONOSCOPY;  Surgeon: Roel Mendez MD;  Location: [facility];  Service: Endoscopy;  Laterality: N/A;  Pt confirmend appt.    melanoma      forehead     Family History   Problem Relation Age of Onset    Cancer Father         Tuberculosis.  Secondary scar was neoplastic.     Social History     Socioeconomic History    Marital status:      Spouse name: Not on file    Number of children: Not on file    Years of education: Not on file    Highest education level: Not on file   Occupational History    Not on file   Social Needs    Financial resource strain: Not on file    Food insecurity:     Worry: Not on file     Inability: Not on file    Transportation needs:     Medical: Not on file     Non-medical: Not on file   Tobacco Use    Smoking status: Former Smoker     Types: Cigarettes    Smokeless tobacco: Never Used    Tobacco comment: uses cigars on ocassion   Substance and Sexual Activity    Alcohol use: Yes     Comment: socially    Drug use: No    Sexual activity: Not Currently   Lifestyle    Physical activity:     Days per week: Not on file     Minutes per session: Not on file    Stress: Not on file   Relationships    Social connections:     Talks on phone: Not on file     Gets together:  "Not on file     Attends Hoahaoism service: Not on file     Active member of club or organization: Not on file     Attends meetings of clubs or organizations: Not on file     Relationship status: Not on file   Other Topics Concern    Not on file   Social History Narrative    Not on file       Review of Systems:  CV: no syncope  ENT: no sore throat  Resp: per hpi  Eyes: no eye pain  Gastrointestinal: no nausea or vomiting  Integument/Breast: no rash  Musculoskeletal: no arthralgias  Neurological: no headaches  Behavioral/Psych: no confusion or depression  Heme: no bleeding      OBJECTIVE:     Vital Signs  Vitals:    05/29/20 1405   BP: (!) 140/72   BP Location: Left arm   Patient Position: Sitting   Pulse: (!) 116   SpO2: 95%   Weight: 85.8 kg (189 lb 2.5 oz)   Height: 6' 7" (2.007 m)     Body mass index is 21.31 kg/m².    Physical Exam:  General: no distress  Eyes:  conjunctivae/corneas clear  Nose: no discharge  Neck: trachea midline with no masses appreciated  Lungs:  normal respiratory effort, no wheezes, no rales, severely diminished lungs b/l fields  Heart: regular rate and rhythm and no murmur  Abdomen: non-distended  Extremities: no cyanosis, no edema, no clubbing  Skin: No rashes or lesions. good skin turgor  Neurologic: alert, oriented, thought content appropriate    Laboratory:  Lab Results   Component Value Date    WBC 11.87 02/03/2020    HGB 9.2 (L) 02/03/2020    HCT 31.5 (L) 02/03/2020    MCV 75 (L) 02/03/2020     (H) 02/03/2020       Chest Imaging, My Impression: Ct showing severe emphysema with pulmonary nodules. Also seen is consolidation from MAC.    Diagnostic Results:  CT: Reviewed    ASSESSMENT/PLAN:     Problem Noted   Bronchiectasis 3/6/2020    Pt having thick productive sputum.   - continue his albuterol nebs bid  - will add hypertonic saline nebs bid. May need prior authorization? He didn't get them the last time I prescribed them  - will hold off on adding acapella device given all " the difficulty his daughter has had with getting this device. Pt states that he's feeling well.     Pulmonary Nodule 3/6/2020    Pt has a 1.3cm GUTIERREZ pulmonary nodule that wasn't present 1 year ago. Pt is an ex-smoker, quit 5 years ago. He however is high risk for complications if he undergoes a biopsy (pneumothorax) given the extend of his emphysema. Pt also not interested in any treatment if it were malignancy. He agreed with repeat follow up CT chest which was done and shows stable size of the pulmonary nodule  - can do a follow up ct chest in 6 months if pt really wants to. Will hold off for now       Copd, Severe 6/21/2016    Pt has severe COPD. FEV1 is 40%, DLCO 35. CT showing severe emphysema. Pt not in an exacerbation but is using albuterol inhaler about 4-5 times a week. Isn't using his maintenance inhalers.  - continue symbicort. Pt stopped using spiriva as it caused sore throat. He states the he is feeling well so can hold off on adding a LAMA for now.  - if having issues with his COPD, can try a different LAMA  - advised to use albuterol inhaler prn     Pulmonary Mycobacterium Avium Complex (Mac) Infection 1/14/2016    Currently being watched surveillance. Last sputum was negative on 1/2020       RTC in 3 months    Joe Gordon MD  [facility]  [facility]

## 2020-05-30 NOTE — PROGRESS NOTES
Attending Note:     I have seen and evaluated the patient with the fellow. Their note reflects the content of our discussion and my plan of care.  Pt is an 78 yo man with COPD and previous MAC with bronchiectasis.  He is stable on current inhaler therapy.  Not currently on MAC therapy and last surveillance cultures 1/2020 are negative.  He has a pulmonary nodule on most recent CT chest.  This was explained to him including the risk of malignancy.  Would recommend repeat CT chest to monitor but he does not wish to pursue this as he does not want to proceed with any biopsies or therapy if it is malignant.        Guadalupe Pena, DO  Pulmonary/Critical Care Medicine

## 2020-09-24 ENCOUNTER — PATIENT OUTREACH (OUTPATIENT)
Dept: ADMINISTRATIVE | Facility: OTHER | Age: 80
End: 2020-09-24

## 2020-09-24 DIAGNOSIS — E11.9 TYPE 2 DIABETES MELLITUS WITHOUT COMPLICATION, UNSPECIFIED WHETHER LONG TERM INSULIN USE: Primary | ICD-10-CM

## 2020-09-24 NOTE — PROGRESS NOTES
Care Everywhere: updated   Immunization: updated  Health Maintenance: updated  Media Review:   Legacy Review:   Order placed: hemoglobin a1c  Upcoming appts:

## 2020-09-25 ENCOUNTER — OFFICE VISIT (OUTPATIENT)
Dept: PULMONOLOGY | Facility: CLINIC | Age: 80
End: 2020-09-25
Payer: MEDICARE

## 2020-09-25 ENCOUNTER — HOSPITAL ENCOUNTER (OUTPATIENT)
Dept: PULMONOLOGY | Facility: CLINIC | Age: 80
Discharge: HOME | End: 2020-09-25
Payer: MEDICARE

## 2020-09-25 VITALS
HEART RATE: 96 BPM | BODY MASS INDEX: 22.14 KG/M2 | HEIGHT: 78 IN | OXYGEN SATURATION: 83 % | WEIGHT: 191.38 LBS | SYSTOLIC BLOOD PRESSURE: 132 MMHG | DIASTOLIC BLOOD PRESSURE: 62 MMHG

## 2020-09-25 VITALS — BODY MASS INDEX: 21.4 KG/M2 | WEIGHT: 185 LBS | HEIGHT: 78 IN

## 2020-09-25 DIAGNOSIS — J47.9 BRONCHIECTASIS WITHOUT COMPLICATION: Primary | ICD-10-CM

## 2020-09-25 DIAGNOSIS — J44.9 CHRONIC OBSTRUCTIVE PULMONARY DISEASE, UNSPECIFIED COPD TYPE: ICD-10-CM

## 2020-09-25 DIAGNOSIS — R91.1 PULMONARY NODULE: ICD-10-CM

## 2020-09-25 PROCEDURE — 3075F PR MOST RECENT SYSTOLIC BLOOD PRESS GE 130-139MM HG: ICD-10-PCS | Mod: CPTII,GC,S$GLB, | Performed by: INTERNAL MEDICINE

## 2020-09-25 PROCEDURE — 99214 OFFICE O/P EST MOD 30 MIN: CPT | Mod: GC,S$GLB,, | Performed by: INTERNAL MEDICINE

## 2020-09-25 PROCEDURE — 99999 PR PBB SHADOW E&M-EST. PATIENT-LVL IV: ICD-10-PCS | Mod: PBBFAC,GC,, | Performed by: INTERNAL MEDICINE

## 2020-09-25 PROCEDURE — 1101F PT FALLS ASSESS-DOCD LE1/YR: CPT | Mod: CPTII,GC,S$GLB, | Performed by: INTERNAL MEDICINE

## 2020-09-25 PROCEDURE — 94618 PULMONARY STRESS TESTING: CPT | Mod: S$GLB,,, | Performed by: INTERNAL MEDICINE

## 2020-09-25 PROCEDURE — 3075F SYST BP GE 130 - 139MM HG: CPT | Mod: CPTII,GC,S$GLB, | Performed by: INTERNAL MEDICINE

## 2020-09-25 PROCEDURE — 1159F PR MEDICATION LIST DOCUMENTED IN MEDICAL RECORD: ICD-10-PCS | Mod: GC,S$GLB,, | Performed by: INTERNAL MEDICINE

## 2020-09-25 PROCEDURE — 94618 PULMONARY STRESS TESTING: ICD-10-PCS | Mod: S$GLB,,, | Performed by: INTERNAL MEDICINE

## 2020-09-25 PROCEDURE — 1101F PR PT FALLS ASSESS DOC 0-1 FALLS W/OUT INJ PAST YR: ICD-10-PCS | Mod: CPTII,GC,S$GLB, | Performed by: INTERNAL MEDICINE

## 2020-09-25 PROCEDURE — 1126F PR PAIN SEVERITY QUANTIFIED, NO PAIN PRESENT: ICD-10-PCS | Mod: GC,S$GLB,, | Performed by: INTERNAL MEDICINE

## 2020-09-25 PROCEDURE — 3078F DIAST BP <80 MM HG: CPT | Mod: CPTII,GC,S$GLB, | Performed by: INTERNAL MEDICINE

## 2020-09-25 PROCEDURE — 1159F MED LIST DOCD IN RCRD: CPT | Mod: GC,S$GLB,, | Performed by: INTERNAL MEDICINE

## 2020-09-25 PROCEDURE — 99214 PR OFFICE/OUTPT VISIT, EST, LEVL IV, 30-39 MIN: ICD-10-PCS | Mod: GC,S$GLB,, | Performed by: INTERNAL MEDICINE

## 2020-09-25 PROCEDURE — 3078F PR MOST RECENT DIASTOLIC BLOOD PRESSURE < 80 MM HG: ICD-10-PCS | Mod: CPTII,GC,S$GLB, | Performed by: INTERNAL MEDICINE

## 2020-09-25 PROCEDURE — 1126F AMNT PAIN NOTED NONE PRSNT: CPT | Mod: GC,S$GLB,, | Performed by: INTERNAL MEDICINE

## 2020-09-25 PROCEDURE — 99999 PR PBB SHADOW E&M-EST. PATIENT-LVL IV: CPT | Mod: PBBFAC,GC,, | Performed by: INTERNAL MEDICINE

## 2020-09-25 RX ORDER — BUDESONIDE AND FORMOTEROL FUMARATE DIHYDRATE 160; 4.5 UG/1; UG/1
AEROSOL RESPIRATORY (INHALATION)
COMMUNITY
Start: 2020-07-01 | End: 2021-07-28 | Stop reason: CLARIF

## 2020-09-25 RX ORDER — PANTOPRAZOLE SODIUM 40 MG
TABLET, DELAYED RELEASE (ENTERIC COATED) ORAL
COMMUNITY
Start: 2020-07-01 | End: 2021-07-28 | Stop reason: SDUPTHER

## 2020-09-25 NOTE — PROGRESS NOTES
Subjective:       Patient ID: Regan Alvarez is a 79 y.o. male.    Chief Complaint: No chief complaint on file.    Regan Alvarez is a 79 y.o. 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 In regards to pt's pulmonary MAC, he see's Dr. Valera. His last positive sputum culture has been 12/14/15. He was treated with triple therapy from 1/2016 to 12/2017 and has been on surveillance therapy since. He has a sputum from 1/28/20 that has been negative.  During last visit patient was having issues with frequent EDELMIRA use and cough productive of thick sputum.  This visit, reports that this visit that he is now using his maintanece inhalers and has cut down on his use of prn albuterol. Is currently doing well with his hypertonic saline.   Now is no longer constantly coughing up phlegm and no longer needs to carry around a cup for sputum with resolution of wheezing.   Discussed repeat imaging of pulmonary nodules and patient and his daughter expressed that as patient has no new constitutional symptoms and no desire for invasive procedures or chemotherapy, they would defer further imaging.         Review of Systems   Constitutional: Negative for weight loss, activity change, appetite change and fatigue.   Respiratory: Positive for cough, sputum production, dyspnea on extertion and use of rescue inhaler. Negative for wheezing.        Objective:      Physical Exam   Constitutional: He is oriented to person, place, and time. He appears cachectic.   Cardiovascular: Normal rate and regular rhythm.   Pulmonary/Chest: Symmetric chest wall expansion and effort normal. He has no wheezes. He has rhonchi.   Abdominal: Soft.   Musculoskeletal:         General: No edema.   Neurological: He is alert and oriented to person, place, and time.   Skin: Skin is warm and dry.   Psychiatric: He has a normal mood and  "affect.     Personal Diagnostic Review  3/2020 CT Chest     Stable cavitary lesion within the right upper lobe associated with bronchiectasis and bronchial wall thickening.     Left upper lobe bronchiectasis with persistent tubular density within the left upper lobe likely containing chronic calcified inspissated secretions.     1.3 cm left upper lobe pulmonary nodule is likely associated with bronchiectasis and parenchymal scarring.  This is unchanged.     1.3 cm pulmonary nodule within the lingula of the left upper lobe is not as well characterized on today's examination due to respiratory motion.  This does not appear significantly changed.     Small right pleural effusion which may be loculated.     Compression deformities of T7 and T8.  There does appear to be increased sclerosis in the T7 vertebral body which may be related to interval healing.     Superior mediastinal adenopathy, stable.     Emphysematous changes.     Bronchiectasis.     Prominent coronary atherosclerosis.  Pulmonary Function Tests 9/25/2020   Height 79   Weight 3061.75   BMI (Calculated) 21.5   Some recent data might be hidden         Assessment:       No diagnosis found.    Outpatient Encounter Medications as of 9/25/2020   Medication Sig Dispense Refill    albuterol (ACCUNEB) 0.63 mg/3 mL Nebu       albuterol (ACCUNEB) 0.63 mg/3 mL Nebu USE 1 VIAL IN NEBULIZER EVERY 6 HOURS AS NEEDED FOR COUGH 90 mL 0    albuterol (PROVENTIL/VENTOLIN HFA) 90 mcg/actuation inhaler INHALE 2 PUFFS BY MOUTH EVERY 6 HOURS AS NEEDED FOR WHEEZE 8.5 Inhaler 2    BD ULTRA-FINE TOMÁS PEN NEEDLES 32 gauge x 5/32" Ndle USE QID UTD  5    blood sugar diagnostic (TRUE METRIX GLUCOSE TEST STRIP) Strp Test blood sugar 3 times daily 300 strip 3    budesonide (UCERIS) 9 mg TaDE Take 6 mg by mouth.      budesonide-formoterol 160-4.5 mcg (SYMBICORT) 160-4.5 mcg/actuation HFAA Inhale 2 puffs into the lungs every 12 (twelve) hours. Controller 1 Inhaler 6    " budesonide-formoterol 160-4.5 mcg (SYMBICORT) 160-4.5 mcg/actuation HFAA       budesonide-formoterol 160-4.5 mcg (SYMBICORT) 160-4.5 mcg/actuation HFAA       ferrous sulfate (FEOSOL) 325 mg (65 mg iron) Tab tablet Take 1 tablet by mouth 2 (two) times daily.      fluticasone furoate-vilanterol (BREO) 200-25 mcg/dose DsDv diskus inhaler Inhale 1 puff into the lungs.      fluticasone propionate (FLONASE) 50 mcg/actuation nasal spray SPRAY 1 SPRAY (50 MCG TOTAL) BY EACH NARE ROUTE ONCE DAILY. 16 mL 0    fluticasone-umeclidin-vilanter (TRELEGY ELLIPTA) 100-62.5-25 mcg DsDv Inhale 1 puff into the lungs once daily. 60 each 1    gabapentin (NEURONTIN) 100 MG capsule TAKE 2 CAPSULES BY MOUTH ONCE DAILY IN THE EVENING 90 capsule 0    gabapentin (NEURONTIN) 100 MG capsule       glipiZIDE (GLUCOTROL) 5 MG tablet TAKE 1 TABLET BY MOUTH EVERY DAY WITH BREAKFAST 90 tablet 1    HYDROcodone-acetaminophen (NORCO) 5-325 mg per tablet Take 1 tablet by mouth every 8 (eight) hours as needed for Pain (back pain). 21 tablet 0    ibuprofen (ADVIL,MOTRIN) 800 MG tablet Take 1 tablet (800 mg total) by mouth every 8 (eight) hours as needed for Pain (neck pain). 30 tablet 0    isosorbide mononitrate (IMDUR) 30 MG 24 hr tablet Take 1 tablet by mouth once daily 30 tablet 0    lancets 28 gauge Misc 1 lancet by Misc.(Non-Drug; Combo Route) route 3 (three) times daily. True Metrix lancets 300 each 3    mucus clearing device (ACAPELLA, FLUTTER) by Misc.(Non-Drug; Combo Route) route 2 (two) times daily. 1 Device 0    multivitamin capsule Take 1 capsule by mouth once daily.      pantoprazole (PROTONIX) 40 MG tablet Take 1 tablet (40 mg total) by mouth once daily. 30 tablet 11    PROTONIX 40 mg tablet       PROTONIX 40 mg tablet       ramipril (ALTACE) 2.5 MG capsule TAKE 1 CAPSULE (2.5 MG TOTAL) BY MOUTH ONCE DAILY. (Patient not taking: Reported on 9/25/2020) 90 capsule 3    tiotropium (SPIRIVA WITH HANDIHALER) 18 mcg inhalation  capsule Inhale 1 capsule (18 mcg total) into the lungs once daily. Controller (Patient not taking: Reported on 9/25/2020) 30 capsule 11     No facility-administered encounter medications on file as of 9/25/2020.      No orders of the defined types were placed in this encounter.      Assessment and Plan:     1. Bronchiectasis without complication     2. Pulmonary nodule     3. Chronic obstructive pulmonary disease, unspecified COPD type  Stress test, pulmonary       1. Continue pulmonary toilet with hypertonic saline nebs. Will defer additional mucus clearing devices such as Acapella as patient has near resolution of symptoms with current therapies.     2. Extensive discussion with patient and daughter this encounter about goals of care -- patient would decline an IR guided biopsy of nodule as given surrounding emphysematous disease would place him at high risk for PTX complicating procedure, necessitating a pleural catheter and hospitalization. Furthermore, if nodule is positive for malignancy, patient would decline chemotherapy/radiation as he would likely have a decline in his quality of life with therapy. Patient has appropriate insight with familial support; in agreement with patient's decision. No additional imaging for pulmonary nodules.     3. Improved control with appropriate use of LABA + ICS, well controlled with prior intolerance of LAMA. Will defer LAMA      RTC 6months       This case was discussed with staff, Dr. Cisco Melendrez.          Sarah Nuñez M.D., PGY - [facility] Fellow

## 2020-09-25 NOTE — PROCEDURES
Regan Alvarez is a 79 y.o.  male patient, who presents for a 6 minute walk test ordered by MD Joaquin.  The diagnosis is Shortness of Breath; COPD/Emphysema.  The patient's BMI is 20.8 kg/m2.  Predicted distance (lower limit of normal) is 308.17 meters.      Test Results:    The test was completed without stopping.  The total time walked was 360 seconds.  During walking, the patient reported:  Dyspnea.  The patient used a cane for assistance during testing.     09/25/2020---------Distance: 182.88 meters (600 feet)     O2 Sat % Supplemental Oxygen Heart Rate Blood Pressure Sarah Scale   Pre-exercise  (Resting) 97 % Room Air 102 bpm 134/74 mmHg 4   During Exercise 93 % Room Air 120 bpm 149/79 mmHg 5-6   Post-exercise  (Recovery) 97 % Room Air  102 bpm       Recovery Time: 71 seconds    Performing nurse/tech: Edilson HAYES      PREVIOUS STUDY:   The patient has not had a previous study.      CLINICAL INTERPRETATION:  Six minute walk distance is 182.88 meters (600 feet) with heavy dyspnea.  During exercise, there was desaturation while breathing room air.  Both blood pressure and heart rate remained stable with walking.  Tachycardia was present prior to exercise.  The patient did not report non-pulmonary symptoms during exercise.  Significant exercise impairment is likely due to cardiovascular causes and subjective symptoms.  The patient did complete the study, walking 360 seconds of the 360 second test.  No previous study performed.  Based upon age and body mass index, exercise capacity is less than predicted.

## 2020-09-30 ENCOUNTER — CLINICAL SUPPORT (OUTPATIENT)
Dept: FAMILY MEDICINE | Facility: CLINIC | Age: 80
End: 2020-09-30
Payer: MEDICARE

## 2020-09-30 ENCOUNTER — TELEPHONE (OUTPATIENT)
Dept: FAMILY MEDICINE | Facility: CLINIC | Age: 80
End: 2020-09-30

## 2020-09-30 DIAGNOSIS — Z23 NEED FOR IMMUNIZATION AGAINST INFLUENZA: Primary | ICD-10-CM

## 2020-09-30 DIAGNOSIS — Z23 NEED FOR TETANUS BOOSTER: Primary | ICD-10-CM

## 2020-09-30 PROCEDURE — 90694 FLU VACCINE - QUADRIVALENT - ADJUVANTED: ICD-10-PCS | Mod: S$GLB,,, | Performed by: INTERNAL MEDICINE

## 2020-09-30 PROCEDURE — G0008 FLU VACCINE - QUADRIVALENT - ADJUVANTED: ICD-10-PCS | Mod: S$GLB,,, | Performed by: INTERNAL MEDICINE

## 2020-09-30 PROCEDURE — 90694 VACC AIIV4 NO PRSRV 0.5ML IM: CPT | Mod: S$GLB,,, | Performed by: INTERNAL MEDICINE

## 2020-09-30 PROCEDURE — 99499 UNLISTED E&M SERVICE: CPT | Mod: S$GLB,,, | Performed by: INTERNAL MEDICINE

## 2020-09-30 PROCEDURE — 99499 NO LOS: ICD-10-PCS | Mod: S$GLB,,, | Performed by: INTERNAL MEDICINE

## 2020-09-30 PROCEDURE — G0008 ADMIN INFLUENZA VIRUS VAC: HCPCS | Mod: S$GLB,,, | Performed by: INTERNAL MEDICINE

## 2020-09-30 NOTE — TELEPHONE ENCOUNTER
----- Message from Charlene Pineda sent at 9/29/2020  4:17 PM CDT -----  Regarding: Patient Call Back  Contact: Patient  Type: Patient Call Back    Who called: Patient     What is the request in detail: Pt is requesting a tetanus  shot     Can the clinic reply by [facility]?    Would the patient rather a call back or a response via [facility]? Call back     Best call back number: [phone]

## 2020-10-01 ENCOUNTER — CLINICAL SUPPORT (OUTPATIENT)
Dept: FAMILY MEDICINE | Facility: CLINIC | Age: 80
End: 2020-10-01
Payer: MEDICARE

## 2020-10-01 DIAGNOSIS — Z23 NEED FOR TETANUS BOOSTER: ICD-10-CM

## 2020-10-01 PROCEDURE — 90471 TD VACCINE GREATER THAN OR EQUAL TO 7YO PRESERVATIVE FREE IM: ICD-10-PCS | Mod: S$GLB,,, | Performed by: INTERNAL MEDICINE

## 2020-10-01 PROCEDURE — 90714 TD VACC NO PRESV 7 YRS+ IM: CPT | Mod: S$GLB,,, | Performed by: INTERNAL MEDICINE

## 2020-10-01 PROCEDURE — 90471 IMMUNIZATION ADMIN: CPT | Mod: S$GLB,,, | Performed by: INTERNAL MEDICINE

## 2020-10-01 PROCEDURE — 90714 TD VACCINE GREATER THAN OR EQUAL TO 7YO PRESERVATIVE FREE IM: ICD-10-PCS | Mod: S$GLB,,, | Performed by: INTERNAL MEDICINE

## 2020-10-01 PROCEDURE — 99999 PR PBB SHADOW E&M-EST. PATIENT-LVL II: CPT | Mod: PBBFAC,,,

## 2020-10-01 PROCEDURE — 99999 PR PBB SHADOW E&M-EST. PATIENT-LVL II: ICD-10-PCS | Mod: PBBFAC,,,

## 2020-10-12 DIAGNOSIS — I10 HYPERTENSION, ESSENTIAL: Primary | ICD-10-CM

## 2020-10-13 RX ORDER — ISOSORBIDE MONONITRATE 30 MG/1
TABLET, EXTENDED RELEASE ORAL
Qty: 90 TABLET | Refills: 0 | Status: SHIPPED | OUTPATIENT
Start: 2020-10-13 | End: 2021-01-03

## 2020-10-15 ENCOUNTER — TELEPHONE (OUTPATIENT)
Dept: VASCULAR SURGERY | Facility: CLINIC | Age: 80
End: 2020-10-15

## 2020-10-16 DIAGNOSIS — E11.9 TYPE 2 DIABETES MELLITUS WITHOUT COMPLICATION, WITH LONG-TERM CURRENT USE OF INSULIN: Primary | ICD-10-CM

## 2020-10-16 DIAGNOSIS — Z79.4 TYPE 2 DIABETES MELLITUS WITHOUT COMPLICATION, WITH LONG-TERM CURRENT USE OF INSULIN: Primary | ICD-10-CM

## 2020-10-16 RX ORDER — INSULIN PUMP SYRINGE, 3 ML
EACH MISCELLANEOUS
Qty: 1 EACH | Refills: 0 | Status: SHIPPED | OUTPATIENT
Start: 2020-10-16 | End: 2021-10-16

## 2020-10-16 RX ORDER — LANCETS
EACH MISCELLANEOUS
Qty: 100 EACH | Refills: 0 | Status: SHIPPED | OUTPATIENT
Start: 2020-10-16

## 2020-10-16 NOTE — TELEPHONE ENCOUNTER
----- Message from Jessica Velazquez sent at 10/16/2020 11:40 AM CDT -----  Regarding: dwaine with [facility] [phone]  Type: Patient Call Back    Who called:dwaine    What is the request in detail:pls fax over updated order for diabetic supplies, clinic notes and signed prescription. Fax [phone]    Can the clinic reply by [facility]?    Would the patient rather a call back or a response via [facility]? call    Best call back number:dwaine with [facility] [phone]    Additional Information

## 2020-10-27 ENCOUNTER — HOSPITAL ENCOUNTER (OUTPATIENT)
Dept: CARDIOLOGY | Facility: HOSPITAL | Age: 80
Discharge: HOME | End: 2020-10-27
Attending: SURGERY
Payer: MEDICARE

## 2020-10-27 DIAGNOSIS — I65.23 CAROTID STENOSIS, ASYMPTOMATIC, BILATERAL: ICD-10-CM

## 2020-10-27 PROCEDURE — 93880 EXTRACRANIAL BILAT STUDY: CPT | Mod: 26,,, | Performed by: SURGERY

## 2020-10-27 PROCEDURE — 93922 UPR/L XTREMITY ART 2 LEVELS: CPT | Mod: 26,,, | Performed by: SURGERY

## 2020-10-27 PROCEDURE — 93880 EXTRACRANIAL BILAT STUDY: CPT

## 2020-10-27 PROCEDURE — 93880 CV US DOPPLER CAROTID (CUPID ONLY): ICD-10-PCS | Mod: 26,,, | Performed by: SURGERY

## 2020-10-27 PROCEDURE — 93922 CV US ANKLE BRACHIAL INDICES WO STRESS W TOE TRACINGS (CUPID ONLY): ICD-10-PCS | Mod: 26,,, | Performed by: SURGERY

## 2020-10-27 PROCEDURE — 93922 UPR/L XTREMITY ART 2 LEVELS: CPT | Mod: 50

## 2020-11-01 LAB
LEFT ABI: 0.89
LEFT ARM BP: 93 MMHG
LEFT CBA DIAS: 55 CM/S
LEFT CBA SYS: 266 CM/S
LEFT CCA DIST DIAS: 19 CM/S
LEFT CCA DIST SYS: 78 CM/S
LEFT CCA MID DIAS: 22 CM/S
LEFT CCA MID SYS: 77 CM/S
LEFT CCA PROX DIAS: 11 CM/S
LEFT CCA PROX SYS: 72 CM/S
LEFT DORSALIS PEDIS: 74 MMHG
LEFT ECA DIAS: 0 CM/S
LEFT ECA SYS: 120 CM/S
LEFT ICA DIST DIAS: 32 CM/S
LEFT ICA DIST SYS: 73 CM/S
LEFT ICA MID DIAS: 22 CM/S
LEFT ICA MID SYS: 70 CM/S
LEFT ICA PROX DIAS: 22 CM/S
LEFT ICA PROX SYS: 106 CM/S
LEFT POSTERIOR TIBIAL: 87 MMHG
LEFT TBI: 0.47
LEFT TOE PRESSURE: 46 MMHG
LEFT VERTEBRAL DIAS: 13 CM/S
LEFT VERTEBRAL SYS: 72 CM/S
OHS CV CAROTID RIGHT ICA EDV HIGHEST: 23
OHS CV CAROTID ULTRASOUND LEFT ICA/CCA RATIO: 1.36
OHS CV CAROTID ULTRASOUND RIGHT ICA/CCA RATIO: 1.07
OHS CV PV CAROTID LEFT HIGHEST CCA: 78
OHS CV PV CAROTID LEFT HIGHEST ICA: 106
OHS CV PV CAROTID RIGHT HIGHEST CCA: 95
OHS CV PV CAROTID RIGHT HIGHEST ICA: 78
OHS CV US CAROTID LEFT HIGHEST EDV: 32
RIGHT ABI: 1
RIGHT ARM BP: 98 MMHG
RIGHT CBA DIAS: 14 CM/S
RIGHT CBA SYS: 93 CM/S
RIGHT CCA DIST DIAS: 15 CM/S
RIGHT CCA DIST SYS: 73 CM/S
RIGHT CCA MID DIAS: 25 CM/S
RIGHT CCA MID SYS: 83 CM/S
RIGHT CCA PROX DIAS: 16 CM/S
RIGHT CCA PROX SYS: 95 CM/S
RIGHT DORSALIS PEDIS: 90 MMHG
RIGHT ECA DIAS: 9 CM/S
RIGHT ECA SYS: 81 CM/S
RIGHT ICA DIST DIAS: 18 CM/S
RIGHT ICA DIST SYS: 62 CM/S
RIGHT ICA MID DIAS: 23 CM/S
RIGHT ICA MID SYS: 78 CM/S
RIGHT ICA PROX DIAS: 20 CM/S
RIGHT ICA PROX SYS: 3 CM/S
RIGHT POSTERIOR TIBIAL: 98 MMHG
RIGHT TBI: 0.8
RIGHT TOE PRESSURE: 78 MMHG
RIGHT VERTEBRAL DIAS: 12 CM/S
RIGHT VERTEBRAL SYS: 34 CM/S

## 2020-11-19 ENCOUNTER — OFFICE VISIT (OUTPATIENT)
Dept: VASCULAR SURGERY | Facility: CLINIC | Age: 80
End: 2020-11-19
Payer: MEDICARE

## 2020-11-19 ENCOUNTER — TELEPHONE (OUTPATIENT)
Dept: FAMILY MEDICINE | Facility: CLINIC | Age: 80
End: 2020-11-19

## 2020-11-19 VITALS
WEIGHT: 186.5 LBS | DIASTOLIC BLOOD PRESSURE: 68 MMHG | SYSTOLIC BLOOD PRESSURE: 90 MMHG | BODY MASS INDEX: 21.58 KG/M2 | HEIGHT: 78 IN

## 2020-11-19 DIAGNOSIS — I73.9 ASYMPTOMATIC PVD (PERIPHERAL VASCULAR DISEASE): ICD-10-CM

## 2020-11-19 DIAGNOSIS — I65.23 CAROTID STENOSIS, ASYMPTOMATIC, BILATERAL: Primary | ICD-10-CM

## 2020-11-19 DIAGNOSIS — I65.29 ASYMPTOMATIC CAROTID ARTERY STENOSIS, UNSPECIFIED LATERALITY: Primary | ICD-10-CM

## 2020-11-19 PROCEDURE — 1126F PR PAIN SEVERITY QUANTIFIED, NO PAIN PRESENT: ICD-10-PCS | Mod: S$GLB,,, | Performed by: SURGERY

## 2020-11-19 PROCEDURE — 3074F SYST BP LT 130 MM HG: CPT | Mod: CPTII,S$GLB,, | Performed by: SURGERY

## 2020-11-19 PROCEDURE — 1101F PR PT FALLS ASSESS DOC 0-1 FALLS W/OUT INJ PAST YR: ICD-10-PCS | Mod: CPTII,S$GLB,, | Performed by: SURGERY

## 2020-11-19 PROCEDURE — 3288F FALL RISK ASSESSMENT DOCD: CPT | Mod: CPTII,S$GLB,, | Performed by: SURGERY

## 2020-11-19 PROCEDURE — 99214 PR OFFICE/OUTPT VISIT, EST, LEVL IV, 30-39 MIN: ICD-10-PCS | Mod: S$GLB,,, | Performed by: SURGERY

## 2020-11-19 PROCEDURE — 3288F PR FALLS RISK ASSESSMENT DOCUMENTED: ICD-10-PCS | Mod: CPTII,S$GLB,, | Performed by: SURGERY

## 2020-11-19 PROCEDURE — 1159F MED LIST DOCD IN RCRD: CPT | Mod: S$GLB,,, | Performed by: SURGERY

## 2020-11-19 PROCEDURE — 99499 RISK ADDL DX/OHS AUDIT: ICD-10-PCS | Mod: S$GLB,,, | Performed by: SURGERY

## 2020-11-19 PROCEDURE — 3074F PR MOST RECENT SYSTOLIC BLOOD PRESSURE < 130 MM HG: ICD-10-PCS | Mod: CPTII,S$GLB,, | Performed by: SURGERY

## 2020-11-19 PROCEDURE — 99999 PR PBB SHADOW E&M-EST. PATIENT-LVL IV: ICD-10-PCS | Mod: PBBFAC,,, | Performed by: SURGERY

## 2020-11-19 PROCEDURE — 1159F PR MEDICATION LIST DOCUMENTED IN MEDICAL RECORD: ICD-10-PCS | Mod: S$GLB,,, | Performed by: SURGERY

## 2020-11-19 PROCEDURE — 3078F DIAST BP <80 MM HG: CPT | Mod: CPTII,S$GLB,, | Performed by: SURGERY

## 2020-11-19 PROCEDURE — 3078F PR MOST RECENT DIASTOLIC BLOOD PRESSURE < 80 MM HG: ICD-10-PCS | Mod: CPTII,S$GLB,, | Performed by: SURGERY

## 2020-11-19 PROCEDURE — 99499 UNLISTED E&M SERVICE: CPT | Mod: S$GLB,,, | Performed by: SURGERY

## 2020-11-19 PROCEDURE — 1101F PT FALLS ASSESS-DOCD LE1/YR: CPT | Mod: CPTII,S$GLB,, | Performed by: SURGERY

## 2020-11-19 PROCEDURE — 99214 OFFICE O/P EST MOD 30 MIN: CPT | Mod: S$GLB,,, | Performed by: SURGERY

## 2020-11-19 PROCEDURE — 99999 PR PBB SHADOW E&M-EST. PATIENT-LVL IV: CPT | Mod: PBBFAC,,, | Performed by: SURGERY

## 2020-11-19 PROCEDURE — 1126F AMNT PAIN NOTED NONE PRSNT: CPT | Mod: S$GLB,,, | Performed by: SURGERY

## 2020-11-19 RX ORDER — ATORVASTATIN CALCIUM 40 MG/1
40 TABLET, FILM COATED ORAL DAILY
Qty: 90 TABLET | Refills: 3 | Status: SHIPPED | OUTPATIENT
Start: 2020-11-19 | End: 2021-04-06 | Stop reason: SDUPTHER

## 2020-11-19 NOTE — PROGRESS NOTES
Lalit Woodson MD [facility]                         11/19/2020    HPI:  Regan Alvarez is a 80 y.o. male with   Patient Active Problem List   Diagnosis    Type 2 diabetes mellitus without complication, with long-term current use of insulin    Osteoarthritis    SOB (shortness of breath)    PVC (premature ventricular contraction)    Simple chronic bronchitis    Pulmonary Mycobacterium avium complex (MAC) infection    COPD, severe    Essential hypertension    Esophageal dysphagia    Anemia    Ulcerative pancolitis without complication    Uncontrolled type 2 diabetes mellitus with hyperglycemia, without long-term current use of insulin    Symptomatic anemia    Compression fracture of T7 vertebra with routine healing    Bronchiectasis    Pulmonary nodule    Chronic obstructive pulmonary disease    being managed by PCP and specialists who is here today for evaluation of PVD.  No claudication, PEREIRA at 89 yds limits pt.  No rest pain or wounds BLE.  No BLE edema.      no MI  no Stroke  Tobacco use: quit many yrs ago, prev 1 ppd 30 yrs    10/2019: Worsening PEREIRA.  No other complaints or new issues today.  BLE pain has resolved with Gabapentin    11/2020: No new issues.    Past Medical History:   Diagnosis Date    Ambulates with cane     Arthritis     COPD (chronic obstructive pulmonary disease)     Diabetes mellitus type II     Hypertension     Mycobacterium avium complex     X's 2    Symptomatic anemia 01/28/2020    Tuberculosis     Ulcerative colitis      Past Surgical History:   Procedure Laterality Date    COLONOSCOPY N/A 1/22/2018    Procedure: COLONOSCOPY;  Surgeon: Roel Mendez MD;  Location: [facility];  Service: Endoscopy;  Laterality: N/A;  Pt confirmend appt.    melanoma      forehead     Family History   Problem Relation Age of Onset    Cancer Father         Tuberculosis.  Secondary scar was neoplastic.     Social History      Socioeconomic History    Marital status:      Spouse name: Not on file    Number of children: Not on file    Years of education: Not on file    Highest education level: Not on file   Occupational History    Not on file   Social Needs    Financial resource strain: Not on file    Food insecurity     Worry: Not on file     Inability: Not on file    Transportation needs     Medical: Not on file     Non-medical: Not on file   Tobacco Use    Smoking status: Former Smoker     Types: Cigarettes    Smokeless tobacco: Never Used    Tobacco comment: uses cigars on ocassion   Substance and Sexual Activity    Alcohol use: Yes     Comment: socially    Drug use: No    Sexual activity: Not Currently   Lifestyle    Physical activity     Days per week: Not on file     Minutes per session: Not on file    Stress: Not on file   Relationships    Social connections     Talks on phone: Not on file     Gets together: Not on file     Attends Sabianism service: Not on file     Active member of club or organization: Not on file     Attends meetings of clubs or organizations: Not on file     Relationship status: Not on file   Other Topics Concern    Not on file   Social History Narrative    Not on file       Current Outpatient Medications:     albuterol (ACCUNEB) 0.63 mg/3 mL Nebu, USE 1 VIAL IN NEBULIZER EVERY 6 HOURS AS NEEDED FOR COUGH, Disp: 90 mL, Rfl: 0    albuterol (PROVENTIL/VENTOLIN HFA) 90 mcg/actuation inhaler, INHALE 2 PUFFS BY MOUTH EVERY 6 HOURS AS NEEDED FOR WHEEZE, Disp: 8.5 Inhaler, Rfl: 2    budesonide-formoterol 160-4.5 mcg (SYMBICORT) 160-4.5 mcg/actuation HFAA, Inhale 2 puffs into the lungs every 12 (twelve) hours. Controller, Disp: 1 Inhaler, Rfl: 6    ferrous sulfate (FEOSOL) 325 mg (65 mg iron) Tab tablet, Take 1 tablet by mouth 2 (two) times daily., Disp: , Rfl:     glipiZIDE (GLUCOTROL) 5 MG tablet, TAKE 1 TABLET BY MOUTH EVERY DAY WITH BREAKFAST, Disp: 90 tablet, Rfl: 1     "isosorbide mononitrate (IMDUR) 30 MG 24 hr tablet, Take 1 tablet by mouth once daily, Disp: 90 tablet, Rfl: 0    pantoprazole (PROTONIX) 40 MG tablet, Take 1 tablet (40 mg total) by mouth once daily., Disp: 30 tablet, Rfl: 11    albuterol (ACCUNEB) 0.63 mg/3 mL Nebu, , Disp: , Rfl:     BD ULTRA-FINE TOMÁS PEN NEEDLES 32 gauge x 5/32" Ndle, USE QID UTD, Disp: , Rfl: 5    blood sugar diagnostic (TRUE METRIX GLUCOSE TEST STRIP) Strp, Test blood sugar 3 times daily, Disp: 300 strip, Rfl: 3    blood sugar diagnostic Strp, To check BG one time daily PRN, to use with insurance preferred meter (Patient not taking: Reported on 11/19/2020), Disp: 100 each, Rfl: 0    blood-glucose meter kit, To check BG one time daily PRN, to use with insurance preferred meter (Patient not taking: Reported on 11/19/2020), Disp: 1 each, Rfl: 0    budesonide (UCERIS) 9 mg TaDE, Take 6 mg by mouth., Disp: , Rfl:     budesonide-formoterol 160-4.5 mcg (SYMBICORT) 160-4.5 mcg/actuation HFAA, , Disp: , Rfl:     budesonide-formoterol 160-4.5 mcg (SYMBICORT) 160-4.5 mcg/actuation HFAA, , Disp: , Rfl:     fluticasone furoate-vilanterol (BREO) 200-25 mcg/dose DsDv diskus inhaler, Inhale 1 puff into the lungs., Disp: , Rfl:     fluticasone propionate (FLONASE) 50 mcg/actuation nasal spray, SPRAY 1 SPRAY (50 MCG TOTAL) BY EACH NARE ROUTE ONCE DAILY. (Patient not taking: Reported on 11/19/2020), Disp: 16 mL, Rfl: 0    fluticasone-umeclidin-vilanter (TRELEGY ELLIPTA) 100-62.5-25 mcg DsDv, Inhale 1 puff into the lungs once daily. (Patient not taking: Reported on 11/19/2020), Disp: 60 each, Rfl: 1    gabapentin (NEURONTIN) 100 MG capsule, TAKE 2 CAPSULES BY MOUTH ONCE DAILY IN THE EVENING (Patient not taking: Reported on 11/19/2020), Disp: 90 capsule, Rfl: 0    gabapentin (NEURONTIN) 100 MG capsule, , Disp: , Rfl:     HYDROcodone-acetaminophen (NORCO) 5-325 mg per tablet, Take 1 tablet by mouth every 8 (eight) hours as needed for Pain (back " pain). (Patient not taking: Reported on 11/19/2020), Disp: 21 tablet, Rfl: 0    ibuprofen (ADVIL,MOTRIN) 800 MG tablet, Take 1 tablet (800 mg total) by mouth every 8 (eight) hours as needed for Pain (neck pain). (Patient not taking: Reported on 11/19/2020), Disp: 30 tablet, Rfl: 0    lancets 28 gauge Misc, 1 lancet by Misc.(Non-Drug; Combo Route) route 3 (three) times daily. True Metrix lancets (Patient not taking: Reported on 11/19/2020), Disp: 300 each, Rfl: 3    lancets Misc, To check BG one time daily PRN, to use with insurance preferred meter (Patient not taking: Reported on 11/19/2020), Disp: 100 each, Rfl: 0    mucus clearing device (ACAPELLA, FLUTTER), by Misc.(Non-Drug; Combo Route) route 2 (two) times daily. (Patient not taking: Reported on 11/19/2020), Disp: 1 Device, Rfl: 0    multivitamin capsule, Take 1 capsule by mouth once daily., Disp: , Rfl:     PROTONIX 40 mg tablet, , Disp: , Rfl:     PROTONIX 40 mg tablet, , Disp: , Rfl:     ramipril (ALTACE) 2.5 MG capsule, TAKE 1 CAPSULE (2.5 MG TOTAL) BY MOUTH ONCE DAILY. (Patient not taking: Reported on 9/25/2020), Disp: 90 capsule, Rfl: 3    tiotropium (SPIRIVA WITH HANDIHALER) 18 mcg inhalation capsule, Inhale 1 capsule (18 mcg total) into the lungs once daily. Controller (Patient not taking: Reported on 9/25/2020), Disp: 30 capsule, Rfl: 11    REVIEW OF SYSTEMS:  General: No fevers or chills; ENT: No sore throat; Allergy and Immunology: no persistent infections; Hematological and Lymphatic: No history of bleeding or easy bruising; Endocrine: negative; Respiratory: no cough, shortness of breath, or wheezing; Cardiovascular: no chest pain or dyspnea on exertion; Gastrointestinal: no abdominal pain/back, change in bowel habits, or bloody stools; Genito-Urinary: no dysuria, trouble voiding, or hematuria; Musculoskeletal: negative; Neurological: no TIA or stroke symptoms; Psychiatric: no nervousness, anxiety or depression.    PHYSICAL EXAM:                 General appearance:  Alert, well-appearing, and in no distress.  Oriented to person, place, and time                    Neurological: Normal speech, no focal findings noted; CN II - XII grossly intact. RLE with sensation to light touch, LLE with sensation to light touch.            Musculoskeletal: Digits/nail without cyanosis/clubbing.  Strength 5/5 BLE.                    Neck: Supple, no significant adenopathy, L carotid bruit can be auscultated                  Chest:  Clear to auscultation, no wheezes, rales or rhonchi, symmetric air entry. No use of accessory muscles               Cardiac: Normal rate and regular rhythm, S1 and S2 normal            Abdomen: Soft, nontender, nondistended, no masses or organomegaly, no hernia     No rebound tenderness noted; bowel sounds normal     Pulsatile aortic mass is non palpable.     No groin adenopathy      Extremities:        2+ R DP pulse, 2+ L DP pulse     1+ RLE edema, 1+ LLE edema    Skin: RLE without tissue loss; LLE without tissue loss    LAB RESULTS:  No results found for: CBC  Lab Results   Component Value Date    LABPROT 12.5 02/21/2017    INR 1.2 02/21/2017     Lab Results   Component Value Date     01/28/2020    K 4.1 01/28/2020     01/28/2020    CO2 25 01/28/2020     (H) 01/28/2020    BUN 15 01/28/2020    CREATININE 1.3 01/28/2020    CALCIUM 9.3 01/28/2020    ANIONGAP 11 01/28/2020    EGFRNONAA 51.2 (A) 01/28/2020     Lab Results   Component Value Date    WBC 11.87 02/03/2020    RBC 4.21 (L) 02/03/2020    HGB 9.2 (L) 02/03/2020    HCT 31.5 (L) 02/03/2020    MCV 75 (L) 02/03/2020    MCH 21.9 (L) 02/03/2020    MCHC 29.2 (L) 02/03/2020    RDW 20.9 (H) 02/03/2020     (H) 02/03/2020    MPV 9.8 02/03/2020    GRAN 7.9 (H) 02/03/2020    GRAN 66.8 02/03/2020    LYMPH 2.0 02/03/2020    LYMPH 16.6 (L) 02/03/2020    MONO 1.2 (H) 02/03/2020    MONO 9.9 02/03/2020    EOS 0.7 (H) 02/03/2020    BASO 0.05 02/03/2020    EOSINOPHIL 6.0  02/03/2020    BASOPHIL 0.4 02/03/2020    DIFFMETHOD Automated 02/03/2020     .  Lab Results   Component Value Date    HGBA1C 7.0 (H) 02/03/2020       IMAGING:  All pertinent imaging has been reviewed and interpreted independently.    6/2019: VELIA 1/0.8    10/8/19 Carotid US:  · There is 0-19% right Internal Carotid Stenosis.  · There is 60-69% left Internal Carotid Stenosis.    11/2020 VELIA/TP and Carotid US reviewed.    IMP/PLAN:  80 y.o. male with   Patient Active Problem List   Diagnosis    Type 2 diabetes mellitus without complication, with long-term current use of insulin    Osteoarthritis    SOB (shortness of breath)    PVC (premature ventricular contraction)    Simple chronic bronchitis    Pulmonary Mycobacterium avium complex (MAC) infection    COPD, severe    Essential hypertension    Esophageal dysphagia    Anemia    Ulcerative pancolitis without complication    Uncontrolled type 2 diabetes mellitus with hyperglycemia, without long-term current use of insulin    Symptomatic anemia    Compression fracture of T7 vertebra with routine healing    Bronchiectasis    Pulmonary nodule    Chronic obstructive pulmonary disease    being managed by PCP and specialists who is here today for evaluation of PVD and ROSEMARIE.    -Asymptomatic mild PVD LLE - rec ASA, statin and heart healthy lifestyle with optimization of COPD which is his main limiting factor to functional status; no vascular surgical intervention indicated at this time  -Asymptomatic L>R carotid stenosis - rec starting statin by Dr. Adamson;  Will cont ASA and routine surveillance with US in 1 year  -2.6 cm infrarenal AAA on CT 2019 - will cont routine surveillance with AAA US in 5 yrs  -RTC 1 yr     I spent 14 minutes evaluating this patient and greater than 50% of the time was spent counseling, coordinator care and discussing the plan of care.  All questions were answered and patient stated understanding with agreement with the above treatment  plan.    Lalit Woodson MD [facility]  Vascular and Endovascular Surgery

## 2020-11-19 NOTE — LETTER
November 19, 2020        Ishaan Adamson MD  [address] [facility] [address]             [facility]  [address]  [facility] [address]  Phone: [phone]  Fax: [phone]   Patient: Regan Alvarez   MR Number: [id number]   YOB: 1940   Date of Visit: 11/19/2020       Dear Dr. Adamson:    Thank you for referring Regan Alvarez to me for evaluation. Below are the relevant portions of my assessment and plan of care.            If you have questions, please do not hesitate to call me. I look forward to following Regan along with you.    Sincerely,      Lalit Woodson MD           CC  No Recipients

## 2020-11-19 NOTE — PATIENT INSTRUCTIONS
Peripheral Artery Disease (PAD)     Peripheral artery disease (PAD) occurs when the arteries that carry blood to the limbs are narrowed or blocked. This is usually due to a buildup of a fatty substance called plaque in the walls of the arteries.  PAD most often affects the arteries in the legs. When these arteries are narrowed or blocked, blood flow to the legs is reduced. This can cause leg and foot pain and other symptoms. If severe enough, reduced blood flow to the legs can lead to tissue death (gangrene) and the loss of a toe, foot, or leg. Having PAD also makes it more likely that arteries in other body areas are blocked. For instance, arteries that carry blood to the heart or brain may be affected. This raises the chances of heart attack and stroke.  Risk factors  Certain factors can make PAD more likely. They include:  · Smoking  · Diabetes  · High blood pressure  · Unhealthy cholesterol levels  · Obesity  · Inactive lifestyle  · Older age  · Family history of PAD  Symptoms  Many people with PAD have no symptoms. If symptoms do occur, they can include:  · Pain in the muscles of the calves, thighs or hips that gets worse with activity and better with rest (intermittent claudication)  · Achy, tired, or heavy feeling in the legs  · Weakness, numbness, tingling, or loss of feeling in the legs  · Changes in skin color of the legs  · Sores on the legs and feet  · Cold leg, feet, or toes  · Pain the feet or toes even when lying down (rest pain)  Home care  PAD is a chronic (lifelong) condition. Treatment is focused on managing your condition and lowering your health risks. This may include doing the following:  · If you smoke, quit. This helps prevent further damage to your arteries and lowers your health risks. Ask your provider about medicines or products that can help you quit smoking. Also consider joining a stop-smoking program or support group.  · Be more active. This helps you lose weight and manage  problems such as high blood pressure and unhealthy cholesterol levels. Start a walking program if advised to by your provider. Your provider may also help you form a safe exercise program that is right for your needs.  · Make healthy eating changes. This includes eating less fat, salt, and sugar.  · Take medicines for high blood pressure, unhealthy cholesterol levels, and diabetes as directed.  · Have your blood pressure and cholesterol levels checked as often as directed.  · If you have diabetes, try to keep your blood sugar well controlled. Test your blood sugar as directed.  · If you are overweight, talk to your provider about forming a weight-loss plan.  · Watch for cuts, scrapes, or open sores on your feet. Poor blood flow to the feet may slow healing and increase the risk of infection from these problems.   Follow-up care  Follow up with your healthcare provider, or as advised. If imaging tests such as ultrasound were done, they will be reviewed by a doctor. You will be told the results and any new findings that may affect your care.  When to seek medical advice   Call your healthcare provider right away if any of these occur:  · Sudden severe pain in the legs or feet  · Sudden cold, pale or blue color in the legs or feet  · Weakness or numbness in the legs or feet that worsens  · Any sore or wound in the legs or feet that wont heal  · Weak pulse in your legs or feet  Know the Signs of Heart Attack and Stroke  People with PAD are at high risk for heart attack and stroke. Knowing the signs of these problems can help you protect your health and get help when you need it. Call 911 right away if you have any of the following:  Signs of a Heart Attack  · Chest discomfort, such as pain, aching, tightness, or pressure that lasts more than a few minutes, or that comes and goes  · Pain or discomfort in the arms, back, shoulders, neck, or jaw  · Shortness of breath  · Sweating (often a cold, clammy  sweat)  · Nausea  · Lightheadedness  Signs of a Stroke  · Sudden numbness or weakness of the face, arms, or legs, especially on one side  · Sudden confusion or trouble speaking or understanding  · Sudden trouble seeing in one or both eyes  · Sudden trouble walking, dizziness, or loss of balance  · Sudden, severe headache with no known cause   Date Last Reviewed: 9/21/2015  © 0535-1600 [employer]. [address] [place] [address], [place] [address]. All rights reserved. This information is not intended as a substitute for professional medical care. Always follow your healthcare professional's instructions.          Understanding Abdominal Aortic Aneurysm  You may have been told that you have an aneurysm. This is when a weakened part of a blood vessel expands like a balloon. An aneurysm in the main blood vessel in your stomach area is called an abdominal aortic aneurysm (AAA).  What is AAA?     An aneurysm happens when a weakened part of the aorta wall stretches and expands.     The aorta is the large artery that carries blood from the heart to the rest of the body. With AAA, part of the aorta weakens and expands. If an aneurysm gets large enough, it may burst. This is very serious, and usually fatal.  How is an aneurysm found?  AAA usually causes no symptoms. It is often found when tests (such as an X-ray, MRI, or CT scan) are done for an unrelated problem. Or your healthcare provider may find it while feeling your stomach during a routine exam.  Who develops AAA?  These things increase your chances of having AAA:  · AAA runs in your family  · Your age--AAA is more likely as you get older  · Men are more likely than women to have AAA  · Smoking  · High blood pressure  · High cholesterol level (a buildup of fat and other materials in the blood)  · Injury (such as a car accident  What can be done?  Surgery can be done to remove an aneurysm. Your healthcare provider will weigh the chances that the aneurysm will  burst against the risks of treatment. Because a small aneurysm is not likely to burst, it may be watched for a while. When it reaches a certain size, you may have surgery to replace that section of your aorta.  Date Last Reviewed: 4/26/2016  © 8339-3502 [employer]. [address] [place] [address]. All rights reserved. This information is not intended as a substitute for professional medical care. Always follow your healthcare professional's instructions.

## 2021-01-10 ENCOUNTER — IMMUNIZATION (OUTPATIENT)
Dept: OBSTETRICS AND GYNECOLOGY | Facility: CLINIC | Age: 81
End: 2021-01-10
Payer: MEDICARE

## 2021-01-10 DIAGNOSIS — Z23 NEED FOR VACCINATION: ICD-10-CM

## 2021-01-10 PROCEDURE — 91300 COVID-19, MRNA, LNP-S, PF, 30 MCG/0.3 ML DOSE VACCINE: CPT | Mod: PBBFAC | Performed by: FAMILY MEDICINE

## 2021-01-31 ENCOUNTER — IMMUNIZATION (OUTPATIENT)
Dept: OBSTETRICS AND GYNECOLOGY | Facility: CLINIC | Age: 81
End: 2021-01-31
Payer: MEDICARE

## 2021-01-31 DIAGNOSIS — Z23 NEED FOR VACCINATION: Primary | ICD-10-CM

## 2021-01-31 PROCEDURE — 0002A PR IMMUNIZ ADMIN, SARS-COV-2 COVID-19 VACC, 30MCG/0.3ML, 2ND DOSE: CPT | Mod: PBBFAC | Performed by: FAMILY MEDICINE

## 2021-01-31 PROCEDURE — 91300 PR SARS-COV- 2 COVID-19 VACCINE, NO PRSV, 30MCG/0.3ML, IM: CPT | Mod: PBBFAC | Performed by: FAMILY MEDICINE

## 2021-01-31 RX ADMIN — RNA INGREDIENT BNT-162B2 0.3 ML: 0.23 INJECTION, SUSPENSION INTRAMUSCULAR at 02:01

## 2021-02-10 DIAGNOSIS — E11.65 UNCONTROLLED TYPE 2 DIABETES MELLITUS WITH HYPERGLYCEMIA, WITHOUT LONG-TERM CURRENT USE OF INSULIN: ICD-10-CM

## 2021-02-22 DIAGNOSIS — J98.4 PULMONARY LESION OF LEFT SIDE OF CHEST: ICD-10-CM

## 2021-02-22 DIAGNOSIS — A31.0 MYCOBACTERIUM AVIUM COMPLEX: ICD-10-CM

## 2021-02-22 DIAGNOSIS — J44.9 COPD, SEVERE: ICD-10-CM

## 2021-02-22 RX ORDER — BUDESONIDE AND FORMOTEROL FUMARATE DIHYDRATE 160; 4.5 UG/1; UG/1
2 AEROSOL RESPIRATORY (INHALATION) EVERY 12 HOURS
Qty: 1 INHALER | Refills: 6 | Status: CANCELLED | OUTPATIENT
Start: 2021-02-22 | End: 2022-02-22

## 2021-02-26 DIAGNOSIS — A31.0 MYCOBACTERIUM AVIUM COMPLEX: ICD-10-CM

## 2021-02-26 DIAGNOSIS — J98.4 PULMONARY LESION OF LEFT SIDE OF CHEST: ICD-10-CM

## 2021-02-26 DIAGNOSIS — J44.9 COPD, SEVERE: ICD-10-CM

## 2021-02-28 RX ORDER — BUDESONIDE AND FORMOTEROL FUMARATE DIHYDRATE 160; 4.5 UG/1; UG/1
2 AEROSOL RESPIRATORY (INHALATION) EVERY 12 HOURS
Qty: 1 INHALER | Refills: 6 | Status: SHIPPED | OUTPATIENT
Start: 2021-02-28 | End: 2022-01-01 | Stop reason: SDUPTHER

## 2021-04-06 DIAGNOSIS — I65.29 ASYMPTOMATIC CAROTID ARTERY STENOSIS, UNSPECIFIED LATERALITY: ICD-10-CM

## 2021-04-06 DIAGNOSIS — A31.0 PULMONARY MYCOBACTERIUM AVIUM COMPLEX (MAC) INFECTION: Chronic | ICD-10-CM

## 2021-04-06 DIAGNOSIS — I10 HYPERTENSION, ESSENTIAL: ICD-10-CM

## 2021-04-06 DIAGNOSIS — E11.65 UNCONTROLLED TYPE 2 DIABETES MELLITUS WITH HYPERGLYCEMIA, WITHOUT LONG-TERM CURRENT USE OF INSULIN: ICD-10-CM

## 2021-04-06 RX ORDER — GLIPIZIDE 5 MG/1
5 TABLET ORAL DAILY
Qty: 90 TABLET | Refills: 1 | Status: SHIPPED | OUTPATIENT
Start: 2021-04-06 | End: 2021-10-07

## 2021-04-06 RX ORDER — ATORVASTATIN CALCIUM 40 MG/1
40 TABLET, FILM COATED ORAL DAILY
Qty: 90 TABLET | Refills: 3 | Status: SHIPPED | OUTPATIENT
Start: 2021-04-06 | End: 2021-11-03

## 2021-04-06 RX ORDER — ALBUTEROL SULFATE 0.63 MG/3ML
SOLUTION RESPIRATORY (INHALATION)
Qty: 90 ML | Refills: 0 | Status: SHIPPED | OUTPATIENT
Start: 2021-04-06 | End: 2021-07-15

## 2021-04-06 RX ORDER — ISOSORBIDE MONONITRATE 30 MG/1
30 TABLET, EXTENDED RELEASE ORAL DAILY
Qty: 90 TABLET | Refills: 1 | Status: SHIPPED | OUTPATIENT
Start: 2021-04-06 | End: 2022-01-17

## 2021-04-13 DIAGNOSIS — J44.9 CHRONIC OBSTRUCTIVE PULMONARY DISEASE, UNSPECIFIED COPD TYPE: ICD-10-CM

## 2021-04-13 RX ORDER — ALBUTEROL SULFATE 90 UG/1
AEROSOL, METERED RESPIRATORY (INHALATION)
Qty: 8 G | Refills: 3 | Status: SHIPPED | OUTPATIENT
Start: 2021-04-13 | End: 2021-09-04

## 2021-07-06 ENCOUNTER — LAB VISIT (OUTPATIENT)
Dept: LAB | Facility: HOSPITAL | Age: 81
End: 2021-07-06
Attending: INTERNAL MEDICINE
Payer: MEDICARE

## 2021-07-06 DIAGNOSIS — I10 HYPERTENSION, ESSENTIAL: ICD-10-CM

## 2021-07-06 DIAGNOSIS — E11.65 UNCONTROLLED TYPE 2 DIABETES MELLITUS WITH HYPERGLYCEMIA, WITHOUT LONG-TERM CURRENT USE OF INSULIN: ICD-10-CM

## 2021-07-06 LAB
ALBUMIN SERPL BCP-MCNC: 3.5 G/DL (ref 3.5–5.2)
ALBUMIN SERPL BCP-MCNC: 3.5 G/DL (ref 3.5–5.2)
ALP SERPL-CCNC: 61 U/L (ref 55–135)
ALP SERPL-CCNC: 61 U/L (ref 55–135)
ALT SERPL W/O P-5'-P-CCNC: 12 U/L (ref 10–44)
ALT SERPL W/O P-5'-P-CCNC: 12 U/L (ref 10–44)
ANION GAP SERPL CALC-SCNC: 6 MMOL/L (ref 8–16)
ANION GAP SERPL CALC-SCNC: 6 MMOL/L (ref 8–16)
AST SERPL-CCNC: 14 U/L (ref 10–40)
AST SERPL-CCNC: 14 U/L (ref 10–40)
BASOPHILS # BLD AUTO: 0.09 K/UL (ref 0–0.2)
BASOPHILS NFR BLD: 0.6 % (ref 0–1.9)
BILIRUB SERPL-MCNC: 0.2 MG/DL (ref 0.1–1)
BILIRUB SERPL-MCNC: 0.2 MG/DL (ref 0.1–1)
BUN SERPL-MCNC: 16 MG/DL (ref 8–23)
BUN SERPL-MCNC: 16 MG/DL (ref 8–23)
CALCIUM SERPL-MCNC: 9.1 MG/DL (ref 8.7–10.5)
CALCIUM SERPL-MCNC: 9.1 MG/DL (ref 8.7–10.5)
CHLORIDE SERPL-SCNC: 109 MMOL/L (ref 95–110)
CHLORIDE SERPL-SCNC: 109 MMOL/L (ref 95–110)
CO2 SERPL-SCNC: 25 MMOL/L (ref 23–29)
CO2 SERPL-SCNC: 25 MMOL/L (ref 23–29)
CREAT SERPL-MCNC: 1 MG/DL (ref 0.5–1.4)
CREAT SERPL-MCNC: 1 MG/DL (ref 0.5–1.4)
DIFFERENTIAL METHOD: ABNORMAL
EOSINOPHIL # BLD AUTO: 1 K/UL (ref 0–0.5)
EOSINOPHIL NFR BLD: 7.1 % (ref 0–8)
ERYTHROCYTE [DISTWIDTH] IN BLOOD BY AUTOMATED COUNT: 17.1 % (ref 11.5–14.5)
EST. GFR  (AFRICAN AMERICAN): >60 ML/MIN/1.73 M^2
EST. GFR  (AFRICAN AMERICAN): >60 ML/MIN/1.73 M^2
EST. GFR  (NON AFRICAN AMERICAN): >60 ML/MIN/1.73 M^2
EST. GFR  (NON AFRICAN AMERICAN): >60 ML/MIN/1.73 M^2
ESTIMATED AVG GLUCOSE: 177 MG/DL (ref 68–131)
GLUCOSE SERPL-MCNC: 169 MG/DL (ref 70–110)
GLUCOSE SERPL-MCNC: 169 MG/DL (ref 70–110)
HBA1C MFR BLD: 7.8 % (ref 4–5.6)
HCT VFR BLD AUTO: 33.2 % (ref 40–54)
HGB BLD-MCNC: 10.2 G/DL (ref 14–18)
IMM GRANULOCYTES # BLD AUTO: 0.07 K/UL (ref 0–0.04)
IMM GRANULOCYTES NFR BLD AUTO: 0.5 % (ref 0–0.5)
LYMPHOCYTES # BLD AUTO: 3.6 K/UL (ref 1–4.8)
LYMPHOCYTES NFR BLD: 25.8 % (ref 18–48)
MCH RBC QN AUTO: 28.4 PG (ref 27–31)
MCHC RBC AUTO-ENTMCNC: 30.7 G/DL (ref 32–36)
MCV RBC AUTO: 93 FL (ref 82–98)
MONOCYTES # BLD AUTO: 1.2 K/UL (ref 0.3–1)
MONOCYTES NFR BLD: 8.4 % (ref 4–15)
NEUTROPHILS # BLD AUTO: 8 K/UL (ref 1.8–7.7)
NEUTROPHILS NFR BLD: 57.6 % (ref 38–73)
NRBC BLD-RTO: 0 /100 WBC
PLATELET # BLD AUTO: 332 K/UL (ref 150–450)
PMV BLD AUTO: 11.1 FL (ref 9.2–12.9)
POTASSIUM SERPL-SCNC: 4.2 MMOL/L (ref 3.5–5.1)
POTASSIUM SERPL-SCNC: 4.2 MMOL/L (ref 3.5–5.1)
PROT SERPL-MCNC: 7.9 G/DL (ref 6–8.4)
PROT SERPL-MCNC: 7.9 G/DL (ref 6–8.4)
RBC # BLD AUTO: 3.59 M/UL (ref 4.6–6.2)
SODIUM SERPL-SCNC: 140 MMOL/L (ref 136–145)
SODIUM SERPL-SCNC: 140 MMOL/L (ref 136–145)
WBC # BLD AUTO: 13.94 K/UL (ref 3.9–12.7)

## 2021-07-06 PROCEDURE — 80053 COMPREHEN METABOLIC PANEL: CPT | Performed by: INTERNAL MEDICINE

## 2021-07-06 PROCEDURE — 83036 HEMOGLOBIN GLYCOSYLATED A1C: CPT | Performed by: INTERNAL MEDICINE

## 2021-07-06 PROCEDURE — 85025 COMPLETE CBC W/AUTO DIFF WBC: CPT | Performed by: INTERNAL MEDICINE

## 2021-07-06 PROCEDURE — 36415 COLL VENOUS BLD VENIPUNCTURE: CPT | Mod: PN | Performed by: INTERNAL MEDICINE

## 2021-07-28 ENCOUNTER — OFFICE VISIT (OUTPATIENT)
Dept: FAMILY MEDICINE | Facility: CLINIC | Age: 81
End: 2021-07-28
Payer: MEDICARE

## 2021-07-28 VITALS
HEIGHT: 78 IN | TEMPERATURE: 98 F | BODY MASS INDEX: 21.19 KG/M2 | DIASTOLIC BLOOD PRESSURE: 78 MMHG | OXYGEN SATURATION: 96 % | WEIGHT: 183.19 LBS | SYSTOLIC BLOOD PRESSURE: 122 MMHG | HEART RATE: 92 BPM

## 2021-07-28 DIAGNOSIS — J98.4 PULMONARY LESION OF LEFT SIDE OF CHEST: ICD-10-CM

## 2021-07-28 DIAGNOSIS — E11.9 TYPE 2 DIABETES MELLITUS WITHOUT COMPLICATION, WITHOUT LONG-TERM CURRENT USE OF INSULIN: Primary | ICD-10-CM

## 2021-07-28 DIAGNOSIS — J44.9 COPD, SEVERE: ICD-10-CM

## 2021-07-28 PROCEDURE — 1160F RVW MEDS BY RX/DR IN RCRD: CPT | Mod: CPTII,S$GLB,, | Performed by: INTERNAL MEDICINE

## 2021-07-28 PROCEDURE — 3288F FALL RISK ASSESSMENT DOCD: CPT | Mod: CPTII,S$GLB,, | Performed by: INTERNAL MEDICINE

## 2021-07-28 PROCEDURE — 3051F PR MOST RECENT HEMOGLOBIN A1C LEVEL 7.0 - < 8.0%: ICD-10-PCS | Mod: CPTII,S$GLB,, | Performed by: INTERNAL MEDICINE

## 2021-07-28 PROCEDURE — 99214 OFFICE O/P EST MOD 30 MIN: CPT | Mod: S$GLB,,, | Performed by: INTERNAL MEDICINE

## 2021-07-28 PROCEDURE — 3051F HG A1C>EQUAL 7.0%<8.0%: CPT | Mod: CPTII,S$GLB,, | Performed by: INTERNAL MEDICINE

## 2021-07-28 PROCEDURE — 1101F PT FALLS ASSESS-DOCD LE1/YR: CPT | Mod: CPTII,S$GLB,, | Performed by: INTERNAL MEDICINE

## 2021-07-28 PROCEDURE — 1126F AMNT PAIN NOTED NONE PRSNT: CPT | Mod: CPTII,S$GLB,, | Performed by: INTERNAL MEDICINE

## 2021-07-28 PROCEDURE — 3074F SYST BP LT 130 MM HG: CPT | Mod: CPTII,S$GLB,, | Performed by: INTERNAL MEDICINE

## 2021-07-28 PROCEDURE — 1159F MED LIST DOCD IN RCRD: CPT | Mod: CPTII,S$GLB,, | Performed by: INTERNAL MEDICINE

## 2021-07-28 PROCEDURE — 1126F PR PAIN SEVERITY QUANTIFIED, NO PAIN PRESENT: ICD-10-PCS | Mod: CPTII,S$GLB,, | Performed by: INTERNAL MEDICINE

## 2021-07-28 PROCEDURE — 1101F PR PT FALLS ASSESS DOC 0-1 FALLS W/OUT INJ PAST YR: ICD-10-PCS | Mod: CPTII,S$GLB,, | Performed by: INTERNAL MEDICINE

## 2021-07-28 PROCEDURE — 1159F PR MEDICATION LIST DOCUMENTED IN MEDICAL RECORD: ICD-10-PCS | Mod: CPTII,S$GLB,, | Performed by: INTERNAL MEDICINE

## 2021-07-28 PROCEDURE — 99999 PR PBB SHADOW E&M-EST. PATIENT-LVL IV: CPT | Mod: PBBFAC,,, | Performed by: INTERNAL MEDICINE

## 2021-07-28 PROCEDURE — 99999 PR PBB SHADOW E&M-EST. PATIENT-LVL IV: ICD-10-PCS | Mod: PBBFAC,,, | Performed by: INTERNAL MEDICINE

## 2021-07-28 PROCEDURE — 99214 PR OFFICE/OUTPT VISIT, EST, LEVL IV, 30-39 MIN: ICD-10-PCS | Mod: S$GLB,,, | Performed by: INTERNAL MEDICINE

## 2021-07-28 PROCEDURE — 3074F PR MOST RECENT SYSTOLIC BLOOD PRESSURE < 130 MM HG: ICD-10-PCS | Mod: CPTII,S$GLB,, | Performed by: INTERNAL MEDICINE

## 2021-07-28 PROCEDURE — 99499 UNLISTED E&M SERVICE: CPT | Mod: S$GLB,,, | Performed by: INTERNAL MEDICINE

## 2021-07-28 PROCEDURE — 3078F DIAST BP <80 MM HG: CPT | Mod: CPTII,S$GLB,, | Performed by: INTERNAL MEDICINE

## 2021-07-28 PROCEDURE — 99499 RISK ADDL DX/OHS AUDIT: ICD-10-PCS | Mod: S$GLB,,, | Performed by: INTERNAL MEDICINE

## 2021-07-28 PROCEDURE — 3078F PR MOST RECENT DIASTOLIC BLOOD PRESSURE < 80 MM HG: ICD-10-PCS | Mod: CPTII,S$GLB,, | Performed by: INTERNAL MEDICINE

## 2021-07-28 PROCEDURE — 3288F PR FALLS RISK ASSESSMENT DOCUMENTED: ICD-10-PCS | Mod: CPTII,S$GLB,, | Performed by: INTERNAL MEDICINE

## 2021-07-28 PROCEDURE — 1160F PR REVIEW ALL MEDS BY PRESCRIBER/CLIN PHARMACIST DOCUMENTED: ICD-10-PCS | Mod: CPTII,S$GLB,, | Performed by: INTERNAL MEDICINE

## 2021-07-28 RX ORDER — TIZANIDINE 4 MG/1
4 TABLET ORAL EVERY 8 HOURS PRN
Qty: 30 TABLET | Refills: 0 | Status: SHIPPED | OUTPATIENT
Start: 2021-07-28 | End: 2021-08-07

## 2021-07-28 RX ORDER — FLUTICASONE FUROATE AND VILANTEROL TRIFENATATE 200; 25 UG/1; UG/1
1 POWDER RESPIRATORY (INHALATION) DAILY
Qty: 60 EACH | Refills: 5 | Status: SHIPPED | OUTPATIENT
Start: 2021-07-28 | End: 2022-03-11

## 2021-08-03 ENCOUNTER — TELEPHONE (OUTPATIENT)
Dept: FAMILY MEDICINE | Facility: CLINIC | Age: 81
End: 2021-08-03

## 2021-11-03 DIAGNOSIS — I65.29 ASYMPTOMATIC CAROTID ARTERY STENOSIS, UNSPECIFIED LATERALITY: ICD-10-CM

## 2021-11-03 RX ORDER — ATORVASTATIN CALCIUM 40 MG/1
TABLET, FILM COATED ORAL
Qty: 90 TABLET | Refills: 3 | Status: SHIPPED | OUTPATIENT
Start: 2021-11-03 | End: 2022-01-01

## 2022-01-01 ENCOUNTER — OFFICE VISIT (OUTPATIENT)
Dept: OPTOMETRY | Facility: CLINIC | Age: 82
End: 2022-01-01
Payer: MEDICARE

## 2022-01-01 ENCOUNTER — TELEPHONE (OUTPATIENT)
Dept: FAMILY MEDICINE | Facility: CLINIC | Age: 82
End: 2022-01-01
Payer: MEDICARE

## 2022-01-01 ENCOUNTER — PATIENT MESSAGE (OUTPATIENT)
Dept: ADMINISTRATIVE | Facility: HOSPITAL | Age: 82
End: 2022-01-01
Payer: MEDICARE

## 2022-01-01 DIAGNOSIS — E11.9 TYPE 2 DIABETES MELLITUS WITHOUT RETINOPATHY: Primary | ICD-10-CM

## 2022-01-01 DIAGNOSIS — J41.0 SIMPLE CHRONIC BRONCHITIS: Primary | ICD-10-CM

## 2022-01-01 DIAGNOSIS — J47.9 BRONCHIECTASIS WITHOUT COMPLICATION: ICD-10-CM

## 2022-01-01 DIAGNOSIS — A31.0 PULMONARY MYCOBACTERIUM AVIUM COMPLEX (MAC) INFECTION: Chronic | ICD-10-CM

## 2022-01-01 DIAGNOSIS — Z96.1 PSEUDOPHAKIA: ICD-10-CM

## 2022-01-01 DIAGNOSIS — J44.9 CHRONIC OBSTRUCTIVE PULMONARY DISEASE, UNSPECIFIED COPD TYPE: ICD-10-CM

## 2022-01-01 DIAGNOSIS — H52.7 REFRACTIVE ERROR: ICD-10-CM

## 2022-01-01 PROCEDURE — 99999 PR PBB SHADOW E&M-EST. PATIENT-LVL III: ICD-10-PCS | Mod: PBBFAC,,, | Performed by: OPTOMETRIST

## 2022-01-01 PROCEDURE — 2023F DILAT RTA XM W/O RTNOPTHY: CPT | Mod: CPTII,S$GLB,, | Performed by: OPTOMETRIST

## 2022-01-01 PROCEDURE — 92015 DETERMINE REFRACTIVE STATE: CPT | Mod: S$GLB,,, | Performed by: OPTOMETRIST

## 2022-01-01 PROCEDURE — 2023F PR DILATED RETINAL EXAM W/O EVID OF RETINOPATHY: ICD-10-PCS | Mod: CPTII,S$GLB,, | Performed by: OPTOMETRIST

## 2022-01-01 PROCEDURE — 1160F PR REVIEW ALL MEDS BY PRESCRIBER/CLIN PHARMACIST DOCUMENTED: ICD-10-PCS | Mod: CPTII,S$GLB,, | Performed by: OPTOMETRIST

## 2022-01-01 PROCEDURE — 92015 PR REFRACTION: ICD-10-PCS | Mod: S$GLB,,, | Performed by: OPTOMETRIST

## 2022-01-01 PROCEDURE — 1159F MED LIST DOCD IN RCRD: CPT | Mod: CPTII,S$GLB,, | Performed by: OPTOMETRIST

## 2022-01-01 PROCEDURE — 3288F PR FALLS RISK ASSESSMENT DOCUMENTED: ICD-10-PCS | Mod: CPTII,S$GLB,, | Performed by: OPTOMETRIST

## 2022-01-01 PROCEDURE — 3288F FALL RISK ASSESSMENT DOCD: CPT | Mod: CPTII,S$GLB,, | Performed by: OPTOMETRIST

## 2022-01-01 PROCEDURE — 92004 PR EYE EXAM, NEW PATIENT,COMPREHESV: ICD-10-PCS | Mod: S$GLB,,, | Performed by: OPTOMETRIST

## 2022-01-01 PROCEDURE — 1101F PR PT FALLS ASSESS DOC 0-1 FALLS W/OUT INJ PAST YR: ICD-10-PCS | Mod: CPTII,S$GLB,, | Performed by: OPTOMETRIST

## 2022-01-01 PROCEDURE — 1126F AMNT PAIN NOTED NONE PRSNT: CPT | Mod: CPTII,S$GLB,, | Performed by: OPTOMETRIST

## 2022-01-01 PROCEDURE — 1101F PT FALLS ASSESS-DOCD LE1/YR: CPT | Mod: CPTII,S$GLB,, | Performed by: OPTOMETRIST

## 2022-01-01 PROCEDURE — 1159F PR MEDICATION LIST DOCUMENTED IN MEDICAL RECORD: ICD-10-PCS | Mod: CPTII,S$GLB,, | Performed by: OPTOMETRIST

## 2022-01-01 PROCEDURE — 1160F RVW MEDS BY RX/DR IN RCRD: CPT | Mod: CPTII,S$GLB,, | Performed by: OPTOMETRIST

## 2022-01-01 PROCEDURE — 92004 COMPRE OPH EXAM NEW PT 1/>: CPT | Mod: S$GLB,,, | Performed by: OPTOMETRIST

## 2022-01-01 PROCEDURE — 1126F PR PAIN SEVERITY QUANTIFIED, NO PAIN PRESENT: ICD-10-PCS | Mod: CPTII,S$GLB,, | Performed by: OPTOMETRIST

## 2022-01-01 PROCEDURE — 99999 PR PBB SHADOW E&M-EST. PATIENT-LVL III: CPT | Mod: PBBFAC,,, | Performed by: OPTOMETRIST

## 2022-01-01 RX ORDER — FERROUS SULFATE 325(65) MG
325 TABLET ORAL 2 TIMES DAILY
Qty: 60 TABLET | Refills: 0 | Status: SHIPPED | OUTPATIENT
Start: 2022-01-01 | End: 2022-01-01

## 2022-01-01 RX ORDER — ALBUTEROL SULFATE 90 UG/1
AEROSOL, METERED RESPIRATORY (INHALATION)
Qty: 9 G | Refills: 0 | Status: SHIPPED | OUTPATIENT
Start: 2022-01-01

## 2022-01-01 RX ORDER — SODIUM CHLORIDE FOR INHALATION 3 %
4 VIAL, NEBULIZER (ML) INHALATION
Qty: 60 ML | Refills: 1 | Status: SHIPPED | OUTPATIENT
Start: 2022-01-01

## 2022-01-01 RX ORDER — SODIUM CHLORIDE FOR INHALATION 3 %
4 VIAL, NEBULIZER (ML) INHALATION
COMMUNITY
End: 2022-01-01 | Stop reason: SDUPTHER

## 2022-01-01 RX ORDER — SODIUM CHLORIDE FOR INHALATION 3 %
4 VIAL, NEBULIZER (ML) INHALATION
Qty: 60 ML | Refills: 1 | Status: SHIPPED | OUTPATIENT
Start: 2022-01-01 | End: 2022-01-01 | Stop reason: SDUPTHER

## 2022-01-01 RX ORDER — FERROUS SULFATE TAB 325 MG (65 MG ELEMENTAL FE) 325 (65 FE) MG
TAB ORAL
Qty: 180 TABLET | Refills: 1 | Status: SHIPPED | OUTPATIENT
Start: 2022-01-01 | End: 2023-01-01

## 2022-01-01 RX ORDER — ALBUTEROL SULFATE 0.63 MG/3ML
SOLUTION RESPIRATORY (INHALATION)
Qty: 90 ML | Refills: 1 | Status: SHIPPED | OUTPATIENT
Start: 2022-01-01 | End: 2023-01-01

## 2022-01-13 DIAGNOSIS — E11.65 UNCONTROLLED TYPE 2 DIABETES MELLITUS WITH HYPERGLYCEMIA, WITHOUT LONG-TERM CURRENT USE OF INSULIN: ICD-10-CM

## 2022-01-13 DIAGNOSIS — I10 HYPERTENSION, ESSENTIAL: ICD-10-CM

## 2022-01-13 NOTE — TELEPHONE ENCOUNTER
Care Due:                  Date            Visit Type   Department     Provider  --------------------------------------------------------------------------------                                             [facility]                                           MEDICINE/  Last Visit: 07-      None         INTERNAL MED   Ishaan Adamson  Next Visit: None Scheduled  None         None Found                                                            Last  Test          Frequency    Reason                     Performed    Due Date  --------------------------------------------------------------------------------    HBA1C.......  6 months...  glipiZIDE................  07- 01-    Lipid Panel.  12 months..  atorvastatin.............  Not Found    Overdue    Powered by [employer]. Reference number: [id number].   1/13/2022 10:31:20 AM CST

## 2022-01-17 RX ORDER — GLIPIZIDE 5 MG/1
TABLET ORAL
Qty: 90 TABLET | Refills: 0 | Status: SHIPPED | OUTPATIENT
Start: 2022-01-17 | End: 2022-01-01

## 2022-01-18 RX ORDER — ISOSORBIDE MONONITRATE 30 MG/1
TABLET, EXTENDED RELEASE ORAL
Qty: 90 TABLET | Refills: 2 | Status: SHIPPED | OUTPATIENT
Start: 2022-01-18 | End: 2022-01-01

## 2022-01-18 NOTE — TELEPHONE ENCOUNTER
Refill Routing Note   Medication(s) are not appropriate for processing by [facility] for the following reason(s):      - Drug-Disease Interaction (isosorbide mononitrate and Anemia; Symptomatic anemia)    [facility] action(s):  Defer  Approve Medication-related problems identified:   Drug-disease interaction  Requires labs     Medication Therapy Plan: Drug-Disease: isosorbide mononitrate and Anemia; Symptomatic anemia; Defer isosorbide; Approve glipizide  --->Care Gap information included in message below if applicable.   Medication reconciliation completed: No   Automatic Epic Generated Protocol Data:    Orders Placed This Encounter    glipiZIDE (GLUCOTROL) 5 MG tablet      Requested Prescriptions   Pending Prescriptions Disp Refills    isosorbide mononitrate (IMDUR) 30 MG 24 hr tablet [Pharmacy Med Name: Isosorbide Mononitrate ER 30 MG Oral Tablet Extended Release 24 Hour] 90 tablet 2     Sig: Take 1 tablet by mouth once daily       Cardiovascular: Nitrates Passed - 1/13/2022 10:30 AM        Passed - Patient is at least 18 years old        Passed - BP > 90/50 mmH     BP Readings from Last 1 Encounters:   07/28/21 122/78               Passed - Valid encounter within last 15 months     Recent Visits  Date Type Provider Dept   07/28/21 Office Visit Ishaan Adamson MD [facility] Med   02/10/20 Office Visit Ishaan Adamson MD [facility]   02/03/20 Office Visit Ishaan Adamson MD [facility]   Showing recent visits within past 720 days and meeting all other requirements  Future Appointments  No visits were found meeting these conditions.  Showing future appointments within next 150 days and meeting all other requirements                 Signed Prescriptions Disp Refills    glipiZIDE (GLUCOTROL) 5 MG tablet 90 tablet 0     Sig: Take 1 tablet by mouth once daily       Endocrinology:  Diabetes - Sulfonylureas Failed - 1/13/2022 10:30 AM         Failed - HBA1C within 180 days     Lab Results   Component Value Date    HGBA1C 7.8 (H) 07/06/2021    HGBA1C 7.0 (H) 02/03/2020    HGBA1C 6.4 (H) 01/29/2020              Passed - Patient is at least 18 years old        Passed - Valid encounter within last 15 months     Recent Visits  Date Type Provider Dept   07/28/21 Office Visit Ishaan Adamson MD [facility]/ Internal Med   02/10/20 Office Visit Ishaan Adamson MD [facility] Med   02/03/20 Office Visit Ishaan Adamson MD [facility] Internal Med   Showing recent visits within past 720 days and meeting all other requirements  Future Appointments  No visits were found meeting these conditions.  Showing future appointments within next 150 days and meeting all other requirements                Passed - Cr is 1.39 or below and within 360 days     Lab Results   Component Value Date    CREATININE 1.0 07/06/2021    CREATININE 1.0 07/06/2021    CREATININE 1.3 01/28/2020              Passed - eGFR is 59 or above and within 360 days     Lab Results   Component Value Date    EGFRNONAA >60 07/06/2021    EGFRNONAA >60 07/06/2021    EGFRNONAA 51.2 (A) 01/28/2020                      Appointments  past 12m or future 3m with PCP    Date Provider   Last Visit   7/28/2021 Ishaan Adamson MD   Next Visit   Visit date not found Ishaan Adamson MD   ED visits in past 90 days: 0        Note composed:10:14 PM 01/17/2022

## 2022-01-29 DIAGNOSIS — J44.9 CHRONIC OBSTRUCTIVE PULMONARY DISEASE, UNSPECIFIED COPD TYPE: ICD-10-CM

## 2022-01-29 NOTE — TELEPHONE ENCOUNTER
No new care gaps identified.  Powered by "[employer] by [employer]. Reference number: [id number].   1/29/2022 11:43:09 AM CST

## 2022-02-04 DIAGNOSIS — J44.9 CHRONIC OBSTRUCTIVE PULMONARY DISEASE, UNSPECIFIED COPD TYPE: ICD-10-CM

## 2022-02-04 RX ORDER — ALBUTEROL SULFATE 90 UG/1
2 AEROSOL, METERED RESPIRATORY (INHALATION) EVERY 6 HOURS PRN
Qty: 9 G | Refills: 0 | Status: SHIPPED | OUTPATIENT
Start: 2022-02-04 | End: 2022-01-01

## 2022-02-04 NOTE — TELEPHONE ENCOUNTER
No new care gaps identified.  Powered by [employer] by [employer]. Reference number: [id number].   2/04/2022 10:49:16 AM CST

## 2022-02-04 NOTE — TELEPHONE ENCOUNTER
----- Message from Claudette Lopez sent at 2/4/2022 10:29 AM CST -----  Regarding: Meghana/ Daughter/  [phone]  Type: RX Refill Request    Who Called:  daughter    Refill or New Rx:  refill    RX Name and Strength:  albuterol (PROVENTIL/VENTOLIN HFA) 90 mcg/actuation inhaler    Preferred Pharmacy with phone number:  [facility] [address] [place], [place] - [address] [facility]    Local or Mail Order:  local    Ordering Provider:  dago miller    Would the patient rather a call back or a response via [facility]?  Call back    Best Call Back Number:  [phone]

## 2022-02-07 RX ORDER — ALBUTEROL SULFATE 90 UG/1
AEROSOL, METERED RESPIRATORY (INHALATION)
Qty: 9 G | Refills: 0 | OUTPATIENT
Start: 2022-02-07

## 2022-02-07 NOTE — TELEPHONE ENCOUNTER
This medication has been reordered by PCP already. Will remove duplicate and close out encounter.

## 2022-02-16 ENCOUNTER — PES CALL (OUTPATIENT)
Dept: ADMINISTRATIVE | Facility: CLINIC | Age: 82
End: 2022-02-16
Payer: MEDICARE

## 2022-03-09 ENCOUNTER — TELEPHONE (OUTPATIENT)
Dept: ADMINISTRATIVE | Facility: CLINIC | Age: 82
End: 2022-03-09
Payer: MEDICARE

## 2022-03-11 ENCOUNTER — OFFICE VISIT (OUTPATIENT)
Dept: FAMILY MEDICINE | Facility: CLINIC | Age: 82
End: 2022-03-11
Payer: MEDICARE

## 2022-03-11 ENCOUNTER — TELEPHONE (OUTPATIENT)
Dept: ADMINISTRATIVE | Facility: CLINIC | Age: 82
End: 2022-03-11
Payer: MEDICARE

## 2022-03-11 VITALS — WEIGHT: 183.19 LBS | BODY MASS INDEX: 21.19 KG/M2 | HEIGHT: 78 IN

## 2022-03-11 DIAGNOSIS — E11.65 UNCONTROLLED TYPE 2 DIABETES MELLITUS WITH HYPERGLYCEMIA, WITHOUT LONG-TERM CURRENT USE OF INSULIN: ICD-10-CM

## 2022-03-11 DIAGNOSIS — I70.0 THORACIC AORTA ATHEROSCLEROSIS: ICD-10-CM

## 2022-03-11 DIAGNOSIS — Z99.89 DEPENDENCE ON OTHER ENABLING MACHINES AND DEVICES: ICD-10-CM

## 2022-03-11 DIAGNOSIS — J41.0 SIMPLE CHRONIC BRONCHITIS: ICD-10-CM

## 2022-03-11 DIAGNOSIS — R26.9 ABNORMALITY OF GAIT AND MOBILITY: ICD-10-CM

## 2022-03-11 DIAGNOSIS — J47.9 BRONCHIECTASIS WITHOUT COMPLICATION: ICD-10-CM

## 2022-03-11 DIAGNOSIS — D64.9 SYMPTOMATIC ANEMIA: ICD-10-CM

## 2022-03-11 DIAGNOSIS — K51.919 ULCERATIVE COLITIS WITH COMPLICATION, UNSPECIFIED LOCATION: ICD-10-CM

## 2022-03-11 DIAGNOSIS — Z00.00 ENCOUNTER FOR PREVENTIVE HEALTH EXAMINATION: Primary | ICD-10-CM

## 2022-03-11 DIAGNOSIS — A31.0 PULMONARY MYCOBACTERIUM AVIUM COMPLEX (MAC) INFECTION: Chronic | ICD-10-CM

## 2022-03-11 DIAGNOSIS — I77.9 BILATERAL CAROTID ARTERY DISEASE, UNSPECIFIED TYPE: ICD-10-CM

## 2022-03-11 DIAGNOSIS — J44.9 COPD, SEVERE: ICD-10-CM

## 2022-03-11 DIAGNOSIS — I10 ESSENTIAL HYPERTENSION: Chronic | ICD-10-CM

## 2022-03-11 PROBLEM — K51.90 ULCERATIVE COLITIS: Status: ACTIVE | Noted: 2019-01-30

## 2022-03-11 PROCEDURE — 1126F PR PAIN SEVERITY QUANTIFIED, NO PAIN PRESENT: ICD-10-PCS | Mod: CPTII,S$GLB,, | Performed by: NURSE PRACTITIONER

## 2022-03-11 PROCEDURE — 1100F PTFALLS ASSESS-DOCD GE2>/YR: CPT | Mod: CPTII,S$GLB,, | Performed by: NURSE PRACTITIONER

## 2022-03-11 PROCEDURE — 1170F PR FUNCTIONAL STATUS ASSESSED: ICD-10-PCS | Mod: CPTII,S$GLB,, | Performed by: NURSE PRACTITIONER

## 2022-03-11 PROCEDURE — G0439 PPPS, SUBSEQ VISIT: HCPCS | Mod: 95,,, | Performed by: NURSE PRACTITIONER

## 2022-03-11 PROCEDURE — 99499 UNLISTED E&M SERVICE: CPT | Mod: 95,,, | Performed by: NURSE PRACTITIONER

## 2022-03-11 PROCEDURE — 1126F AMNT PAIN NOTED NONE PRSNT: CPT | Mod: CPTII,S$GLB,, | Performed by: NURSE PRACTITIONER

## 2022-03-11 PROCEDURE — 3051F HG A1C>EQUAL 7.0%<8.0%: CPT | Mod: CPTII,S$GLB,, | Performed by: NURSE PRACTITIONER

## 2022-03-11 PROCEDURE — 3288F PR FALLS RISK ASSESSMENT DOCUMENTED: ICD-10-PCS | Mod: CPTII,S$GLB,, | Performed by: NURSE PRACTITIONER

## 2022-03-11 PROCEDURE — G0439 PR MEDICARE ANNUAL WELLNESS SUBSEQUENT VISIT: ICD-10-PCS | Mod: 95,,, | Performed by: NURSE PRACTITIONER

## 2022-03-11 PROCEDURE — 3288F FALL RISK ASSESSMENT DOCD: CPT | Mod: CPTII,S$GLB,, | Performed by: NURSE PRACTITIONER

## 2022-03-11 PROCEDURE — 99499 RISK ADDL DX/OHS AUDIT: ICD-10-PCS | Mod: 95,,, | Performed by: NURSE PRACTITIONER

## 2022-03-11 PROCEDURE — 1160F PR REVIEW ALL MEDS BY PRESCRIBER/CLIN PHARMACIST DOCUMENTED: ICD-10-PCS | Mod: CPTII,S$GLB,, | Performed by: NURSE PRACTITIONER

## 2022-03-11 PROCEDURE — 1100F PR PT FALLS ASSESS DOC 2+ FALLS/FALL W/INJURY/YR: ICD-10-PCS | Mod: CPTII,S$GLB,, | Performed by: NURSE PRACTITIONER

## 2022-03-11 PROCEDURE — 1159F PR MEDICATION LIST DOCUMENTED IN MEDICAL RECORD: ICD-10-PCS | Mod: CPTII,S$GLB,, | Performed by: NURSE PRACTITIONER

## 2022-03-11 PROCEDURE — 1159F MED LIST DOCD IN RCRD: CPT | Mod: CPTII,S$GLB,, | Performed by: NURSE PRACTITIONER

## 2022-03-11 PROCEDURE — 1170F FXNL STATUS ASSESSED: CPT | Mod: CPTII,S$GLB,, | Performed by: NURSE PRACTITIONER

## 2022-03-11 PROCEDURE — 1160F RVW MEDS BY RX/DR IN RCRD: CPT | Mod: CPTII,S$GLB,, | Performed by: NURSE PRACTITIONER

## 2022-03-11 PROCEDURE — 3051F PR MOST RECENT HEMOGLOBIN A1C LEVEL 7.0 - < 8.0%: ICD-10-PCS | Mod: CPTII,S$GLB,, | Performed by: NURSE PRACTITIONER

## 2022-03-11 NOTE — PATIENT INSTRUCTIONS
Counseling and Referral of Other Preventative  (Italic type indicates deductible and co-insurance are waived)    Patient Name: Regan Alvarez  Today's Date: 3/11/2022    Health Maintenance       Date Due Completion Date    Aspirin/Antiplatelet Therapy 03/17/2022 (Originally 11/13/1958) ---    Hemoglobin A1c 03/18/2022 (Originally 1/6/2022) 7/6/2021    Shingles Vaccine (2 of 3) 04/01/2022 (Originally 1/1/2014) 11/6/2013    Colonoscopy 09/11/2023 9/11/2018    Override on 10/14/2013: Done (scheduled)    Override on 8/8/2000: Done (q 5 yr cycle.  )    Lipid Panel 08/31/2025 8/31/2020    Override on 10/14/2013: Done (ordered)    TETANUS VACCINE 10/01/2030 10/1/2020        No orders of the defined types were placed in this encounter.    The following information is provided to all patients.  This information is to help you find resources for any of the problems found today that may be affecting your health:                Living healthy guide: www.[facility].[place].gov      Understanding Diabetes: www.diabetes.org      Eating healthy: www.cdc.gov/healthyweight      CDC home safety checklist: www.cdc.gov/steadi/patient.html      Agency on Aging: www.goea.[place].gov      Alcoholics anonymous (AA): www.aa.org      Physical Activity: www.ferny.nih.gov/mk1aorb      Tobacco use: www.quitwithusla.org

## 2022-03-11 NOTE — PROGRESS NOTES
"The patient location is: [place]  The chief complaint leading to consultation is: Medicare AWV    Visit type: audiovisual    Face to Face time with patient: 38 minutes  60 minutes of total time spent on the encounter, which includes face to face time and non-face to face time preparing to see the patient (eg, review of tests), Obtaining and/or reviewing separately obtained history, Documenting clinical information in the electronic or other health record, Independently interpreting results (not separately reported) and communicating results to the patient/family/caregiver, or Care coordination (not separately reported).     Each patient to whom he or she provides medical services by telemedicine is:  (1) informed of the relationship between the physician and patient and the respective role of any other health care provider with respect to management of the patient; and (2) notified that he or she may decline to receive medical services by telemedicine and may withdraw from such care at any time.    Regan Alvarez presented for a  Medicare AWV and comprehensive Health Risk Assessment today. The following components were reviewed and updated:    · Medical history  · Family History  · Social history  · Allergies and Current Medications  · Health Risk Assessment  · Health Maintenance  · Care Team         ** See Completed Assessments for Annual Wellness Visit within the encounter summary.**         The following assessments were completed:  · Living Situation  · CAGE  · Depression Screening  · Fall Risk Assessment (MACH 10)  · Hearing Assessment(HHI)  · Cognitive Function Screening  · Nutrition Screening  · ADL Screening  · PAQ Screening      Vitals:    03/11/22 1250   Weight: 83.1 kg (183 lb 3.2 oz)   Height: 6' 7" (2.007 m)     Body mass index is 20.64 kg/m².     Physical Exam  Constitutional:       General: He is not in acute distress.     Appearance: Normal appearance. He is well-developed and well-groomed. "   HENT:      Head: Normocephalic.      Ears:      Comments: Hard of hearing, visit done with assistance from daughter  Pulmonary:      Effort: Pulmonary effort is normal. No respiratory distress.   Skin:     Coloration: Skin is not pale.   Neurological:      Mental Status: He is alert and oriented to person, place, and time.   Psychiatric:         Attention and Perception: Attention normal.         Mood and Affect: Mood and affect normal.         Speech: Speech normal.         Behavior: Behavior normal. Behavior is cooperative.         Cognition and Memory: Cognition and memory normal.     Physical exam limited d/t virtual visit. Patient does not appear to be in any respiratory distress. Able to speak in complete sentences without becoming short of breath.        Diagnoses and health risks identified today and associated recommendations/orders:    1. Encounter for preventive health examination    2. Uncontrolled type 2 diabetes mellitus with hyperglycemia, without long-term current use of insulin  Chronic; stable on medication. Follow up with PCP.    3. COPD, severe  Chronic; stable on medication. Followed by Pulmonary Disease.    4. Pulmonary Mycobacterium avium complex (MAC) infection  Chronic; stable on medication. Followed by Pulmonary Disease.    5. Bronchiectasis without complication  Chronic; stable on medication. Followed by Pulmonary Disease.    6. Simple chronic bronchitis  Chronic; stable on medication. Followed by Pulmonary Disease.    7. Essential hypertension  Chronic; stable on medication. Followed by Vascular Surgery.    8. Bilateral carotid artery disease, unspecified type  Chronic; stable on medication. Followed by Vascular Surgery.    9. Thoracic aorta atherosclerosis  Chronic; stable on medication. Followed by Vascular Surgery.    10. Ulcerative colitis with complication, unspecified location  Chronic; stable on medication. Followed by Gastroenterology.    11. Symptomatic anemia  Chronic; stable  on medication. Follow up with PCP.    12. Abnormality of gait and mobility  Chronic use of assistive device with ambulation, reports 1 fall in past year. Follow up with PCP.    13. Dependence on other enabling machines and devices  Chronic use of assistive device with ambulation, reports 1 fall in past year. Follow up with PCP.    14. Body mass index (BMI) of 20.0 to 20.9 in adult  Patient's BMI is WNL. Encouraged patient to continue to eat a low salt/low fat ADA diet and discussed importance of engaging in physical activityy.      Provided Regan with a 5-10 year written screening schedule and personal prevention plan. Recommendations were developed using the USPSTF age appropriate recommendations. Education, counseling, and referrals were provided as needed. After Visit Summary printed and given to patient which includes a list of additional screenings/tests needed.    Follow up for your next annual wellness visit.    Aleksandra Fuentes NP    Advance Care Planning     I offered to discuss advanced care planning, including how to pick a person who would make decisions for you if you were unable to make them for yourself, called a health care power of , and what kind of decisions you might make such as use of life sustaining treatments such as ventilators and tube feeding when faced with a life limiting illness recorded on a living will that they will need to know. (How you want to be cared for as you near the end of your natural life)     X  Patient has advanced directives written and agrees to provide copies to the institution.

## 2022-03-18 ENCOUNTER — OFFICE VISIT (OUTPATIENT)
Dept: FAMILY MEDICINE | Facility: CLINIC | Age: 82
End: 2022-03-18
Payer: MEDICARE

## 2022-03-18 VITALS
WEIGHT: 187.63 LBS | HEIGHT: 78 IN | SYSTOLIC BLOOD PRESSURE: 98 MMHG | HEART RATE: 109 BPM | OXYGEN SATURATION: 95 % | BODY MASS INDEX: 21.71 KG/M2 | DIASTOLIC BLOOD PRESSURE: 56 MMHG | TEMPERATURE: 98 F

## 2022-03-18 DIAGNOSIS — J41.0 SIMPLE CHRONIC BRONCHITIS: Primary | ICD-10-CM

## 2022-03-18 DIAGNOSIS — K51.919 ULCERATIVE COLITIS WITH COMPLICATION, UNSPECIFIED LOCATION: ICD-10-CM

## 2022-03-18 PROCEDURE — 3078F DIAST BP <80 MM HG: CPT | Mod: CPTII,S$GLB,, | Performed by: INTERNAL MEDICINE

## 2022-03-18 PROCEDURE — 99213 PR OFFICE/OUTPT VISIT, EST, LEVL III, 20-29 MIN: ICD-10-PCS | Mod: S$GLB,,, | Performed by: INTERNAL MEDICINE

## 2022-03-18 PROCEDURE — 1126F PR PAIN SEVERITY QUANTIFIED, NO PAIN PRESENT: ICD-10-PCS | Mod: CPTII,S$GLB,, | Performed by: INTERNAL MEDICINE

## 2022-03-18 PROCEDURE — 1126F AMNT PAIN NOTED NONE PRSNT: CPT | Mod: CPTII,S$GLB,, | Performed by: INTERNAL MEDICINE

## 2022-03-18 PROCEDURE — 1101F PT FALLS ASSESS-DOCD LE1/YR: CPT | Mod: CPTII,S$GLB,, | Performed by: INTERNAL MEDICINE

## 2022-03-18 PROCEDURE — 3074F PR MOST RECENT SYSTOLIC BLOOD PRESSURE < 130 MM HG: ICD-10-PCS | Mod: CPTII,S$GLB,, | Performed by: INTERNAL MEDICINE

## 2022-03-18 PROCEDURE — 1159F PR MEDICATION LIST DOCUMENTED IN MEDICAL RECORD: ICD-10-PCS | Mod: CPTII,S$GLB,, | Performed by: INTERNAL MEDICINE

## 2022-03-18 PROCEDURE — 1101F PR PT FALLS ASSESS DOC 0-1 FALLS W/OUT INJ PAST YR: ICD-10-PCS | Mod: CPTII,S$GLB,, | Performed by: INTERNAL MEDICINE

## 2022-03-18 PROCEDURE — 3288F PR FALLS RISK ASSESSMENT DOCUMENTED: ICD-10-PCS | Mod: CPTII,S$GLB,, | Performed by: INTERNAL MEDICINE

## 2022-03-18 PROCEDURE — 3074F SYST BP LT 130 MM HG: CPT | Mod: CPTII,S$GLB,, | Performed by: INTERNAL MEDICINE

## 2022-03-18 PROCEDURE — 3078F PR MOST RECENT DIASTOLIC BLOOD PRESSURE < 80 MM HG: ICD-10-PCS | Mod: CPTII,S$GLB,, | Performed by: INTERNAL MEDICINE

## 2022-03-18 PROCEDURE — 99999 PR PBB SHADOW E&M-EST. PATIENT-LVL IV: ICD-10-PCS | Mod: PBBFAC,,, | Performed by: INTERNAL MEDICINE

## 2022-03-18 PROCEDURE — 1160F RVW MEDS BY RX/DR IN RCRD: CPT | Mod: CPTII,S$GLB,, | Performed by: INTERNAL MEDICINE

## 2022-03-18 PROCEDURE — 1160F PR REVIEW ALL MEDS BY PRESCRIBER/CLIN PHARMACIST DOCUMENTED: ICD-10-PCS | Mod: CPTII,S$GLB,, | Performed by: INTERNAL MEDICINE

## 2022-03-18 PROCEDURE — 3288F FALL RISK ASSESSMENT DOCD: CPT | Mod: CPTII,S$GLB,, | Performed by: INTERNAL MEDICINE

## 2022-03-18 PROCEDURE — 1159F MED LIST DOCD IN RCRD: CPT | Mod: CPTII,S$GLB,, | Performed by: INTERNAL MEDICINE

## 2022-03-18 PROCEDURE — 99999 PR PBB SHADOW E&M-EST. PATIENT-LVL IV: CPT | Mod: PBBFAC,,, | Performed by: INTERNAL MEDICINE

## 2022-03-18 PROCEDURE — 99213 OFFICE O/P EST LOW 20 MIN: CPT | Mod: S$GLB,,, | Performed by: INTERNAL MEDICINE

## 2022-03-18 NOTE — PROGRESS NOTES
"Subjective:       Patient ID: Regan Alvarez is a 81 y.o. male.    Chief Complaint: Follow-up and Constipation    F/u chronic conditions    HPI: 82 y/o with COPD UC presents with daughter for follow up. Last month was having some constipation this has resolved. Continue siwht monthly infusions for UC no melena or BRBPR. No orthostatic symptoms. He does get winded more easily. Has been using motorized scooter for longer distances outside the home. No LE swelling no orthopnea     Review of Systems   Constitutional: Negative for activity change, appetite change, fatigue, fever and unexpected weight change.   HENT: Negative for ear pain, rhinorrhea and sore throat.    Eyes: Negative for discharge and visual disturbance.   Respiratory: Negative for chest tightness, shortness of breath and wheezing.    Cardiovascular: Negative for chest pain, palpitations and leg swelling.   Gastrointestinal: Positive for constipation. Negative for abdominal pain and diarrhea.   Endocrine: Negative for cold intolerance and heat intolerance.   Genitourinary: Negative for dysuria and hematuria.   Musculoskeletal: Negative for joint swelling and neck stiffness.   Skin: Negative for rash.   Neurological: Negative for dizziness, syncope, weakness and headaches.   Psychiatric/Behavioral: Negative for suicidal ideas.       Objective:     Vitals:    03/18/22 1507   BP: (!) 98/56   BP Location: Right arm   Patient Position: Sitting   BP Method: Medium (Manual)   Pulse: 109   Temp: 97.7 °F (36.5 °C)   TempSrc: Oral   SpO2: 95%   Weight: 85.1 kg (187 lb 9.8 oz)   Height: 6' 7" (2.007 m)          Physical Exam  Constitutional:       Appearance: He is well-developed.   HENT:      Head: Normocephalic and atraumatic.   Eyes:      Conjunctiva/sclera: Conjunctivae normal.   Cardiovascular:      Rate and Rhythm: Normal rate and regular rhythm.      Heart sounds: No murmur heard.    No friction rub. No gallop.   Pulmonary:      Effort: Pulmonary effort " is normal.      Breath sounds: Normal breath sounds. No wheezing or rales.   Abdominal:      Palpations: Abdomen is soft.      Tenderness: There is no abdominal tenderness. There is no right CVA tenderness, left CVA tenderness, guarding or rebound.   Musculoskeletal:         General: No tenderness. Normal range of motion.      Cervical back: Normal range of motion.      Right lower leg: No edema.      Left lower leg: No edema.   Skin:     General: Skin is warm and dry.   Neurological:      Mental Status: He is alert and oriented to person, place, and time.      Cranial Nerves: No cranial nerve deficit.         Assessment and Plan   1. Simple chronic bronchitis  No longer using inhalers clear exam today    2. Ulcerative colitis with complication, unspecified location  Followed by GI

## 2022-04-06 ENCOUNTER — PATIENT MESSAGE (OUTPATIENT)
Dept: ADMINISTRATIVE | Facility: HOSPITAL | Age: 82
End: 2022-04-06
Payer: MEDICARE

## 2022-04-20 NOTE — TELEPHONE ENCOUNTER
Care Due:                  Date            Visit Type   Department     Provider  --------------------------------------------------------------------------------                                [facility]                              PRIMARY      MEDICINE/  Last Visit: 03-      CARE ([facility])   INTERNAL MED   Ishaan Adamson  Next Visit: None Scheduled  None         None Found                                                            Last  Test          Frequency    Reason                     Performed    Due Date  --------------------------------------------------------------------------------    CMP.........  12 months..  atorvastatin, glipiZIDE..  07- 07-    HBA1C.......  6 months...  glipiZIDE................  07- 01-    Lipid Panel.  12 months..  atorvastatin.............  Not Found    Overdue    Powered by [employer] by [employer]. Reference number: [id number].   4/20/2022 10:14:04 AM CDT

## 2022-04-20 NOTE — TELEPHONE ENCOUNTER
Refill Routing Note   Medication(s) are not appropriate for processing by [facility] for the following reason(s):      - Indication is outside of scope for [facility]    [facility] action(s):  Defer          Medication reconciliation completed: No     Appointments  past 12m or future 3m with PCP    Date Provider   Last Visit   3/18/2022 Ishaan Adamson MD   Next Visit   Visit date not found Ishaan Adamson MD   ED visits in past 90 days: 0        Note composed:11:37 AM 04/20/2022

## 2022-04-25 NOTE — TELEPHONE ENCOUNTER
Called Maria G with [facility] back , needs clarification on the frequency patient should use the sodium chloride 3% 3 % nebulizer solution . Please advise

## 2022-04-25 NOTE — TELEPHONE ENCOUNTER
----- Message from Anastasia Farnsworth sent at 4/25/2022  3:34 PM CDT -----  Regarding: Medication  Contact: [facility]  Type:  Pharmacy Calling to Clarify an RX    Name of Caller: Maria G    Pharmacy Name:   [facility] [id number]  [address]  [place] [address]  Phone: [phone] Fax: [phone]      Prescription Name: Sodium chloride for nebulizer    What do they need to clarify? She needs to clarify medication    Can you be contacted via [facility]? No    Best Call Back Number:     Additional Information:

## 2022-04-25 NOTE — TELEPHONE ENCOUNTER
No new care gaps identified.  Powered by [employer]. Reference number: [id number].   4/25/2022 1:20:29 PM CDT

## 2022-04-25 NOTE — TELEPHONE ENCOUNTER
----- Message from Dede Fields sent at 4/25/2022  1:25 PM CDT -----  Contact: Alix - Daughter  Pt's daughter requesting call back re: rx below    sodium chlor 3% neb     Confirmed contact below:  Contact Name: Alix  Phone Number: [phone]

## 2022-05-10 NOTE — TELEPHONE ENCOUNTER
----- Message from Dede Fields sent at 5/10/2022 10:24 AM CDT -----  Contact: Alix - daughter  Pt's daughter requesting rx below be sent over again to pharmacy below b/c they are saying they didn't receive it.     sodium chloride 3% 3 % nebulizer solution    [id number]  [address] - [address]  [address]  [place] [address]  Phone: [phone] Fax: [phone]

## 2022-06-10 NOTE — TELEPHONE ENCOUNTER
----- Message from Nicole Parrish sent at 6/10/2022  1:35 PM CDT -----  Regarding: daughter  .Type: Patient Call Back    Who called: Alix - daughter     What is the request in detail: states he needs an oxygen order. Please call. States the portable oxygen is called '[employer]'     Can the clinic reply by [facility]? No     Would the patient rather a call back or a response via [facility]?  Call     Best call back number: .[phone]

## 2022-06-10 NOTE — TELEPHONE ENCOUNTER
Called patient daughter Alix  back for clarification , stated she tried to get a oxgyen tank through [employer] . Found out there is a 3 month wait , so she wants to go through [facility] . Father has issues going long distances but to help him get more circulation/excrise she feels this will be beneficial . Please advise ,once order is placed will fax to company .

## 2022-06-29 NOTE — TELEPHONE ENCOUNTER
Refill Routing Note   Medication(s) are not appropriate for processing by [facility] for the following reason(s):      - Outside of protocol    [facility] action(s):  Route          Medication reconciliation completed: No     Appointments  past 12m or future 3m with PCP    Date Provider   Last Visit   3/18/2022 Isahan Adamson MD   Next Visit   Visit date not found Ishaan Adamson MD   ED visits in past 90 days: 0        Note composed:4:58 PM 06/29/2022

## 2022-06-29 NOTE — TELEPHONE ENCOUNTER
----- Message from Nakita Grullon sent at 6/29/2022 12:03 PM CDT -----  Type: Patient Call Back       What is the request in detail:  pt calling to speak to a nurse regarding status on iron meds.      Can the clinic reply by [facility]? No       Would the patient rather a call back or a response via [facility]? Call back       Best call back number: [phone]        Thank you.

## 2022-08-05 NOTE — TELEPHONE ENCOUNTER
Refill Routing Note   Medication(s) are not appropriate for processing by [facility] for the following reason(s):      - Outside of protocol    [facility] action(s):  Route          Medication reconciliation completed: No     Appointments  past 12m or future 3m with PCP    Date Provider   Last Visit   3/18/2022 Ishaan Adamson MD   Next Visit   Visit date not found Ishaan Adamson MD   ED visits in past 90 days: 0        Note composed:8:42 AM 08/05/2022

## 2022-11-01 PROBLEM — H52.7 REFRACTIVE ERROR: Status: ACTIVE | Noted: 2022-01-01

## 2022-11-01 PROBLEM — Z96.1 PSEUDOPHAKIA: Status: ACTIVE | Noted: 2022-01-01

## 2022-11-01 NOTE — PROGRESS NOTES
Subjective:       Patient ID: Regan Alvarez is a 81 y.o. male      Chief Complaint   Patient presents with    Concerns About Ocular Health    Diabetic Eye Exam     History of Present Illness  Dls: 1 yrs     80 y/o male presents today for ocular health check.  Pt wears bifocal glasses. Pt needs a new rx for glasses.     No tearing  No itching  No burning  No pain  + ha's  No floaters  No flashes    Eye meds  Clear eyes ou prn    Hemoglobin A1C       Date                     Value               Ref Range             Status                07/06/2021               7.8 (H)             4.0 - 5.6 %           Final                  02/03/2020               7.0 (H)             4.0 - 5.6 %           Final                  01/29/2020               6.4 (H)             4.0 - 5.6 %           Final                   Assessment/Plan:     1. Type 2 diabetes mellitus without retinopathy  No diabetic retinopathy. Discussed with pt the effects of diabetes on vision, importance of good blood sugar control, compliance with meds, and follow up care with PCP. Return in 1 year for dilated eye exam, sooner PRN.      2. Pseudophakia  Well-centered. Clear.     3. Refractive error  Educated patient on refractive error and discussed lens options. Dispensed updated spectacle Rx. Educated about adaptation period to new specs.    Eyeglass Final Rx       Eyeglass Final Rx         Sphere Cylinder Axis Add    Right -0.75 +1.25 020 +2.50    Left +1.00 +1.00 005 +2.50      Expiration Date: 11/1/2023    Comments: ANTIMETROPIA                          Follow up in about 1 year (around 11/1/2023) for Diabetic Eye Exam.

## 2023-01-01 ENCOUNTER — PATIENT OUTREACH (OUTPATIENT)
Dept: ADMINISTRATIVE | Facility: CLINIC | Age: 83
End: 2023-01-01
Payer: MEDICARE

## 2023-01-01 ENCOUNTER — PES CALL (OUTPATIENT)
Dept: ADMINISTRATIVE | Facility: CLINIC | Age: 83
End: 2023-01-01
Payer: MEDICARE

## 2023-01-01 ENCOUNTER — PATIENT MESSAGE (OUTPATIENT)
Dept: ADMINISTRATIVE | Facility: HOSPITAL | Age: 83
End: 2023-01-01
Payer: MEDICARE

## 2023-01-01 ENCOUNTER — HOSPITAL ENCOUNTER (INPATIENT)
Facility: HOSPITAL | Age: 83
LOS: 6 days | Discharge: HOME HEALTH | DRG: 871 | End: 2023-03-24
Attending: EMERGENCY MEDICINE | Admitting: STUDENT IN AN ORGANIZED HEALTH CARE EDUCATION/TRAINING PROGRAM
Payer: MEDICARE

## 2023-01-01 VITALS
RESPIRATION RATE: 23 BRPM | DIASTOLIC BLOOD PRESSURE: 83 MMHG | SYSTOLIC BLOOD PRESSURE: 136 MMHG | OXYGEN SATURATION: 97 % | BODY MASS INDEX: 19.75 KG/M2 | HEART RATE: 75 BPM | WEIGHT: 170.69 LBS | TEMPERATURE: 97 F | HEIGHT: 78 IN

## 2023-01-01 DIAGNOSIS — Z51.5 PALLIATIVE CARE ENCOUNTER: ICD-10-CM

## 2023-01-01 DIAGNOSIS — Z86.19 HISTORY OF MAI INFECTION: ICD-10-CM

## 2023-01-01 DIAGNOSIS — R65.20 SEVERE SEPSIS: ICD-10-CM

## 2023-01-01 DIAGNOSIS — I50.31 ACUTE DIASTOLIC HEART FAILURE: ICD-10-CM

## 2023-01-01 DIAGNOSIS — E11.65 UNCONTROLLED TYPE 2 DIABETES MELLITUS WITH HYPERGLYCEMIA, WITHOUT LONG-TERM CURRENT USE OF INSULIN: ICD-10-CM

## 2023-01-01 DIAGNOSIS — J44.1 COPD EXACERBATION: ICD-10-CM

## 2023-01-01 DIAGNOSIS — Z71.89 ACP (ADVANCE CARE PLANNING): ICD-10-CM

## 2023-01-01 DIAGNOSIS — A31.0 PULMONARY MYCOBACTERIUM AVIUM COMPLEX (MAC) INFECTION: Chronic | ICD-10-CM

## 2023-01-01 DIAGNOSIS — D84.9 IMMUNOCOMPROMISED STATE: ICD-10-CM

## 2023-01-01 DIAGNOSIS — R06.03 ACUTE RESPIRATORY DISTRESS: ICD-10-CM

## 2023-01-01 DIAGNOSIS — R09.02 HYPOXIA: ICD-10-CM

## 2023-01-01 DIAGNOSIS — J96.01 ACUTE HYPOXEMIC RESPIRATORY FAILURE: Primary | ICD-10-CM

## 2023-01-01 DIAGNOSIS — A41.9 SEVERE SEPSIS: ICD-10-CM

## 2023-01-01 DIAGNOSIS — K51.90 ULCERATIVE COLITIS WITHOUT COMPLICATIONS, UNSPECIFIED LOCATION: ICD-10-CM

## 2023-01-01 DIAGNOSIS — J96.90 RESPIRATORY FAILURE: ICD-10-CM

## 2023-01-01 DIAGNOSIS — R79.89 ELEVATED TROPONIN: ICD-10-CM

## 2023-01-01 DIAGNOSIS — R79.89 ELEVATED BRAIN NATRIURETIC PEPTIDE (BNP) LEVEL: ICD-10-CM

## 2023-01-01 LAB
ALBUMIN SERPL BCP-MCNC: 2.3 G/DL (ref 3.5–5.2)
ALBUMIN SERPL BCP-MCNC: 2.8 G/DL (ref 3.5–5.2)
ALLENS TEST: ABNORMAL
ALLENS TEST: NORMAL
ALP SERPL-CCNC: 77 U/L (ref 55–135)
ALP SERPL-CCNC: 80 U/L (ref 55–135)
ALP SERPL-CCNC: 89 U/L (ref 55–135)
ALP SERPL-CCNC: 92 U/L (ref 55–135)
ALT SERPL W/O P-5'-P-CCNC: 10 U/L (ref 10–44)
ALT SERPL W/O P-5'-P-CCNC: 22 U/L (ref 10–44)
ALT SERPL W/O P-5'-P-CCNC: 23 U/L (ref 10–44)
ALT SERPL W/O P-5'-P-CCNC: 9 U/L (ref 10–44)
ANION GAP SERPL CALC-SCNC: 10 MMOL/L (ref 8–16)
ANION GAP SERPL CALC-SCNC: 10 MMOL/L (ref 8–16)
ANION GAP SERPL CALC-SCNC: 13 MMOL/L (ref 8–16)
ANION GAP SERPL CALC-SCNC: 8 MMOL/L (ref 8–16)
ANION GAP SERPL CALC-SCNC: 9 MMOL/L (ref 8–16)
AST SERPL-CCNC: 12 U/L (ref 10–40)
AST SERPL-CCNC: 14 U/L (ref 10–40)
AST SERPL-CCNC: 22 U/L (ref 10–40)
AST SERPL-CCNC: 28 U/L (ref 10–40)
AV INDEX (PROSTH): 0.79
AV MEAN GRADIENT: 3 MMHG
AV PEAK GRADIENT: 5 MMHG
AV VELOCITY RATIO: 0.62
BACTERIA BLD CULT: NORMAL
BACTERIA BLD CULT: NORMAL
BACTERIA SPEC AEROBE CULT: NORMAL
BASOPHILS # BLD AUTO: 0.02 K/UL (ref 0–0.2)
BASOPHILS # BLD AUTO: 0.03 K/UL (ref 0–0.2)
BASOPHILS # BLD AUTO: 0.04 K/UL (ref 0–0.2)
BASOPHILS # BLD AUTO: 0.05 K/UL (ref 0–0.2)
BASOPHILS # BLD AUTO: 0.06 K/UL (ref 0–0.2)
BASOPHILS NFR BLD: 0.1 % (ref 0–1.9)
BASOPHILS NFR BLD: 0.1 % (ref 0–1.9)
BASOPHILS NFR BLD: 0.2 % (ref 0–1.9)
BILIRUB SERPL-MCNC: 0.3 MG/DL (ref 0.1–1)
BILIRUB SERPL-MCNC: 0.3 MG/DL (ref 0.1–1)
BILIRUB SERPL-MCNC: 0.4 MG/DL (ref 0.1–1)
BILIRUB SERPL-MCNC: 0.6 MG/DL (ref 0.1–1)
BNP SERPL-MCNC: 429 PG/ML (ref 0–99)
BSA FOR ECHO PROCEDURE: 1.98 M2
BUN SERPL-MCNC: 15 MG/DL (ref 8–23)
BUN SERPL-MCNC: 18 MG/DL (ref 8–23)
BUN SERPL-MCNC: 19 MG/DL (ref 8–23)
BUN SERPL-MCNC: 22 MG/DL (ref 8–23)
BUN SERPL-MCNC: 24 MG/DL (ref 8–23)
CALCIUM SERPL-MCNC: 8.1 MG/DL (ref 8.7–10.5)
CALCIUM SERPL-MCNC: 8.2 MG/DL (ref 8.7–10.5)
CALCIUM SERPL-MCNC: 8.3 MG/DL (ref 8.7–10.5)
CALCIUM SERPL-MCNC: 8.5 MG/DL (ref 8.7–10.5)
CALCIUM SERPL-MCNC: 9.2 MG/DL (ref 8.7–10.5)
CHLORIDE SERPL-SCNC: 101 MMOL/L (ref 95–110)
CHLORIDE SERPL-SCNC: 101 MMOL/L (ref 95–110)
CHLORIDE SERPL-SCNC: 103 MMOL/L (ref 95–110)
CHLORIDE SERPL-SCNC: 105 MMOL/L (ref 95–110)
CHLORIDE SERPL-SCNC: 99 MMOL/L (ref 95–110)
CO2 SERPL-SCNC: 22 MMOL/L (ref 23–29)
CO2 SERPL-SCNC: 23 MMOL/L (ref 23–29)
CO2 SERPL-SCNC: 23 MMOL/L (ref 23–29)
CO2 SERPL-SCNC: 24 MMOL/L (ref 23–29)
CO2 SERPL-SCNC: 26 MMOL/L (ref 23–29)
CREAT SERPL-MCNC: 0.9 MG/DL (ref 0.5–1.4)
CREAT SERPL-MCNC: 0.9 MG/DL (ref 0.5–1.4)
CREAT SERPL-MCNC: 1 MG/DL (ref 0.5–1.4)
CTP QC/QA: YES
DELSYS: ABNORMAL
DELSYS: NORMAL
DIFFERENTIAL METHOD: ABNORMAL
DOP CALC AO PEAK VEL: 1.16 M/S
DOP CALC AO VTI: 23.5 CM
DOP CALC LVOT PEAK VEL: 0.72 M/S
DOP CALCLVOT PEAK VEL VTI: 18.5 CM
EJECTION FRACTION: 45 %
EOSINOPHIL # BLD AUTO: 0 K/UL (ref 0–0.5)
EOSINOPHIL NFR BLD: 0 % (ref 0–8)
EP: 6
ERYTHROCYTE [DISTWIDTH] IN BLOOD BY AUTOMATED COUNT: 13.8 % (ref 11.5–14.5)
ERYTHROCYTE [DISTWIDTH] IN BLOOD BY AUTOMATED COUNT: 13.9 % (ref 11.5–14.5)
ERYTHROCYTE [DISTWIDTH] IN BLOOD BY AUTOMATED COUNT: 14 % (ref 11.5–14.5)
ERYTHROCYTE [DISTWIDTH] IN BLOOD BY AUTOMATED COUNT: 14.2 % (ref 11.5–14.5)
ERYTHROCYTE [DISTWIDTH] IN BLOOD BY AUTOMATED COUNT: 14.3 % (ref 11.5–14.5)
ERYTHROCYTE [SEDIMENTATION RATE] IN BLOOD BY WESTERGREN METHOD: 14 MM/H
EST. GFR  (NO RACE VARIABLE): >60 ML/MIN/1.73 M^2
ESTIMATED AVG GLUCOSE: 206 MG/DL (ref 68–131)
FIO2: 100
FLOW: 8
GLUCOSE SERPL-MCNC: 187 MG/DL (ref 70–110)
GLUCOSE SERPL-MCNC: 190 MG/DL (ref 70–110)
GLUCOSE SERPL-MCNC: 243 MG/DL (ref 70–110)
GLUCOSE SERPL-MCNC: 352 MG/DL (ref 70–110)
GLUCOSE SERPL-MCNC: 383 MG/DL (ref 70–110)
GRAM STN SPEC: NORMAL
HBA1C MFR BLD: 8.8 % (ref 4–5.6)
HCO3 UR-SCNC: 28.4 MMOL/L (ref 24–28)
HCT VFR BLD AUTO: 28.6 % (ref 40–54)
HCT VFR BLD AUTO: 28.9 % (ref 40–54)
HCT VFR BLD AUTO: 29.7 % (ref 40–54)
HCT VFR BLD AUTO: 31.3 % (ref 40–54)
HCT VFR BLD AUTO: 35.7 % (ref 40–54)
HGB BLD-MCNC: 10.1 G/DL (ref 14–18)
HGB BLD-MCNC: 11.9 G/DL (ref 14–18)
HGB BLD-MCNC: 9.4 G/DL (ref 14–18)
HGB BLD-MCNC: 9.7 G/DL (ref 14–18)
HGB BLD-MCNC: 9.8 G/DL (ref 14–18)
IMM GRANULOCYTES # BLD AUTO: 0.2 K/UL (ref 0–0.04)
IMM GRANULOCYTES # BLD AUTO: 0.2 K/UL (ref 0–0.04)
IMM GRANULOCYTES # BLD AUTO: 0.4 K/UL (ref 0–0.04)
IMM GRANULOCYTES # BLD AUTO: 0.4 K/UL (ref 0–0.04)
IMM GRANULOCYTES # BLD AUTO: 0.48 K/UL (ref 0–0.04)
IMM GRANULOCYTES NFR BLD AUTO: 0.7 % (ref 0–0.5)
IMM GRANULOCYTES NFR BLD AUTO: 0.8 % (ref 0–0.5)
IMM GRANULOCYTES NFR BLD AUTO: 1.4 % (ref 0–0.5)
IMM GRANULOCYTES NFR BLD AUTO: 2 % (ref 0–0.5)
IMM GRANULOCYTES NFR BLD AUTO: 2.3 % (ref 0–0.5)
IP: 12
IVC DIAMETER: 1.51 CM
IVRT: 148.43 MSEC
LA MAJOR: 7 CM
LA MINOR: 6.05 CM
LA WIDTH: 4.5 CM
LACTATE SERPL-SCNC: 1.5 MMOL/L (ref 0.5–2.2)
LACTATE SERPL-SCNC: 1.5 MMOL/L (ref 0.5–2.2)
LDH SERPL L TO P-CCNC: 2.06 MMOL/L (ref 0.5–2.2)
LVOT MG: 1.23 MMHG
LVOT MV: 0.53 CM/S
LYMPHOCYTES # BLD AUTO: 0.5 K/UL (ref 1–4.8)
LYMPHOCYTES # BLD AUTO: 0.8 K/UL (ref 1–4.8)
LYMPHOCYTES # BLD AUTO: 1.3 K/UL (ref 1–4.8)
LYMPHOCYTES # BLD AUTO: 1.6 K/UL (ref 1–4.8)
LYMPHOCYTES # BLD AUTO: 2.5 K/UL (ref 1–4.8)
LYMPHOCYTES NFR BLD: 1.8 % (ref 18–48)
LYMPHOCYTES NFR BLD: 2.8 % (ref 18–48)
LYMPHOCYTES NFR BLD: 6.2 % (ref 18–48)
LYMPHOCYTES NFR BLD: 7.5 % (ref 18–48)
LYMPHOCYTES NFR BLD: 8.9 % (ref 18–48)
MAGNESIUM SERPL-MCNC: 1.3 MG/DL (ref 1.6–2.6)
MAGNESIUM SERPL-MCNC: 1.4 MG/DL (ref 1.6–2.6)
MAGNESIUM SERPL-MCNC: 2 MG/DL (ref 1.6–2.6)
MAGNESIUM SERPL-MCNC: 2 MG/DL (ref 1.6–2.6)
MAGNESIUM SERPL-MCNC: 2.2 MG/DL (ref 1.6–2.6)
MCH RBC QN AUTO: 30 PG (ref 27–31)
MCH RBC QN AUTO: 30.2 PG (ref 27–31)
MCH RBC QN AUTO: 30.5 PG (ref 27–31)
MCH RBC QN AUTO: 31.1 PG (ref 27–31)
MCH RBC QN AUTO: 31.2 PG (ref 27–31)
MCHC RBC AUTO-ENTMCNC: 32.3 G/DL (ref 32–36)
MCHC RBC AUTO-ENTMCNC: 32.7 G/DL (ref 32–36)
MCHC RBC AUTO-ENTMCNC: 32.9 G/DL (ref 32–36)
MCHC RBC AUTO-ENTMCNC: 33.3 G/DL (ref 32–36)
MCHC RBC AUTO-ENTMCNC: 33.9 G/DL (ref 32–36)
MCV RBC AUTO: 92 FL (ref 82–98)
MCV RBC AUTO: 93 FL (ref 82–98)
MODE: ABNORMAL
MODE: NORMAL
MONOCYTES # BLD AUTO: 0.8 K/UL (ref 0.3–1)
MONOCYTES # BLD AUTO: 1.3 K/UL (ref 0.3–1)
MONOCYTES # BLD AUTO: 1.4 K/UL (ref 0.3–1)
MONOCYTES # BLD AUTO: 1.7 K/UL (ref 0.3–1)
MONOCYTES # BLD AUTO: 2.7 K/UL (ref 0.3–1)
MONOCYTES NFR BLD: 3.2 % (ref 4–15)
MONOCYTES NFR BLD: 4.4 % (ref 4–15)
MONOCYTES NFR BLD: 6.7 % (ref 4–15)
MONOCYTES NFR BLD: 8 % (ref 4–15)
MONOCYTES NFR BLD: 9.7 % (ref 4–15)
MV PEAK E VEL: 1.17 M/S
NEUTROPHILS # BLD AUTO: 17.3 K/UL (ref 1.8–7.7)
NEUTROPHILS # BLD AUTO: 17.5 K/UL (ref 1.8–7.7)
NEUTROPHILS # BLD AUTO: 22.1 K/UL (ref 1.8–7.7)
NEUTROPHILS # BLD AUTO: 24.7 K/UL (ref 1.8–7.7)
NEUTROPHILS # BLD AUTO: 26.6 K/UL (ref 1.8–7.7)
NEUTROPHILS NFR BLD: 80.5 % (ref 38–73)
NEUTROPHILS NFR BLD: 82 % (ref 38–73)
NEUTROPHILS NFR BLD: 85 % (ref 38–73)
NEUTROPHILS NFR BLD: 91.3 % (ref 38–73)
NEUTROPHILS NFR BLD: 94 % (ref 38–73)
NRBC BLD-RTO: 0 /100 WBC
PCO2 BLDA: 47.5 MMHG (ref 35–45)
PH SMN: 7.38 [PH] (ref 7.35–7.45)
PHOSPHATE SERPL-MCNC: 1.6 MG/DL (ref 2.7–4.5)
PHOSPHATE SERPL-MCNC: 1.8 MG/DL (ref 2.7–4.5)
PISA TR MAX VEL: 1.09 M/S
PLATELET # BLD AUTO: 318 K/UL (ref 150–450)
PLATELET # BLD AUTO: 326 K/UL (ref 150–450)
PLATELET # BLD AUTO: 346 K/UL (ref 150–450)
PLATELET # BLD AUTO: 353 K/UL (ref 150–450)
PLATELET # BLD AUTO: 360 K/UL (ref 150–450)
PMV BLD AUTO: 10.3 FL (ref 9.2–12.9)
PMV BLD AUTO: 10.3 FL (ref 9.2–12.9)
PMV BLD AUTO: 10.6 FL (ref 9.2–12.9)
PMV BLD AUTO: 10.7 FL (ref 9.2–12.9)
PMV BLD AUTO: 10.8 FL (ref 9.2–12.9)
PO2 BLDA: 16 MMHG (ref 40–60)
POC BE: 3 MMOL/L
POC SATURATED O2: 21 % (ref 95–100)
POC TCO2: 30 MMOL/L (ref 24–29)
POCT GLUCOSE: 136 MG/DL (ref 70–110)
POCT GLUCOSE: 143 MG/DL (ref 70–110)
POCT GLUCOSE: 166 MG/DL (ref 70–110)
POCT GLUCOSE: 186 MG/DL (ref 70–110)
POCT GLUCOSE: 191 MG/DL (ref 70–110)
POCT GLUCOSE: 196 MG/DL (ref 70–110)
POCT GLUCOSE: 227 MG/DL (ref 70–110)
POCT GLUCOSE: 239 MG/DL (ref 70–110)
POCT GLUCOSE: 274 MG/DL (ref 70–110)
POCT GLUCOSE: 302 MG/DL (ref 70–110)
POCT GLUCOSE: 302 MG/DL (ref 70–110)
POCT GLUCOSE: 335 MG/DL (ref 70–110)
POCT GLUCOSE: 370 MG/DL (ref 70–110)
POCT GLUCOSE: 386 MG/DL (ref 70–110)
POCT GLUCOSE: 427 MG/DL (ref 70–110)
POCT GLUCOSE: 434 MG/DL (ref 70–110)
POCT GLUCOSE: 445 MG/DL (ref 70–110)
POCT GLUCOSE: 487 MG/DL (ref 70–110)
POCT GLUCOSE: 62 MG/DL (ref 70–110)
POCT GLUCOSE: 76 MG/DL (ref 70–110)
POCT GLUCOSE: 80 MG/DL (ref 70–110)
POTASSIUM SERPL-SCNC: 3.5 MMOL/L (ref 3.5–5.1)
POTASSIUM SERPL-SCNC: 3.8 MMOL/L (ref 3.5–5.1)
POTASSIUM SERPL-SCNC: 3.9 MMOL/L (ref 3.5–5.1)
POTASSIUM SERPL-SCNC: 3.9 MMOL/L (ref 3.5–5.1)
POTASSIUM SERPL-SCNC: 4.1 MMOL/L (ref 3.5–5.1)
PROT SERPL-MCNC: 6.3 G/DL (ref 6–8.4)
PROT SERPL-MCNC: 6.5 G/DL (ref 6–8.4)
PROT SERPL-MCNC: 6.5 G/DL (ref 6–8.4)
PROT SERPL-MCNC: 7.9 G/DL (ref 6–8.4)
PV PEAK VELOCITY: 0.68 CM/S
RA MAJOR: 6.54 CM
RA PRESSURE: 3 MMHG
RA WIDTH: 4.5 CM
RBC # BLD AUTO: 3.08 M/UL (ref 4.6–6.2)
RBC # BLD AUTO: 3.14 M/UL (ref 4.6–6.2)
RBC # BLD AUTO: 3.21 M/UL (ref 4.6–6.2)
RBC # BLD AUTO: 3.37 M/UL (ref 4.6–6.2)
RBC # BLD AUTO: 3.83 M/UL (ref 4.6–6.2)
RIGHT VENTRICULAR END-DIASTOLIC DIMENSION: 4.27 CM
SAMPLE: ABNORMAL
SAMPLE: NORMAL
SARS-COV-2 RDRP RESP QL NAA+PROBE: NEGATIVE
SITE: ABNORMAL
SITE: NORMAL
SODIUM SERPL-SCNC: 132 MMOL/L (ref 136–145)
SODIUM SERPL-SCNC: 133 MMOL/L (ref 136–145)
SODIUM SERPL-SCNC: 136 MMOL/L (ref 136–145)
SODIUM SERPL-SCNC: 137 MMOL/L (ref 136–145)
SODIUM SERPL-SCNC: 139 MMOL/L (ref 136–145)
TR MAX PG: 5 MMHG
TROPONIN I SERPL DL<=0.01 NG/ML-MCNC: 0.03 NG/ML (ref 0–0.03)
TV REST PULMONARY ARTERY PRESSURE: 8 MMHG
VANCOMYCIN TROUGH SERPL-MCNC: 11 UG/ML (ref 10–22)
WBC # BLD AUTO: 20.33 K/UL (ref 3.9–12.7)
WBC # BLD AUTO: 21.3 K/UL (ref 3.9–12.7)
WBC # BLD AUTO: 26.24 K/UL (ref 3.9–12.7)
WBC # BLD AUTO: 27.43 K/UL (ref 3.9–12.7)
WBC # BLD AUTO: 29.15 K/UL (ref 3.9–12.7)

## 2023-01-01 PROCEDURE — 36415 COLL VENOUS BLD VENIPUNCTURE: CPT | Performed by: STUDENT IN AN ORGANIZED HEALTH CARE EDUCATION/TRAINING PROGRAM

## 2023-01-01 PROCEDURE — 99223 1ST HOSP IP/OBS HIGH 75: CPT | Mod: ,,, | Performed by: REGISTERED NURSE

## 2023-01-01 PROCEDURE — 83605 ASSAY OF LACTIC ACID: CPT | Mod: 91 | Performed by: EMERGENCY MEDICINE

## 2023-01-01 PROCEDURE — 93005 ELECTROCARDIOGRAM TRACING: CPT

## 2023-01-01 PROCEDURE — 99497 ADVNCD CARE PLAN 30 MIN: CPT | Mod: ,,, | Performed by: REGISTERED NURSE

## 2023-01-01 PROCEDURE — 25000003 PHARM REV CODE 250: Performed by: STUDENT IN AN ORGANIZED HEALTH CARE EDUCATION/TRAINING PROGRAM

## 2023-01-01 PROCEDURE — 27000646 HC AEROBIKA DEVICE

## 2023-01-01 PROCEDURE — 99900035 HC TECH TIME PER 15 MIN (STAT)

## 2023-01-01 PROCEDURE — 36415 COLL VENOUS BLD VENIPUNCTURE: CPT | Performed by: HOSPITALIST

## 2023-01-01 PROCEDURE — 94761 N-INVAS EAR/PLS OXIMETRY MLT: CPT

## 2023-01-01 PROCEDURE — 99223 1ST HOSP IP/OBS HIGH 75: CPT | Mod: ,,, | Performed by: INTERNAL MEDICINE

## 2023-01-01 PROCEDURE — 87116 MYCOBACTERIA CULTURE: CPT | Performed by: INTERNAL MEDICINE

## 2023-01-01 PROCEDURE — 93010 ELECTROCARDIOGRAM REPORT: CPT | Mod: ,,, | Performed by: INTERNAL MEDICINE

## 2023-01-01 PROCEDURE — 63600175 PHARM REV CODE 636 W HCPCS: Performed by: STUDENT IN AN ORGANIZED HEALTH CARE EDUCATION/TRAINING PROGRAM

## 2023-01-01 PROCEDURE — 27000221 HC OXYGEN, UP TO 24 HOURS

## 2023-01-01 PROCEDURE — 99497 ADVNCD CARE PLAN 30 MIN: CPT | Mod: 25,,, | Performed by: REGISTERED NURSE

## 2023-01-01 PROCEDURE — 94640 AIRWAY INHALATION TREATMENT: CPT

## 2023-01-01 PROCEDURE — 63600175 PHARM REV CODE 636 W HCPCS: Performed by: EMERGENCY MEDICINE

## 2023-01-01 PROCEDURE — 93010 EKG 12-LEAD: ICD-10-PCS | Mod: ,,, | Performed by: INTERNAL MEDICINE

## 2023-01-01 PROCEDURE — 87070 CULTURE OTHR SPECIMN AEROBIC: CPT | Performed by: HOSPITALIST

## 2023-01-01 PROCEDURE — 25000242 PHARM REV CODE 250 ALT 637 W/ HCPCS: Performed by: INTERNAL MEDICINE

## 2023-01-01 PROCEDURE — 99232 PR SUBSEQUENT HOSPITAL CARE,LEVL II: ICD-10-PCS | Mod: ,,, | Performed by: REGISTERED NURSE

## 2023-01-01 PROCEDURE — 99285 EMERGENCY DEPT VISIT HI MDM: CPT | Mod: 25

## 2023-01-01 PROCEDURE — 25000242 PHARM REV CODE 250 ALT 637 W/ HCPCS: Performed by: HOSPITALIST

## 2023-01-01 PROCEDURE — 83735 ASSAY OF MAGNESIUM: CPT | Performed by: EMERGENCY MEDICINE

## 2023-01-01 PROCEDURE — 63600175 PHARM REV CODE 636 W HCPCS: Mod: TB,JG | Performed by: HOSPITALIST

## 2023-01-01 PROCEDURE — 99498 PR ADVNCD CARE PLAN ADDL 30 MIN: ICD-10-PCS | Mod: ,,, | Performed by: REGISTERED NURSE

## 2023-01-01 PROCEDURE — 85025 COMPLETE CBC W/AUTO DIFF WBC: CPT | Performed by: HOSPITALIST

## 2023-01-01 PROCEDURE — 25000003 PHARM REV CODE 250: Performed by: REGISTERED NURSE

## 2023-01-01 PROCEDURE — 25500020 PHARM REV CODE 255: Performed by: HOSPITALIST

## 2023-01-01 PROCEDURE — 99233 SBSQ HOSP IP/OBS HIGH 50: CPT | Mod: ,,, | Performed by: INTERNAL MEDICINE

## 2023-01-01 PROCEDURE — 99497 PR ADVNCD CARE PLAN 30 MIN: ICD-10-PCS | Mod: ,,, | Performed by: REGISTERED NURSE

## 2023-01-01 PROCEDURE — 83036 HEMOGLOBIN GLYCOSYLATED A1C: CPT | Performed by: HOSPITALIST

## 2023-01-01 PROCEDURE — 83735 ASSAY OF MAGNESIUM: CPT | Performed by: STUDENT IN AN ORGANIZED HEALTH CARE EDUCATION/TRAINING PROGRAM

## 2023-01-01 PROCEDURE — 96374 THER/PROPH/DIAG INJ IV PUSH: CPT

## 2023-01-01 PROCEDURE — 87015 SPECIMEN INFECT AGNT CONCNTJ: CPT | Performed by: INTERNAL MEDICINE

## 2023-01-01 PROCEDURE — 80053 COMPREHEN METABOLIC PANEL: CPT | Performed by: HOSPITALIST

## 2023-01-01 PROCEDURE — 94760 N-INVAS EAR/PLS OXIMETRY 1: CPT

## 2023-01-01 PROCEDURE — 80048 BASIC METABOLIC PNL TOTAL CA: CPT | Performed by: HOSPITALIST

## 2023-01-01 PROCEDURE — 25000003 PHARM REV CODE 250: Performed by: INTERNAL MEDICINE

## 2023-01-01 PROCEDURE — 82803 BLOOD GASES ANY COMBINATION: CPT

## 2023-01-01 PROCEDURE — 12000002 HC ACUTE/MED SURGE SEMI-PRIVATE ROOM

## 2023-01-01 PROCEDURE — 99232 SBSQ HOSP IP/OBS MODERATE 35: CPT | Mod: ,,, | Performed by: REGISTERED NURSE

## 2023-01-01 PROCEDURE — 27000190 HC CPAP FULL FACE MASK W/VALVE

## 2023-01-01 PROCEDURE — 94664 DEMO&/EVAL PT USE INHALER: CPT

## 2023-01-01 PROCEDURE — 83605 ASSAY OF LACTIC ACID: CPT

## 2023-01-01 PROCEDURE — 99233 PR SUBSEQUENT HOSPITAL CARE,LEVL III: ICD-10-PCS | Mod: ,,, | Performed by: INTERNAL MEDICINE

## 2023-01-01 PROCEDURE — 99223 PR INITIAL HOSPITAL CARE,LEVL III: ICD-10-PCS | Mod: ,,, | Performed by: REGISTERED NURSE

## 2023-01-01 PROCEDURE — 99498 ADVNCD CARE PLAN ADDL 30 MIN: CPT | Mod: ,,, | Performed by: REGISTERED NURSE

## 2023-01-01 PROCEDURE — 94660 CPAP INITIATION&MGMT: CPT

## 2023-01-01 PROCEDURE — 87205 SMEAR GRAM STAIN: CPT | Performed by: HOSPITALIST

## 2023-01-01 PROCEDURE — 63600175 PHARM REV CODE 636 W HCPCS: Performed by: HOSPITALIST

## 2023-01-01 PROCEDURE — 97110 THERAPEUTIC EXERCISES: CPT

## 2023-01-01 PROCEDURE — 87040 BLOOD CULTURE FOR BACTERIA: CPT | Mod: 59 | Performed by: EMERGENCY MEDICINE

## 2023-01-01 PROCEDURE — 85025 COMPLETE CBC W/AUTO DIFF WBC: CPT | Performed by: EMERGENCY MEDICINE

## 2023-01-01 PROCEDURE — 20000000 HC ICU ROOM

## 2023-01-01 PROCEDURE — 83880 ASSAY OF NATRIURETIC PEPTIDE: CPT | Performed by: EMERGENCY MEDICINE

## 2023-01-01 PROCEDURE — 99223 PR INITIAL HOSPITAL CARE,LEVL III: ICD-10-PCS | Mod: ,,, | Performed by: INTERNAL MEDICINE

## 2023-01-01 PROCEDURE — 21400001 HC TELEMETRY ROOM

## 2023-01-01 PROCEDURE — 80053 COMPREHEN METABOLIC PANEL: CPT | Performed by: EMERGENCY MEDICINE

## 2023-01-01 PROCEDURE — 84100 ASSAY OF PHOSPHORUS: CPT | Performed by: EMERGENCY MEDICINE

## 2023-01-01 PROCEDURE — 25000242 PHARM REV CODE 250 ALT 637 W/ HCPCS: Performed by: EMERGENCY MEDICINE

## 2023-01-01 PROCEDURE — 25000003 PHARM REV CODE 250: Performed by: HOSPITALIST

## 2023-01-01 PROCEDURE — 83605 ASSAY OF LACTIC ACID: CPT | Performed by: STUDENT IN AN ORGANIZED HEALTH CARE EDUCATION/TRAINING PROGRAM

## 2023-01-01 PROCEDURE — 97161 PT EVAL LOW COMPLEX 20 MIN: CPT

## 2023-01-01 PROCEDURE — 80202 ASSAY OF VANCOMYCIN: CPT | Performed by: HOSPITALIST

## 2023-01-01 PROCEDURE — 84484 ASSAY OF TROPONIN QUANT: CPT | Performed by: EMERGENCY MEDICINE

## 2023-01-01 PROCEDURE — 87206 SMEAR FLUORESCENT/ACID STAI: CPT | Performed by: INTERNAL MEDICINE

## 2023-01-01 PROCEDURE — 94644 CONT INHLJ TX 1ST HOUR: CPT

## 2023-01-01 PROCEDURE — 63600175 PHARM REV CODE 636 W HCPCS: Mod: TB,JG | Performed by: STUDENT IN AN ORGANIZED HEALTH CARE EDUCATION/TRAINING PROGRAM

## 2023-01-01 PROCEDURE — 99233 PR SUBSEQUENT HOSPITAL CARE,LEVL III: ICD-10-PCS | Mod: ,,, | Performed by: REGISTERED NURSE

## 2023-01-01 PROCEDURE — 99233 SBSQ HOSP IP/OBS HIGH 50: CPT | Mod: ,,, | Performed by: REGISTERED NURSE

## 2023-01-01 PROCEDURE — 99497 PR ADVNCD CARE PLAN 30 MIN: ICD-10-PCS | Mod: 25,,, | Performed by: REGISTERED NURSE

## 2023-01-01 PROCEDURE — 84100 ASSAY OF PHOSPHORUS: CPT | Performed by: HOSPITALIST

## 2023-01-01 PROCEDURE — 63600175 PHARM REV CODE 636 W HCPCS

## 2023-01-01 RX ORDER — DIPHENHYDRAMINE HCL 25 MG
25 CAPSULE ORAL ONCE
Status: COMPLETED | OUTPATIENT
Start: 2023-01-01 | End: 2023-01-01

## 2023-01-01 RX ORDER — DEXTROSE 40 %
30 GEL (GRAM) ORAL
Status: DISCONTINUED | OUTPATIENT
Start: 2023-01-01 | End: 2023-01-01 | Stop reason: HOSPADM

## 2023-01-01 RX ORDER — MORPHINE SULFATE 4 MG/ML
1 INJECTION, SOLUTION INTRAMUSCULAR; INTRAVENOUS EVERY 4 HOURS PRN
Status: DISCONTINUED | OUTPATIENT
Start: 2023-01-01 | End: 2023-01-01 | Stop reason: HOSPADM

## 2023-01-01 RX ORDER — POTASSIUM CHLORIDE 7.45 MG/ML
40 INJECTION INTRAVENOUS
Status: DISCONTINUED | OUTPATIENT
Start: 2023-01-01 | End: 2023-01-01

## 2023-01-01 RX ORDER — MORPHINE SULFATE 4 MG/ML
0.5 INJECTION, SOLUTION INTRAMUSCULAR; INTRAVENOUS ONCE AS NEEDED
Status: COMPLETED | OUTPATIENT
Start: 2023-01-01 | End: 2023-01-01

## 2023-01-01 RX ORDER — SODIUM,POTASSIUM PHOSPHATES 280-250MG
2 POWDER IN PACKET (EA) ORAL
Status: DISCONTINUED | OUTPATIENT
Start: 2023-01-01 | End: 2023-01-01 | Stop reason: HOSPADM

## 2023-01-01 RX ORDER — MAGNESIUM SULFATE HEPTAHYDRATE 40 MG/ML
2 INJECTION, SOLUTION INTRAVENOUS
Status: DISCONTINUED | OUTPATIENT
Start: 2023-01-01 | End: 2023-01-01

## 2023-01-01 RX ORDER — GLIPIZIDE 5 MG/1
TABLET ORAL
Qty: 90 TABLET | Refills: 0 | Status: SHIPPED | OUTPATIENT
Start: 2023-01-01

## 2023-01-01 RX ORDER — GLUCAGON 1 MG
1 KIT INJECTION
Status: DISCONTINUED | OUTPATIENT
Start: 2023-01-01 | End: 2023-01-01 | Stop reason: HOSPADM

## 2023-01-01 RX ORDER — CEFEPIME HYDROCHLORIDE 1 G/50ML
2 INJECTION, SOLUTION INTRAVENOUS
Status: DISCONTINUED | OUTPATIENT
Start: 2023-01-01 | End: 2023-01-01 | Stop reason: HOSPADM

## 2023-01-01 RX ORDER — HYDROCODONE BITARTRATE AND ACETAMINOPHEN 5; 325 MG/1; MG/1
1 TABLET ORAL EVERY 6 HOURS PRN
Status: DISCONTINUED | OUTPATIENT
Start: 2023-01-01 | End: 2023-01-01 | Stop reason: HOSPADM

## 2023-01-01 RX ORDER — INSULIN ASPART 100 [IU]/ML
8 INJECTION, SOLUTION INTRAVENOUS; SUBCUTANEOUS
Status: DISCONTINUED | OUTPATIENT
Start: 2023-01-01 | End: 2023-01-01

## 2023-01-01 RX ORDER — HEPARIN SODIUM 5000 [USP'U]/ML
5000 INJECTION, SOLUTION INTRAVENOUS; SUBCUTANEOUS EVERY 8 HOURS
Status: DISCONTINUED | OUTPATIENT
Start: 2023-01-01 | End: 2023-01-01

## 2023-01-01 RX ORDER — MAGNESIUM SULFATE HEPTAHYDRATE 40 MG/ML
4 INJECTION, SOLUTION INTRAVENOUS
Status: DISCONTINUED | OUTPATIENT
Start: 2023-01-01 | End: 2023-01-01

## 2023-01-01 RX ORDER — ATORVASTATIN CALCIUM 40 MG/1
40 TABLET, FILM COATED ORAL DAILY
Status: DISCONTINUED | OUTPATIENT
Start: 2023-01-01 | End: 2023-01-01 | Stop reason: HOSPADM

## 2023-01-01 RX ORDER — ENOXAPARIN SODIUM 100 MG/ML
40 INJECTION SUBCUTANEOUS EVERY 24 HOURS
Status: DISCONTINUED | OUTPATIENT
Start: 2023-01-01 | End: 2023-01-01 | Stop reason: HOSPADM

## 2023-01-01 RX ORDER — LANOLIN ALCOHOL/MO/W.PET/CERES
800 CREAM (GRAM) TOPICAL
Status: DISCONTINUED | OUTPATIENT
Start: 2023-01-01 | End: 2023-01-01 | Stop reason: HOSPADM

## 2023-01-01 RX ORDER — METHYLPREDNISOLONE SOD SUCC 125 MG
125 VIAL (EA) INJECTION
Status: COMPLETED | OUTPATIENT
Start: 2023-01-01 | End: 2023-01-01

## 2023-01-01 RX ORDER — ACETAMINOPHEN 325 MG/1
650 TABLET ORAL EVERY 6 HOURS PRN
Status: DISCONTINUED | OUTPATIENT
Start: 2023-01-01 | End: 2023-01-01 | Stop reason: HOSPADM

## 2023-01-01 RX ORDER — LORAZEPAM 2 MG/ML
0.5 INJECTION INTRAMUSCULAR ONCE
Status: COMPLETED | OUTPATIENT
Start: 2023-01-01 | End: 2023-01-01

## 2023-01-01 RX ORDER — DEXTROSE 40 %
15 GEL (GRAM) ORAL
Status: DISCONTINUED | OUTPATIENT
Start: 2023-01-01 | End: 2023-01-01 | Stop reason: HOSPADM

## 2023-01-01 RX ORDER — INSULIN ASPART 100 [IU]/ML
5 INJECTION, SOLUTION INTRAVENOUS; SUBCUTANEOUS
Status: DISCONTINUED | OUTPATIENT
Start: 2023-01-01 | End: 2023-01-01

## 2023-01-01 RX ORDER — SODIUM CHLORIDE FOR INHALATION 3 %
4 VIAL, NEBULIZER (ML) INHALATION EVERY 8 HOURS
Status: DISCONTINUED | OUTPATIENT
Start: 2023-01-01 | End: 2023-01-01 | Stop reason: HOSPADM

## 2023-01-01 RX ORDER — MORPHINE SULFATE 1 MG/ML
1 INJECTION, SOLUTION EPIDURAL; INTRATHECAL; INTRAVENOUS ONCE
Status: DISCONTINUED | OUTPATIENT
Start: 2023-01-01 | End: 2023-01-01 | Stop reason: CLARIF

## 2023-01-01 RX ORDER — ALBUTEROL SULFATE 0.63 MG/3ML
1.26 SOLUTION RESPIRATORY (INHALATION) EVERY 6 HOURS
Qty: 1080 ML | Refills: 3 | Status: SHIPPED | OUTPATIENT
Start: 2023-01-01 | End: 2023-06-21

## 2023-01-01 RX ORDER — CALCIUM GLUCONATE 20 MG/ML
3 INJECTION, SOLUTION INTRAVENOUS
Status: DISCONTINUED | OUTPATIENT
Start: 2023-01-01 | End: 2023-01-01

## 2023-01-01 RX ORDER — SODIUM CHLORIDE FOR INHALATION 3 %
4 VIAL, NEBULIZER (ML) INHALATION DAILY
Status: DISCONTINUED | OUTPATIENT
Start: 2023-01-01 | End: 2023-01-01

## 2023-01-01 RX ORDER — CALCIUM GLUCONATE 20 MG/ML
2 INJECTION, SOLUTION INTRAVENOUS
Status: DISCONTINUED | OUTPATIENT
Start: 2023-01-01 | End: 2023-01-01

## 2023-01-01 RX ORDER — PREDNISONE 20 MG/1
40 TABLET ORAL DAILY
Status: COMPLETED | OUTPATIENT
Start: 2023-01-01 | End: 2023-01-01

## 2023-01-01 RX ORDER — POTASSIUM CHLORIDE 7.45 MG/ML
60 INJECTION INTRAVENOUS
Status: DISCONTINUED | OUTPATIENT
Start: 2023-01-01 | End: 2023-01-01

## 2023-01-01 RX ORDER — CALCIUM GLUCONATE 20 MG/ML
1 INJECTION, SOLUTION INTRAVENOUS
Status: DISCONTINUED | OUTPATIENT
Start: 2023-01-01 | End: 2023-01-01

## 2023-01-01 RX ORDER — ALBUTEROL SULFATE 2.5 MG/.5ML
2.5 SOLUTION RESPIRATORY (INHALATION) EVERY 4 HOURS PRN
Status: DISCONTINUED | OUTPATIENT
Start: 2023-01-01 | End: 2023-01-01

## 2023-01-01 RX ORDER — PANTOPRAZOLE SODIUM 40 MG/1
40 TABLET, DELAYED RELEASE ORAL DAILY
Status: DISCONTINUED | OUTPATIENT
Start: 2023-01-01 | End: 2023-01-01 | Stop reason: HOSPADM

## 2023-01-01 RX ORDER — FUROSEMIDE 10 MG/ML
40 INJECTION INTRAMUSCULAR; INTRAVENOUS ONCE
Status: COMPLETED | OUTPATIENT
Start: 2023-01-01 | End: 2023-01-01

## 2023-01-01 RX ORDER — POTASSIUM CHLORIDE 7.45 MG/ML
80 INJECTION INTRAVENOUS
Status: DISCONTINUED | OUTPATIENT
Start: 2023-01-01 | End: 2023-01-01

## 2023-01-01 RX ORDER — INSULIN ASPART 100 [IU]/ML
0-5 INJECTION, SOLUTION INTRAVENOUS; SUBCUTANEOUS
Status: DISCONTINUED | OUTPATIENT
Start: 2023-01-01 | End: 2023-01-01 | Stop reason: HOSPADM

## 2023-01-01 RX ORDER — ALBUTEROL SULFATE 2.5 MG/.5ML
15 SOLUTION RESPIRATORY (INHALATION)
Status: COMPLETED | OUTPATIENT
Start: 2023-01-01 | End: 2023-01-01

## 2023-01-01 RX ORDER — TALC
6 POWDER (GRAM) TOPICAL NIGHTLY PRN
Status: DISCONTINUED | OUTPATIENT
Start: 2023-01-01 | End: 2023-01-01 | Stop reason: HOSPADM

## 2023-01-01 RX ORDER — TRAZODONE HYDROCHLORIDE 50 MG/1
50 TABLET ORAL NIGHTLY
Status: DISCONTINUED | OUTPATIENT
Start: 2023-01-01 | End: 2023-01-01 | Stop reason: HOSPADM

## 2023-01-01 RX ORDER — IPRATROPIUM BROMIDE 0.5 MG/2.5ML
0.5 SOLUTION RESPIRATORY (INHALATION) EVERY 6 HOURS
Status: DISCONTINUED | OUTPATIENT
Start: 2023-01-01 | End: 2023-01-01

## 2023-01-01 RX ORDER — ISOSORBIDE MONONITRATE 30 MG/1
30 TABLET, EXTENDED RELEASE ORAL DAILY
Status: DISCONTINUED | OUTPATIENT
Start: 2023-01-01 | End: 2023-01-01 | Stop reason: HOSPADM

## 2023-01-01 RX ORDER — LORAZEPAM 0.5 MG/1
0.5 TABLET ORAL EVERY 6 HOURS PRN
Status: DISCONTINUED | OUTPATIENT
Start: 2023-01-01 | End: 2023-01-01 | Stop reason: HOSPADM

## 2023-01-01 RX ORDER — ALBUTEROL SULFATE 90 UG/1
2 AEROSOL, METERED RESPIRATORY (INHALATION) EVERY 4 HOURS PRN
Status: DISCONTINUED | OUTPATIENT
Start: 2023-01-01 | End: 2023-01-01 | Stop reason: CLARIF

## 2023-01-01 RX ORDER — INSULIN ASPART 100 [IU]/ML
10 INJECTION, SOLUTION INTRAVENOUS; SUBCUTANEOUS
Status: DISCONTINUED | OUTPATIENT
Start: 2023-01-01 | End: 2023-01-01

## 2023-01-01 RX ORDER — MORPHINE SULFATE 4 MG/ML
1 INJECTION, SOLUTION INTRAMUSCULAR; INTRAVENOUS ONCE
Status: COMPLETED | OUTPATIENT
Start: 2023-01-01 | End: 2023-01-01

## 2023-01-01 RX ORDER — MUPIROCIN 20 MG/G
OINTMENT TOPICAL 2 TIMES DAILY
Status: COMPLETED | OUTPATIENT
Start: 2023-01-01 | End: 2023-01-01

## 2023-01-01 RX ORDER — MORPHINE SULFATE 4 MG/ML
INJECTION, SOLUTION INTRAMUSCULAR; INTRAVENOUS
Status: COMPLETED
Start: 2023-01-01 | End: 2023-01-01

## 2023-01-01 RX ORDER — IPRATROPIUM BROMIDE AND ALBUTEROL SULFATE 2.5; .5 MG/3ML; MG/3ML
3 SOLUTION RESPIRATORY (INHALATION) EVERY 4 HOURS
Status: DISCONTINUED | OUTPATIENT
Start: 2023-01-01 | End: 2023-01-01 | Stop reason: HOSPADM

## 2023-01-01 RX ORDER — SODIUM CHLORIDE 0.9 % (FLUSH) 0.9 %
10 SYRINGE (ML) INJECTION
Status: DISCONTINUED | OUTPATIENT
Start: 2023-01-01 | End: 2023-01-01 | Stop reason: HOSPADM

## 2023-01-01 RX ORDER — LANOLIN ALCOHOL/MO/W.PET/CERES
1 CREAM (GRAM) TOPICAL DAILY
Status: DISCONTINUED | OUTPATIENT
Start: 2023-01-01 | End: 2023-01-01 | Stop reason: HOSPADM

## 2023-01-01 RX ORDER — METOPROLOL TARTRATE 50 MG/1
50 TABLET ORAL 2 TIMES DAILY
Status: DISCONTINUED | OUTPATIENT
Start: 2023-01-01 | End: 2023-01-01 | Stop reason: HOSPADM

## 2023-01-01 RX ADMIN — INSULIN ASPART 3 UNITS: 100 INJECTION, SOLUTION INTRAVENOUS; SUBCUTANEOUS at 09:03

## 2023-01-01 RX ADMIN — ENOXAPARIN SODIUM 40 MG: 40 INJECTION SUBCUTANEOUS at 04:03

## 2023-01-01 RX ADMIN — ENOXAPARIN SODIUM 40 MG: 40 INJECTION SUBCUTANEOUS at 05:03

## 2023-01-01 RX ADMIN — IPRATROPIUM BROMIDE AND ALBUTEROL SULFATE 3 ML: 2.5; .5 SOLUTION RESPIRATORY (INHALATION) at 12:03

## 2023-01-01 RX ADMIN — IPRATROPIUM BROMIDE AND ALBUTEROL SULFATE 3 ML: 2.5; .5 SOLUTION RESPIRATORY (INHALATION) at 11:03

## 2023-01-01 RX ADMIN — ATORVASTATIN CALCIUM 40 MG: 40 TABLET, FILM COATED ORAL at 08:03

## 2023-01-01 RX ADMIN — ISOSORBIDE MONONITRATE 30 MG: 30 TABLET, EXTENDED RELEASE ORAL at 09:03

## 2023-01-01 RX ADMIN — MUPIROCIN: 20 OINTMENT TOPICAL at 09:03

## 2023-01-01 RX ADMIN — PREDNISONE 40 MG: 20 TABLET ORAL at 09:03

## 2023-01-01 RX ADMIN — PANTOPRAZOLE SODIUM 40 MG: 40 TABLET, DELAYED RELEASE ORAL at 09:03

## 2023-01-01 RX ADMIN — IPRATROPIUM BROMIDE AND ALBUTEROL SULFATE 3 ML: 2.5; .5 SOLUTION RESPIRATORY (INHALATION) at 03:03

## 2023-01-01 RX ADMIN — PANTOPRAZOLE SODIUM 40 MG: 40 TABLET, DELAYED RELEASE ORAL at 08:03

## 2023-01-01 RX ADMIN — IOHEXOL 75 ML: 350 INJECTION, SOLUTION INTRAVENOUS at 05:03

## 2023-01-01 RX ADMIN — IPRATROPIUM BROMIDE AND ALBUTEROL SULFATE 3 ML: 2.5; .5 SOLUTION RESPIRATORY (INHALATION) at 07:03

## 2023-01-01 RX ADMIN — INSULIN ASPART 8 UNITS: 100 INJECTION, SOLUTION INTRAVENOUS; SUBCUTANEOUS at 08:03

## 2023-01-01 RX ADMIN — MORPHINE SULFATE 0.5 MG: 4 INJECTION, SOLUTION INTRAMUSCULAR; INTRAVENOUS at 09:03

## 2023-01-01 RX ADMIN — CEFEPIME HYDROCHLORIDE 2 G: 2 INJECTION, SOLUTION INTRAVENOUS at 09:03

## 2023-01-01 RX ADMIN — IPRATROPIUM BROMIDE AND ALBUTEROL SULFATE 3 ML: 2.5; .5 SOLUTION RESPIRATORY (INHALATION) at 04:03

## 2023-01-01 RX ADMIN — ACETAMINOPHEN 650 MG: 325 TABLET ORAL at 07:03

## 2023-01-01 RX ADMIN — METOPROLOL TARTRATE 50 MG: 50 TABLET, FILM COATED ORAL at 09:03

## 2023-01-01 RX ADMIN — PREDNISONE 40 MG: 20 TABLET ORAL at 08:03

## 2023-01-01 RX ADMIN — POTASSIUM CHLORIDE 10 MEQ: 7.46 INJECTION, SOLUTION INTRAVENOUS at 07:03

## 2023-01-01 RX ADMIN — MUPIROCIN: 20 OINTMENT TOPICAL at 08:03

## 2023-01-01 RX ADMIN — INSULIN ASPART 3 UNITS: 100 INJECTION, SOLUTION INTRAVENOUS; SUBCUTANEOUS at 08:03

## 2023-01-01 RX ADMIN — INSULIN ASPART 5 UNITS: 100 INJECTION, SOLUTION INTRAVENOUS; SUBCUTANEOUS at 08:03

## 2023-01-01 RX ADMIN — HYDROCODONE BITARTRATE AND ACETAMINOPHEN 1 TABLET: 5; 325 TABLET ORAL at 04:03

## 2023-01-01 RX ADMIN — ATORVASTATIN CALCIUM 40 MG: 40 TABLET, FILM COATED ORAL at 10:03

## 2023-01-01 RX ADMIN — AZITHROMYCIN MONOHYDRATE 500 MG: 500 INJECTION, POWDER, LYOPHILIZED, FOR SOLUTION INTRAVENOUS at 03:03

## 2023-01-01 RX ADMIN — VANCOMYCIN HYDROCHLORIDE 2000 MG: 500 INJECTION, POWDER, LYOPHILIZED, FOR SOLUTION INTRAVENOUS at 12:03

## 2023-01-01 RX ADMIN — INSULIN ASPART 5 UNITS: 100 INJECTION, SOLUTION INTRAVENOUS; SUBCUTANEOUS at 04:03

## 2023-01-01 RX ADMIN — INSULIN ASPART 5 UNITS: 100 INJECTION, SOLUTION INTRAVENOUS; SUBCUTANEOUS at 11:03

## 2023-01-01 RX ADMIN — INSULIN DETEMIR 10 UNITS: 100 INJECTION, SOLUTION SUBCUTANEOUS at 09:03

## 2023-01-01 RX ADMIN — FERROUS SULFATE TAB 325 MG (65 MG ELEMENTAL FE) 1 EACH: 325 (65 FE) TAB at 08:03

## 2023-01-01 RX ADMIN — INSULIN ASPART 10 UNITS: 100 INJECTION, SOLUTION INTRAVENOUS; SUBCUTANEOUS at 07:03

## 2023-01-01 RX ADMIN — AZITHROMYCIN MONOHYDRATE 500 MG: 500 INJECTION, POWDER, LYOPHILIZED, FOR SOLUTION INTRAVENOUS at 01:03

## 2023-01-01 RX ADMIN — IPRATROPIUM BROMIDE AND ALBUTEROL SULFATE 3 ML: 2.5; .5 SOLUTION RESPIRATORY (INHALATION) at 08:03

## 2023-01-01 RX ADMIN — SODIUM CHLORIDE 30 MG/ML INHALATION SOLUTION 4 ML: 30 SOLUTION INHALANT at 03:03

## 2023-01-01 RX ADMIN — SODIUM CHLORIDE 30 MG/ML INHALATION SOLUTION 4 ML: 30 SOLUTION INHALANT at 07:03

## 2023-01-01 RX ADMIN — VANCOMYCIN HYDROCHLORIDE 1500 MG: 1.5 INJECTION, POWDER, LYOPHILIZED, FOR SOLUTION INTRAVENOUS at 12:03

## 2023-01-01 RX ADMIN — POTASSIUM CHLORIDE 10 MEQ: 7.46 INJECTION, SOLUTION INTRAVENOUS at 05:03

## 2023-01-01 RX ADMIN — ATORVASTATIN CALCIUM 40 MG: 40 TABLET, FILM COATED ORAL at 09:03

## 2023-01-01 RX ADMIN — CEFEPIME HYDROCHLORIDE 2 G: 2 INJECTION, SOLUTION INTRAVENOUS at 03:03

## 2023-01-01 RX ADMIN — CEFEPIME HYDROCHLORIDE 2 G: 2 INJECTION, SOLUTION INTRAVENOUS at 12:03

## 2023-01-01 RX ADMIN — METHYLPREDNISOLONE SODIUM SUCCINATE 125 MG: 125 INJECTION, POWDER, FOR SOLUTION INTRAMUSCULAR; INTRAVENOUS at 01:03

## 2023-01-01 RX ADMIN — CEFEPIME HYDROCHLORIDE 2 G: 2 INJECTION, SOLUTION INTRAVENOUS at 05:03

## 2023-01-01 RX ADMIN — CEFEPIME HYDROCHLORIDE 2 G: 2 INJECTION, SOLUTION INTRAVENOUS at 08:03

## 2023-01-01 RX ADMIN — SODIUM CHLORIDE 30 MG/ML INHALATION SOLUTION 4 ML: 30 SOLUTION INHALANT at 11:03

## 2023-01-01 RX ADMIN — POTASSIUM CHLORIDE 10 MEQ: 7.46 INJECTION, SOLUTION INTRAVENOUS at 11:03

## 2023-01-01 RX ADMIN — LORAZEPAM 0.5 MG: 2 INJECTION INTRAMUSCULAR; INTRAVENOUS at 08:03

## 2023-01-01 RX ADMIN — ACETAMINOPHEN 650 MG: 325 TABLET ORAL at 04:03

## 2023-01-01 RX ADMIN — TRAZODONE HYDROCHLORIDE 50 MG: 50 TABLET ORAL at 09:03

## 2023-01-01 RX ADMIN — FERROUS SULFATE TAB 325 MG (65 MG ELEMENTAL FE) 1 EACH: 325 (65 FE) TAB at 09:03

## 2023-01-01 RX ADMIN — ISOSORBIDE MONONITRATE 30 MG: 30 TABLET, EXTENDED RELEASE ORAL at 08:03

## 2023-01-01 RX ADMIN — ISOSORBIDE MONONITRATE 30 MG: 30 TABLET, EXTENDED RELEASE ORAL at 10:03

## 2023-01-01 RX ADMIN — Medication 6 MG: at 08:03

## 2023-01-01 RX ADMIN — MAGNESIUM SULFATE HEPTAHYDRATE 2 G: 40 INJECTION, SOLUTION INTRAVENOUS at 12:03

## 2023-01-01 RX ADMIN — MORPHINE SULFATE 1 MG: 4 INJECTION INTRAVENOUS at 10:03

## 2023-01-01 RX ADMIN — CEFEPIME HYDROCHLORIDE 2 G: 2 INJECTION, SOLUTION INTRAVENOUS at 04:03

## 2023-01-01 RX ADMIN — IPRATROPIUM BROMIDE AND ALBUTEROL SULFATE 3 ML: 2.5; .5 SOLUTION RESPIRATORY (INHALATION) at 09:03

## 2023-01-01 RX ADMIN — FUROSEMIDE 40 MG: 10 INJECTION, SOLUTION INTRAMUSCULAR; INTRAVENOUS at 11:03

## 2023-01-01 RX ADMIN — HEPARIN SODIUM 5000 UNITS: 5000 INJECTION INTRAVENOUS; SUBCUTANEOUS at 06:03

## 2023-01-01 RX ADMIN — SODIUM CHLORIDE 30 MG/ML INHALATION SOLUTION 4 ML: 30 SOLUTION INHALANT at 09:03

## 2023-01-01 RX ADMIN — DIPHENHYDRAMINE HYDROCHLORIDE 25 MG: 25 CAPSULE ORAL at 02:03

## 2023-01-01 RX ADMIN — ALBUTEROL SULFATE 15 MG: 2.5 SOLUTION RESPIRATORY (INHALATION) at 08:03

## 2023-01-01 RX ADMIN — INSULIN ASPART 4 UNITS: 100 INJECTION, SOLUTION INTRAVENOUS; SUBCUTANEOUS at 09:03

## 2023-01-01 RX ADMIN — CEFEPIME HYDROCHLORIDE 2 G: 2 INJECTION, SOLUTION INTRAVENOUS at 11:03

## 2023-01-01 RX ADMIN — POTASSIUM CHLORIDE 10 MEQ: 7.46 INJECTION, SOLUTION INTRAVENOUS at 02:03

## 2023-01-01 RX ADMIN — FERROUS SULFATE TAB 325 MG (65 MG ELEMENTAL FE) 1 EACH: 325 (65 FE) TAB at 10:03

## 2023-01-01 RX ADMIN — METOPROLOL TARTRATE 50 MG: 50 TABLET, FILM COATED ORAL at 02:03

## 2023-01-01 RX ADMIN — PANTOPRAZOLE SODIUM 40 MG: 40 TABLET, DELAYED RELEASE ORAL at 10:03

## 2023-01-01 RX ADMIN — INSULIN ASPART 4 UNITS: 100 INJECTION, SOLUTION INTRAVENOUS; SUBCUTANEOUS at 07:03

## 2023-01-01 RX ADMIN — INSULIN ASPART 8 UNITS: 100 INJECTION, SOLUTION INTRAVENOUS; SUBCUTANEOUS at 04:03

## 2023-01-01 RX ADMIN — POTASSIUM BICARBONATE 50 MEQ: 977.5 TABLET, EFFERVESCENT ORAL at 04:03

## 2023-01-01 RX ADMIN — METOPROLOL TARTRATE 50 MG: 50 TABLET, FILM COATED ORAL at 11:03

## 2023-01-01 RX ADMIN — INSULIN ASPART 5 UNITS: 100 INJECTION, SOLUTION INTRAVENOUS; SUBCUTANEOUS at 05:03

## 2023-01-01 RX ADMIN — Medication 6 MG: at 02:03

## 2023-01-01 RX ADMIN — INSULIN ASPART 4 UNITS: 100 INJECTION, SOLUTION INTRAVENOUS; SUBCUTANEOUS at 04:03

## 2023-01-01 RX ADMIN — LIDOCAINE HYDROCHLORIDE 15 ML: 20 SOLUTION ORAL; TOPICAL at 12:03

## 2023-01-01 RX ADMIN — POTASSIUM CHLORIDE 10 MEQ: 7.46 INJECTION, SOLUTION INTRAVENOUS at 10:03

## 2023-01-01 RX ADMIN — INSULIN ASPART 10 UNITS: 100 INJECTION, SOLUTION INTRAVENOUS; SUBCUTANEOUS at 04:03

## 2023-01-01 RX ADMIN — AZITHROMYCIN MONOHYDRATE 500 MG: 500 INJECTION, POWDER, LYOPHILIZED, FOR SOLUTION INTRAVENOUS at 02:03

## 2023-01-01 RX ADMIN — INSULIN ASPART 1 UNITS: 100 INJECTION, SOLUTION INTRAVENOUS; SUBCUTANEOUS at 08:03

## 2023-01-01 RX ADMIN — MAGNESIUM SULFATE HEPTAHYDRATE 2 G: 40 INJECTION, SOLUTION INTRAVENOUS at 10:03

## 2023-01-01 RX ADMIN — INSULIN ASPART 10 UNITS: 100 INJECTION, SOLUTION INTRAVENOUS; SUBCUTANEOUS at 11:03

## 2023-01-01 RX ADMIN — CEFTRIAXONE 1 G: 1 INJECTION, SOLUTION INTRAVENOUS at 01:03

## 2023-01-01 RX ADMIN — MORPHINE SULFATE 1 MG: 4 INJECTION, SOLUTION INTRAMUSCULAR; INTRAVENOUS at 10:03

## 2023-01-01 RX ADMIN — MUPIROCIN: 20 OINTMENT TOPICAL at 10:03

## 2023-01-01 RX ADMIN — Medication 6 MG: at 10:03

## 2023-01-01 RX ADMIN — POTASSIUM CHLORIDE 10 MEQ: 7.46 INJECTION, SOLUTION INTRAVENOUS at 01:03

## 2023-02-05 NOTE — TELEPHONE ENCOUNTER
Care Due:                  Date            Visit Type   Department     Provider  --------------------------------------------------------------------------------                                [facility]                              PRIMARY      MEDICINE/  Last Visit: 03-      CARE ([facility])   INTERNAL MED   Ishaan Adamson  Next Visit: None Scheduled  None         None Found                                                            Last  Test          Frequency    Reason                     Performed    Due Date  --------------------------------------------------------------------------------    CMP.........  12 months..  atorvastatin, glipiZIDE..  07- 07-    HBA1C.......  6 months...  glipiZIDE................  07- 01-    Lipid Panel.  12 months..  atorvastatin.............  Not Found    Overdue    [employer] Embedded Care Gaps. Reference number: [id number]. 2/05/2023   11:40:34 AM CST

## 2023-02-06 NOTE — TELEPHONE ENCOUNTER
Refill Routing Note   Medication(s) are not appropriate for processing by [facility] for the following reason(s):       Requires lab    [facility] action(s):  Defer  Care gaps identified: Yes                Appointments  past 12m or future 3m with PCP    Date Provider   Last Visit   3/18/2022 Ishaan Adamson MD   Next Visit   Visit date not found Ishaan Adamson MD   ED visits in past 90 days: 0        Note composed:10:09 AM 02/06/2023

## 2023-03-18 NOTE — Clinical Note
Diagnosis: Acute respiratory distress [857485]   Admitting Provider:: GILMAR SANTOS [[id number]]   Future Attending Provider: GILMAR SANTOS [[id number]]   Reason for IP Medical Treatment  (Clinical interventions that can only be accomplished in the IP setting? ) :: COPD exacerbation with respiratory distress and hypoxia   I certify that Inpatient services for greater than or equal to 2 midnights are medically necessary:: Yes   Plans for Post-Acute care--if anticipated (pick the single best option):: A. No post acute care anticipated at this time

## 2023-03-19 PROBLEM — D84.9 IMMUNOCOMPROMISED STATE: Status: ACTIVE | Noted: 2023-01-01

## 2023-03-19 PROBLEM — R65.20 SEVERE SEPSIS: Status: ACTIVE | Noted: 2017-01-29

## 2023-03-19 PROBLEM — D64.9 SYMPTOMATIC ANEMIA: Status: RESOLVED | Noted: 2020-01-28 | Resolved: 2023-01-01

## 2023-03-19 PROBLEM — I50.31 ACUTE DIASTOLIC HEART FAILURE: Status: ACTIVE | Noted: 2017-02-21

## 2023-03-19 PROBLEM — R06.03 ACUTE RESPIRATORY DISTRESS: Status: ACTIVE | Noted: 2023-01-01

## 2023-03-19 PROBLEM — E83.39 HYPOPHOSPHATEMIA: Status: ACTIVE | Noted: 2023-01-01

## 2023-03-19 PROBLEM — E83.42 HYPOMAGNESEMIA: Status: ACTIVE | Noted: 2023-01-01

## 2023-03-19 PROBLEM — Z71.89 ACP (ADVANCE CARE PLANNING): Status: ACTIVE | Noted: 2023-01-01

## 2023-03-19 NOTE — SUBJECTIVE & OBJECTIVE
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    melanoma      forehead       Review of patient's allergies indicates:  No Known Allergies    No current facility-administered medications on file prior to encounter.     Current Outpatient Medications on File Prior to Encounter   Medication Sig    SYMBICORT 160-4.5 mcg/actuation HFAA INHALE 2 PUFFS BY MOUTH EVERY 12 HOURS INTO LUNGS    albuterol (ACCUNEB) 0.63 mg/3 mL Nebu USE 1 VIAL IN NEBULIZER EVERY 6 HOURS AS NEEDED FOR COUGH    albuterol (PROVENTIL/VENTOLIN HFA) 90 mcg/actuation inhaler INHALE 2 PUFFS BY MOUTH EVERY 6 HOURS AS NEEDED    atorvastatin (LIPITOR) 40 MG tablet Take 1 tablet by mouth once daily    BD ULTRA-FINE TOMÁS PEN NEEDLES 32 gauge x 5/32" Ndle USE QID UTD    blood sugar diagnostic (TRUE METRIX GLUCOSE TEST STRIP) Strp Test blood sugar 3 times daily    blood sugar diagnostic Strp To check BG one time daily PRN, to use with insurance preferred meter    blood-glucose meter kit To check BG one time daily PRN, to use with insurance preferred meter    glipiZIDE (GLUCOTROL) 5 MG tablet Take 1 tablet by mouth once daily    ibuprofen (ADVIL,MOTRIN) 800 MG tablet Take 1 tablet (800 mg total) by mouth every 8 (eight) hours as needed for Pain (neck pain).    IRON, FERROUS SULFATE, 325 mg (65 mg iron) Tab tablet Take 1 tablet by mouth twice daily    isosorbide mononitrate (IMDUR) 30 MG 24 hr tablet Take 1 tablet by mouth once daily    lancets 28 gauge Misc 1 lancet by " Misc.(Non-Drug; Combo Route) route 3 (three) times daily. True Metrix lancets    lancets Misc To check BG one time daily PRN, to use with insurance preferred meter    mucus clearing device (ACAPELLA, FLUTTER) by Misc.(Non-Drug; Combo Route) route 2 (two) times daily.    multivitamin capsule Take 1 capsule by mouth once daily.    PROTONIX 40 mg tablet Take by mouth once daily.    sodium chloride 3% 3 % nebulizer solution Take 4 mLs by nebulization as needed for Other (daily for sputum production).     Family History       Problem Relation (Age of Onset)    Arthritis Daughter    Cancer Father    Depression Sister    Diabetes Daughter    No Known Problems Mother, Brother, Maternal Aunt, Maternal Uncle, Paternal Aunt, Paternal Uncle, Maternal Grandmother, Maternal Grandfather, Paternal Grandmother, Paternal Grandfather, Other          Tobacco Use    Smoking status: Former     Packs/day: 1.00     Years: 55.00     Pack years: 55.00     Types: Cigarettes    Smokeless tobacco: Never    Tobacco comments:     uses cigars on ocassion   Substance and Sexual Activity    Alcohol use: Not Currently     Comment: socially    Drug use: No    Sexual activity: Not Currently     Partners: Female     Review of Systems   Constitutional:  Negative for appetite change, chills, diaphoresis and fatigue.   HENT:  Negative for congestion, sore throat and tinnitus.    Eyes:  Negative for pain, discharge and itching.   Respiratory:  Positive for cough, shortness of breath and wheezing. Negative for chest tightness.    Gastrointestinal:  Negative for abdominal distention, abdominal pain, blood in stool, nausea and vomiting.   Endocrine: Negative for cold intolerance, heat intolerance and polydipsia.   Genitourinary:  Negative for difficulty urinating, dysuria, flank pain, frequency and hematuria.   Musculoskeletal:  Negative for arthralgias and back pain.   Neurological:  Positive for weakness. Negative for dizziness, seizures, facial asymmetry,  light-headedness, numbness and headaches.   Psychiatric/Behavioral:  Negative for agitation, confusion and hallucinations.    Objective:     Vital Signs (Most Recent):  Temp: 99.9 °F (37.7 °C) (03/18/23 1956)  Pulse: 108 (03/19/23 0001)  Resp: (!) 33 (03/19/23 0001)  BP: 119/66 (03/19/23 0001)  SpO2: 98 % (03/19/23 0001)   Vital Signs (24h Range):  Temp:  [98.5 °F (36.9 °C)-99.9 °F (37.7 °C)] 99.9 °F (37.7 °C)  Pulse:  [] 108  Resp:  [30-69] 33  SpO2:  [77 %-100 %] 98 %  BP: (109-138)/(56-84) 119/66     Weight: 86.2 kg (190 lb)  Body mass index is 25.77 kg/m².    Physical Exam  Constitutional:       General: He is in acute distress.   HENT:      Head: Normocephalic and atraumatic.      Nose: Nose normal. No congestion or rhinorrhea.      Mouth/Throat:      Mouth: Mucous membranes are dry.   Eyes:      Extraocular Movements: Extraocular movements intact.      Conjunctiva/sclera: Conjunctivae normal.      Pupils: Pupils are equal, round, and reactive to light.   Cardiovascular:      Rate and Rhythm: Regular rhythm. Tachycardia present.      Pulses: Normal pulses.      Heart sounds: Normal heart sounds.   Pulmonary:      Effort: Respiratory distress (on BIBAP) present.      Breath sounds: Wheezing present.   Chest:      Chest wall: No tenderness.   Abdominal:      General: Abdomen is flat. Bowel sounds are normal. There is no distension.      Palpations: Abdomen is soft.      Tenderness: There is no abdominal tenderness. There is no right CVA tenderness, left CVA tenderness, guarding or rebound.   Musculoskeletal:         General: Normal range of motion.      Cervical back: Normal range of motion and neck supple.   Skin:     General: Skin is warm and dry.   Neurological:      General: No focal deficit present.      Mental Status: He is alert and oriented to person, place, and time. Mental status is at baseline.      Cranial Nerves: No cranial nerve deficit.      Sensory: No sensory deficit.   Psychiatric:          Mood and Affect: Mood normal.         Behavior: Behavior normal.         Thought Content: Thought content normal.         Judgment: Judgment normal.         CRANIAL NERVES     CN III, IV, VI   Pupils are equal, round, and reactive to light.     Significant Labs: All pertinent labs within the past 24 hours have been reviewed.  A1C: No results for input(s): HGBA1C in the last 4320 hours.  CBC:   Recent Labs   Lab 03/18/23 1951   WBC 27.43*   HGB 11.9*   HCT 35.7*        CMP:   Recent Labs   Lab 03/18/23 1951      K 3.5      CO2 23   *   BUN 15   CREATININE 1.0   CALCIUM 9.2   PROT 7.9   ALBUMIN 2.8*   BILITOT 0.6   ALKPHOS 89   AST 14   ALT 10   ANIONGAP 13     Lactic Acid: No results for input(s): LACTATE in the last 48 hours.  Troponin:   Recent Labs   Lab 03/18/23 1951   TROPONINI 0.033*       Significant Imaging: I have reviewed all pertinent imaging results/findings within the past 24 hours.

## 2023-03-19 NOTE — CARE UPDATE
Assumed care of Mr Regan Alvarez. He is a 82 y.o. man with COPD (FEV1/FVC 48%) no longer smoking, not currently on home O2 (used O2 PRN from his wife who is on hospice care). Does not follow with Pulmonary. 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 He is immunocompromised-- he has UC on vedolizumab with last infusion 2/10/23. He was admitted with acute hypoxic respiratory failure and severe sepsis due to suspected pneumonia. Started broad spectrum antibiotics and sputum culture is pending. CT chest pending to further define pulmonary parenchyma. TTE shows new EF 45% with inferoseptal hypokinesis, diastolic dysfunction, and normal PAP/RV function. Given IV lasix. He intermittently is requiring BiPAP for work of breathing.     Advance Care Planning     Date: 03/19/2023  Discussed mechanical ventilation and code status with Mr Alvarez with his niece and nurse at bedside. He clearly states he would not want a ventilator at any point even if he would die without it. Similarly, he would not want resuscitation after cardiac arrest. Discussed that this is a DNR code status. He and family understand and agree. DNR order placed in cart. Of note, his wife is on home hospice. Palliative care consulted. Time spent discussing ACP on 3/19/23= 16 minutes.     Updated family (niece) at bedside. Anticipate daughter will visit later today.     Critical care time spent on the evaluation and treatment of severe organ dysfunction, review of pertinent labs and imaging studies, discussions with consulting providers and discussions with patient/family: 45 minutes    Karine Ryan MD  03/19/2023  11:12 AM

## 2023-03-19 NOTE — HPI
82-year-old  male with medical history significant for type 2 diabetes mellitus,hypertension,ulcerative colitis,chronic obstructive pulmonary disease,tobacco use disorder in remission, has 55 pack year of cigarette smoking, and previous Mycobacterium avium complex infection who presented to the ED with worsening shortness of breath that did not respond to home nebs,he was said to be saturating in the 80s when EMS arrived but up to 93 at presentation to the ED,shortness of breath was associated with productive cough but no hemoptysis,he denied chest pain, no orthopnea, pedal swelling or PND.He denied any urinary symptoms,no dysuria, no frequency,no urgency or straining at micturition.No change in bowel habit,no diarrhea or constipation.

## 2023-03-19 NOTE — ED PROVIDER NOTES
Encounter Date: 3/18/2023       History     Source of history:  Patient.    History limited by the condition of the patient.      HPI:  The patient is an 82-year-old with a blow past medical history that includes COPD.  He is on continuous oxygen but does not know the dose.  He complains of worsening breathing difficulty for three days.  He has nonproductive cough and intermittent generalized chest tightness.  He has been performing nebulized breathing treatments without much relief.  He performed three breathing treatments this afternoon.  He also received two from EMS during transit.  The transporting medics report an SpO2 in the 80s on their arrival.      Review of patient's allergies indicates:  No Known Allergies  Past Medical History:   Diagnosis Date    Ambulates with cane     Arthritis     COPD (chronic obstructive pulmonary disease)     Diabetes mellitus type II     Hypertension     Mycobacterium avium complex     X's 2    Symptomatic anemia 01/28/2020    Tuberculosis     Ulcerative colitis      Past Surgical History:   Procedure Laterality Date    CATARACT EXTRACTION Bilateral     COLONOSCOPY N/A 01/22/2018    Procedure: COLONOSCOPY;  Surgeon: Roel Mendez MD;  Location: [facility];  Service: Endoscopy;  Laterality: N/A;  Pt confirmend appt.    melanoma      forehead     Family History   Problem Relation Age of Onset    No Known Problems Mother     Cancer Father         Tuberculosis.  Secondary scar was neoplastic.    Depression Sister     No Known Problems Brother     No Known Problems Maternal Aunt     No Known Problems Maternal Uncle     No Known Problems Paternal Aunt     No Known Problems Paternal Uncle     No Known Problems Maternal Grandmother     No Known Problems Maternal Grandfather     No Known Problems Paternal Grandmother     No Known Problems Paternal Grandfather     Arthritis Daughter     Diabetes Daughter     No Known Problems Other      Social History     Tobacco Use    Smoking status:  Former     Packs/day: 1.00     Years: 55.00     Pack years: 55.00     Types: Cigarettes    Smokeless tobacco: Never    Tobacco comments:     uses cigars on ocassion   Substance Use Topics    Alcohol use: Not Currently     Comment: socially    Drug use: No     Review of Systems  See HPI.  Review limited by patient's arrival condition.    Physical Exam     Initial Vitals [03/18/23 1941]   BP Pulse Resp Temp SpO2   112/84 60 (!) 32 98.5 °F (36.9 °C) (!) 93 %      MAP       --         Physical Exam    Constitutional: He appears distressed.   Cardiovascular:  Normal rate, regular rhythm and normal heart sounds.     Exam reveals no gallop and no friction rub.       No murmur heard.  Pulmonary/Chest: Accessory muscle usage present. Tachypnea noted. He is in respiratory distress. He has wheezes.   Abdominal: Abdomen is soft. Bowel sounds are normal. He exhibits no distension. There is no abdominal tenderness.     Neurological: He is alert and oriented to person, place, and time. GCS score is 15. GCS eye subscore is 4. GCS verbal subscore is 5. GCS motor subscore is 6.   Skin: Skin is warm and dry. No pallor.       ED Course   Procedures  Labs Reviewed   CBC W/ AUTO DIFFERENTIAL - Abnormal; Notable for the following components:       Result Value    WBC 27.43 (*)     RBC 3.83 (*)     Hemoglobin 11.9 (*)     Hematocrit 35.7 (*)     MCH 31.1 (*)     Immature Granulocytes 0.7 (*)     Gran # (ANC) 22.1 (*)     Immature Grans (Abs) 0.20 (*)     Mono # 2.7 (*)     Gran % 80.5 (*)     Lymph % 8.9 (*)     All other components within normal limits   COMPREHENSIVE METABOLIC PANEL - Abnormal; Notable for the following components:    Glucose 243 (*)     Albumin 2.8 (*)     All other components within normal limits   TROPONIN I - Abnormal; Notable for the following components:    Troponin I 0.033 (*)     All other components within normal limits   B-TYPE NATRIURETIC PEPTIDE - Abnormal; Notable for the following components:     (*)      All other components within normal limits   MAGNESIUM - Abnormal; Notable for the following components:    Magnesium 1.4 (*)     All other components within normal limits   PHOSPHORUS - Abnormal; Notable for the following components:    Phosphorus 1.6 (*)     All other components within normal limits   ISTAT PROCEDURE - Abnormal; Notable for the following components:    POC PCO2 47.5 (*)     POC PO2 16 (*)     POC HCO3 28.4 (*)     POC SATURATED O2 21 (*)     POC TCO2 30 (*)     All other components within normal limits   CULTURE, BLOOD   CULTURE, BLOOD   MAGNESIUM   PHOSPHORUS   LACTIC ACID, PLASMA   SARS-COV-2 RDRP GENE   ISTAT LACTATE          Imaging Results              X-Ray Chest AP Portable (Final result)  Result time 03/18/23 21:18:13      Final result by Jessica Garner MD (03/18/23 21:18:13)                   Impression:      No new superimposed areas of focal consolidation.  Relatively stable chest radiograph.      Electronically signed by: Jessica Garner  Date:    03/18/2023  Time:    21:18               Narrative:    EXAMINATION:  AP PORTABLE CHEST    CLINICAL HISTORY:  Acute respiratory distress;    TECHNIQUE:  AP portable chest radiograph was submitted.    COMPARISON:  01/28/2020    FINDINGS:  There is suboptimal positioning of the patient.  AP portable chest radiograph demonstrates a cardiac silhouette within normal limits.  There is tortuosity and vascular calcification involving the thoracic aorta.  The lungs are emphysematous.  There is right-sided pleural thickening and significant volume loss.  There are no new superimposed areas of focal consolidation.  There is a known cavitary lesion at the right apex, which is not well appreciated.  The bones are diffusely osteopenic.                                       Medications   albuterol sulfate nebulizer solution 15 mg (15 mg Nebulization Given 3/18/23 2001)   LORazepam injection 0.5 mg (0.5 mg Intravenous Given 3/18/23 2038)     Medical  Decision Making:   Differential Diagnosis:   Respiratory failure   Arrhythmia   Acute coronary syndromes   Spontaneous pneumothorax  Pericardial effusion/tamponade  Pleural effusion          Attending Attestation:         Attending Critical Care:   Critical Care Times:   Direct Patient Care (initial evaluation, reassessments, and time considering the case)................................................................20 minutes.   Ordering, Reviewing, and Interpreting Diagnostic Studies...............................................................................................................5 minutes.   Documentation..................................................................................................................................................................................5 minutes.   ==============================================================  Total Critical Care Time - exclusive of procedural time: 30 minutes.  ==============================================================  Critical care was necessary to treat or prevent imminent or life-threatening deterioration of the following conditions: COPD exacerbation and respiratory failure.   Critical care was time spent personally by me on the following activities: obtaining history from patient or relative, examination of patient, ordering lab, x-rays, and/or EKG, development of treatment plan with patient or relative, ordering and performing treatments and interventions, evaluation of patient's response to treatment, discussion with consultants, interpretation of cardiac measurements and re-evaluation of patient's conition.   Critical Care Condition: potentially life-threatening   Critical Care Comments:     This was an emergent evaluation.  The patient was in extremis on arrival.  He was immediately started on a continuous nebulizer treatment.  He had continued worsening and was started on BiPAP.  His condition improved with BiPAP and he  was eventually weaned off.  He maintained normal sats on nasal cannula.  His white count was significantly elevated.  Lactic acid level was normal.  Chest x-ray was negative for acute processes.  He was afebrile.  Antibiotics were not felt to be indicated.  Troponin was mildly elevated.  He denies chest pain.  This elevation was felt to be due to cardiac stress from respiratory distress with hypoxia.  He was admitted to Hospital Medicine for close monitoring, serial exams, troponin trending, additional breathing treatments, IV steroids.         ED Course as of 03/18/23 2314   Sat Mar 18, 2023   2019 Platelets: 326 [LP]   2213 CBC auto differential(!)  Abnormalities:  WBC 27.43, RBC 3.83, hemoglobin 11.9, hematocrit 35.7, MCH 31.1.  Unclear etiology of elevated white count.  Could be result of severe respiratory distress.  No infiltrates or other acute processes on chest x-ray.  Normal i-STAT lactate. [LP]   2224 Troponin I(!): 0.033  Acute troponin elevation.  Likely due to acute respiratory distress with hypoxia.  Will trend to rule out ACS. [LP]   2234 BNP(!): 429  Elevation likely a result of acute respiratory distress.  Will possibly need echocardiogram to assess cardiac function. [LP]      ED Course User Index  [LP] Santana Pa III, MD                 Clinical Impression:   Final diagnoses:  [R06.03] Acute respiratory distress  [R09.02] Hypoxia (Primary)  [R77.8] Elevated troponin  [R79.89] Elevated brain natriuretic peptide (BNP) level        ED Disposition Condition    Admit Stable                Santana Pa III, MD  03/18/23 4634

## 2023-03-19 NOTE — ASSESSMENT & PLAN NOTE
Patient's FSGs are uncontrolled due to hyperglycemia on current medication regimen.  Last A1c reviewed-   Lab Results   Component Value Date    HGBA1C 7.8 (H) 07/06/2021     Most recent fingerstick glucose reviewed- No results for input(s): POCTGLUCOSE in the last 24 hours.  Current correctional scale  Medium  Maintain anti-hyperglycemic dose as follows-   Antihyperglycemics (From admission, onward)    None      Hold Oral hypoglycemics while patient is in the hospital.  Will start basal,bolus and correctional insulin

## 2023-03-19 NOTE — ASSESSMENT & PLAN NOTE
Know patient with severe COPD presenting with worsening shortness of breath, wheezing and cough  Said to be saturating in the 80s when EMS arrived  Has now improved on duonebs and BIPAP  Will manage for moderate-severe COPD exacerbation  Started on short course of steroid; and antibiotics   Encourage flu vaccine.

## 2023-03-19 NOTE — ED NOTES
Pt from home for SOB- pt arrived with retractions, increase work of breathing noted. Pt reports 3 breathing tx at home and received 2 en route with EMS. Pt unable to speak in full sentences. Dr. Pa at bedside.

## 2023-03-19 NOTE — ASSESSMENT & PLAN NOTE
Blood pressure is sub-optimal but acceptable  Will continue home antihypertensive at this time  Low salt diet  Heart friendly diet

## 2023-03-19 NOTE — PLAN OF CARE
[facility] Intensive Care  Initial Discharge Assessment       Primary Care Provider: Ishaan Adamson MD    Admission Diagnosis: Acute respiratory distress [R06.03]  Hypoxia [R09.02]  Elevated troponin [R77.8]  Elevated brain natriuretic peptide (BNP) level [R79.89]    Admission Date: 3/18/2023  Expected Discharge Date:     Discharge Barriers Identified: None    Payor: [employer] MEDICARE / Plan: "[employer] CHOICES 65 / Product Type: Medicare Advantage /     Extended Emergency Contact Information  Primary Emergency Contact: Alix Anderson   [facility]  Home Phone: [phone]  Mobile Phone: [phone]  Relation: Daughter    Discharge Plan A: Home with family, Home Health, Other (TBD)  Discharge Plan B: Home with family, Home Health, Other (TBD)      [id number] - [place]  [address]  [address]  [place] [address]  Phone: [phone] Fax: [phone]    [id number] [place] - [address]  [address]  [place] [address]  Phone: [phone] Fax: [phone]      Initial Assessment (most recent)       Adult Discharge Assessment - 03/19/23 1555          Discharge Assessment    Assessment Type Discharge Planning Assessment     Confirmed/corrected address, phone number and insurance Yes     Confirmed Demographics Correct on Facesheet     Source of Information family     If unable to respond/provide information was family/caregiver contacted? No Contact Information Available     Communicated STANISLAV with patient/caregiver Date not available/Unable to determine     Reason For Admission Acute respiratory distress     People in Home child(lupe), adult;other relative(s)     Facility Arrived From: Alix Anderson (Daughter)   [phone]     Do you expect to return to your current living situation? No     Do you have help at home or someone to help you manage your care at home? No     Equipment Currently Used at Home none     Readmission  within 30 days? No     Patient currently being followed by outpatient case management? No     Do you currently have service(s) that help you manage your care at home? No     Do you take prescription medications? Yes     Do you have prescription coverage? Yes     Coverage PHN     Do you have any problems affording any of your prescribed medications? No     Is the patient taking medications as prescribed? yes     Who is going to help you get home at discharge? Alix Anderson (Daughter)   [phone]     How do you get to doctors appointments? family or friend will provide     Are you on dialysis? No     Do you take coumadin? No     Discharge Plan A Home with family;Home Health;Other   TBD    Discharge Plan B Home with family;Home Health;Other   TBD    DME Needed Upon Discharge  other (see comments)   TBD    Discharge Plan discussed with: Adult children     Discharge Barriers Identified None        Housing Stability    In the last 12 months, was there a time when you were not able to pay the mortgage or rent on time? No     In the last 12 months, was there a time when you did not have a steady place to sleep or slept in a shelter (including now)? No        Transportation Needs    In the past 12 months, has lack of transportation kept you from medical appointments or from getting medications? No     In the past 12 months, has lack of transportation kept you from meetings, work, or from getting things needed for daily living? No        Social Connections    In a typical week, how many times do you talk on the phone with family, friends, or neighbors? More than three times a week     How often do you get together with friends or relatives? More than three times a week        OTHER    Name(s) of People in Home Alix Anderson (Daughter)   [phone]                      Patient's daughter confirmed information on face sheet. She stated patient lives with her and her spouse who helps patient at home. Patient daughter  stated she provides transportation to doctor appointments. She stated preference is for afternoon appointments. Patient daughter stated patient is receiving Entyrio every two to three months. She stated patient is not going to be return home, and patient is not going to a nursing home. Patient daughter stated if hospice is recommended [facility] would be her choice. She also asked for any resources that could assist with patient care at home. SW mention a senior resource guide and medicaid information.     Patient daughter has a nurse for her mother at home from 10:30 am - 2:30 pm and preference for doctor appointments to be between that time.

## 2023-03-19 NOTE — H&P
[facility] Medicine  History & Physical    Patient Name: Regan Alvarez  MRN: [id number]  Patient Class: IP- Inpatient  Admission Date: 3/18/2023  Attending Physician: Param Ortega DO   Primary Care Provider: Ishaan Adamson MD         Patient information was obtained from patient, past medical records and ER records.     Subjective:     Principal Problem:COPD exacerbation    Chief Complaint:   Chief Complaint   Patient presents with    Shortness of Breath     Pt arrived via ems, pt chief complaint is shortness of breath. Pt has twila SOB all day was on home treatment upon ems arrival stating in 80's. Pt on duo neb now but only stating 93 at this time.          HPI: 82-year-old  male with medical history significant for type 2 diabetes mellitus,hypertension,ulcerative colitis,chronic obstructive pulmonary disease,tobacco use disorder in remission, has 55 pack year of cigarette smoking, and previous Mycobacterium avium complex infection who presented to the ED with worsening shortness of breath that did not respond to home nebs,he was said to be saturating in the 80s when EMS arrived but up to 93 at presentation to the ED,shortness of breath was associated with productive cough but no hemoptysis,he denied chest pain, no orthopnea, pedal swelling or PND.He denied any urinary symptoms,no dysuria, no frequency,no urgency or straining at micturition.No change in bowel habit,no diarrhea or constipation.      Past Medical History:   Diagnosis Date    Ambulates with cane     Arthritis     COPD (chronic obstructive pulmonary disease)     Diabetes mellitus type II     Hypertension     Mycobacterium avium complex     X's 2    Symptomatic anemia 01/28/2020    Tuberculosis     Ulcerative colitis        Past Surgical History:   Procedure Laterality Date    CATARACT EXTRACTION Bilateral     COLONOSCOPY N/A 01/22/2018    Procedure: COLONOSCOPY;  Surgeon: Roel Mendez MD;  Location:  "[facility] ENDO;  Service: Endoscopy;  Laterality: N/A;  Pt confirmend appt.    melanoma      forehead       Review of patient's allergies indicates:  No Known Allergies    No current facility-administered medications on file prior to encounter.     Current Outpatient Medications on File Prior to Encounter   Medication Sig    SYMBICORT 160-4.5 mcg/actuation HFAA INHALE 2 PUFFS BY MOUTH EVERY 12 HOURS INTO LUNGS    albuterol (ACCUNEB) 0.63 mg/3 mL Nebu USE 1 VIAL IN NEBULIZER EVERY 6 HOURS AS NEEDED FOR COUGH    albuterol (PROVENTIL/VENTOLIN HFA) 90 mcg/actuation inhaler INHALE 2 PUFFS BY MOUTH EVERY 6 HOURS AS NEEDED    atorvastatin (LIPITOR) 40 MG tablet Take 1 tablet by mouth once daily    BD ULTRA-FINE TOMÁS PEN NEEDLES 32 gauge x 5/32" Ndle USE QID UTD    blood sugar diagnostic (TRUE METRIX GLUCOSE TEST STRIP) Strp Test blood sugar 3 times daily    blood sugar diagnostic Strp To check BG one time daily PRN, to use with insurance preferred meter    blood-glucose meter kit To check BG one time daily PRN, to use with insurance preferred meter    glipiZIDE (GLUCOTROL) 5 MG tablet Take 1 tablet by mouth once daily    ibuprofen (ADVIL,MOTRIN) 800 MG tablet Take 1 tablet (800 mg total) by mouth every 8 (eight) hours as needed for Pain (neck pain).    IRON, FERROUS SULFATE, 325 mg (65 mg iron) Tab tablet Take 1 tablet by mouth twice daily    isosorbide mononitrate (IMDUR) 30 MG 24 hr tablet Take 1 tablet by mouth once daily    lancets 28 gauge Misc 1 lancet by Misc.(Non-Drug; Combo Route) route 3 (three) times daily. True Metrix lancets    lancets Misc To check BG one time daily PRN, to use with insurance preferred meter    mucus clearing device (ACAPELLA, FLUTTER) by Misc.(Non-Drug; Combo Route) route 2 (two) times daily.    multivitamin capsule Take 1 capsule by mouth once daily.    PROTONIX 40 mg tablet Take by mouth once daily.    sodium chloride 3% 3 % nebulizer solution Take 4 mLs by nebulization as " needed for Other (daily for sputum production).     Family History       Problem Relation (Age of Onset)    Arthritis Daughter    Cancer Father    Depression Sister    Diabetes Daughter    No Known Problems Mother, Brother, Maternal Aunt, Maternal Uncle, Paternal Aunt, Paternal Uncle, Maternal Grandmother, Maternal Grandfather, Paternal Grandmother, Paternal Grandfather, Other          Tobacco Use    Smoking status: Former     Packs/day: 1.00     Years: 55.00     Pack years: 55.00     Types: Cigarettes    Smokeless tobacco: Never    Tobacco comments:     uses cigars on ocassion   Substance and Sexual Activity    Alcohol use: Not Currently     Comment: socially    Drug use: No    Sexual activity: Not Currently     Partners: Female     Review of Systems   Constitutional:  Negative for appetite change, chills, diaphoresis and fatigue.   HENT:  Negative for congestion, sore throat and tinnitus.    Eyes:  Negative for pain, discharge and itching.   Respiratory:  Positive for cough, shortness of breath and wheezing. Negative for chest tightness.    Gastrointestinal:  Negative for abdominal distention, abdominal pain, blood in stool, nausea and vomiting.   Endocrine: Negative for cold intolerance, heat intolerance and polydipsia.   Genitourinary:  Negative for difficulty urinating, dysuria, flank pain, frequency and hematuria.   Musculoskeletal:  Negative for arthralgias and back pain.   Neurological:  Positive for weakness. Negative for dizziness, seizures, facial asymmetry, light-headedness, numbness and headaches.   Psychiatric/Behavioral:  Negative for agitation, confusion and hallucinations.    Objective:     Vital Signs (Most Recent):  Temp: 99.9 °F (37.7 °C) (03/18/23 1956)  Pulse: 108 (03/19/23 0001)  Resp: (!) 33 (03/19/23 0001)  BP: 119/66 (03/19/23 0001)  SpO2: 98 % (03/19/23 0001)   Vital Signs (24h Range):  Temp:  [98.5 °F (36.9 °C)-99.9 °F (37.7 °C)] 99.9 °F (37.7 °C)  Pulse:  [] 108  Resp:   [30-69] 33  SpO2:  [77 %-100 %] 98 %  BP: (109-138)/(56-84) 119/66     Weight: 86.2 kg (190 lb)  Body mass index is 25.77 kg/m².    Physical Exam  Constitutional:       General: He is in acute distress.   HENT:      Head: Normocephalic and atraumatic.      Nose: Nose normal. No congestion or rhinorrhea.      Mouth/Throat:      Mouth: Mucous membranes are dry.   Eyes:      Extraocular Movements: Extraocular movements intact.      Conjunctiva/sclera: Conjunctivae normal.      Pupils: Pupils are equal, round, and reactive to light.   Cardiovascular:      Rate and Rhythm: Regular rhythm. Tachycardia present.      Pulses: Normal pulses.      Heart sounds: Normal heart sounds.   Pulmonary:      Effort: Respiratory distress (on BIBAP) present.      Breath sounds: Wheezing present.   Chest:      Chest wall: No tenderness.   Abdominal:      General: Abdomen is flat. Bowel sounds are normal. There is no distension.      Palpations: Abdomen is soft.      Tenderness: There is no abdominal tenderness. There is no right CVA tenderness, left CVA tenderness, guarding or rebound.   Musculoskeletal:         General: Normal range of motion.      Cervical back: Normal range of motion and neck supple.   Skin:     General: Skin is warm and dry.   Neurological:      General: No focal deficit present.      Mental Status: He is alert and oriented to person, place, and time. Mental status is at baseline.      Cranial Nerves: No cranial nerve deficit.      Sensory: No sensory deficit.   Psychiatric:         Mood and Affect: Mood normal.         Behavior: Behavior normal.         Thought Content: Thought content normal.         Judgment: Judgment normal.         CRANIAL NERVES     CN III, IV, VI   Pupils are equal, round, and reactive to light.     Significant Labs: All pertinent labs within the past 24 hours have been reviewed.  A1C: No results for input(s): HGBA1C in the last 4320 hours.  CBC:   Recent Labs   Lab 03/18/23 1951   WBC 27.43*    HGB 11.9*   HCT 35.7*        CMP:   Recent Labs   Lab 03/18/23 1951      K 3.5      CO2 23   *   BUN 15   CREATININE 1.0   CALCIUM 9.2   PROT 7.9   ALBUMIN 2.8*   BILITOT 0.6   ALKPHOS 89   AST 14   ALT 10   ANIONGAP 13     Lactic Acid: No results for input(s): LACTATE in the last 48 hours.  Troponin:   Recent Labs   Lab 03/18/23 1951   TROPONINI 0.033*       Significant Imaging: I have reviewed all pertinent imaging results/findings within the past 24 hours.    Assessment/Plan:     * COPD exacerbation  Know patient with severe COPD presenting with worsening shortness of breath, wheezing and cough  Said to be saturating in the 80s when EMS arrived  Has now improved on duonebs and BIPAP  Will manage for moderate-severe COPD exacerbation  Started on short course of steroid; and antibiotics   Encourage flu vaccine.      Acute respiratory distress  Respiratory failure likely acute on chronic hypoxic hypercapnic respiratory failure  He was saturating in the 90s where EMS arrived  Has 55 pack year of smoking,quit now  Improved on BIPAP  Will continue BIPAP for hypercapnic respiratory failure    Uncontrolled type 2 diabetes mellitus with hyperglycemia, without long-term current use of insulin  Patient's FSGs are uncontrolled due to hyperglycemia on current medication regimen.  Last A1c reviewed-   Lab Results   Component Value Date    HGBA1C 7.8 (H) 07/06/2021     Most recent fingerstick glucose reviewed- No results for input(s): POCTGLUCOSE in the last 24 hours.  Current correctional scale  Medium  Maintain anti-hyperglycemic dose as follows-   Antihyperglycemics (From admission, onward)    None      Hold Oral hypoglycemics while patient is in the hospital.  Will start basal,bolus and correctional insulin    Ulcerative colitis with complication  Stable  No flare, follow up GI as outpatient      Essential hypertension  Blood pressure is sub-optimal but acceptable  Will continue home  antihypertensive at this time  Low salt diet  Heart friendly diet        VTE Risk Mitigation (From admission, onward)         Ordered     heparin (porcine) injection 5,000 Units  Every 8 hours         03/19/23 0207     IP VTE HIGH RISK PATIENT  Once         03/19/23 0207     Place sequential compression device  Until discontinued         03/19/23 0207              Critical care time spent on the evaluation and treatment of severe organ dysfunction, review of pertinent labs and imaging studies, discussions with consulting providers and discussions with patient/family: 30 minutes.     On 03/19/2023, patient should be placed in hospital observation services under my care.        Sterling Cash MD  Department of Hospital Medicine  [facility] - Intensive Care

## 2023-03-19 NOTE — ED NOTES
Patient removed from Bipap per MD Peoples' request. Patient tolerating well. Patient remains tachypneic but O2 sats >95%. Patieent A&Ox4 and taking small sips of water. Will continue to monitor closely.

## 2023-03-19 NOTE — PROGRESS NOTES
Pharmacokinetic Initial Assessment: IV Vancomycin    Assessment/Plan:    Initiate intravenous vancomycin with loading dose of 2000 mg once followed by a maintenance dose of vancomycin 1500mg IV every 24 hours  Desired empiric serum trough concentration is 10 to 20 mcg/mL  Draw vancomycin trough level 60 min prior to third dose on 3/21 at approximately 1100  Pharmacy will continue to follow and monitor vancomycin.      Please contact pharmacy at extension [phone] with any questions regarding this assessment.     Thank you for the consult,   Melody Fatima       Patient brief summary:  Regan Alvarez is a 82 y.o. male initiated on antimicrobial therapy with IV Vancomycin for treatment of suspected  pneumonia    Drug Allergies:   Review of patient's allergies indicates:  No Known Allergies    Actual Body Weight:   76.8  kg    Renal Function:   Estimated Creatinine Clearance: 59.7 mL/min (based on SCr of 1 mg/dL).,     Dialysis Method (if applicable):  N/A    CBC (last 72 hours):  Recent Labs   Lab Result Units 03/18/23 1951   WBC K/uL 27.43*   Hemoglobin g/dL 11.9*   Hematocrit % 35.7*   Platelets K/uL 326   Gran % % 80.5*   Lymph % % 8.9*   Mono % % 9.7   Eosinophil % % 0.0   Basophil % % 0.2   Differential Method  Automated       Metabolic Panel (last 72 hours):  Recent Labs   Lab Result Units 03/18/23 1951 03/19/23  0353   Sodium mmol/L 137  --    Potassium mmol/L 3.5  --    Chloride mmol/L 101  --    CO2 mmol/L 23  --    Glucose mg/dL 243*  --    BUN mg/dL 15  --    Creatinine mg/dL 1.0  --    Albumin g/dL 2.8*  --    Total Bilirubin mg/dL 0.6  --    Alkaline Phosphatase U/L 89  --    AST U/L 14  --    ALT U/L 10  --    Magnesium mg/dL 1.4* 1.3*   Phosphorus mg/dL 1.6*  --        Drug levels (last 3 results):  No results for input(s): VANCOMYCINRA, VANCORANDOM, VANCOMYCINPE, VANCOPEAK, VANCOMYCINTR, VANCOTROUGH in the last 72 hours.    Microbiologic Results:  Microbiology Results (last 7 days)       Procedure  Component Value Units Date/Time    Culture, Respiratory with Gram Stain [[id number]] Collected: 03/19/23 1011    Order Status: Completed Specimen: Respiratory from Sputum, Expectorated Updated: 03/19/23 1122     Gram Stain (Respiratory) <10 epithelial cells per low power field.     Gram Stain (Respiratory) Moderate WBC's     Gram Stain (Respiratory) Few Gram negative rods     Gram Stain (Respiratory) Few Gram positive cocci in pairs    Blood culture x two cultures. Draw prior to antibiotics. [[id number]] Collected: 03/18/23 2035    Order Status: Completed Specimen: Blood from Peripheral, Hand, Right Updated: 03/19/23 0312     Blood Culture, Routine No Growth to date    Narrative:      Aerobic and anaerobic    Blood culture x two cultures. Draw prior to antibiotics. [[id number]] Collected: 03/18/23 2052    Order Status: Completed Specimen: Blood from Peripheral, Hand, Left Updated: 03/19/23 0312     Blood Culture, Routine No Growth to date    Narrative:      Aerobic and anaerobic

## 2023-03-19 NOTE — ASSESSMENT & PLAN NOTE
Respiratory failure likely acute on chronic hypoxic hypercapnic respiratory failure  He was saturating in the 90s where EMS arrived  Has 55 pack year of smoking,quit now  Improved on BIPAP  Will continue BIPAP for hypercapnic respiratory failure

## 2023-03-19 NOTE — EICU
EICU Note    81 y/o male with a PMH of COPD, HTN, DM type 2, anemia, ulcerative colitis and previous mycobacterium avium complex infection presents with complaints of increasing shortness of breath and non productive cough for  3 days PTA.    Currently:    /87 (BP Location: Right arm, Patient Position: Lying)   Pulse 105   Temp 98 °F (36.7 °C)   Resp (!) 27   Ht 6' (1.829 m)   Wt 76.8 kg (169 lb 5 oz)   SpO2 99%   BMI 22.96 kg/m²     Labs:    CBC; WBC 27.43/ Hgb 11.9/ Hct 35.7/ plts 326K    BMP: Na 137/ K 3.5/ Cl 101/ CO2 23/ BUN 15/ Cr 1/ Glucose 243    BNP: 429  Troponin; 0.033  Lactate: 2.06    VBG on BIPAP/ FIO2 100%/ Insp 12/ Exp 6 : pH 7.38/ pCO2 16    Chest Xray: Final reading  FINDINGS:  There is suboptimal positioning of the patient.  AP portable chest radiograph demonstrates a cardiac silhouette within normal limits. There is tortuosity and vascular calcification involving the thoracic aorta.  The lungs are emphysematous.  There is right-sided pleural thickening and significant volume loss.  There are no new superimposed areas of focal consolidation.  There is a known cavitary lesion at the right apex, which is not well appreciated.  The bones are diffusely osteopenic.  Impression:  No new superimposed areas of focal consolidation.  Relatively stable chest radiograph    Impression;    -Acute hypoxic respiratory failure secondary to  -Acute exacerbation of COPD  -DM type 2    Plan; Continue present management of bronchodilators, steroids and azithromycin and rocephin. Insulin coverage for hyperglycemia

## 2023-03-20 NOTE — CONSULTS
[facility] - Intensive Care  Pulmonology  Consult Note    Patient Name: Regan Alvarez  MRN: [id number]  Admission Date: 3/18/2023  Hospital Length of Stay: 2 days  Code Status: DNR  Attending Physician: Karine Ryan MD  Primary Care Provider: Ishaan Adamson MD   Principal Problem: Acute hypoxemic respiratory failure    [unfilled]  Subjective:     HPI:  Patient is 82 y.o. male  has a past medical history of Ambulates with cane, Arthritis, COPD (chronic obstructive pulmonary disease), Diabetes mellitus type II, Hypertension, Mycobacterium avium complex, Symptomatic anemia (01/28/2020), Tuberculosis, and Ulcerative colitis. presented to [facility] on 3/18/23 with worsening sob x 1 week.  Ct with diffuse infiltrate.  Wbc was elevated at 26.  Patient was started on antibiotics for CAP + prednisone + nebulized treatement.  Pulmonary was consulted for further inputs.      Patient with severe COPD (FEV1 32%) and MAC.  S/p triple therapy by Dr. Hill.  Last sputum afb 1/2020 was negative.     Patient is feeling much better since admission.  Currently, patient is comfortable on nasal canula.  No fever/chills.   +cough with dark color phlegm.  No recent hospitalization.  No weight loss.  Intermittent hemoptysis.      Patient smoked 1 ppd x 50 years.  Quit around 2013.  Patient is living with daughter, Alix, and wife who is bed ridden.  Worked in [employer] x 50 years.        Past Medical History:   Diagnosis Date    Ambulates with cane     Arthritis     COPD (chronic obstructive pulmonary disease)     Diabetes mellitus type II     Hypertension     Mycobacterium avium complex     X's 2    Symptomatic anemia 01/28/2020    Tuberculosis     Ulcerative colitis        Past Surgical History:   Procedure Laterality Date    CATARACT EXTRACTION Bilateral     COLONOSCOPY N/A 01/22/2018    Procedure: COLONOSCOPY;  Surgeon: Roel Mendez MD;  Location: [facility];  Service: Endoscopy;  Laterality: N/A;  Pt  confirmend appt.    melanoma      forehead       Review of patient's allergies indicates:  No Known Allergies    Family History       Problem Relation (Age of Onset)    Arthritis Daughter    Cancer Father    Depression Sister    Diabetes Daughter    No Known Problems Mother, Brother, Maternal Aunt, Maternal Uncle, Paternal Aunt, Paternal Uncle, Maternal Grandmother, Maternal Grandfather, Paternal Grandmother, Paternal Grandfather, Other          Tobacco Use    Smoking status: Former     Packs/day: 1.00     Years: 55.00     Pack years: 55.00     Types: Cigarettes    Smokeless tobacco: Never    Tobacco comments:     uses cigars on ocassion   Substance and Sexual Activity    Alcohol use: Not Currently     Comment: socially    Drug use: No    Sexual activity: Not Currently     Partners: Female         Review of Systems   Constitutional:  Negative for appetite change, chills, diaphoresis, fatigue, fever and unexpected weight change.   HENT:  Positive for hearing loss. Negative for congestion, sore throat and tinnitus.    Eyes:  Negative for pain, discharge and itching.   Respiratory:  Positive for cough, shortness of breath and wheezing. Negative for chest tightness.    Gastrointestinal:  Negative for abdominal distention, abdominal pain, blood in stool, nausea and vomiting.   Endocrine: Negative for cold intolerance, heat intolerance and polydipsia.   Genitourinary:  Negative for difficulty urinating, dysuria, flank pain, frequency and hematuria.   Musculoskeletal:  Negative for arthralgias and back pain.   Neurological:  Positive for weakness. Negative for dizziness, seizures, facial asymmetry, light-headedness, numbness and headaches.   Psychiatric/Behavioral:  Negative for agitation, confusion and hallucinations.    Objective:     Vital Signs (Most Recent):  Temp: 97.9 °F (36.6 °C) (03/20/23 1200)  Pulse: 92 (03/20/23 1223)  Resp: (!) 22 (03/20/23 1223)  BP: (!) 120/90 (03/20/23 1200)  SpO2: 95 % (03/20/23  1223)   Vital Signs (24h Range):  Temp:  [97.7 °F (36.5 °C)-98 °F (36.7 °C)] 97.9 °F (36.6 °C)  Pulse:  [] 92  Resp:  [0-50] 22  SpO2:  [76 %-100 %] 95 %  BP: (103-158)/(56-90) 120/90     Weight: 76.8 kg (169 lb 5 oz)  Body mass index is 19.07 kg/m².      Intake/Output Summary (Last 24 hours) at 3/20/2023 1319  Last data filed at 3/20/2023 0719  Gross per 24 hour   Intake 1214.59 ml   Output 1825 ml   Net -610.41 ml       Physical Exam  Constitutional:       General: He is in acute distress.   HENT:      Head: Normocephalic and atraumatic.      Nose: Nose normal. No congestion or rhinorrhea.      Mouth/Throat:      Mouth: Mucous membranes are dry.   Eyes:      Extraocular Movements: Extraocular movements intact.      Conjunctiva/sclera: Conjunctivae normal.      Pupils: Pupils are equal, round, and reactive to light.   Cardiovascular:      Rate and Rhythm: Normal rate and regular rhythm.      Pulses: Normal pulses.      Heart sounds: Normal heart sounds.   Pulmonary:      Effort: No respiratory distress (on BIBAP).      Breath sounds: Wheezing present.   Chest:      Chest wall: No tenderness.   Abdominal:      General: Abdomen is flat. Bowel sounds are normal. There is no distension.      Palpations: Abdomen is soft.      Tenderness: There is no abdominal tenderness. There is no right CVA tenderness, left CVA tenderness, guarding or rebound.   Musculoskeletal:         General: Normal range of motion.      Cervical back: Normal range of motion and neck supple.   Skin:     General: Skin is warm and dry.   Neurological:      General: No focal deficit present.      Mental Status: He is alert and oriented to person, place, and time. Mental status is at baseline.      Cranial Nerves: No cranial nerve deficit.      Sensory: No sensory deficit.   Psychiatric:         Mood and Affect: Mood normal.         Behavior: Behavior normal.         Thought Content: Thought content normal.         Judgment: Judgment normal.        Vents:  Oxygen Concentration (%): 30 (03/19/23 2330)    Lines/Drains/Airways       Peripheral Intravenous Line  Duration                  Peripheral IV - Single Lumen 03/18/23 1951 20 G Right Forearm 1 day         Peripheral IV - Single Lumen 03/19/23 1800 20 G Left;Posterior Forearm <1 day                    Significant Labs:    CBC/Anemia Profile:  Recent Labs   Lab 03/18/23 1951 03/20/23  0319   WBC 27.43* 26.24*   HGB 11.9* 9.8*   HCT 35.7* 28.9*    318   MCV 93 92   RDW 13.8 13.9        Chemistries:  Recent Labs   Lab 03/18/23 1951 03/19/23  0353 03/20/23  0319     --  133*   K 3.5  --  3.8     --  99   CO2 23  --  24   BUN 15  --  22   CREATININE 1.0  --  1.0   CALCIUM 9.2  --  8.2*   ALBUMIN 2.8*  --  2.3*   PROT 7.9  --  6.5   BILITOT 0.6  --  0.4   ALKPHOS 89  --  92   ALT 10  --  9*   AST 14  --  12   MG 1.4* 1.3* 2.2   PHOS 1.6*  --  1.8*       ABGs:   Recent Labs   Lab 03/18/23 1951   PH 7.385   PCO2 47.5*   HCO3 28.4*   POCSATURATED 21*   BE 3     PFT 3/6/20 Ratio of 49%; FVC 2.76 L (66%); FEV1 0.94 L (32%); TLC 4.91 L (60%); dlco 10 (36%)   Significant Imaging:   I have reviewed all pertinent imaging results/findings within the past 24 hours.  CT: I have reviewed all pertinent results/findings within the past 24 hours and my personal findings are:  1/19/23 diffuse emphysematous changes with traction bronchiectasis.  Worsening of rll consolidation with cavitary changes.  Also with stable cavitary changes in rul.  Tom nodule now cavitate.  Lingula nodule stable.       ABG  Recent Labs   Lab 03/18/23 1951   PH 7.385   PO2 16*   PCO2 47.5*   HCO3 28.4*   BE 3     Assessment/Plan:     Pulmonary  * Acute hypoxemic respiratory failure  Patient with Hypercapnic and Hypoxic Respiratory failure which is Acute on chronic.  he is not on home oxygen. Supplemental oxygen was provided and noted- Oxygen Concentration (%):  [30] 30.   Signs/symptoms of respiratory failure include-  tachypnea, increased work of breathing, respiratory distress and wheezing. Contributing diagnoses includes - COPD and Pneumonia Labs and images were reviewed. Patient Has recent ABG, which has been reviewed. Will treat underlying causes and adjust management of respiratory failure as follows-   -fev1 of 36%; ct with diffuse airspace disease and cavitary changes.  Worsening of rll consolidation along with leukocytosis suggestive of community bacterial pneumonia superimpose on chronic lung parenchymal changes a/w copd and MAC infection  -airway clearance with hypertonic saline, nebs and cpt  -steroid + antibiotics for cap  -sputum culture for afb and routine culture  -patient did not like bipap and unlikely to wear it at home  -would likely benefit from home oxygen    Pulmonary nodule  -ajay nodule documented on ct  1/2020.   ajay nodule now cavitate but did not enlarge.  Patient and daughter declined biopsy and serial imaging.   -h/o CT guided biopsy in 12/14/15.  Path c/w caseous granuloma.      COPD exacerbation  -see respiratory failure    Palliative Care  ACP (advance care planning)  -dnr status reconfirmed.  Would benefit from outpatient palliative care follow up.            Thank you for your consult. I will follow-up with patient. Please contact us if you have any additional questions.     Ab Noyola MD  Pulmonology  [facility] - Intensive Care

## 2023-03-20 NOTE — HPI
"From H&P: "82-year-old  male with medical history significant for type 2 diabetes mellitus,hypertension,ulcerative colitis,chronic obstructive pulmonary disease,tobacco use disorder in remission, has 55 pack year of cigarette smoking, and previous Mycobacterium avium complex infection who presented to the ED with worsening shortness of breath that did not respond to home nebs,he was said to be saturating in the 80s when EMS arrived but up to 93 at presentation to the ED,shortness of breath was associated with productive cough but no hemoptysis,he denied chest pain, no orthopnea, pedal swelling or PND.He denied any urinary symptoms,no dysuria, no frequency,no urgency or straining at micturition.No change in bowel habit,no diarrhea or constipation."    Palliative medicine consulted for advance care planning and goals of care discussion; Met with pt at bedside; for details of visit, see advance care planning section of plan.     "

## 2023-03-20 NOTE — ASSESSMENT & PLAN NOTE
 The left ventricle is normal in size with mildly decreased systolic function.   The estimated ejection fraction is 45%. Inferoseptal akinesis   Left ventricular diastolic dysfunction.   Normal right ventricular size with normal right ventricular systolic function.   Severe left atrial enlargement.   Mild right atrial enlargement.   Normal central venous pressure (3 mmHg).   The estimated PA systolic pressure is 8 mmHg.    - this is new diagnosis  - he does not appear volume overloaded on exam but   BNP  Recent Labs   Lab 03/18/23 1951   *     Given lasix x1  - monitor ins/outs

## 2023-03-20 NOTE — PLAN OF CARE
Problem: Adult Inpatient Plan of Care  Goal: Plan of Care Review  Outcome: Ongoing, Progressing  Goal: Patient-Specific Goal (Individualized)  Outcome: Ongoing, Progressing  Goal: Absence of Hospital-Acquired Illness or Injury  Outcome: Ongoing, Progressing  Goal: Optimal Comfort and Wellbeing  Outcome: Ongoing, Progressing  Goal: Readiness for Transition of Care  Outcome: Ongoing, Progressing     Problem: Diabetes Comorbidity  Goal: Blood Glucose Level Within Targeted Range  Outcome: Ongoing, Progressing     Problem: Fall Injury Risk  Goal: Absence of Fall and Fall-Related Injury  Outcome: Ongoing, Progressing     Problem: Impaired Wound Healing  Goal: Optimal Wound Healing  Outcome: Ongoing, Progressing     Problem: Skin Injury Risk Increased  Goal: Skin Health and Integrity  Outcome: Ongoing, Progressing     Problem: Adjustment to Illness (Sepsis/Septic Shock)  Goal: Optimal Coping  Outcome: Ongoing, Progressing     Problem: Bleeding (Sepsis/Septic Shock)  Goal: Absence of Bleeding  Outcome: Ongoing, Progressing     Problem: Glycemic Control Impaired (Sepsis/Septic Shock)  Goal: Blood Glucose Level Within Desired Range  Outcome: Ongoing, Progressing     Problem: Infection Progression (Sepsis/Septic Shock)  Goal: Absence of Infection Signs and Symptoms  Outcome: Ongoing, Progressing     Problem: Nutrition Impaired (Sepsis/Septic Shock)  Goal: Optimal Nutrition Intake  Outcome: Ongoing, Progressing     Problem: Coping Ineffective  Goal: Effective Coping  Outcome: Ongoing, Progressing

## 2023-03-20 NOTE — PROGRESS NOTES
Vancomycin consult follow-up:    Patient reviewed, renal function stable, no new levels, continue current therapy; Next levels due: trough due 3/21/2023 at 1100

## 2023-03-20 NOTE — ASSESSMENT & PLAN NOTE
Patient with Hypercapnic and Hypoxic Respiratory failure which is Acute on chronic.  he is not on home oxygen. Supplemental oxygen was provided and noted- Oxygen Concentration (%):  [30] 30.   Signs/symptoms of respiratory failure include- tachypnea, increased work of breathing, respiratory distress and wheezing. Contributing diagnoses includes - COPD and Pneumonia Labs and images were reviewed. Patient Has recent ABG, which has been reviewed. Will treat underlying causes and adjust management of respiratory failure as follows-   -fev1 of 36%; ct with diffuse airspace disease and cavitary changes.  Worsening of rll consolidation along with leukocytosis suggestive of community bacterial pneumonia superimpose on chronic lung parenchymal changes a/w copd and MAC infection  -airway clearance with hypertonic saline, nebs and cpt  -steroid + antibiotics for cap  -sputum culture for afb and routine culture  -patient did not like bipap and unlikely to wear it at home  -would likely benefit from home oxygen

## 2023-03-20 NOTE — ASSESSMENT & PLAN NOTE
Patient's FSGs are uncontrolled due to hyperglycemia on current medication regimen.  Last A1c reviewed-   Lab Results   Component Value Date    HGBA1C 8.8 (H) 03/18/2023     Most recent fingerstick glucose reviewed-   Recent Labs   Lab 03/19/23  1716 03/19/23  2107 03/20/23  0753 03/20/23  1124   POCTGLUCOSE 445* 434* 370* 427*     Current correctional scale  Medium  Increase anti-hyperglycemic dose as follows-   Antihyperglycemics (From admission, onward)    Start     Stop Route Frequency Ordered    03/20/23 2100  insulin detemir U-100 pen 25 Units         -- SubQ Nightly 03/20/23 1320    03/20/23 1645  insulin aspart U-100 pen 8 Units         -- SubQ 3 times daily with meals 03/20/23 1320    03/19/23 0956  insulin aspart U-100 pen 0-5 Units         -- SubQ Before meals & nightly PRN 03/19/23 0856      Hold Oral hypoglycemics while patient is in the hospital.

## 2023-03-20 NOTE — ASSESSMENT & PLAN NOTE
Advance Care Planning     Date: 03/20/2023  See Dr Ryan note from 3/19/23-- DNR code status, no ventilator. Palliative consulted.

## 2023-03-20 NOTE — CONSULTS
[facility] - Intensive Care  Adult Nutrition  Consult Note    SUMMARY     Recommendations    1. Recommend Boost Glucose control TID/ Michele TID to promote wound healing.   2. Monitor Blood Sugars, consider ADA education before leaving.   3. Continue to monitor and follow up.    Goals: 1. Meet % EEN/EPN by RD follow up.  Nutrition Goal Status: new  Communication of RD Recs: other (comment) (POC)    Assessment and Plan    Nutrition Problem  Inadequate energy intake    Related to (etiology):   Acute hypoxemic respiratory failure    Signs and Symptoms (as evidenced by):   Decreased PO intake     Interventions/Recommendations (treatment strategy):  Commercial beverage  Collaboration with medical providers    Nutrition Diagnosis Status:   New        Malnutrition Assessment  Malnutrition Type: acute illness or injury  Skin (Micronutrient): dry, rough  Hair/Scalp (Micronutrient): fine  Lips/Mucous Membranes (Micronutrient): fissuring  Teeth (Micronutrient): broken dentition  Neck/Chest (Micronutrient): bony prominence           Upper Arm Region (Subcutaneous Fat Loss): mild depletion  Thoracic and Lumbar Region: mild depletion                     Reason for Assessment    Reason For Assessment: consult (wounds)  Diagnosis:  (acute hypoxemic respiratory failure)  Relevant Medical History: DM II, HTN, Sepsis  Interdisciplinary Rounds: did not attend  General Information Comments: Pt seen while eating lunch. States he has a decrease in appetite with  lb. He is currently 169 lb (16 lb weight loss). Per chart review, pt has no signfiicant weight changes dating back to 2017. Consider Boost Glucose Control BID and Michele to promote wound healing. RD to continue to monitor intake.  KNFA.  Nutrition Discharge Planning: Diabetic Diet    Nutrition Risk Screen    Nutrition Risk Screen: difficulty chewing/swallowing    Nutrition/Diet History    Food Allergies: NKFA    Anthropometrics    Temp: 97.9 °F (36.6 °C)  Height Method:  "Stated  Height: 6' 7" (200.7 cm)  Height (inches): 79 in  Weight Method: Bed Scale  Weight: 76.7 kg (169 lb)  Weight (lb): 169 lb  Ideal Body Weight (IBW), Male: 220 lb  % Ideal Body Weight, Male (lb): 95.12 %  BMI (Calculated): 19  BMI Grade: 18.5-24.9 - normal       Lab/Procedures/Meds    Pertinent Labs Reviewed: reviewed  Pertinent Labs Comments: Glu 383, ca 8.2, phos 1.8  Pertinent Medications Reviewed: reviewed  Current Outpatient Medications   Medication Instructions    albuterol (ACCUNEB) 0.63 mg/3 mL Nebu USE 1 VIAL IN NEBULIZER EVERY 6 HOURS AS NEEDED FOR COUGH    albuterol (PROVENTIL/VENTOLIN HFA) 90 mcg/actuation inhaler INHALE 2 PUFFS BY MOUTH EVERY 6 HOURS AS NEEDED    atorvastatin (LIPITOR) 40 MG tablet Take 1 tablet by mouth once daily    BD ULTRA-FINE TOMÁS PEN NEEDLES 32 gauge x 5/32" Ndle USE QID UTD    blood sugar diagnostic (TRUE METRIX GLUCOSE TEST STRIP) Strp Test blood sugar 3 times daily    blood sugar diagnostic Strp To check BG one time daily PRN, to use with insurance preferred meter    blood-glucose meter kit To check BG one time daily PRN, to use with insurance preferred meter    glipiZIDE (GLUCOTROL) 5 MG tablet Take 1 tablet by mouth once daily    ibuprofen (ADVIL,MOTRIN) 800 mg, Oral, Every 8 hours PRN    IRON, FERROUS SULFATE, 325 mg (65 mg iron) Tab tablet Take 1 tablet by mouth twice daily    isosorbide mononitrate (IMDUR) 30 MG 24 hr tablet Take 1 tablet by mouth once daily    lancets 28 gauge Misc 1 lancet, Misc.(Non-Drug; Combo Route), 3 times daily, True Metrix lancets     lancets Misc To check BG one time daily PRN, to use with insurance preferred meter    mucus clearing device (ACAPELLA, FLUTTER) Misc.(Non-Drug; Combo Route), 2 times daily    multivitamin capsule 1 capsule, Oral, Daily    PROTONIX 40 mg tablet Oral, Daily    sodium chloride 3% 3 % nebulizer solution 4 mLs, Nebulization, As needed (PRN)    SYMBICORT 160-4.5 mcg/actuation HFAA INHALE 2 PUFFS BY MOUTH EVERY 12 " HOURS INTO LUNGS        Estimated/Assessed Needs    Weight Used For Calorie Calculations: 76.7 kg (169 lb)  Energy Calorie Requirements (kcal): 1765 kcal  Energy Need Method: [place]-St Jeor (x 1.1)  Protein Requirements: 76 kg  Weight Used For Protein Calculations: 76.7 kg (169 lb)        RDA Method (mL): 1765         Nutrition Prescription Ordered    Current Diet Order: Diabetic diet    Evaluation of Received Nutrient/Fluid Intake    I/O: +371 mL  Energy Calories Required: not meeting needs  Protein Required: not meeting needs  Fluid Required: not meeting needs  Comments: lbm 3/17/23  % Intake of Estimated Energy Needs: 25 - 50 %  % Meal Intake: 25 - 50 %    Nutrition Risk    Level of Risk/Frequency of Follow-up: low       Monitor and Evaluation    Food and Nutrient Intake: energy intake, food and beverage intake  Food and Nutrient Adminstration: diet order  Knowledge/Beliefs/Attitudes: food and nutrition knowledge/skill, beliefs and attitudes  Physical Activity and Function: nutrition-related ADLs and IADLs, factors affecting access to physical activity  Anthropometric Measurements: weight, weight change, body mass index  Biochemical Data, Medical Tests and Procedures: electrolyte and renal panel, glucose/endocrine profile, gastrointestinal profile, inflammatory profile, lipid profile  Nutrition-Focused Physical Findings: overall appearance       Nutrition Follow-Up    RD Follow-up?: Yes    Maricel Bernal, Registration Eligible, Provisional LDN

## 2023-03-20 NOTE — HPI
Patient is 82 y.o. male  has a past medical history of Ambulates with cane, Arthritis, COPD (chronic obstructive pulmonary disease), Diabetes mellitus type II, Hypertension, Mycobacterium avium complex, Symptomatic anemia (01/28/2020), Tuberculosis, and Ulcerative colitis. presented to [facility] on 3/18/23 with worsening sob x 1 week.  Ct with diffuse infiltrate.  Wbc was elevated at 26.  Patient was started on antibiotics for CAP + prednisone + nebulized treatement.  Pulmonary was consulted for further inputs.      Patient with severe COPD (FEV1 32%) and MAC.  S/p triple therapy by Dr. Hill.  Last sputum afb 1/2020 was negative.     Patient is feeling much better since admission.  Currently, patient is comfortable on nasal canula.  No fever/chills.   +cough with dark color phlegm.  No recent hospitalization.  No weight loss.  Intermittent hemoptysis.      Patient smoked 1 ppd x 50 years.  Quit around 2013.  Patient is living with daughter, Alix, and wife who is bed ridden.  Worked in [employer] x 50 years.

## 2023-03-20 NOTE — PLAN OF CARE
Recommendations     1. Recommend Boost Glucose control TID/ Michele TID to promote wound healing.   2. Monitor Blood Sugars, consider ADA education before leaving.   3. Continue to monitor and follow up.     Goals: 1. Meet % EEN/EPN by RD follow up.  Nutrition Goal Status: new  Communication of RD Recs: other (comment) (POC)     Assessment and Plan     Nutrition Problem  Inadequate energy intake     Related to (etiology):   Acute hypoxemic respiratory failure     Signs and Symptoms (as evidenced by):   Decreased PO intake      Interventions/Recommendations (treatment strategy):  Commercial beverage  Collaboration with medical providers     Nutrition Diagnosis Status:   New

## 2023-03-20 NOTE — SUBJECTIVE & OBJECTIVE
Interval History: Feeling better, still somewhat short of breath. Less cough.     Review of Systems   Constitutional:  Negative for chills and fever.   Respiratory:  Positive for shortness of breath. Negative for cough.    Cardiovascular:  Negative for chest pain.   Gastrointestinal:  Negative for abdominal pain, nausea and vomiting.   Psychiatric/Behavioral:  Negative for confusion.    Objective:     Vital Signs (Most Recent):  Temp: 97.9 °F (36.6 °C) (03/20/23 1200)  Pulse: 92 (03/20/23 1223)  Resp: (!) 22 (03/20/23 1223)  BP: (!) 120/90 (03/20/23 1200)  SpO2: 95 % (03/20/23 1223)   Vital Signs (24h Range):  Temp:  [97.7 °F (36.5 °C)-98 °F (36.7 °C)] 97.9 °F (36.6 °C)  Pulse:  [] 92  Resp:  [0-50] 22  SpO2:  [76 %-100 %] 95 %  BP: (103-158)/(56-90) 120/90     Weight: 76.8 kg (169 lb 5 oz)  Body mass index is 19.07 kg/m².    Intake/Output Summary (Last 24 hours) at 3/20/2023 1322  Last data filed at 3/20/2023 0719  Gross per 24 hour   Intake 1214.59 ml   Output 1825 ml   Net -610.41 ml      Physical Exam  Vitals and nursing note reviewed.   Constitutional:       General: He is in acute distress.      Appearance: He is ill-appearing. He is not toxic-appearing.   HENT:      Head: Normocephalic and atraumatic.      Nose: Nose normal.      Mouth/Throat:      Mouth: Mucous membranes are moist.   Cardiovascular:      Rate and Rhythm: Normal rate and regular rhythm.      Pulses: Normal pulses.      Heart sounds: Normal heart sounds.   Pulmonary:      Effort: Respiratory distress (retractions present, pursed lip breathing) present.      Breath sounds: Rhonchi present. No wheezing or rales.      Comments: Diminished breath sounds on the R compared to the L  Abdominal:      General: Bowel sounds are normal. There is no distension.      Palpations: Abdomen is soft.      Tenderness: There is no abdominal tenderness. There is no guarding.   Musculoskeletal:      Right lower leg: No edema.      Left lower leg: No edema.    Skin:     General: Skin is warm and dry.   Neurological:      Mental Status: He is alert and oriented to person, place, and time.       Significant Labs: All pertinent labs within the past 24 hours have been reviewed.    Significant Imaging: I have reviewed all pertinent imaging results/findings within the past 24 hours.

## 2023-03-20 NOTE — ASSESSMENT & PLAN NOTE
- diagnoses in his 70s, per patient   - symptoms much improved with infusion treatments with vedolizumab every 2-3 months (per pt)   - noted; management per hospital primary

## 2023-03-20 NOTE — ASSESSMENT & PLAN NOTE
- 3/19 Echo: new EF 45% with inferoseptal hypokinesis, diastolic dysfunction, and normal PAP/RV function  - respiratory symptoms also correlated to COPD   - hospice appropriate if/when aligns with GOC   - noted; management per hospital primary

## 2023-03-20 NOTE — CONSULTS
"[facility] - Intensive Care  Palliative Medicine  Consult Note    Patient Name: Regan Alvarez  MRN: [id number]  Admission Date: 3/18/2023  Hospital Length of Stay: 2 days  Code Status: DNR   Attending Provider: Karine Ryan MD  Consulting Provider: Veronica Wright NP  Primary Care Physician: Ishaan Adamson MD  Principal Problem:Acute hypoxemic respiratory failure    Patient information was obtained from patient, past medical records, ER records and primary team.      Inpatient consult to Palliative Care  Consult performed by: Veronica Wright NP  Consult ordered by: Karine Ryan MD  Reason for consult: goals of care and advanced care planning         Assessment/Plan:   Advance Care Planning   Palliative Consult   Date: 03/20/2023  - consult received; interval chart reviewed in detail; discussed pt discussed during ICU MDT rounds   - met with patient, at ICU bedside; introduction to palliative medicine team and role in current care and admission   - learned more about pt outside of hospital and current admission; he lives at home with his wife "Stella" and daughter Alix; Stella is currently under hospice care following, what seems to be per pt's explanation, 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"; he is from [place] and Stella is from [place], they enjoyed trips to visit family regularly; he " correlates his previous cigarette addiction as a result of the lifestyle of his work; later in his carrier he realized the toll that industry had on his overall life and transitioned to being a lumber farmer; he shared many accounts from his past   - his wife is currently receiving Hospice care; pt shares that he is very fond of the hospice provider caring for his wife; he also shares recently requiring to use her home oxygen as he previously has not been prescribed/required O2 at home   - at baseline typically ambulatory and able to perform ADLS with minimal to no assistance, besides cooking which his daughter does; but recently hasn't been able to do same amount of physical activity, such as bringing trash out, without becoming severely SOB; dyspnea with minimal exertion and sometimes at rest; does have assistive devices at home including a motorized scooter to assist with ambulation; discussed PT/OT while inpatient to assess abilities with oxygen and potentially home PT to improve strength and maintain recent  abilities   - briefly discussed the need for additional support for his medical needs at home,  pt would be a good candidate for hospice if aligned with GOC, but would have to stop UC infusions which seem to drastically improve his QOL; pt has a strong support system with real and private nurse in home several days a week; recommending addition of home palliative care which pt is agreeable to; originally planned to further discussion tomorrow once I am able to assess what services/company his wife has; he was open to this discussion and acknowledged his declining health and need for more assistance; also expressed not wanting to be increased burden to his daughter   - emotional support provided   - Allowed time for questions/concerns; all addressed; expressed availability of myself/palliative team for additional questions/concerns   - later attempted to call daughter, Alix, to learn she was at pt's  bedside; met at bedside and discussed pt's care needs at home; confirmed wife's hospice provider is [facility]; both pt and daughter are agreeable to Palliative with [facility]  - daughter aware/agrees with DNR wishes and for overall GOC for improvement in reversible conditions but with focus of QOL and comfort.    - Contacted [facility] to confirm that they also had home palliative services   - updated CM/SW for coordination of palliative with [facility]; updated hospital primary    Pulmonary  * Acute hypoxemic respiratory failure  - See COPD exacerbation  - noted; management per PCCM and hospital primary     COPD exacerbation  - known history of severe COPD; history of smoking (currently not smoking)   - sats in 80s upon EMS assessment; has responded well to Duenebs and bipap since admit  - dyspnea and increased work of breathing on NC, continued intermittent bipap   -  not currently on home O2 (used O2 PRN from his wife who is on hospice care)  - PCCM consulted   - hospice qualifying if/when aligns with GOC   - noted, management per hospital primary and PCCM     Cardiac/Vascular  Acute diastolic heart failure  - 3/19 Echo: new EF 45% with inferoseptal hypokinesis, diastolic dysfunction, and normal PAP/RV function  - respiratory symptoms also correlated to COPD   - hospice appropriate if/when aligns with GOC   - noted; management per hospital primary     ID  Severe sepsis  - suspected secondary to pneumonia   - pt immunocompromised due to UC treatment   - IV abx regimen per hospital primary   - noted; management per hospital primary     Immunology/Multi System  Immunocompromised state  - see UC   - noted; management per hospital primary     Endocrine  Uncontrolled type 2 diabetes mellitus with hyperglycemia, without long-term current use of insulin  - stable per pt and documentation   - under treatment with vedolizumab (last infusion 2/10/23)   - immunocompromised due to treatment   - noted; management  "per hospital primary     GI  Ulcerative colitis with complication  - diagnoses in his 70s, per patient   - symptoms much improved with infusion treatments with vedolizumab every 2-3 months (per pt)   - noted; management per hospital primary        Thank you for your consult. I will follow-up with patient. Please contact us if you have any additional questions.    Subjective:     HPI:   From H&P: "82-year-old  male with medical history significant for type 2 diabetes mellitus,hypertension,ulcerative colitis,chronic obstructive pulmonary disease,tobacco use disorder in remission, has 55 pack year of cigarette smoking, and previous Mycobacterium avium complex infection who presented to the ED with worsening shortness of breath that did not respond to home nebs,he was said to be saturating in the 80s when EMS arrived but up to 93 at presentation to the ED,shortness of breath was associated with productive cough but no hemoptysis,he denied chest pain, no orthopnea, pedal swelling or PND.He denied any urinary symptoms,no dysuria, no frequency,no urgency or straining at micturition.No change in bowel habit,no diarrhea or constipation."    Palliative medicine consulted for advance care planning and goals of care discussion; Met with pt at bedside; for details of visit, see advance care planning section of plan.         Hospital Course:  No notes on file    Past Medical History:   Diagnosis Date    Ambulates with cane     Arthritis     COPD (chronic obstructive pulmonary disease)     Diabetes mellitus type II     Hypertension     Mycobacterium avium complex     X's 2    Symptomatic anemia 01/28/2020    Tuberculosis     Ulcerative colitis        Past Surgical History:   Procedure Laterality Date    CATARACT EXTRACTION Bilateral     COLONOSCOPY N/A 01/22/2018    Procedure: COLONOSCOPY;  Surgeon: Roel Mendez MD;  Location: [facility];  Service: Endoscopy;  Laterality: N/A;  Pt confirmend appt.    melanoma      forehead "       Review of patient's allergies indicates:  No Known Allergies    Medications:  Continuous Infusions:  Scheduled Meds:   albuterol-ipratropium  3 mL Nebulization Q4H    atorvastatin  40 mg Oral Daily    azithromycin  500 mg Intravenous Q24H    ceFEPime (MAXIPIME) IVPB  2 g Intravenous Q8H    enoxaparin  40 mg Subcutaneous Daily    ferrous sulfate  1 tablet Oral Daily    insulin aspart U-100  5 Units Subcutaneous TIDWM    insulin detemir U-100  15 Units Subcutaneous QHS    isosorbide mononitrate  30 mg Oral Daily    mupirocin   Nasal BID    pantoprazole  40 mg Oral Daily    predniSONE  40 mg Oral Daily    vancomycin (VANCOCIN) IVPB  1,500 mg Intravenous Q24H     PRN Meds:albuterol sulfate, calcium gluconate IVPB, calcium gluconate IVPB, calcium gluconate IVPB, dextrose 10%, dextrose 10%, dextrose, dextrose, glucagon (human recombinant), insulin aspart U-100, magnesium sulfate IVPB, magnesium sulfate IVPB, potassium chloride **AND** potassium chloride **AND** potassium chloride, sodium chloride 0.9%, sodium phosphate IVPB, sodium phosphate IVPB, sodium phosphate IVPB, Pharmacy to dose Vancomycin consult **AND** vancomycin - pharmacy to dose    Family History       Problem Relation (Age of Onset)    Arthritis Daughter    Cancer Father    Depression Sister    Diabetes Daughter    No Known Problems Mother, Brother, Maternal Aunt, Maternal Uncle, Paternal Aunt, Paternal Uncle, Maternal Grandmother, Maternal Grandfather, Paternal Grandmother, Paternal Grandfather, Other          Tobacco Use    Smoking status: Former     Packs/day: 1.00     Years: 55.00     Pack years: 55.00     Types: Cigarettes    Smokeless tobacco: Never    Tobacco comments:     uses cigars on ocassion   Substance and Sexual Activity    Alcohol use: Not Currently     Comment: socially    Drug use: No    Sexual activity: Not Currently     Partners: Female       Review of Systems   Constitutional:  Positive for activity change and fatigue.    Respiratory:  Positive for cough, chest tightness, shortness of breath and wheezing.    Cardiovascular:  Negative for chest pain and leg swelling.   Gastrointestinal:  Negative for abdominal pain and nausea.   Genitourinary:  Negative for difficulty urinating and dysuria.   Musculoskeletal:  Negative for arthralgias and back pain.   Neurological:  Positive for weakness.   Psychiatric/Behavioral:  Negative for confusion and hallucinations.    Objective:     Vital Signs (Most Recent):  Temp: 97.9 °F (36.6 °C) (03/20/23 1200)  Pulse: 92 (03/20/23 1223)  Resp: (!) 22 (03/20/23 1223)  BP: (!) 120/90 (03/20/23 1200)  SpO2: 95 % (03/20/23 1223)   Vital Signs (24h Range):  Temp:  [97.7 °F (36.5 °C)-98 °F (36.7 °C)] 97.9 °F (36.6 °C)  Pulse:  [] 92  Resp:  [0-50] 22  SpO2:  [76 %-100 %] 95 %  BP: (103-158)/(56-90) 120/90     Weight: 76.8 kg (169 lb 5 oz)  Body mass index is 19.07 kg/m².    Physical Exam  Vitals and nursing note reviewed.   Constitutional:       General: He is not in acute distress.     Appearance: He is ill-appearing.      Comments: Elderly, frail   HENT:      Head: Normocephalic and atraumatic.      Nose: Nose normal.      Comments: Clear dressing to nose (bipap protection)     Mouth/Throat:      Mouth: Mucous membranes are dry.   Pulmonary:      Effort: Pulmonary effort is normal. No respiratory distress.      Comments: Occasional dyspnea with prolonged conversation despite O2 via NC; improved work of breathing from brief observation during ICU rounds   Musculoskeletal:      Right lower leg: No edema.      Left lower leg: No edema.   Neurological:      Mental Status: He is alert and oriented to person, place, and time.   Psychiatric:         Mood and Affect: Mood normal.         Thought Content: Thought content normal.       Advance Care Planning   Advance Directives:   Living Will: No    LaPOST: No    Do Not Resuscitate Status: Yes (will plan to complete LAPOST prior to discharge))        Oral  Declaration: Yes  Agent's Name:  Alix (daughter); wife lacks capacity/dementia, only living child is Alix per pt)    Decision Making:  Patient answered questions  Goals of Care: The patient endorses that what is most important right now is to focus on spending time at home, remaining as independent as possible, symptom/pain control, and quality of life, even if it means sacrificing a little time    Accordingly, we have decided that the best plan to meet the patient's goals includes continuing with treatment but agreeable to discussing more help at home through hospice or home health and palliative care.        Significant Labs: All pertinent labs within the past 24 hours have been reviewed.  CBC:   Recent Labs   Lab 03/20/23 0319   WBC 26.24*   HGB 9.8*   HCT 28.9*   MCV 92        BMP:  Recent Labs   Lab 03/20/23 0319   *   *   K 3.8   CL 99   CO2 24   BUN 22   CREATININE 1.0   CALCIUM 8.2*   MG 2.2     LFT:  Lab Results   Component Value Date    AST 12 03/20/2023    ALKPHOS 92 03/20/2023    BILITOT 0.4 03/20/2023     Albumin:   Albumin   Date Value Ref Range Status   03/20/2023 2.3 (L) 3.5 - 5.2 g/dL Final     Protein:   Total Protein   Date Value Ref Range Status   03/20/2023 6.5 6.0 - 8.4 g/dL Final     Lactic acid:   Lab Results   Component Value Date    LACTATE 1.5 03/19/2023    LACTATE 1.5 03/19/2023       Significant Imaging: I have reviewed all pertinent imaging results/findings within the past 24 hours.        Total visit time: 120 minutes    > 50% of  70  min visit spent in chart review, face to face discussion of symptom assessment, coordination of care with other specialists, documentation, and discharge planning.    50 min ACP time spent: goals of care, emotional support, formulating and communicating prognosis, exploring burden/ benefit of various approaches of treatment.       Veronica Wright NP  Palliative Medicine  [facility] - Intensive Care

## 2023-03-20 NOTE — ASSESSMENT & PLAN NOTE
This patient does have evidence of infective focus  My overall impression is severe sepsis.  Source: Respiratory  Antibiotics given-   Antibiotics (72h ago, onward)    Start     Stop Route Frequency Ordered    03/20/23 1200  vancomycin 1,500 mg in dextrose 5 % (D5W) 250 mL IVPB (Vial-Mate)         -- IV Every 24 hours (non-standard times) 03/19/23 1218    03/19/23 1215  cefepime in dextrose 5 % IVPB 2 g         -- IV Every 8 hours (non-standard times) 03/19/23 1104    03/19/23 1202  vancomycin - pharmacy to dose  (vancomycin IVPB)        See [facility] for full Linked Orders Report.    -- IV pharmacy to manage frequency 03/19/23 1104    03/19/23 1000  mupirocin 2 % ointment         03/24 0859 Nasl 2 times daily 03/19/23 0855    03/19/23 0130  azithromycin (ZITHROMAX) 500 mg in dextrose 5 % (D5W) 250 mL IVPB (Vial-Mate)         03/22 0129 IV Every 24 hours (non-standard times) 03/19/23 0024        Latest lactate reviewed-  Recent Labs   Lab 03/19/23  0353   LACTATE 1.5  1.5     Organ dysfunction indicated by Acute respiratory failure    Fluid challenge Contraindicated- Fluid bolus is contraindicated in this patient due to Congestive Heart Failure - new diagnosis diastolic dysfunction     Post- resuscitation assessment Yes Perfusion exam was performed within 6 hours of septic shock presentation after bolus shows Adequate tissue perfusion assessed by non-invasive monitoring       Will Not start Pressors- Levophed for MAP of 65  Source control achieved by: antibiotics

## 2023-03-20 NOTE — ASSESSMENT & PLAN NOTE
He has UC on vedolizumab with last infusion 2/10/23  - immunocompromised due to this  - no signs of UC flare

## 2023-03-20 NOTE — PLAN OF CARE
Patient arrived from:  home  Help at home:  daughter, Alix Anderson  Barriers to DC:  none  Discharge plan at this time:  1) Palliative NP at home with [facility] as his wife is receiving services from them 2) Home hospice    CM will continue to follow and finalize plans     [facility]  Discharge Reassessment    Primary Care Provider: Ishaan Adamson MD    Expected Discharge Date: 3/24/2023    Reassessment (most recent)       Discharge Reassessment - 03/20/23 1445          Discharge Reassessment    Assessment Type Discharge Planning Reassessment     Did the patient's condition or plan change since previous assessment? No     Discharge Plan discussed with: Adult children     Communicated STANISLAV with patient/caregiver Yes     Discharge Plan A Home with family   and Palliative care NP at home    Discharge Plan B Hospice/home     DME Needed Upon Discharge  oxygen     Why the patient remains in the hospital Requires continued medical care

## 2023-03-20 NOTE — NURSING
Nurses Note -- 4 Eyes      3/19/2023   9:47 PM      Skin assessed during: Daily Assessment      [x] No Pressure Injuries Present    [x]Prevention Measures Documented      [] Yes- Altered Skin Integrity Present or Discovered   [] LDA Added if Not in Epic (Describe Wound)   [] New Altered Skin Integrity was Present on Admit and Documented in LDA   [] Wound Image Taken    Wound Care Consulted? No    Attending Nurse:  Nathanael Tabor RN     Second RN/Staff Member:  evi erickson

## 2023-03-20 NOTE — ASSESSMENT & PLAN NOTE
-ajay nodule documented on ct  1/2020.   ajay nodule now cavitate but did not enlarge.  Patient and daughter declined biopsy and serial imaging.   -h/o CT guided biopsy in 12/14/15.  Path c/w caseous granuloma.

## 2023-03-20 NOTE — NURSING
"Called and spoke with eICU MD 2/2 pt c/o being "fidgety" and shortness of breath. Pt does have increased WOB, tachypnea, accessory muscle breathing, restlessness, with O@ level WNL and above 95%. Mild exp wheeze resolved. Per new order morphine 0.5mg given with some relief. Pt RR and WOB improved to pt baseline and is now resting    "

## 2023-03-20 NOTE — PLAN OF CARE
Patient remains in ICU. AAOx3, required some reminders to situation. Bipap worn for majority of shift. NC 3L for breakfast and at dinner time but Bipap needed to be replaced shortly after due to work of breathing. Sats >95% continuously. Abx, scheduled nebs added. Sputum cx sent. CT with contrast of chest completed.  at dinner check, MD notified and ordered 10 untis levemir for this PM. UO >1L. PRN electrolyte replacement ordered and enacted. No BM. No falls, injury, or skin breakdown. Plan of care reviewed with pt, pt niece, and pt daughter at bedside.

## 2023-03-20 NOTE — HOSPITAL COURSE
Mr Regan Alvarez has COPD (FEV1/FVC 48%) no longer smoking, not currently on home O2 (used O2 PRN from his wife who is on hospice care). Does not follow with Pulmonary. 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 He is immunocompromised-- he has UC on vedolizumab with last infusion 2/10/23.     He was admitted with acute hypoxic respiratory failure and severe sepsis due to suspected pneumonia. Started broad spectrum antibiotics. CT chest confirms RLL infiltrate. TTE shows new EF 45% with inferoseptal hypokinesis, diastolic dysfunction, and normal PAP/RV function. Given IV lasix. He was requiring BiPAP for work of breathing. Pulmonary consulted. Sputum culture normal altagracia. He is steadily improving and weaning O2. He weaned to room air at rest and with activity. He did have paroxysmal Afib RVR. Discussed, rate controlled without intervention, will hold on anticoagulation. Palliative following- plan for home palliative on discharge. PT, OT also following.     He has decided that he no longer wants lab checks. He wants to go home. Plan for home palliative upon discharge and likely transition to home hospice after next vedolizumab infusion. DME ordered.

## 2023-03-20 NOTE — SUBJECTIVE & OBJECTIVE
Past Medical History:   Diagnosis Date    Ambulates with cane     Arthritis     COPD (chronic obstructive pulmonary disease)     Diabetes mellitus type II     Hypertension     Mycobacterium avium complex     X's 2    Symptomatic anemia 01/28/2020    Tuberculosis     Ulcerative colitis        Past Surgical History:   Procedure Laterality Date    CATARACT EXTRACTION Bilateral     COLONOSCOPY N/A 01/22/2018    Procedure: COLONOSCOPY;  Surgeon: Roel Mendez MD;  Location: [facility];  Service: Endoscopy;  Laterality: N/A;  Pt confirmend appt.    melanoma      forehead       Review of patient's allergies indicates:  No Known Allergies    Family History       Problem Relation (Age of Onset)    Arthritis Daughter    Cancer Father    Depression Sister    Diabetes Daughter    No Known Problems Mother, Brother, Maternal Aunt, Maternal Uncle, Paternal Aunt, Paternal Uncle, Maternal Grandmother, Maternal Grandfather, Paternal Grandmother, Paternal Grandfather, Other          Tobacco Use    Smoking status: Former     Packs/day: 1.00     Years: 55.00     Pack years: 55.00     Types: Cigarettes    Smokeless tobacco: Never    Tobacco comments:     uses cigars on ocassion   Substance and Sexual Activity    Alcohol use: Not Currently     Comment: socially    Drug use: No    Sexual activity: Not Currently     Partners: Female         Review of Systems   Constitutional:  Negative for appetite change, chills, diaphoresis, fatigue, fever and unexpected weight change.   HENT:  Positive for hearing loss. Negative for congestion, sore throat and tinnitus.    Eyes:  Negative for pain, discharge and itching.   Respiratory:  Positive for cough, shortness of breath and wheezing. Negative for chest tightness.    Gastrointestinal:  Negative for abdominal distention, abdominal pain, blood in stool, nausea and vomiting.   Endocrine: Negative for cold intolerance, heat intolerance and polydipsia.   Genitourinary:  Negative for difficulty urinating,  dysuria, flank pain, frequency and hematuria.   Musculoskeletal:  Negative for arthralgias and back pain.   Neurological:  Positive for weakness. Negative for dizziness, seizures, facial asymmetry, light-headedness, numbness and headaches.   Psychiatric/Behavioral:  Negative for agitation, confusion and hallucinations.    Objective:     Vital Signs (Most Recent):  Temp: 97.9 °F (36.6 °C) (03/20/23 1200)  Pulse: 92 (03/20/23 1223)  Resp: (!) 22 (03/20/23 1223)  BP: (!) 120/90 (03/20/23 1200)  SpO2: 95 % (03/20/23 1223)   Vital Signs (24h Range):  Temp:  [97.7 °F (36.5 °C)-98 °F (36.7 °C)] 97.9 °F (36.6 °C)  Pulse:  [] 92  Resp:  [0-50] 22  SpO2:  [76 %-100 %] 95 %  BP: (103-158)/(56-90) 120/90     Weight: 76.8 kg (169 lb 5 oz)  Body mass index is 19.07 kg/m².      Intake/Output Summary (Last 24 hours) at 3/20/2023 1319  Last data filed at 3/20/2023 0719  Gross per 24 hour   Intake 1214.59 ml   Output 1825 ml   Net -610.41 ml       Physical Exam  Constitutional:       General: He is in acute distress.   HENT:      Head: Normocephalic and atraumatic.      Nose: Nose normal. No congestion or rhinorrhea.      Mouth/Throat:      Mouth: Mucous membranes are dry.   Eyes:      Extraocular Movements: Extraocular movements intact.      Conjunctiva/sclera: Conjunctivae normal.      Pupils: Pupils are equal, round, and reactive to light.   Cardiovascular:      Rate and Rhythm: Normal rate and regular rhythm.      Pulses: Normal pulses.      Heart sounds: Normal heart sounds.   Pulmonary:      Effort: No respiratory distress (on BIBAP).      Breath sounds: Wheezing present.   Chest:      Chest wall: No tenderness.   Abdominal:      General: Abdomen is flat. Bowel sounds are normal. There is no distension.      Palpations: Abdomen is soft.      Tenderness: There is no abdominal tenderness. There is no right CVA tenderness, left CVA tenderness, guarding or rebound.   Musculoskeletal:         General: Normal range of motion.       Cervical back: Normal range of motion and neck supple.   Skin:     General: Skin is warm and dry.   Neurological:      General: No focal deficit present.      Mental Status: He is alert and oriented to person, place, and time. Mental status is at baseline.      Cranial Nerves: No cranial nerve deficit.      Sensory: No sensory deficit.   Psychiatric:         Mood and Affect: Mood normal.         Behavior: Behavior normal.         Thought Content: Thought content normal.         Judgment: Judgment normal.       Vents:  Oxygen Concentration (%): 30 (03/19/23 2330)    Lines/Drains/Airways       Peripheral Intravenous Line  Duration                  Peripheral IV - Single Lumen 03/18/23 1951 20 G Right Forearm 1 day         Peripheral IV - Single Lumen 03/19/23 1800 20 G Left;Posterior Forearm <1 day                    Significant Labs:    CBC/Anemia Profile:  Recent Labs   Lab 03/18/23 1951 03/20/23 0319   WBC 27.43* 26.24*   HGB 11.9* 9.8*   HCT 35.7* 28.9*    318   MCV 93 92   RDW 13.8 13.9        Chemistries:  Recent Labs   Lab 03/18/23 1951 03/19/23  0353 03/20/23 0319     --  133*   K 3.5  --  3.8     --  99   CO2 23  --  24   BUN 15  --  22   CREATININE 1.0  --  1.0   CALCIUM 9.2  --  8.2*   ALBUMIN 2.8*  --  2.3*   PROT 7.9  --  6.5   BILITOT 0.6  --  0.4   ALKPHOS 89  --  92   ALT 10  --  9*   AST 14  --  12   MG 1.4* 1.3* 2.2   PHOS 1.6*  --  1.8*       ABGs:   Recent Labs   Lab 03/18/23 1951   PH 7.385   PCO2 47.5*   HCO3 28.4*   POCSATURATED 21*   BE 3     PFT 3/6/20 Ratio of 49%; FVC 2.76 L (66%); FEV1 0.94 L (32%); TLC 4.91 L (60%); dlco 10 (36%)   Significant Imaging:   I have reviewed all pertinent imaging results/findings within the past 24 hours.  CT: I have reviewed all pertinent results/findings within the past 24 hours and my personal findings are:  1/19/23 diffuse emphysematous changes with traction bronchiectasis.  Worsening of rll consolidation with cavitary changes.   Also with stable cavitary changes in rul.  Tom nodule now cavitate.  Lingula nodule stable.

## 2023-03-20 NOTE — EICU
eICU Intevention     Notified that patient is tachypneic and fidgety    Seen on BiPap restless with increased work of breathing    Will order a trial of low dose morphine 0.5 which would address dyspnea and discomfort from pain

## 2023-03-20 NOTE — PROGRESS NOTES
[facility] Medicine  Progress Note    Patient Name: Regan Alvarez  MRN: [id number]  Patient Class: IP- Inpatient   Admission Date: 3/18/2023  Length of Stay: 2 days  Attending Physician: Karine Ryan MD  Primary Care Provider: Ishaan Adamson MD        Subjective:     Principal Problem:Acute hypoxemic respiratory failure        HPI:  82-year-old  male with medical history significant for type 2 diabetes mellitus,hypertension,ulcerative colitis,chronic obstructive pulmonary disease,tobacco use disorder in remission, has 55 pack year of cigarette smoking, and previous Mycobacterium avium complex infection who presented to the ED with worsening shortness of breath that did not respond to home nebs,he was said to be saturating in the 80s when EMS arrived but up to 93 at presentation to the ED,shortness of breath was associated with productive cough but no hemoptysis,he denied chest pain, no orthopnea, pedal swelling or PND.He denied any urinary symptoms,no dysuria, no frequency,no urgency or straining at micturition.No change in bowel habit,no diarrhea or constipation.      Overview/Hospital Course:  Mr Regan Alvarez has COPD (FEV1/FVC 48%) no longer smoking, not currently on home O2 (used O2 PRN from his wife who is on hospice care). Does not follow with Pulmonary. 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 He is immunocompromised-- he has UC on vedolizumab with last infusion 2/10/23.     He was admitted with acute hypoxic respiratory failure and severe sepsis due to suspected pneumonia. Started broad spectrum antibiotics and sputum culture is pending. CT chest confirms RLL infiltrate. TTE shows new EF 45% with inferoseptal hypokinesis, diastolic dysfunction, and normal PAP/RV function. Given IV lasix. He intermittently is requiring BiPAP for work of breathing. Pulmonary consulted.       Interval  History: Feeling better, still somewhat short of breath. Less cough.     Review of Systems   Constitutional:  Negative for chills and fever.   Respiratory:  Positive for shortness of breath. Negative for cough.    Cardiovascular:  Negative for chest pain.   Gastrointestinal:  Negative for abdominal pain, nausea and vomiting.   Psychiatric/Behavioral:  Negative for confusion.    Objective:     Vital Signs (Most Recent):  Temp: 97.9 °F (36.6 °C) (03/20/23 1200)  Pulse: 92 (03/20/23 1223)  Resp: (!) 22 (03/20/23 1223)  BP: (!) 120/90 (03/20/23 1200)  SpO2: 95 % (03/20/23 1223)   Vital Signs (24h Range):  Temp:  [97.7 °F (36.5 °C)-98 °F (36.7 °C)] 97.9 °F (36.6 °C)  Pulse:  [] 92  Resp:  [0-50] 22  SpO2:  [76 %-100 %] 95 %  BP: (103-158)/(56-90) 120/90     Weight: 76.8 kg (169 lb 5 oz)  Body mass index is 19.07 kg/m².    Intake/Output Summary (Last 24 hours) at 3/20/2023 1322  Last data filed at 3/20/2023 0719  Gross per 24 hour   Intake 1214.59 ml   Output 1825 ml   Net -610.41 ml      Physical Exam  Vitals and nursing note reviewed.   Constitutional:       General: He is in acute distress.      Appearance: He is ill-appearing. He is not toxic-appearing.   HENT:      Head: Normocephalic and atraumatic.      Nose: Nose normal.      Mouth/Throat:      Mouth: Mucous membranes are moist.   Cardiovascular:      Rate and Rhythm: Normal rate and regular rhythm.      Pulses: Normal pulses.      Heart sounds: Normal heart sounds.   Pulmonary:      Effort: Respiratory distress (retractions present, pursed lip breathing) present.      Breath sounds: Rhonchi present. No wheezing or rales.      Comments: Diminished breath sounds on the R compared to the L  Abdominal:      General: Bowel sounds are normal. There is no distension.      Palpations: Abdomen is soft.      Tenderness: There is no abdominal tenderness. There is no guarding.   Musculoskeletal:      Right lower leg: No edema.      Left lower leg: No edema.   Skin:      General: Skin is warm and dry.   Neurological:      Mental Status: He is alert and oriented to person, place, and time.       Significant Labs: All pertinent labs within the past 24 hours have been reviewed.    Significant Imaging: I have reviewed all pertinent imaging results/findings within the past 24 hours.      Assessment/Plan:      * Acute hypoxemic respiratory failure  Patient admitted with Hypoxic which is Acute.  he is not on home oxygen. Signs/symptoms of respiratory failure include- tachypnea, increased work of breathing, respiratory distress and use of accessory muscles present on admission.   - Labs and images were reviewed. Patient Has not has a recent ABG.   - Supplemental oxygen was provided and noted- Nasal Cannula 3 LPM and BiPAP PRN  - Respiratory failure is due to- COPD and Pneumonia   - continue steroids, nebs for COPD exacerbation  - continue antibiotics for pneumonia. Sputum culture is pending  - TTE with new diastolic dysfunction and BNP elevated- given lasix x1, may need to redose  - Pulmonary following  - needs testing for home O2 prior to discharge      Hypophosphatemia  Replace and monitor        Hypomagnesemia  Replace and monitor        Immunocompromised state  Due to infusions given for UC      ACP (advance care planning)  Advance Care Planning     Date: 03/20/2023  See Dr Ryan note from 3/19/23-- DNR code status, no ventilator. Palliative consulted.           Uncontrolled type 2 diabetes mellitus with hyperglycemia, without long-term current use of insulin  Patient's FSGs are uncontrolled due to hyperglycemia on current medication regimen.  Last A1c reviewed-   Lab Results   Component Value Date    HGBA1C 8.8 (H) 03/18/2023     Most recent fingerstick glucose reviewed-   Recent Labs   Lab 03/19/23  1716 03/19/23  2107 03/20/23  0753 03/20/23  1124   POCTGLUCOSE 445* 434* 370* 427*     Current correctional scale  Medium  Increase anti-hyperglycemic dose as follows-    Antihyperglycemics (From admission, onward)    Start     Stop Route Frequency Ordered    03/20/23 2100  insulin detemir U-100 pen 25 Units         -- SubQ Nightly 03/20/23 1320    03/20/23 1645  insulin aspart U-100 pen 8 Units         -- SubQ 3 times daily with meals 03/20/23 1320    03/19/23 0956  insulin aspart U-100 pen 0-5 Units         -- SubQ Before meals & nightly PRN 03/19/23 0856      Hold Oral hypoglycemics while patient is in the hospital.      Ulcerative colitis without complications  He has UC on vedolizumab with last infusion 2/10/23  - immunocompromised due to this  - no signs of UC flare      Acute diastolic heart failure   The left ventricle is normal in size with mildly decreased systolic function.   The estimated ejection fraction is 45%. Inferoseptal akinesis   Left ventricular diastolic dysfunction.   Normal right ventricular size with normal right ventricular systolic function.   Severe left atrial enlargement.   Mild right atrial enlargement.   Normal central venous pressure (3 mmHg).   The estimated PA systolic pressure is 8 mmHg.    - this is new diagnosis  - he does not appear volume overloaded on exam but   BNP  Recent Labs   Lab 03/18/23 1951   *     Given lasix x1  - monitor ins/outs      Severe sepsis  This patient does have evidence of infective focus  My overall impression is severe sepsis.  Source: Respiratory  Antibiotics given-   Antibiotics (72h ago, onward)    Start     Stop Route Frequency Ordered    03/20/23 1200  vancomycin 1,500 mg in dextrose 5 % (D5W) 250 mL IVPB (Vial-Mate)         -- IV Every 24 hours (non-standard times) 03/19/23 1218    03/19/23 1215  cefepime in dextrose 5 % IVPB 2 g         -- IV Every 8 hours (non-standard times) 03/19/23 1104    03/19/23 1202  vancomycin - pharmacy to dose  (vancomycin IVPB)        See Hyperspace for full Linked Orders Report.    -- IV pharmacy to manage frequency 03/19/23 1104    03/19/23 1000  mupirocin 2 %  ointment         03/24 0859 Nasl 2 times daily 03/19/23 0855    03/19/23 0130  azithromycin (ZITHROMAX) 500 mg in dextrose 5 % (D5W) 250 mL IVPB (Vial-Mate)         03/22 0129 IV Every 24 hours (non-standard times) 03/19/23 0024        Latest lactate reviewed-  Recent Labs   Lab 03/19/23  0353   LACTATE 1.5  1.5     Organ dysfunction indicated by Acute respiratory failure    Fluid challenge Contraindicated- Fluid bolus is contraindicated in this patient due to Congestive Heart Failure - new diagnosis diastolic dysfunction     Post- resuscitation assessment Yes Perfusion exam was performed within 6 hours of septic shock presentation after bolus shows Adequate tissue perfusion assessed by non-invasive monitoring       Will Not start Pressors- Levophed for MAP of 65  Source control achieved by: antibiotics     Essential hypertension  BP is appropriate   - continue home imdur    COPD exacerbation  Know patient with severe COPD presenting with worsening shortness of breath, wheezing and cough  Last PFTs 2020  Suspect trigger is pneumonia  No longer smoking cigarettes    - started steroids- continue  - nebs Q4h  - continue antibiotics  - Pulmonary consulted  - needs testing for home O2 at discharge    History of KIET infection  He has 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 with cleared cultures       VTE Risk Mitigation (From admission, onward)         Ordered     enoxaparin injection 40 mg  Daily         03/19/23 0855     IP VTE HIGH RISK PATIENT  Once         03/19/23 0207     Place sequential compression device  Until discontinued         03/19/23 0207                Discharge Planning   STANISLAV: 3/24/2023     Code Status: DNR   Is the patient medically ready for discharge?:     Reason for patient still in hospital (select all that apply): Patient trending condition  Discharge Plan A: Home with family, Home Health, Other (TBD)        Discussed plan of care with patient and daughter at bedside.      Critical care time spent on the evaluation and treatment of severe organ dysfunction, review of pertinent labs and imaging studies, discussions with consulting providers and discussions with patient/family: 40 minutes.      Karine Ryan MD  Department of Hospital Medicine   [facility] [place] - Intensive Care

## 2023-03-20 NOTE — ASSESSMENT & PLAN NOTE
- known history of severe COPD; history of smoking (currently not smoking)   - sats in 80s upon EMS assessment; has responded well to Duenebs and bipap since admit  - dyspnea and increased work of breathing on NC, continued intermittent bipap   -  not currently on home O2 (used O2 PRN from his wife who is on hospice care)  - PCCM consulted   - hospice qualifying if/when aligns with GOC   - noted, management per hospital primary and PCCM

## 2023-03-20 NOTE — ASSESSMENT & PLAN NOTE
- suspected secondary to pneumonia   - pt immunocompromised due to UC treatment   - IV abx regimen per hospital primary   - noted; management per hospital primary

## 2023-03-20 NOTE — ASSESSMENT & PLAN NOTE
Patient admitted with Hypoxic which is Acute.  he is not on home oxygen. Signs/symptoms of respiratory failure include- tachypnea, increased work of breathing, respiratory distress and use of accessory muscles present on admission.   - Labs and images were reviewed. Patient Has not has a recent ABG.   - Supplemental oxygen was provided and noted- Nasal Cannula 3 LPM and BiPAP PRN  - Respiratory failure is due to- COPD and Pneumonia   - continue steroids, nebs for COPD exacerbation  - continue antibiotics for pneumonia. Sputum culture is pending  - TTE with new diastolic dysfunction and BNP elevated- given lasix x1, may need to redose  - Pulmonary following  - needs testing for home O2 prior to discharge

## 2023-03-20 NOTE — ASSESSMENT & PLAN NOTE
- stable per pt and documentation   - under treatment with vedolizumab (last infusion 2/10/23)   - immunocompromised due to treatment   - noted; management per hospital primary

## 2023-03-20 NOTE — SUBJECTIVE & OBJECTIVE
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    melanoma      forehead       Review of patient's allergies indicates:  No Known Allergies    Medications:  Continuous Infusions:  Scheduled Meds:   albuterol-ipratropium  3 mL Nebulization Q4H    atorvastatin  40 mg Oral Daily    azithromycin  500 mg Intravenous Q24H    ceFEPime (MAXIPIME) IVPB  2 g Intravenous Q8H    enoxaparin  40 mg Subcutaneous Daily    ferrous sulfate  1 tablet Oral Daily    insulin aspart U-100  5 Units Subcutaneous TIDWM    insulin detemir U-100  15 Units Subcutaneous QHS    isosorbide mononitrate  30 mg Oral Daily    mupirocin   Nasal BID    pantoprazole  40 mg Oral Daily    predniSONE  40 mg Oral Daily    vancomycin (VANCOCIN) IVPB  1,500 mg Intravenous Q24H     PRN Meds:albuterol sulfate, calcium gluconate IVPB, calcium gluconate IVPB, calcium gluconate IVPB, dextrose 10%, dextrose 10%, dextrose, dextrose, glucagon (human recombinant), insulin aspart U-100, magnesium sulfate IVPB, magnesium sulfate IVPB, potassium chloride **AND** potassium chloride **AND** potassium chloride, sodium chloride 0.9%, sodium phosphate IVPB, sodium phosphate IVPB, sodium phosphate IVPB, Pharmacy to dose Vancomycin consult **AND** vancomycin - pharmacy to dose    Family History       Problem Relation (Age of Onset)    Arthritis Daughter    Cancer Father    Depression Sister    Diabetes Daughter    No Known Problems Mother, Brother, Maternal Aunt, Maternal Uncle, Paternal  Aunt, Paternal Uncle, Maternal Grandmother, Maternal Grandfather, Paternal Grandmother, Paternal Grandfather, Other          Tobacco Use    Smoking status: Former     Packs/day: 1.00     Years: 55.00     Pack years: 55.00     Types: Cigarettes    Smokeless tobacco: Never    Tobacco comments:     uses cigars on ocassion   Substance and Sexual Activity    Alcohol use: Not Currently     Comment: socially    Drug use: No    Sexual activity: Not Currently     Partners: Female       Review of Systems   Constitutional:  Positive for activity change and fatigue.   Respiratory:  Positive for cough, chest tightness, shortness of breath and wheezing.    Cardiovascular:  Negative for chest pain and leg swelling.   Gastrointestinal:  Negative for abdominal pain and nausea.   Genitourinary:  Negative for difficulty urinating and dysuria.   Musculoskeletal:  Negative for arthralgias and back pain.   Neurological:  Positive for weakness.   Psychiatric/Behavioral:  Negative for confusion and hallucinations.    Objective:     Vital Signs (Most Recent):  Temp: 97.9 °F (36.6 °C) (03/20/23 1200)  Pulse: 92 (03/20/23 1223)  Resp: (!) 22 (03/20/23 1223)  BP: (!) 120/90 (03/20/23 1200)  SpO2: 95 % (03/20/23 1223)   Vital Signs (24h Range):  Temp:  [97.7 °F (36.5 °C)-98 °F (36.7 °C)] 97.9 °F (36.6 °C)  Pulse:  [] 92  Resp:  [0-50] 22  SpO2:  [76 %-100 %] 95 %  BP: (103-158)/(56-90) 120/90     Weight: 76.8 kg (169 lb 5 oz)  Body mass index is 19.07 kg/m².    Physical Exam  Vitals and nursing note reviewed.   Constitutional:       General: He is not in acute distress.     Appearance: He is ill-appearing.      Comments: Elderly, frail   HENT:      Head: Normocephalic and atraumatic.      Nose: Nose normal.      Comments: Clear dressing to nose (bipap protection)     Mouth/Throat:      Mouth: Mucous membranes are dry.   Pulmonary:      Effort: Pulmonary effort is normal. No respiratory distress.      Comments: Occasional dyspnea with  prolonged conversation despite O2 via NC; improved work of breathing from brief observation during ICU rounds   Musculoskeletal:      Right lower leg: No edema.      Left lower leg: No edema.   Neurological:      Mental Status: He is alert and oriented to person, place, and time.   Psychiatric:         Mood and Affect: Mood normal.         Thought Content: Thought content normal.       Advance Care Planning   Advance Directives:   Living Will: No    LaPOST: No    Do Not Resuscitate Status: Yes (will plan to complete LAPOST prior to discharge))        Oral Declaration: Yes  Agent's Name:  Alix (daughter); wife lacks capacity/dementia, only living child is Alix per pt)    Decision Making:  Patient answered questions  Goals of Care: The patient endorses that what is most important right now is to focus on spending time at home, remaining as independent as possible, symptom/pain control, and quality of life, even if it means sacrificing a little time    Accordingly, we have decided that the best plan to meet the patient's goals includes continuing with treatment but agreeable to discussing more help at home through hospice or home health and palliative care.        Significant Labs: All pertinent labs within the past 24 hours have been reviewed.  CBC:   Recent Labs   Lab 03/20/23 0319   WBC 26.24*   HGB 9.8*   HCT 28.9*   MCV 92        BMP:  Recent Labs   Lab 03/20/23 0319   *   *   K 3.8   CL 99   CO2 24   BUN 22   CREATININE 1.0   CALCIUM 8.2*   MG 2.2     LFT:  Lab Results   Component Value Date    AST 12 03/20/2023    ALKPHOS 92 03/20/2023    BILITOT 0.4 03/20/2023     Albumin:   Albumin   Date Value Ref Range Status   03/20/2023 2.3 (L) 3.5 - 5.2 g/dL Final     Protein:   Total Protein   Date Value Ref Range Status   03/20/2023 6.5 6.0 - 8.4 g/dL Final     Lactic acid:   Lab Results   Component Value Date    LACTATE 1.5 03/19/2023    LACTATE 1.5 03/19/2023       Significant Imaging: I  have reviewed all pertinent imaging results/findings within the past 24 hours.

## 2023-03-20 NOTE — ASSESSMENT & PLAN NOTE
Know patient with severe COPD presenting with worsening shortness of breath, wheezing and cough  Last PFTs 2020  Suspect trigger is pneumonia  No longer smoking cigarettes    - started steroids- continue  - nebs Q4h  - continue antibiotics  - Pulmonary consulted  - needs testing for home O2 at discharge

## 2023-03-20 NOTE — NURSING
[facility] [place]  Nurses Note -- 4 Eyes      3/19/2023       Skin assessed on: Q Shift      [x] No Pressure Injuries Present    [x]Prevention Measures Documented    [] Yes LDA  for Pressure Injury Previously documented     [] Yes New Pressure Injury Discovered   [] LDA for New Pressure Injury Added      Attending RN:  Radha Tabares, CHEY     Second RN:

## 2023-03-21 NOTE — PT/OT/SLP EVAL
"Physical Therapy Evaluation    Patient Name:  Regan Alvarez   MRN:  [id number]    Recommendations:     Discharge Recommendations: home health PT, home with home health   Discharge Equipment Recommendations: hospital bed   Barriers to discharge:  None from PT standpoint.  Close supervision and assist PRN recommended    Assessment:     Regan Alvarez is a 82 y.o. male admitted with a medical diagnosis of Acute hypoxemic respiratory failure.  He presents with the following impairments/functional limitations: weakness, gait instability, impaired cardiopulmonary response to activity, impaired endurance, impaired balance, impaired coordination, decreased safety awareness, impaired self care skills, impaired functional mobility, decreased coordination .    Rehab Prognosis: Fair; patient would benefit from acute skilled PT services to address these deficits and reach maximum level of function.    Recent Surgery: * No surgery found *      Plan:     During this hospitalization, patient to be seen   to address the identified rehab impairments via gait training, therapeutic activities, therapeutic exercises, neuromuscular re-education and progress toward the following goals:    Plan of Care Expires:  04/04/23    Subjective     Chief Complaint: no one will get him up to the bathroom, B foot pain and tingling/[id number]  Patient/Family Comments/goals: to get up to the bathroom initially, then later stated that he didn't have the "urge"  Pain/Comfort:  Pain Rating 1: 0/10    Patients cultural, spiritual, Episcopal conflicts given the current situation: no    Living Environment:  Pt lives with his bed bound spouse and his Daughter who is her CG.  Prior to admission, patients level of function was Independent with ambulation .  Equipment used at home: none.  DME owned (not currently used): rolling walker, single point cane, shower chair, and wheelchair.  Upon discharge, patient will have assistance from Daughter.    Objective: "     Communicated with ns slid down in bed with B LE's pressed against foot board and hanging out of bed.  with SCD, oxygen (ICU monitoring)  upon PT entry to room.    General Precautions: Standard, fall  Orthopedic Precautions:N/A   Braces: N/A  Respiratory Status: Nasal cannula, flow 3 L/min    Exams:  Cognitive Exam:  Patient is oriented to Person, Place, and Time  Fine Motor Coordination:  n/t  Gross Motor Coordination:  Impaired 2/2 weakness and deconditioning  Postural Exam:  Patient presented with the following abnormalities:    -       Rounded shoulders  -       Forward head  Sensation:    -       Intact  light/touch B Le's  Skin Integrity/Edema:      -       Skin integrity: Visible skin intact  -       Edema: Mild B Le's  RLE ROM: WFL  RLE Strength: WFL  LLE ROM: WFL  LLE Strength: WFL    Functional Mobility:  Bed Mobility:     Scooting: stand by assistance  Supine to Sit: stand by assistance  Transfers:     Sit to Stand:  contact guard assistance with rolling walker  Gait: approx 6' with RW and CGA with max Vc for walker mangement/safety and Total A for line management  Balance: Fair+ sit, , fair stand      AM-PAC 6 CLICK MOBILITY  Total Score:17       Treatment & Education:  Angle protocol: pt sat at EOB with SBA and VC for safety.  /69, HR , SPO2 91-97%.  Educated pt to perform B AP's, TKE's, and GS while up in chair.  Pt verbalized/demonstrated understanding x 10 reps with VC/TC to perform correctly    Patient left up in chair with all lines intact, call button in reach, and nsg notified.    GOALS:   Multidisciplinary Problems       Physical Therapy Goals          Problem: Physical Therapy    Goal Priority Disciplines Outcome Goal Variances Interventions   Physical Therapy Goal     PT, PT/OT Ongoing, Progressing     Description: Goals to be met by: 3/2/23     Patient will increase functional independence with mobility by performin. Supine to sit with Supervision  2. Sit to stand  transfer with Supervision  3. Gait  x 10-15 feet with Supervision using Rolling Walker.   4. Ascend/descend 2 stair with CGA  5. Lower extremity exercise program x10 reps per handout, with supervision                         History:     Past Medical History:   Diagnosis Date    Ambulates with cane     Arthritis     COPD (chronic obstructive pulmonary disease)     Diabetes mellitus type II     Hypertension     Mycobacterium avium complex     X's 2    Symptomatic anemia 01/28/2020    Tuberculosis     Ulcerative colitis        Past Surgical History:   Procedure Laterality Date    CATARACT EXTRACTION Bilateral     COLONOSCOPY N/A 01/22/2018    Procedure: COLONOSCOPY;  Surgeon: Roel Mendez MD;  Location: [facility];  Service: Endoscopy;  Laterality: N/A;  Pt confirmend appt.    melanoma      forehead       Time Tracking:     PT Received On: 03/21/23  PT Start Time: 1148     PT Stop Time: 1226  PT Total Time (min): 38 min     Billable Minutes: Evaluation 15, Therapeutic Activity 15, and Therapeutic Exercise 8      03/21/2023

## 2023-03-21 NOTE — PROGRESS NOTES
[facility] - Intensive Care  Palliative Medicine  Progress Note    Patient Name: Regan Alvarez  MRN: [id number]  Admission Date: 3/18/2023  Hospital Length of Stay: 3 days  Code Status: DNR   Attending Provider: Amando Kim MD  Consulting Provider: Veronica Wright NP  Primary Care Physician: Ishaan Adamson MD  Principal Problem:Acute hypoxemic respiratory failure    Patient information was obtained from patient, relative(s) and primary team.      Assessment/Plan:   Advance Care Planning     Pulmonary  * Acute hypoxemic respiratory failure  - See COPD exacerbation  - noted; management per PCCM and hospital primary     COPD exacerbation  - known history of severe COPD; history of smoking (currently not smoking)   - sats in 80s upon EMS assessment; has responded well to Duenebs and bipap since admit  - dyspnea and increased work of breathing on NC, continued intermittent bipap   -  not currently on home O2 (used O2 PRN from his wife who is on hospice care)  - PCCM consulted   - hospice qualifying if/when aligns with GOC   - noted, management per hospital primary and [facility]     Cardiac/Vascular  Acute diastolic heart failure  - 3/19 Echo: new EF 45% with inferoseptal hypokinesis, diastolic dysfunction, and normal PAP/RV function  - respiratory symptoms also correlated to COPD   - hospice appropriate if/when aligns with GOC   - noted; management per hospital primary     ID  Severe sepsis  - suspected secondary to pneumonia   - pt immunocompromised due to UC treatment   - IV abx regimen per hospital primary   - noted; management per hospital primary     GI  Ulcerative colitis without complications  - diagnoses in his 70s, per patient   - symptoms much improved with infusion treatments with vedolizumab every 2-3 months (per pt)   - noted; management per hospital primary      Palliative Care  ACP (advance care planning)  Palliative Encounter    Date: 03/20/2023  - interval chart reviewed in detail; pt discussed in  "ICU MDT rounds   - spoke with pt's niece outside pt's room, shared daughter has formal POA paperwork   - met with pt at ICU bedside; continued GOC/ACP discussion   - discussed completion of LAPOST and MPOA; he also believed that his daughter had formal POA documents; explained that I would like to add a copy to his chart to avoid confusion of MPOA in the future since wife is still alive but per pt and daughter lacks capacity; explained that if that paperwork was not readily available  We could also complete facility MPOA document   - discussed plan to confirm POA paperwork with daughter and complete all necessary document prior to discharge   - reassured pt that I was able to confirm [facility] will be able to provide his home palliative care, which he was excited to hear as he admires the care they have provided to his wife   - later called daughter to confirm existence of POA documents; she was happy to provide a copy for pt's chart when she visits tomorrow; also discussed plan to complete LAPOST and explained LAPOST document   - daughter continues to have good insight into pt's condition and continues to share his GOC for optimization with limits to invasive procedures, with focus for avoiding hospital and QOL/comfort  - emotional support provided; allowed both pt and daughter time for questions/concerns, all addressed   - ongoing communication with MDT     Palliative Consult   Date: 03/20/2023  - consult received; interval chart reviewed in detail; discussed pt discussed during ICU MDT rounds   - met with patient, at ICU bedside; introduction to palliative medicine team and role in current care and admission   - learned more about pt outside of hospital and current admission; he lives at home with his wife "Stella" and daughter Alix; Stella is currently under hospice care following, what seems to be per pt's explanation, early stage dementia which lead to a fall, 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; he shared many accounts from his past   - his wife is currently receiving Hospice care; pt shares that he is very fond of the hospice provider caring for his wife; he also shares recently requiring to use her home oxygen as he previously has not been prescribed/required O2 at home   - at baseline typically ambulatory and able to perform ADLS with minimal to no assistance, besides cooking which his daughter does; but recently hasn't been able to do same amount of physical activity, such as bringing trash out, without becoming severely SOB; dyspnea with minimal exertion and sometimes at rest; does have assistive devices at home including a motorized scooter to assist with ambulation; discussed PT/OT while inpatient to assess abilities with oxygen and potentially home PT to improve strength and maintain recent  abilities   - briefly discussed the need for additional support for his medical needs at home,  pt would be a good candidate for hospice if aligned with GOC, but would have to stop UC infusions which seem to drastically improve his QOL; pt has a strong support " "system with daugther and private nurse in home several days a week; recommending addition of home palliative care which pt is agreeable to; originally planned to further discussion tomorrow once I am able to assess what services/company his wife has; he was open to this discussion and acknowledged his declining health and need for more assistance; also expressed not wanting to be increased burden to his daughter   - emotional support provided   - Allowed time for questions/concerns; all addressed; expressed availability of myself/palliative team for additional questions/concerns   - later attempted to call daughter, Alix, to learn she was at pt's bedside; met at bedside and discussed pt's care needs at home; confirmed wife's hospice provider is [facility]; both pt and daughter are agreeable to Palliative with [facility]  - daughter aware/agrees with DNR wishes and for overall GOC for improvement in reversible conditions but with focus of QOL and comfort.    - Contacted [facility] to confirm that they also had home palliative services   - updated CM/SW for coordination of palliative with [facility]; updated hospital primary      I will follow-up with patient. Please contact us if you have any additional questions.    Subjective:     Chief Complaint:   Chief Complaint   Patient presents with    Shortness of Breath     Pt arrived via ems, pt chief complaint is shortness of breath. Pt has twila SOB all day was on home treatment upon ems arrival stating in 80's. Pt on duo neb now but only stating 93 at this time.         HPI:   From H&P: "82-year-old  male with medical history significant for type 2 diabetes mellitus,hypertension,ulcerative colitis,chronic obstructive pulmonary disease,tobacco use disorder in remission, has 55 pack year of cigarette smoking, and previous Mycobacterium avium complex infection who presented to the ED with worsening shortness of breath that did not respond to home " "nebs,he was said to be saturating in the 80s when EMS arrived but up to 93 at presentation to the ED,shortness of breath was associated with productive cough but no hemoptysis,he denied chest pain, no orthopnea, pedal swelling or PND.He denied any urinary symptoms,no dysuria, no frequency,no urgency or straining at micturition.No change in bowel habit,no diarrhea or constipation."    Palliative medicine consulted for advance care planning and goals of care discussion; Met with pt at bedside; for details of visit, see advance care planning section of plan.         Hospital Course:  No notes on file    Past Medical History:   Diagnosis Date    Ambulates with cane     Arthritis     COPD (chronic obstructive pulmonary disease)     Diabetes mellitus type II     Hypertension     Mycobacterium avium complex     X's 2    Symptomatic anemia 01/28/2020    Tuberculosis     Ulcerative colitis        Past Surgical History:   Procedure Laterality Date    CATARACT EXTRACTION Bilateral     COLONOSCOPY N/A 01/22/2018    Procedure: COLONOSCOPY;  Surgeon: Roel Mendez MD;  Location: [facility];  Service: Endoscopy;  Laterality: N/A;  Pt confirmend appt.    melanoma      forehead     Review of patient's allergies indicates:  No Known Allergies    Medications:  Continuous Infusions:  Scheduled Meds:   albuterol-ipratropium  3 mL Nebulization Q4H    atorvastatin  40 mg Oral Daily    ceFEPime (MAXIPIME) IVPB  2 g Intravenous Q8H    enoxaparin  40 mg Subcutaneous Daily    ferrous sulfate  1 tablet Oral Daily    insulin aspart U-100  10 Units Subcutaneous TIDWM    insulin detemir U-100  25 Units Subcutaneous BID    isosorbide mononitrate  30 mg Oral Daily    mupirocin   Nasal BID    pantoprazole  40 mg Oral Daily    predniSONE  40 mg Oral Daily    sodium chloride 3%  4 mL Nebulization Daily    vancomycin (VANCOCIN) IVPB  1,500 mg Intravenous Q24H     PRN Meds:albuterol sulfate, dextrose 10%, dextrose 10%, dextrose, " dextrose, glucagon (human recombinant), insulin aspart U-100, magnesium oxide, magnesium oxide, melatonin, potassium bicarbonate, potassium bicarbonate, potassium bicarbonate, potassium, sodium phosphates, potassium, sodium phosphates, potassium, sodium phosphates, sodium chloride 0.9%, Pharmacy to dose Vancomycin consult **AND** vancomycin - pharmacy to dose    Family History       Problem Relation (Age of Onset)    Arthritis Daughter    Cancer Father    Depression Sister    Diabetes Daughter    No Known Problems Mother, Brother, Maternal Aunt, Maternal Uncle, Paternal Aunt, Paternal Uncle, Maternal Grandmother, Maternal Grandfather, Paternal Grandmother, Paternal Grandfather, Other          Tobacco Use    Smoking status: Former     Packs/day: 1.00     Years: 55.00     Pack years: 55.00     Types: Cigarettes    Smokeless tobacco: Never    Tobacco comments:     uses cigars on ocassion   Substance and Sexual Activity    Alcohol use: Not Currently     Comment: socially    Drug use: No    Sexual activity: Not Currently     Partners: Female       Review of Systems   Constitutional:  Positive for activity change and fatigue.   Respiratory:  Positive for cough, chest tightness, shortness of breath and wheezing.    Cardiovascular:  Negative for chest pain and leg swelling.   Gastrointestinal:  Negative for abdominal pain and nausea.   Genitourinary:  Negative for difficulty urinating and dysuria.   Musculoskeletal:  Negative for arthralgias and back pain.   Neurological:  Positive for weakness.   Psychiatric/Behavioral:  Negative for confusion and hallucinations.    Objective:     Vital Signs (Most Recent):  Temp: 97.6 °F (36.4 °C) (03/21/23 1101)  Pulse: 96 (03/21/23 1400)  Resp: (!) 27 (03/21/23 1400)  BP: 128/80 (03/21/23 1400)  SpO2: 98 % (03/21/23 1400)   Vital Signs (24h Range):  Temp:  [97.4 °F (36.3 °C)-98.3 °F (36.8 °C)] 97.6 °F (36.4 °C)  Pulse:  [] 96  Resp:  [19-48] 27  SpO2:  [92 %-100 %] 98 %  BP:  (109-151)/(57-88) 128/80     Weight: 76.7 kg (169 lb)  Body mass index is 19.04 kg/m².    Physical Exam  Vitals and nursing note reviewed.   Constitutional:       General: He is not in acute distress.     Appearance: He is ill-appearing.      Comments: Elderly, frail   HENT:      Head: Normocephalic and atraumatic.      Nose: Nose normal.      Comments: Clear dressing to nose (bipap protection)     Mouth/Throat:      Mouth: Mucous membranes are dry.   Pulmonary:      Effort: Pulmonary effort is normal. No respiratory distress.      Comments: Occasional dyspnea with prolonged conversation despite O2 via NC; improved work of breathing from brief observation during ICU rounds   Musculoskeletal:      Right lower leg: No edema.      Left lower leg: No edema.   Neurological:      Mental Status: He is alert and oriented to person, place, and time.   Psychiatric:         Mood and Affect: Mood normal.         Thought Content: Thought content normal.       Advance Care Planning   Advance Directives:   Living Will: No    LaPOST: No    Do Not Resuscitate Status: Yes (will plan to complete LAPOST prior to discharge))        Oral Declaration: Yes  Agent's Name:  Alix (daughter); wife lacks capacity/dementia, only living child is Alix per pt)    Decision Making:  Patient answered questions  Goals of Care: What is most important right now is to focus on spending time at home, remaining as independent as possible, symptom/pain control, quality of life, even if it means sacrificing a little time, improvement in condition with limits to invasive procedures. Accordingly, we have decided that the best plan to meet the patient's goals includes continuing with treatment and enrolling in home palliative care.       Significant Labs: All pertinent labs within the past 24 hours have been reviewed.  CBC:   Recent Labs   Lab 03/21/23  0400   WBC 29.15*   HGB 9.7*   HCT 29.7*   MCV 93          BMP:  Recent Labs   Lab 03/21/23  0400    *   *   K 4.1      CO2 22*   BUN 24*   CREATININE 1.0   CALCIUM 8.1*   MG 2.0       LFT:  Lab Results   Component Value Date    AST 28 03/21/2023    ALKPHOS 80 03/21/2023    BILITOT 0.3 03/21/2023     Albumin:   Albumin   Date Value Ref Range Status   03/21/2023 2.3 (L) 3.5 - 5.2 g/dL Final     Protein:   Total Protein   Date Value Ref Range Status   03/21/2023 6.5 6.0 - 8.4 g/dL Final     Lactic acid:   Lab Results   Component Value Date    LACTATE 1.5 03/19/2023    LACTATE 1.5 03/19/2023       Significant Imaging: I have reviewed all pertinent imaging results/findings within the past 24 hours.        Total visit time: 55 minutes    > 50% of  35  min visit spent in chart review, face to face discussion of symptom assessment, coordination of care with other specialists, documentation, and discharge planning.    20 min ACP time spent: goals of care, emotional support, formulating and communicating prognosis, exploring burden/ benefit of various approaches of treatment.       Veronica Wright NP  Palliative Medicine  [facility] - Intensive Care

## 2023-03-21 NOTE — PROGRESS NOTES
Pharmacokinetic Assessment Follow Up: IV Vancomycin    Vancomycin serum concentration assessment(s):    The trough level was drawn correctly and can be used to guide therapy at this time. The measurement is within the desired definitive target range of 10 to 20 mcg/mL.    Vancomycin Regimen Plan:    Continue regimen to Vancomycin 1500 mg IV every 24 hours with next serum trough concentration measured at 1100 prior to 3rd dose on 3/23/2023    Drug levels (last 3 results):  Recent Labs   Lab Result Units 03/21/23  1101   Vancomycin-Trough ug/mL 11.0       Pharmacy will continue to follow and monitor vancomycin.    Please contact pharmacy at extension [phone] for questions regarding this assessment.    Thank you for the consult,   Kurtis Fitzpatrick Jr       Patient brief summary:  Regan Alvarez is a 82 y.o. male initiated on antimicrobial therapy with IV Vancomycin for treatment of  pneumonia    Drug Allergies:   Review of patient's allergies indicates:  No Known Allergies    Actual Body Weight:   76.7 kg    Renal Function:   Estimated Creatinine Clearance: 59.7 mL/min (based on SCr of 1 mg/dL).,     Dialysis Method (if applicable):  N/A    CBC (last 72 hours):  Recent Labs   Lab Result Units 03/18/23 1951 03/20/23 0319 03/21/23  0400   WBC K/uL 27.43* 26.24* 29.15*   Hemoglobin g/dL 11.9* 9.8* 9.7*   Hemoglobin A1C % 8.8*  --   --    Hematocrit % 35.7* 28.9* 29.7*   Platelets K/uL 326 318 360   Gran % % 80.5* 94.0* 91.3*   Lymph % % 8.9* 1.8* 2.8*   Mono % % 9.7 3.2* 4.4   Eosinophil % % 0.0 0.0 0.0   Basophil % % 0.2 0.2 0.1   Differential Method  Automated Automated Automated       Metabolic Panel (last 72 hours):  Recent Labs   Lab Result Units 03/18/23 1951 03/19/23 0353 03/20/23 0319 03/21/23  0400   Sodium mmol/L 137  --  133* 132*   Potassium mmol/L 3.5  --  3.8 4.1   Chloride mmol/L 101  --  99 101   CO2 mmol/L 23  --  24 22*   Glucose mg/dL 243*  --  383* 352*   BUN mg/dL 15  --  22 24*   Creatinine  mg/dL 1.0  --  1.0 1.0   Albumin g/dL 2.8*  --  2.3* 2.3*   Total Bilirubin mg/dL 0.6  --  0.4 0.3   Alkaline Phosphatase U/L 89  --  92 80   AST U/L 14  --  12 28   ALT U/L 10  --  9* 22   Magnesium mg/dL 1.4* 1.3* 2.2 2.0   Phosphorus mg/dL 1.6*  --  1.8*  --        Vancomycin Administrations:  vancomycin given in the last 96 hours                     vancomycin 1,500 mg in dextrose 5 % (D5W) 250 mL IVPB (Vial-Mate) (mg) 1,500 mg New Bag 03/20/23 1206    vancomycin (VANCOCIN) 2,000 mg in dextrose 5 % (D5W) 500 mL IVPB (mg) 2,000 mg New Bag 03/19/23 1204                    Microbiologic Results:  Microbiology Results (last 7 days)       Procedure Component Value Units Date/Time    AFB Culture & Smear [[id number]] Collected: 03/20/23 1926    Order Status: Sent Specimen: Respiratory from Sputum Updated: 03/21/23 1005    Culture, Respiratory with Gram Stain [[id number]] Collected: 03/19/23 1011    Order Status: Completed Specimen: Respiratory from Sputum, Expectorated Updated: 03/21/23 0612     Respiratory Culture Normal respiratory altagracia     Gram Stain (Respiratory) <10 epithelial cells per low power field.     Gram Stain (Respiratory) Moderate WBC's     Gram Stain (Respiratory) Few Gram negative rods     Gram Stain (Respiratory) Few Gram positive cocci in pairs    Blood culture x two cultures. Draw prior to antibiotics. [[id number]] Collected: 03/18/23 2052    Order Status: Completed Specimen: Blood from Peripheral, Hand, Left Updated: 03/20/23 2303     Blood Culture, Routine No Growth to date      No Growth to date      No Growth to date    Narrative:      Aerobic and anaerobic    Blood culture x two cultures. Draw prior to antibiotics. [[id number]] Collected: 03/18/23 2035    Order Status: Completed Specimen: Blood from Peripheral, Hand, Right Updated: 03/20/23 2103     Blood Culture, Routine No Growth to date      No Growth to date      No Growth to date    Narrative:      Aerobic and anaerobic

## 2023-03-21 NOTE — CARE UPDATE
[facility]  Nurses Note -- 4 Eyes      3/21/2023       Skin assessed on: Q Shift      [] No Pressure Injuries Present    []Prevention Measures Documented    [x] Yes LDA  for Pressure Injury Previously documented     [] Yes New Pressure Injury Discovered   [] LDA for New Pressure Injury Added      Attending RN:  Linda Chatman RN     Second RN:  Corie Barbosa RN

## 2023-03-21 NOTE — PLAN OF CARE
Problem: Physical Therapy  Goal: Physical Therapy Goal  Description: Goals to be met by: 3/2/23     Patient will increase functional independence with mobility by performin. Supine to sit with Supervision  2. Sit to stand transfer with Supervision  3. Gait  x 10-15 feet with Supervision using Rolling Walker.   4. Ascend/descend 2 stair with CGA  5. Lower extremity exercise program x10 reps per handout, with supervision    Outcome: Ongoing, Progressing   Initial PT evaluation performed today.  Pt could benefit from skilled PT services 3-5x/wk in order to maximize function prior to D/C.  HHPT and family support recommended at time of D/C.

## 2023-03-21 NOTE — CONSULTS
[facility] Intensive Care  Wound Care    Patient Name:  Regan Alvarez   MRN:  [id number]  Date: 3/21/2023  Diagnosis: Acute hypoxemic respiratory failure    History:     Past Medical History:   Diagnosis Date    Ambulates with cane     Arthritis     COPD (chronic obstructive pulmonary disease)     Diabetes mellitus type II     Hypertension     Mycobacterium avium complex     X's 2    Symptomatic anemia 01/28/2020    Tuberculosis     Ulcerative colitis        Social History     Socioeconomic History    Marital status:    Tobacco Use    Smoking status: Former     Packs/day: 1.00     Years: 55.00     Pack years: 55.00     Types: Cigarettes    Smokeless tobacco: Never    Tobacco comments:     uses cigars on ocassion   Substance and Sexual Activity    Alcohol use: Not Currently     Comment: socially    Drug use: No    Sexual activity: Not Currently     Partners: Female     Social Determinants of Health     Transportation Needs: No Transportation Needs    Lack of Transportation (Medical): No    Lack of Transportation (Non-Medical): No   Social Connections: Unknown    Frequency of Communication with Friends and Family: More than three times a week    Frequency of Social Gatherings with Friends and Family: More than three times a week   Housing Stability: Unknown    Unable to Pay for Housing in the Last Year: No    Unstable Housing in the Last Year: No       Precautions:     Allergies as of 03/18/2023    (No Known Allergies)       [facility] Assessment Details/Treatment   Consulted for altered skin integrity partial thickness wound buttocks  An 82 year old male admitted 3/18/23 home with COPD exacerbation; acute respiratory distress; uncontrolled DM II; ulcerative colitis with complication; essential hypertension  3/21 WBC 29.15 Hgb 9.7 Hct 29.7 Alb 2.3 Weight 169 lbs  3/18 A1C 8.8   On Isolibrium mattress; Ravin score 17  Assessment:  Patient eating meal. Not aware of having a skin breakdown, but reports SOB when  attempting to shower  Plan: Will check skin tomorrow.   Nursing to continue Pressure Injury Prevention Interventions  Orders placed.     03/21/2023

## 2023-03-21 NOTE — ASSESSMENT & PLAN NOTE
Patient's FSGs are uncontrolled due to hyperglycemia on current medication regimen.  Last A1c reviewed-   Lab Results   Component Value Date    HGBA1C 8.8 (H) 03/18/2023     Most recent fingerstick glucose reviewed-   Recent Labs   Lab 03/20/23 2025 03/21/23  0729 03/21/23  1120   POCTGLUCOSE 386* 302* 143*     Current correctional scale  Medium  Increase anti-hyperglycemic dose as follows-   Antihyperglycemics (From admission, onward)    Start     Stop Route Frequency Ordered    03/21/23 2100  insulin detemir U-100 pen 20 Units         -- SubQ 2 times daily 03/21/23 1700 03/21/23 0715  insulin aspart U-100 pen 10 Units         -- SubQ 3 times daily with meals 03/20/23 1702 03/19/23 0956  insulin aspart U-100 pen 0-5 Units         -- SubQ Before meals & nightly PRN 03/19/23 0856      Hold Oral hypoglycemics while patient is in the hospital.

## 2023-03-21 NOTE — NURSING
Nurses Note -- 4 Eyes      3/21/2023   12:22 AM      Skin assessed during: Q Shift Change      [x] No Pressure Injuries Present    [x]Prevention Measures Documented      [] Yes- Altered Skin Integrity Present or Discovered   [] LDA Added if Not in Epic (Describe Wound)   [] New Altered Skin Integrity was Present on Admit and Documented in LDA   [] Wound Image Taken    Wound Care Consulted? Yes    Attending Nurse:  Nathanael Tabor RN     Second RN/Staff Member:

## 2023-03-21 NOTE — HPI
"82M with h/o T2dm, copd, tobacco abuse, prior pulmonary MAC admitted 3/19 with worsening sob/productive phegm. Reports chronic sob, but says some days it's worse. Lives with his daughter and says he's normally able to get around her house, but cough recently worse. Says he had a lot of trouble with secretions. Felt like he was vomiting but was mostly mucous. 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 Knows that he's elderly and doesn't have long left and wants quality of life over quantity. Doesn't want to get worked up for lung cancer is present. 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 Says that he had a lot of trouble tolerating the antibiotic before and doesn't want to have diarrhea again from antibiotics.        Last saw Dr Almaraz 2020  "MAC infection and severe COPD. He was treated for MAC from Jan 2016 to Dec 2017 with a triple antibiotic regimen. He was AFB culture negative at the end of the treatment period. "      Ct  worsening right basilar consolidation concerning for pneumonia.     Otherwise stable background of severe postinflammatory changes in the lungs including emphysema, multifocal bronchiectasis and scarring and multiloculated air-fluid levels in the pleural space in the apex and right lower hemithorax.         On day 5 antibiotics, vanc/cefepime/azithromycin    Acid Fast Susceptibility, Slow Grower FINAL 02/03/16 1210 Abnormal     Comment: SOURCE: LUNG, source = lung abscess, organism = M avium   intracellulare complex   SUSCEPTIBILITY, AFB, SLOW GROWER                       FINAL   MYCOBACTERIUM AVIUM COMPLEX     Organism identified by client.   There are no established interpretive guidelines for agents   reported without interpretations.   ----------------------------------------------------------   Organism     MYCOBACTERIUM AVIUM COMPLEX   Antibiotic    MARCO A (mcg/mL)  Interpretation   ----------------------------------------------------------   Moxifloxacin             2       I " "  Clarithromycin           4       S   Amikacin                64   Streptomycin           >64   Linezolid               16       I   Ethambutol             >16   Rifampin                >8   Rifabutin                1        ID consulted for "antibiotic management  "

## 2023-03-21 NOTE — EICU
Patient noted to be very restless with poor sleep and had received morphine last night for similar complains and respiratory distress on BIPAP.    On camera on nasal cannula oxygen 3L/minute   SpO2 97%    Plan:  Attempt benadryl 25 mg po now.  Ordered melatonin 6mg po nightly prn for insomnia.

## 2023-03-21 NOTE — CONSULTS
"2018:   Consult was conducted with the patient.  My visit with the patient found him alert, awake and coherent. He began to disclose that his wife was at home under hospice care. He also stated that he had lost a son and granson to suicide.  He admitted that those deaths took a great toll on he and his wife, and they both took a long period a grief attempting to get past their trgic losses.  Mr. Alvarez refused prayer, stating, " it would not be necessary!"  The patient continuously thanked me for my visit with him, and welcomed me to return again.   He also commented on the amazinzingly kind, and professional people that were taking care of him.  With my promise of returning to visit with him again the consult was ended.  The Spiritual Care Team will continue to provide spiritual support to this patient.        AVERY Blake  [phone]    "

## 2023-03-21 NOTE — PROGRESS NOTES
[facility] Intensive Care  Pulmonology  Progress Note    Patient Name: Regan Alvarez  MRN: [id number]  Admission Date: 3/18/2023  Hospital Length of Stay: 3 days  Code Status: DNR  Attending Provider: Amando Kim MD  Primary Care Provider: Ishaan Adamson MD   Principal Problem: Acute hypoxemic respiratory failure    Subjective:     Interval History: no acute issue.  Difficulty with sleeping overnight.        Objective:     Vital Signs (Most Recent):  Temp: 97.6 °F (36.4 °C) (03/21/23 1101)  Pulse: 88 (03/21/23 1501)  Resp: (!) 21 (03/21/23 1501)  BP: 131/74 (03/21/23 1501)  SpO2: 100 % (03/21/23 1501)   Vital Signs (24h Range):  Temp:  [97.4 °F (36.3 °C)-98.3 °F (36.8 °C)] 97.6 °F (36.4 °C)  Pulse:  [] 88  Resp:  [19-48] 21  SpO2:  [92 %-100 %] 100 %  BP: (109-151)/(57-88) 131/74     Weight: 76.7 kg (169 lb)  Body mass index is 19.04 kg/m².      Intake/Output Summary (Last 24 hours) at 3/21/2023 1532  Last data filed at 3/21/2023 1501  Gross per 24 hour   Intake 646.08 ml   Output 1100 ml   Net -453.92 ml       Physical Exam  Constitutional:       General: He is in acute distress.   HENT:      Head: Normocephalic and atraumatic.      Nose: Nose normal. No congestion or rhinorrhea.      Mouth/Throat:      Mouth: Mucous membranes are dry.   Eyes:      Extraocular Movements: Extraocular movements intact.      Conjunctiva/sclera: Conjunctivae normal.      Pupils: Pupils are equal, round, and reactive to light.   Cardiovascular:      Rate and Rhythm: Normal rate and regular rhythm.      Pulses: Normal pulses.      Heart sounds: Normal heart sounds.   Pulmonary:      Effort: No respiratory distress (on BIBAP).      Breath sounds: No wheezing.   Chest:      Chest wall: No tenderness.   Abdominal:      General: Abdomen is flat. Bowel sounds are normal. There is no distension.      Palpations: Abdomen is soft.      Tenderness: There is no abdominal tenderness. There is no right CVA tenderness, left CVA  tenderness, guarding or rebound.   Musculoskeletal:         General: Normal range of motion.      Cervical back: Normal range of motion and neck supple.   Skin:     General: Skin is warm and dry.   Neurological:      General: No focal deficit present.      Mental Status: He is alert and oriented to person, place, and time. Mental status is at baseline.      Cranial Nerves: No cranial nerve deficit.      Sensory: No sensory deficit.   Psychiatric:         Mood and Affect: Mood normal.         Behavior: Behavior normal.         Thought Content: Thought content normal.         Judgment: Judgment normal.     Review of Systems    Vents:  Oxygen Concentration (%): 30 (03/19/23 2330)    Lines/Drains/Airways       Peripheral Intravenous Line  Duration                  Peripheral IV - Single Lumen 03/21/23 1415 22 G Anterior;Right Forearm <1 day                    Significant Labs:    CBC/Anemia Profile:  Recent Labs   Lab 03/20/23 0319 03/21/23  0400   WBC 26.24* 29.15*   HGB 9.8* 9.7*   HCT 28.9* 29.7*    360   MCV 92 93   RDW 13.9 14.0        Chemistries:  Recent Labs   Lab 03/20/23 0319 03/21/23  0400   * 132*   K 3.8 4.1   CL 99 101   CO2 24 22*   BUN 22 24*   CREATININE 1.0 1.0   CALCIUM 8.2* 8.1*   ALBUMIN 2.3* 2.3*   PROT 6.5 6.5   BILITOT 0.4 0.3   ALKPHOS 92 80   ALT 9* 22   AST 12 28   MG 2.2 2.0   PHOS 1.8*  --        PFT 3/6/20 Ratio of 49%; FVC 2.76 L (66%); FEV1 0.94 L (32%); TLC 4.91 L (60%); dlco 10 (36%)     ABGs: No results for input(s): PH, PCO2, HCO3, POCSATURATED, BE in the last 48 hours.  Cardiac Markers: No results for input(s): CKMB, TROPONINT, MYOGLOBIN in the last 48 hours.  Respiratory Culture: No results for input(s): GSRESP, RESPIRATORYC in the last 48 hours.    Significant Imaging:   I have reviewed all pertinent imaging results/findings within the past 24 hours.  CT: I have reviewed all pertinent results/findings within the past 24 hours and my personal findings are:  1/19/23  diffuse emphysematous changes with traction bronchiectasis.  Worsening of rll consolidation with cavitary changes.  Also with stable cavitary changes in rul.  Ajay nodule now cavitate.  Lingula nodule stable.       ABG  Recent Labs   Lab 03/18/23 1951   PH 7.385   PO2 16*   PCO2 47.5*   HCO3 28.4*   BE 3     Assessment/Plan:     Pulmonary  * Acute hypoxemic respiratory failure  Patient with Hypercapnic and Hypoxic Respiratory failure which is Acute on chronic.  he is not on home oxygen. Supplemental oxygen was provided and noted-  .   Signs/symptoms of respiratory failure include- tachypnea, increased work of breathing, respiratory distress and wheezing. Contributing diagnoses includes - COPD and Pneumonia Labs and images were reviewed. Patient Has recent ABG, which has been reviewed. Will treat underlying causes and adjust management of respiratory failure as follows-   -fev1 of 36%; ct with diffuse airspace disease and cavitary changes.  Worsening of rll consolidation along with leukocytosis suggestive of community bacterial pneumonia superimpose on chronic lung parenchymal changes due to baseline copd and architectural distortion a/w MAC infection  -airway clearance with hypertonic saline, nebs and cpt  -steroid + antibiotics for cap.    -worsening leukocytosis despite vanc and cefapime.  Will get ID inputs.    -sputum culture for afb and routine culture  -patient did not like bipap and unlikely to wear it at home  -would likely benefit from home oxygen  -would benefit from Trelegy in place of symbicort upon discharge.      Pulmonary nodule  -h/o CT guided biopsy rul in 12/14/15.  Path c/w caseous granuloma.    -ajay nodule documented on ct  1/2020.   ajay nodule now cavitate but did not enlarge.  Patient and daughter declined biopsy and serial imaging.    COPD exacerbation  -see respiratory failure    Palliative Care  ACP (advance care planning)  -dnr status reconfirmed.  Would benefit from outpatient palliative  care follow up.           Ab Noyola MD  Pulmonology  [facility] - Intensive Care    Will be available upon request   normal... Well appearing, awake, alert, oriented to person, place, time/situation and in no apparent distress.

## 2023-03-21 NOTE — ASSESSMENT & PLAN NOTE
This patient does have evidence of infective focus  My overall impression is severe sepsis.  Source: Respiratory  Antibiotics given-   Antibiotics (72h ago, onward)    Start     Stop Route Frequency Ordered    03/20/23 1200  vancomycin 1,500 mg in dextrose 5 % (D5W) 250 mL IVPB (Vial-Mate)         -- IV Every 24 hours (non-standard times) 03/19/23 1218    03/19/23 1215  cefepime in dextrose 5 % IVPB 2 g         -- IV Every 8 hours (non-standard times) 03/19/23 1104    03/19/23 1202  vancomycin - pharmacy to dose  (vancomycin IVPB)        See [facility] for full Linked Orders Report.    -- IV pharmacy to manage frequency 03/19/23 1104    03/19/23 1000  mupirocin 2 % ointment         03/24 0859 Nasl 2 times daily 03/19/23 0855        Latest lactate reviewed-  Recent Labs   Lab 03/19/23  0353   LACTATE 1.5  1.5     Organ dysfunction indicated by Acute respiratory failure    Fluid challenge Contraindicated- Fluid bolus is contraindicated in this patient due to Congestive Heart Failure - new diagnosis diastolic dysfunction     Post- resuscitation assessment Yes Perfusion exam was performed within 6 hours of septic shock presentation after bolus shows Adequate tissue perfusion assessed by non-invasive monitoring       Will Not start Pressors- Levophed for MAP of 65  Source control achieved by: antibiotics     White blood cell count is still significantly elevated at 29.15 today.  Continue with IV antibiotics.  Respiratory cultures growing normal respiratory altagracia so no bacteria to target at this time.  He is currently improving with current management.

## 2023-03-21 NOTE — SUBJECTIVE & OBJECTIVE
Interval History: no acute issue.  Difficulty with sleeping overnight.        Objective:     Vital Signs (Most Recent):  Temp: 97.6 °F (36.4 °C) (03/21/23 1101)  Pulse: 88 (03/21/23 1501)  Resp: (!) 21 (03/21/23 1501)  BP: 131/74 (03/21/23 1501)  SpO2: 100 % (03/21/23 1501)   Vital Signs (24h Range):  Temp:  [97.4 °F (36.3 °C)-98.3 °F (36.8 °C)] 97.6 °F (36.4 °C)  Pulse:  [] 88  Resp:  [19-48] 21  SpO2:  [92 %-100 %] 100 %  BP: (109-151)/(57-88) 131/74     Weight: 76.7 kg (169 lb)  Body mass index is 19.04 kg/m².      Intake/Output Summary (Last 24 hours) at 3/21/2023 1532  Last data filed at 3/21/2023 1501  Gross per 24 hour   Intake 646.08 ml   Output 1100 ml   Net -453.92 ml       Physical Exam  Constitutional:       General: He is in acute distress.   HENT:      Head: Normocephalic and atraumatic.      Nose: Nose normal. No congestion or rhinorrhea.      Mouth/Throat:      Mouth: Mucous membranes are dry.   Eyes:      Extraocular Movements: Extraocular movements intact.      Conjunctiva/sclera: Conjunctivae normal.      Pupils: Pupils are equal, round, and reactive to light.   Cardiovascular:      Rate and Rhythm: Normal rate and regular rhythm.      Pulses: Normal pulses.      Heart sounds: Normal heart sounds.   Pulmonary:      Effort: No respiratory distress (on BIBAP).      Breath sounds: No wheezing.   Chest:      Chest wall: No tenderness.   Abdominal:      General: Abdomen is flat. Bowel sounds are normal. There is no distension.      Palpations: Abdomen is soft.      Tenderness: There is no abdominal tenderness. There is no right CVA tenderness, left CVA tenderness, guarding or rebound.   Musculoskeletal:         General: Normal range of motion.      Cervical back: Normal range of motion and neck supple.   Skin:     General: Skin is warm and dry.   Neurological:      General: No focal deficit present.      Mental Status: He is alert and oriented to person, place, and time. Mental status is at  baseline.      Cranial Nerves: No cranial nerve deficit.      Sensory: No sensory deficit.   Psychiatric:         Mood and Affect: Mood normal.         Behavior: Behavior normal.         Thought Content: Thought content normal.         Judgment: Judgment normal.     Review of Systems    Vents:  Oxygen Concentration (%): 30 (03/19/23 2330)    Lines/Drains/Airways       Peripheral Intravenous Line  Duration                  Peripheral IV - Single Lumen 03/21/23 1415 22 G Anterior;Right Forearm <1 day                    Significant Labs:    CBC/Anemia Profile:  Recent Labs   Lab 03/20/23 0319 03/21/23  0400   WBC 26.24* 29.15*   HGB 9.8* 9.7*   HCT 28.9* 29.7*    360   MCV 92 93   RDW 13.9 14.0        Chemistries:  Recent Labs   Lab 03/20/23 0319 03/21/23  0400   * 132*   K 3.8 4.1   CL 99 101   CO2 24 22*   BUN 22 24*   CREATININE 1.0 1.0   CALCIUM 8.2* 8.1*   ALBUMIN 2.3* 2.3*   PROT 6.5 6.5   BILITOT 0.4 0.3   ALKPHOS 92 80   ALT 9* 22   AST 12 28   MG 2.2 2.0   PHOS 1.8*  --        PFT 3/6/20 Ratio of 49%; FVC 2.76 L (66%); FEV1 0.94 L (32%); TLC 4.91 L (60%); dlco 10 (36%)     ABGs: No results for input(s): PH, PCO2, HCO3, POCSATURATED, BE in the last 48 hours.  Cardiac Markers: No results for input(s): CKMB, TROPONINT, MYOGLOBIN in the last 48 hours.  Respiratory Culture: No results for input(s): GSRESP, RESPIRATORYC in the last 48 hours.    Significant Imaging:   I have reviewed all pertinent imaging results/findings within the past 24 hours.  CT: I have reviewed all pertinent results/findings within the past 24 hours and my personal findings are:  1/19/23 diffuse emphysematous changes with traction bronchiectasis.  Worsening of rll consolidation with cavitary changes.  Also with stable cavitary changes in rul.  Tom nodule now cavitate.  Lingula nodule stable.

## 2023-03-21 NOTE — ASSESSMENT & PLAN NOTE
Patient admitted with Hypoxic which is Acute.  he is not on home oxygen. Signs/symptoms of respiratory failure include- tachypnea, increased work of breathing, respiratory distress and use of accessory muscles present on admission.   - Labs and images were reviewed. Patient Has not has a recent ABG.   - Supplemental oxygen was provided and noted- Nasal Cannula 2 LPM and BiPAP PRN  - Respiratory failure is due to- COPD and Pneumonia   - continue steroids, nebs for COPD exacerbation  - continue antibiotics for pneumonia. Sputum culture is pending  - TTE with new diastolic dysfunction and BNP elevated- given lasix x1, may need to redose  - Pulmonary following  - needs testing for home O2 prior to discharge

## 2023-03-21 NOTE — ASSESSMENT & PLAN NOTE
 The left ventricle is normal in size with mildly decreased systolic function.   The estimated ejection fraction is 45%. Inferoseptal akinesis   Left ventricular diastolic dysfunction.   Normal right ventricular size with normal right ventricular systolic function.   Severe left atrial enlargement.   Mild right atrial enlargement.   Normal central venous pressure (3 mmHg).   The estimated PA systolic pressure is 8 mmHg.    - this is new diagnosis  - he does not appear volume overloaded on exam but   BNP  Recent Labs   Lab 03/18/23 1951   *     Given lasix x1  - monitor ins/outs  - appears to be more euvolemic now

## 2023-03-21 NOTE — SUBJECTIVE & OBJECTIVE
Past Medical History:   Diagnosis Date    Ambulates with cane     Arthritis     COPD (chronic obstructive pulmonary disease)     Diabetes mellitus type II     Hypertension     Mycobacterium avium complex     X's 2    Symptomatic anemia 01/28/2020    Tuberculosis     Ulcerative colitis        Past Surgical History:   Procedure Laterality Date    CATARACT EXTRACTION Bilateral     COLONOSCOPY N/A 01/22/2018    Procedure: COLONOSCOPY;  Surgeon: Roel Mendez MD;  Location: [facility];  Service: Endoscopy;  Laterality: N/A;  Pt confirmend appt.    melanoma      forehead     Review of patient's allergies indicates:  No Known Allergies    Medications:  Continuous Infusions:  Scheduled Meds:   albuterol-ipratropium  3 mL Nebulization Q4H    atorvastatin  40 mg Oral Daily    ceFEPime (MAXIPIME) IVPB  2 g Intravenous Q8H    enoxaparin  40 mg Subcutaneous Daily    ferrous sulfate  1 tablet Oral Daily    insulin aspart U-100  10 Units Subcutaneous TIDWM    insulin detemir U-100  25 Units Subcutaneous BID    isosorbide mononitrate  30 mg Oral Daily    mupirocin   Nasal BID    pantoprazole  40 mg Oral Daily    predniSONE  40 mg Oral Daily    sodium chloride 3%  4 mL Nebulization Daily    vancomycin (VANCOCIN) IVPB  1,500 mg Intravenous Q24H     PRN Meds:albuterol sulfate, dextrose 10%, dextrose 10%, dextrose, dextrose, glucagon (human recombinant), insulin aspart U-100, magnesium oxide, magnesium oxide, melatonin, potassium bicarbonate, potassium bicarbonate, potassium bicarbonate, potassium, sodium phosphates, potassium, sodium phosphates, potassium, sodium phosphates, sodium chloride 0.9%, Pharmacy to dose Vancomycin consult **AND** vancomycin - pharmacy to dose    Family History       Problem Relation (Age of Onset)    Arthritis Daughter    Cancer Father    Depression Sister    Diabetes Daughter    No Known Problems Mother, Brother, Maternal Aunt, Maternal Uncle, Paternal Aunt, Paternal Uncle, Maternal Grandmother, Maternal  Grandfather, Paternal Grandmother, Paternal Grandfather, Other          Tobacco Use    Smoking status: Former     Packs/day: 1.00     Years: 55.00     Pack years: 55.00     Types: Cigarettes    Smokeless tobacco: Never    Tobacco comments:     uses cigars on ocassion   Substance and Sexual Activity    Alcohol use: Not Currently     Comment: socially    Drug use: No    Sexual activity: Not Currently     Partners: Female       Review of Systems   Constitutional:  Positive for activity change and fatigue.   Respiratory:  Positive for cough, chest tightness, shortness of breath and wheezing.    Cardiovascular:  Negative for chest pain and leg swelling.   Gastrointestinal:  Negative for abdominal pain and nausea.   Genitourinary:  Negative for difficulty urinating and dysuria.   Musculoskeletal:  Negative for arthralgias and back pain.   Neurological:  Positive for weakness.   Psychiatric/Behavioral:  Negative for confusion and hallucinations.    Objective:     Vital Signs (Most Recent):  Temp: 97.6 °F (36.4 °C) (03/21/23 1101)  Pulse: 96 (03/21/23 1400)  Resp: (!) 27 (03/21/23 1400)  BP: 128/80 (03/21/23 1400)  SpO2: 98 % (03/21/23 1400)   Vital Signs (24h Range):  Temp:  [97.4 °F (36.3 °C)-98.3 °F (36.8 °C)] 97.6 °F (36.4 °C)  Pulse:  [] 96  Resp:  [19-48] 27  SpO2:  [92 %-100 %] 98 %  BP: (109-151)/(57-88) 128/80     Weight: 76.7 kg (169 lb)  Body mass index is 19.04 kg/m².    Physical Exam  Vitals and nursing note reviewed.   Constitutional:       General: He is not in acute distress.     Appearance: He is ill-appearing.      Comments: Elderly, frail   HENT:      Head: Normocephalic and atraumatic.      Nose: Nose normal.      Comments: Clear dressing to nose (bipap protection)     Mouth/Throat:      Mouth: Mucous membranes are dry.   Pulmonary:      Effort: Pulmonary effort is normal. No respiratory distress.      Comments: Occasional dyspnea with prolonged conversation despite O2 via NC; improved work of  breathing from brief observation during ICU rounds   Musculoskeletal:      Right lower leg: No edema.      Left lower leg: No edema.   Neurological:      Mental Status: He is alert and oriented to person, place, and time.   Psychiatric:         Mood and Affect: Mood normal.         Thought Content: Thought content normal.       Advance Care Planning   Advance Directives:   Living Will: No    LaPOST: No    Do Not Resuscitate Status: Yes (will plan to complete LAPOST prior to discharge))        Oral Declaration: Yes  Agent's Name:  Alix (daughter); wife lacks capacity/dementia, only living child is Alix per pt)    Decision Making:  Patient answered questions  Goals of Care: What is most important right now is to focus on spending time at home, remaining as independent as possible, symptom/pain control, quality of life, even if it means sacrificing a little time, improvement in condition with limits to invasive procedures. Accordingly, we have decided that the best plan to meet the patient's goals includes continuing with treatment and enrolling in home palliative care.       Significant Labs: All pertinent labs within the past 24 hours have been reviewed.  CBC:   Recent Labs   Lab 03/21/23  0400   WBC 29.15*   HGB 9.7*   HCT 29.7*   MCV 93          BMP:  Recent Labs   Lab 03/21/23  0400   *   *   K 4.1      CO2 22*   BUN 24*   CREATININE 1.0   CALCIUM 8.1*   MG 2.0       LFT:  Lab Results   Component Value Date    AST 28 03/21/2023    ALKPHOS 80 03/21/2023    BILITOT 0.3 03/21/2023     Albumin:   Albumin   Date Value Ref Range Status   03/21/2023 2.3 (L) 3.5 - 5.2 g/dL Final     Protein:   Total Protein   Date Value Ref Range Status   03/21/2023 6.5 6.0 - 8.4 g/dL Final     Lactic acid:   Lab Results   Component Value Date    LACTATE 1.5 03/19/2023    LACTATE 1.5 03/19/2023       Significant Imaging: I have reviewed all pertinent imaging results/findings within the past 24 hours.

## 2023-03-21 NOTE — PROGRESS NOTES
[facility] Medicine  Progress Note    Patient Name: Regan Alvarez  MRN: [id number]  Patient Class: IP- Inpatient   Admission Date: 3/18/2023  Length of Stay: 3 days  Attending Physician: Amando Kim MD  Primary Care Provider: Ishaan Adamson MD        Subjective:     Principal Problem:Acute hypoxemic respiratory failure        HPI:  82-year-old  male with medical history significant for type 2 diabetes mellitus,hypertension,ulcerative colitis,chronic obstructive pulmonary disease,tobacco use disorder in remission, has 55 pack year of cigarette smoking, and previous Mycobacterium avium complex infection who presented to the ED with worsening shortness of breath that did not respond to home nebs,he was said to be saturating in the 80s when EMS arrived but up to 93 at presentation to the ED,shortness of breath was associated with productive cough but no hemoptysis,he denied chest pain, no orthopnea, pedal swelling or PND.He denied any urinary symptoms,no dysuria, no frequency,no urgency or straining at micturition.No change in bowel habit,no diarrhea or constipation.      Overview/Hospital Course:  Mr Regan Alvarez has COPD (FEV1/FVC 48%) no longer smoking, not currently on home O2 (used O2 PRN from his wife who is on hospice care). Does not follow with Pulmonary. 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 He is immunocompromised-- he has UC on vedolizumab with last infusion 2/10/23.     He was admitted with acute hypoxic respiratory failure and severe sepsis due to suspected pneumonia. Started broad spectrum antibiotics and sputum culture is pending. CT chest confirms RLL infiltrate. TTE shows new EF 45% with inferoseptal hypokinesis, diastolic dysfunction, and normal PAP/RV function. Given IV lasix. He intermittently is requiring BiPAP for work of breathing. Pulmonary consulted.       Interval  History:  Feeling better and appears less distressed    Review of Systems   Constitutional:  Negative for chills and fever.   Respiratory:  Positive for shortness of breath. Negative for cough.    Cardiovascular:  Negative for chest pain.   Gastrointestinal:  Negative for abdominal pain, nausea and vomiting.   Psychiatric/Behavioral:  Negative for confusion.    Objective:     Vital Signs (Most Recent):  Temp: 97.4 °F (36.3 °C) (03/21/23 1501)  Pulse: 89 (03/21/23 1635)  Resp: (!) 23 (03/21/23 1635)  BP: (!) 143/83 (03/21/23 1600)  SpO2: 99 % (03/21/23 1635)   Vital Signs (24h Range):  Temp:  [97.4 °F (36.3 °C)-98 °F (36.7 °C)] 97.4 °F (36.3 °C)  Pulse:  [] 89  Resp:  [19-48] 23  SpO2:  [92 %-100 %] 99 %  BP: (109-151)/(57-88) 143/83     Weight: 76.7 kg (169 lb)  Body mass index is 19.04 kg/m².    Intake/Output Summary (Last 24 hours) at 3/21/2023 1707  Last data filed at 3/21/2023 1600  Gross per 24 hour   Intake 646.08 ml   Output 800 ml   Net -153.92 ml        Physical Exam  Vitals and nursing note reviewed.   Constitutional:       General: He is not in acute distress.     Appearance: He is ill-appearing.   Cardiovascular:      Rate and Rhythm: Normal rate and regular rhythm.   Pulmonary:      Breath sounds: Rhonchi present. No wheezing.   Abdominal:      General: Bowel sounds are normal. There is no distension.   Musculoskeletal:         General: No swelling.   Skin:     General: Skin is warm and dry.   Neurological:      General: No focal deficit present.      Mental Status: He is alert and oriented to person, place, and time.   Psychiatric:         Mood and Affect: Mood normal.         Thought Content: Thought content normal.       Significant Labs: All pertinent labs within the past 24 hours have been reviewed.    Significant Imaging: I have reviewed all pertinent imaging results/findings within the past 24 hours.      Assessment/Plan:      * Acute hypoxemic respiratory failure  Patient admitted with  Hypoxic which is Acute.  he is not on home oxygen. Signs/symptoms of respiratory failure include- tachypnea, increased work of breathing, respiratory distress and use of accessory muscles present on admission.   - Labs and images were reviewed. Patient Has not has a recent ABG.   - Supplemental oxygen was provided and noted- Nasal Cannula 2 LPM and BiPAP PRN  - Respiratory failure is due to- COPD and Pneumonia   - continue steroids, nebs for COPD exacerbation  - continue antibiotics for pneumonia. Sputum culture is pending  - TTE with new diastolic dysfunction and BNP elevated- given lasix x1, may need to redose  - Pulmonary following  - needs testing for home O2 prior to discharge      Hypophosphatemia  Replace and monitor        Hypomagnesemia  Replace and monitor        Immunocompromised state  Due to infusions given for UC      ACP (advance care planning)  Advance Care Planning     Date: 03/20/2023  See Dr Ryan note from 3/19/23-- DNR code status, no ventilator. Palliative consulted.           Uncontrolled type 2 diabetes mellitus with hyperglycemia, without long-term current use of insulin  Patient's FSGs are uncontrolled due to hyperglycemia on current medication regimen.  Last A1c reviewed-   Lab Results   Component Value Date    HGBA1C 8.8 (H) 03/18/2023     Most recent fingerstick glucose reviewed-   Recent Labs   Lab 03/20/23 2025 03/21/23  0729 03/21/23  1120   POCTGLUCOSE 386* 302* 143*     Current correctional scale  Medium  Increase anti-hyperglycemic dose as follows-   Antihyperglycemics (From admission, onward)    Start     Stop Route Frequency Ordered    03/21/23 2100  insulin detemir U-100 pen 20 Units         -- SubQ 2 times daily 03/21/23 1700    03/21/23 0715  insulin aspart U-100 pen 10 Units         -- SubQ 3 times daily with meals 03/20/23 1702    03/19/23 0956  insulin aspart U-100 pen 0-5 Units         -- SubQ Before meals & nightly PRN 03/19/23 0856      Hold Oral hypoglycemics  while patient is in the hospital.      Ulcerative colitis without complications  He has UC on vedolizumab with last infusion 2/10/23  - immunocompromised due to this  - no signs of UC flare      Acute diastolic heart failure   The left ventricle is normal in size with mildly decreased systolic function.   The estimated ejection fraction is 45%. Inferoseptal akinesis   Left ventricular diastolic dysfunction.   Normal right ventricular size with normal right ventricular systolic function.   Severe left atrial enlargement.   Mild right atrial enlargement.   Normal central venous pressure (3 mmHg).   The estimated PA systolic pressure is 8 mmHg.    - this is new diagnosis  - he does not appear volume overloaded on exam but   BNP  Recent Labs   Lab 03/18/23  1951   *     Given lasix x1  - monitor ins/outs  - appears to be more euvolemic now      Severe sepsis  This patient does have evidence of infective focus  My overall impression is severe sepsis.  Source: Respiratory  Antibiotics given-   Antibiotics (72h ago, onward)    Start     Stop Route Frequency Ordered    03/20/23 1200  vancomycin 1,500 mg in dextrose 5 % (D5W) 250 mL IVPB (Vial-Mate)         -- IV Every 24 hours (non-standard times) 03/19/23 1218    03/19/23 1215  cefepime in dextrose 5 % IVPB 2 g         -- IV Every 8 hours (non-standard times) 03/19/23 1104    03/19/23 1202  vancomycin - pharmacy to dose  (vancomycin IVPB)        See Hyperspace for full Linked Orders Report.    -- IV pharmacy to manage frequency 03/19/23 1104    03/19/23 1000  mupirocin 2 % ointment         03/24 0859 Nasl 2 times daily 03/19/23 0855        Latest lactate reviewed-  Recent Labs   Lab 03/19/23  0353   LACTATE 1.5  1.5     Organ dysfunction indicated by Acute respiratory failure    Fluid challenge Contraindicated- Fluid bolus is contraindicated in this patient due to Congestive Heart Failure - new diagnosis diastolic dysfunction     Post- resuscitation  assessment Yes Perfusion exam was performed within 6 hours of septic shock presentation after bolus shows Adequate tissue perfusion assessed by non-invasive monitoring       Will Not start Pressors- Levophed for MAP of 65  Source control achieved by: antibiotics     White blood cell count is still significantly elevated at 29.15 today.  Continue with IV antibiotics.  Respiratory cultures growing normal respiratory altagracia so no bacteria to target at this time.  He is currently improving with current management.    Essential hypertension  BP is appropriate   - continue home imdur    COPD exacerbation  Know patient with severe COPD presenting with worsening shortness of breath, wheezing and cough  Last PFTs 2020  Suspect trigger is pneumonia  No longer smoking cigarettes    - started steroids- continue  - nebs Q4h  - continue antibiotics  - Pulmonary consulted  - needs testing for home O2 at discharge    History of KIET infection  He has 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 with cleared cultures       VTE Risk Mitigation (From admission, onward)         Ordered     enoxaparin injection 40 mg  Daily         03/19/23 0855     IP VTE HIGH RISK PATIENT  Once         03/19/23 0207     Place sequential compression device  Until discontinued         03/19/23 0207                Discharge Planning   STANISLAV: 3/24/2023     Code Status: DNR   Is the patient medically ready for discharge?:     Reason for patient still in hospital (select all that apply): Treatment  Discharge Plan A: Home with family (and Palliative care NP at home)            Critical care time spent on the evaluation and treatment of severe organ dysfunction, review of pertinent labs and imaging studies, discussions with consulting providers and discussions with patient/family: 20 minutes.      Amando Kim MD  Department of Hospital Medicine   [facility] - Intensive Care

## 2023-03-21 NOTE — ASSESSMENT & PLAN NOTE
-h/o CT guided biopsy rul in 12/14/15.  Path c/w caseous granuloma.    -ajay nodule documented on ct  1/2020.   ajay nodule now cavitate but did not enlarge.  Patient and daughter declined biopsy and serial imaging.

## 2023-03-21 NOTE — ASSESSMENT & PLAN NOTE
Patient with Hypercapnic and Hypoxic Respiratory failure which is Acute on chronic.  he is not on home oxygen. Supplemental oxygen was provided and noted-  .   Signs/symptoms of respiratory failure include- tachypnea, increased work of breathing, respiratory distress and wheezing. Contributing diagnoses includes - COPD and Pneumonia Labs and images were reviewed. Patient Has recent ABG, which has been reviewed. Will treat underlying causes and adjust management of respiratory failure as follows-   -fev1 of 36%; ct with diffuse airspace disease and cavitary changes.  Worsening of rll consolidation along with leukocytosis suggestive of community bacterial pneumonia superimpose on chronic lung parenchymal changes due to baseline copd and architectural distortion a/w MAC infection  -airway clearance with hypertonic saline, nebs and cpt  -steroid + antibiotics for cap.    -worsening leukocytosis despite vanc and cefapime.  Will get ID inputs.    -sputum culture for afb and routine culture  -patient did not like bipap and unlikely to wear it at home  -would likely benefit from home oxygen  -would benefit from Trelegy in place of symbicort upon discharge.

## 2023-03-21 NOTE — ASSESSMENT & PLAN NOTE
"Palliative Encounter    Date: 03/20/2023  - interval chart reviewed in detail; pt discussed in ICU MDT rounds   - spoke with pt's niece outside pt's room, shared daughter has formal POA paperwork   - met with pt at ICU bedside; continued GOC/ACP discussion   - discussed completion of LAPOST and MPOA; he also believed that his daughter had formal POA documents; explained that I would like to add a copy to his chart to avoid confusion of MPOA in the future since wife is still alive but per pt and daughter lacks capacity; explained that if that paperwork was not readily available  We could also complete facility MPOA document   - discussed plan to confirm POA paperwork with daughter and complete all necessary document prior to discharge   - reassured pt that I was able to confirm [facility] will be able to provide his home palliative care, which he was excited to hear as he admires the care they have provided to his wife   - later called daughter to confirm existence of POA documents; she was happy to provide a copy for pt's chart when she visits tomorrow; also discussed plan to complete LAPOST and explained LAPOST document   - daughter continues to have good insight into pt's condition and continues to share his GOC for optimization with limits to invasive procedures, with focus for avoiding hospital and QOL/comfort  - emotional support provided; allowed both pt and daughter time for questions/concerns, all addressed   - ongoing communication with MDT     Palliative Consult   Date: 03/20/2023  - consult received; interval chart reviewed in detail; discussed pt discussed during ICU MDT rounds   - met with patient, at ICU bedside; introduction to palliative medicine team and role in current care and admission   - learned more about pt outside of hospital and current admission; he lives at home with his wife "Stella" and daughter Alix; Stella is currently under hospice care following, what seems to be per pt's " "explanation, 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; he shared many accounts from his past   - his wife is currently receiving Hospice care; pt shares that he is very fond of the hospice provider caring for his wife; he also shares recently requiring to use her home oxygen as he previously has not been prescribed/required O2 at home   - at baseline typically ambulatory and able to perform ADLS with minimal to no assistance, besides cooking which his daughter does; but recently hasn't been able to do same amount of physical activity, such as bringing trash out, without becoming severely SOB; dyspnea with minimal exertion and sometimes at rest; does have assistive devices at home including a motorized scooter to assist with ambulation; discussed PT/OT while inpatient to assess abilities with oxygen and potentially home PT to improve strength and maintain recent  abilities   - briefly discussed the need for additional support for his medical needs at home,  pt would be a good candidate for hospice if aligned with GOC, but " would have to stop UC infusions which seem to drastically improve his QOL; pt has a strong support system with daugther and private nurse in home several days a week; recommending addition of home palliative care which pt is agreeable to; originally planned to further discussion tomorrow once I am able to assess what services/company his wife has; he was open to this discussion and acknowledged his declining health and need for more assistance; also expressed not wanting to be increased burden to his daughter   - emotional support provided   - Allowed time for questions/concerns; all addressed; expressed availability of myself/palliative team for additional questions/concerns   - later attempted to call daughter, Alix, to learn she was at pt's bedside; met at bedside and discussed pt's care needs at home; confirmed wife's hospice provider is [facility]; both pt and daughter are agreeable to Palliative with [facility]  - daughter aware/agrees with DNR wishes and for overall GOC for improvement in reversible conditions but with focus of QOL and comfort.    - Contacted [facility] to confirm that they also had home palliative services   - updated CM/SW for coordination of palliative with [facility]; updated hospital primary

## 2023-03-21 NOTE — SUBJECTIVE & OBJECTIVE
Interval History:  Feeling better and appears less distressed    Review of Systems   Constitutional:  Negative for chills and fever.   Respiratory:  Positive for shortness of breath. Negative for cough.    Cardiovascular:  Negative for chest pain.   Gastrointestinal:  Negative for abdominal pain, nausea and vomiting.   Psychiatric/Behavioral:  Negative for confusion.    Objective:     Vital Signs (Most Recent):  Temp: 97.4 °F (36.3 °C) (03/21/23 1501)  Pulse: 89 (03/21/23 1635)  Resp: (!) 23 (03/21/23 1635)  BP: (!) 143/83 (03/21/23 1600)  SpO2: 99 % (03/21/23 1635)   Vital Signs (24h Range):  Temp:  [97.4 °F (36.3 °C)-98 °F (36.7 °C)] 97.4 °F (36.3 °C)  Pulse:  [] 89  Resp:  [19-48] 23  SpO2:  [92 %-100 %] 99 %  BP: (109-151)/(57-88) 143/83     Weight: 76.7 kg (169 lb)  Body mass index is 19.04 kg/m².    Intake/Output Summary (Last 24 hours) at 3/21/2023 1707  Last data filed at 3/21/2023 1600  Gross per 24 hour   Intake 646.08 ml   Output 800 ml   Net -153.92 ml        Physical Exam  Vitals and nursing note reviewed.   Constitutional:       General: He is not in acute distress.     Appearance: He is ill-appearing.   Cardiovascular:      Rate and Rhythm: Normal rate and regular rhythm.   Pulmonary:      Breath sounds: Rhonchi present. No wheezing.   Abdominal:      General: Bowel sounds are normal. There is no distension.   Musculoskeletal:         General: No swelling.   Skin:     General: Skin is warm and dry.   Neurological:      General: No focal deficit present.      Mental Status: He is alert and oriented to person, place, and time.   Psychiatric:         Mood and Affect: Mood normal.         Thought Content: Thought content normal.       Significant Labs: All pertinent labs within the past 24 hours have been reviewed.    Significant Imaging: I have reviewed all pertinent imaging results/findings within the past 24 hours.

## 2023-03-22 PROBLEM — E83.42 HYPOMAGNESEMIA: Status: RESOLVED | Noted: 2023-01-01 | Resolved: 2023-01-01

## 2023-03-22 NOTE — PT/OT/SLP PROGRESS
"Physical Therapy Treatment    Patient Name:  Regan Alvarez   MRN:  [id number]    Recommendations:     Discharge Recommendations: home health PT, home with home health  Discharge Equipment Recommendations: hospital bed  Barriers to discharge:  Pt in ICU    Assessment:     Regan Alvarez is a 82 y.o. male admitted with a medical diagnosis of Acute hypoxemic respiratory failure.  He presents with the following impairments/functional limitations: weakness, gait instability, impaired cardiopulmonary response to activity, impaired endurance, impaired balance, impaired coordination, decreased safety awareness, impaired self care skills, impaired functional mobility, decreased coordination Pt limited by increased work of breathing.    Rehab Prognosis: Fair; patient would benefit from acute skilled PT services to address these deficits and reach maximum level of function.    Recent Surgery: * No surgery found *      Plan:     During this hospitalization, patient to be seen  (3x/wk) to address the identified rehab impairments via gait training, therapeutic activities, therapeutic exercises, neuromuscular re-education and progress toward the following goals:    Plan of Care Expires:  04/04/23    Subjective     Chief Complaint: SOB  Patient/Family Comments/goals: Pt initially stated "no" when PT entered room.  After education and encouragement able to perform some TE.    Pain/Comfort:  Pain Rating 1: 0/10      Objective:     Communicated with nsg prior to session.  Patient found up in chair with oxygen (ICU monitoring) upon PT entry to room.     General Precautions: Standard, fall, respiratory  Orthopedic Precautions: N/A  Braces: N/A  Respiratory Status: Nasal cannula, flow 4 L/min     Functional Mobility:  Scooting back in chair with Supervision      AM-PAC 6 CLICK MOBILITY  Turning over in bed (including adjusting bedclothes, sheets and blankets)?: 3  Sitting down on and standing up from a chair with arms (e.g., " wheelchair, bedside commode, etc.): 3  Moving from lying on back to sitting on the side of the bed?: 3  Moving to and from a bed to a chair (including a wheelchair)?: 3  Need to walk in hospital room?: 3  Climbing 3-5 steps with a railing?: 1  Basic Mobility Total Score: 16       Treatment & Education:  SPO2 92-96%, HR , /67  Pt slouched in chair and unable to maintain cervical extension with rapid mouth breathing.  Educated pt to perform Pursed lip with breathing in reclined position to allow cervical extension and chest expansion.  Pt unable to return demonstration despite max VC.  Pt performed B AP's, Hip ABd, TKE's, and GS with Max VC/TC to perform correctly and to remain on task x 10 reps ea.  .    Patient left up in chair with all lines intact, call button in reach, and nsg notified..    GOALS:   Multidisciplinary Problems       Physical Therapy Goals          Problem: Physical Therapy    Goal Priority Disciplines Outcome Goal Variances Interventions   Physical Therapy Goal     PT, PT/OT Ongoing, Progressing     Description: Goals to be met by: 3/2/23     Patient will increase functional independence with mobility by performin. Supine to sit with Supervision  2. Sit to stand transfer with Supervision  3. Gait  x 10-15 feet with Supervision using Rolling Walker.   4. Ascend/descend 2 stair with CGA  5. Lower extremity exercise program x10 reps per handout, with supervision                         Time Tracking:     PT Received On: 23  PT Start Time: 1114     PT Stop Time: 1128  PT Total Time (min): 14 min     Billable Minutes: Therapeutic Exercise 14    Treatment Type: Treatment  PT/PTA: PT     Number of PTA visits since last PT visit: 0     2023

## 2023-03-22 NOTE — SUBJECTIVE & OBJECTIVE
Interval History: Sitting up in chair reading the paper. Less short of breath but frustrated that he is still weak and dizzy with movement.     Review of Systems   Constitutional:  Negative for chills and fever.   Respiratory:  Positive for shortness of breath. Negative for cough.    Cardiovascular:  Negative for chest pain.   Gastrointestinal:  Negative for abdominal pain, nausea and vomiting.   Neurological:  Positive for weakness.   Psychiatric/Behavioral:  Negative for confusion.    Objective:     Vital Signs (Most Recent):  Temp: 97.3 °F (36.3 °C) (03/22/23 1101)  Pulse: 92 (03/22/23 1101)  Resp: (!) 30 (03/22/23 1101)  BP: (!) 148/67 (03/22/23 1101)  SpO2: (!) 94 % (03/22/23 1101)   Vital Signs (24h Range):  Temp:  [97.3 °F (36.3 °C)-97.7 °F (36.5 °C)] 97.3 °F (36.3 °C)  Pulse:  [] 92  Resp:  [18-40] 30  SpO2:  [92 %-100 %] 94 %  BP: (109-163)/() 148/67     Weight: 76.7 kg (169 lb)  Body mass index is 19.04 kg/m².    Intake/Output Summary (Last 24 hours) at 3/22/2023 1157  Last data filed at 3/22/2023 1101  Gross per 24 hour   Intake 638.36 ml   Output 850 ml   Net -211.64 ml        Physical Exam  Vitals and nursing note reviewed.   Constitutional:       General: He is not in acute distress.     Appearance: He is ill-appearing. He is not toxic-appearing.      Comments: Thin man   HENT:      Head: Normocephalic and atraumatic.      Nose: Nose normal.      Mouth/Throat:      Mouth: Mucous membranes are moist.   Cardiovascular:      Rate and Rhythm: Normal rate and regular rhythm.      Pulses: Normal pulses.      Heart sounds: Normal heart sounds.   Pulmonary:      Effort: No respiratory distress.      Breath sounds: No wheezing, rhonchi or rales.      Comments: Diminished breath sounds bilaterally  Abdominal:      General: Bowel sounds are normal. There is no distension.      Palpations: Abdomen is soft.      Tenderness: There is no abdominal tenderness. There is no guarding.   Musculoskeletal:       Right lower leg: No edema.      Left lower leg: No edema.   Skin:     General: Skin is warm and dry.   Neurological:      Mental Status: He is alert and oriented to person, place, and time.       Significant Labs: All pertinent labs within the past 24 hours have been reviewed.    Significant Imaging: I have reviewed all pertinent imaging results/findings within the past 24 hours.

## 2023-03-22 NOTE — PROGRESS NOTES
Vancomycin consult follow-up:    Patient reviewed, renal function stable, no new levels, continue current therapy; Next levels due: 3/23/2023 at 1100

## 2023-03-22 NOTE — ASSESSMENT & PLAN NOTE
"82M with h/o T2dm, copd, tobacco abuse, prior pulmonary MAC with bronchiectasis and gerd admitted 3/19 with worsening sob/productive phegm. resp culture normal altagracia. CT R basilar consolidatin. Doesn't want to get worked up for lung cancer is present. 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 (Says that he had a lot of trouble tolerating the antibiotic before and doesn't want to have diarrhea again from antibiotics. ). ID consulted for "antibiotic management    Suspect aspiration pneumonia and copd exacerbation. Clinically improving on abx. Recurrent MAC infection very possible, but patient doesn't want prolonged antibiotics again for MAC if present    Recommendations:   - continue cefepime while inpatient, but can consider stopping in next few days and on discharge. Agree with stopping vancomycin and doxycycline. Avoid macrolides in this patient with h/o pulmonary MAC 2/2 risk of inducing macrolide resistant NTM infection    Discussed importance of airway clearance. Encouraged use of Aerobika and nebulized hypertonic saline twice a day. hypertonic saline is hostile environment for mycobacteria. Recommended decreasing duration of hot showers, avoid soil and water aerosols, use fans in shower area, avoid fine mist shower heads (heavy droplets better), avoid bubbling water (hot tubs, humidifiers), flush water heater frequently to avoid biofilm buildup    Prevent reflux- avoid stomach sleeping. Sleep inclined. Famotidine/tums preferrable to PPI if needed. Stop liquids 3hr before bedtime.                  "

## 2023-03-22 NOTE — ASSESSMENT & PLAN NOTE
This patient does have evidence of infective focus  My overall impression is severe sepsis.  Source: Respiratory  Antibiotics given-   Antibiotics (72h ago, onward)    Start     Stop Route Frequency Ordered    03/19/23 1215  cefepime in dextrose 5 % IVPB 2 g         -- IV Every 8 hours (non-standard times) 03/19/23 1104    03/19/23 1000  mupirocin 2 % ointment         03/24 0859 Nasl 2 times daily 03/19/23 0855        Latest lactate reviewed-  No results for input(s): LACTATE in the last 72 hours.  Organ dysfunction indicated by Acute respiratory failure    Fluid challenge Contraindicated- Fluid bolus is contraindicated in this patient due to Congestive Heart Failure - new diagnosis diastolic dysfunction     Post- resuscitation assessment Yes Perfusion exam was performed within 6 hours of septic shock presentation after bolus shows Adequate tissue perfusion assessed by non-invasive monitoring       Will Not start Pressors- Levophed for MAP of 65  Source control achieved by: antibiotics   - improving WBC and respiratory status  - no Staph on cultures- DC vanc  - continue cefepime for GNR and GPC in pairs noted on gram stain sputum culture  - sepsis improving

## 2023-03-22 NOTE — PROGRESS NOTES
[facility] [place] Medicine  Progress Note    Patient Name: Regan Alvarez  MRN: [id number]  Patient Class: IP- Inpatient   Admission Date: 3/18/2023  Length of Stay: 4 days  Attending Physician: Karine Ryan MD  Primary Care Provider: Ishaan Adamson MD        Subjective:     Principal Problem:Acute hypoxemic respiratory failure        HPI:  82-year-old  male with medical history significant for type 2 diabetes mellitus,hypertension,ulcerative colitis,chronic obstructive pulmonary disease,tobacco use disorder in remission, has 55 pack year of cigarette smoking, and previous Mycobacterium avium complex infection who presented to the ED with worsening shortness of breath that did not respond to home nebs,he was said to be saturating in the 80s when EMS arrived but up to 93 at presentation to the ED,shortness of breath was associated with productive cough but no hemoptysis,he denied chest pain, no orthopnea, pedal swelling or PND.He denied any urinary symptoms,no dysuria, no frequency,no urgency or straining at micturition.No change in bowel habit,no diarrhea or constipation.      Overview/Hospital Course:  Mr Regan Alvarez has COPD (FEV1/FVC 48%) no longer smoking, not currently on home O2 (used O2 PRN from his wife who is on hospice care). Does not follow with Pulmonary. 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 He is immunocompromised-- he has UC on vedolizumab with last infusion 2/10/23.     He was admitted with acute hypoxic respiratory failure and severe sepsis due to suspected pneumonia. Started broad spectrum antibiotics. CT chest confirms RLL infiltrate. TTE shows new EF 45% with inferoseptal hypokinesis, diastolic dysfunction, and normal PAP/RV function. Given IV lasix. He was requiring BiPAP for work of breathing. Pulmonary consulted. Sputum culture normal altagracia. He is steadily improving and  weaning O2. Palliative following- plan for home palliative on discharge. PT, OT also following.       Interval History: Sitting up in chair reading the paper. Less short of breath but frustrated that he is still weak and dizzy with movement.     Review of Systems   Constitutional:  Negative for chills and fever.   Respiratory:  Positive for shortness of breath. Negative for cough.    Cardiovascular:  Negative for chest pain.   Gastrointestinal:  Negative for abdominal pain, nausea and vomiting.   Neurological:  Positive for weakness.   Psychiatric/Behavioral:  Negative for confusion.    Objective:     Vital Signs (Most Recent):  Temp: 97.3 °F (36.3 °C) (03/22/23 1101)  Pulse: 92 (03/22/23 1101)  Resp: (!) 30 (03/22/23 1101)  BP: (!) 148/67 (03/22/23 1101)  SpO2: (!) 94 % (03/22/23 1101)   Vital Signs (24h Range):  Temp:  [97.3 °F (36.3 °C)-97.7 °F (36.5 °C)] 97.3 °F (36.3 °C)  Pulse:  [] 92  Resp:  [18-40] 30  SpO2:  [92 %-100 %] 94 %  BP: (109-163)/() 148/67     Weight: 76.7 kg (169 lb)  Body mass index is 19.04 kg/m².    Intake/Output Summary (Last 24 hours) at 3/22/2023 1157  Last data filed at 3/22/2023 1101  Gross per 24 hour   Intake 638.36 ml   Output 850 ml   Net -211.64 ml        Physical Exam  Vitals and nursing note reviewed.   Constitutional:       General: He is not in acute distress.     Appearance: He is ill-appearing. He is not toxic-appearing.      Comments: Thin man   HENT:      Head: Normocephalic and atraumatic.      Nose: Nose normal.      Mouth/Throat:      Mouth: Mucous membranes are moist.   Cardiovascular:      Rate and Rhythm: Normal rate and regular rhythm.      Pulses: Normal pulses.      Heart sounds: Normal heart sounds.   Pulmonary:      Effort: No respiratory distress.      Breath sounds: No wheezing, rhonchi or rales.      Comments: Diminished breath sounds bilaterally  Abdominal:      General: Bowel sounds are normal. There is no distension.      Palpations: Abdomen is  soft.      Tenderness: There is no abdominal tenderness. There is no guarding.   Musculoskeletal:      Right lower leg: No edema.      Left lower leg: No edema.   Skin:     General: Skin is warm and dry.   Neurological:      Mental Status: He is alert and oriented to person, place, and time.       Significant Labs: All pertinent labs within the past 24 hours have been reviewed.    Significant Imaging: I have reviewed all pertinent imaging results/findings within the past 24 hours.      Assessment/Plan:      * Acute hypoxemic respiratory failure  Patient admitted with Hypoxic which is Acute.  he is not on home oxygen. Signs/symptoms of respiratory failure include- tachypnea, increased work of breathing, respiratory distress and use of accessory muscles present on admission.   - Labs and images were reviewed. Patient Has not has a recent ABG.   - Supplemental oxygen was provided and noted- Nasal Cannula 4 LPM and BiPAP PRN  - Respiratory failure is due to- COPD and Pneumonia   - continue steroids, nebs for COPD exacerbation  - continue antibiotics for pneumonia. Sputum culture normal altagracia. Will DC vanc.   - TTE with new diastolic dysfunction and BNP elevated- given lasix x1, appears euvolemic.   - Pulmonary following  - needs testing for home O2 prior to discharge  - needs Pulm follow up on discharge       Hypophosphatemia  Replace and monitor        Immunocompromised state  Due to infusions given for UC      ACP (advance care planning)  Advance Care Planning     Date: 03/20/2023  See Dr Ryan note from 3/19/23-- DNR code status, no ventilator. Palliative consulted.           Pulmonary nodule  Noted on CT  Patient and family do not want to risk biopsy       Uncontrolled type 2 diabetes mellitus with hyperglycemia, without long-term current use of insulin  Patient's FSGs are uncontrolled due to hyperglycemia on current medication regimen.  Last A1c reviewed-   Lab Results   Component Value Date    HGBA1C 8.8 (H)  03/18/2023     Most recent fingerstick glucose reviewed-   Recent Labs   Lab 03/21/23  2053 03/21/23  2104 03/22/23  0758 03/22/23  1132   POCTGLUCOSE 191* 196* 136* 62*     Current correctional scale  Medium  Increase anti-hyperglycemic dose as follows-   Antihyperglycemics (From admission, onward)    Start     Stop Route Frequency Ordered    03/21/23 2100  insulin detemir U-100 pen 20 Units         -- SubQ 2 times daily 03/21/23 1700    03/21/23 0715  insulin aspart U-100 pen 10 Units         -- SubQ 3 times daily with meals 03/20/23 1702    03/19/23 0956  insulin aspart U-100 pen 0-5 Units         -- SubQ Before meals & nightly PRN 03/19/23 0856      Hold Oral hypoglycemics while patient is in the hospital.      Ulcerative colitis without complications  He has UC on vedolizumab with last infusion 2/10/23  - immunocompromised due to this  - no signs of UC flare      Acute diastolic heart failure   The left ventricle is normal in size with mildly decreased systolic function.   The estimated ejection fraction is 45%. Inferoseptal akinesis   Left ventricular diastolic dysfunction.   Normal right ventricular size with normal right ventricular systolic function.   Severe left atrial enlargement.   Mild right atrial enlargement.   Normal central venous pressure (3 mmHg).   The estimated PA systolic pressure is 8 mmHg.    - this is new diagnosis  - he does not appear volume overloaded on exam but   BNP  Recent Labs   Lab 03/18/23 1951   *     Given lasix x1  - monitor ins/outs  - appears to be euvolemic       Severe sepsis  This patient does have evidence of infective focus  My overall impression is severe sepsis.  Source: Respiratory  Antibiotics given-   Antibiotics (72h ago, onward)    Start     Stop Route Frequency Ordered    03/19/23 1215  cefepime in dextrose 5 % IVPB 2 g         -- IV Every 8 hours (non-standard times) 03/19/23 1104    03/19/23 1000  mupirocin 2 % ointment         03/24 0859 Nasl 2  times daily 03/19/23 0855        Latest lactate reviewed-  No results for input(s): LACTATE in the last 72 hours.  Organ dysfunction indicated by Acute respiratory failure    Fluid challenge Contraindicated- Fluid bolus is contraindicated in this patient due to Congestive Heart Failure - new diagnosis diastolic dysfunction     Post- resuscitation assessment Yes Perfusion exam was performed within 6 hours of septic shock presentation after bolus shows Adequate tissue perfusion assessed by non-invasive monitoring       Will Not start Pressors- Levophed for MAP of 65  Source control achieved by: antibiotics   - improving WBC and respiratory status  - no Staph on cultures- DC vanc  - continue cefepime for GNR and GPC in pairs noted on gram stain sputum culture  - sepsis improving     Essential hypertension  BP is elevated somewhat  - continue home imdur    COPD exacerbation  Know patient with severe COPD presenting with worsening shortness of breath, wheezing and cough  Last PFTs 2020  Suspect trigger is pneumonia  No longer smoking cigarettes    - started steroids- continue x5 days  - nebs Q4h  - continue antibiotics for PNA treatment   - Pulmonary consulted  - needs testing for home O2 at discharge    History of KIET infection  He has 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 with cleared cultures   - unable to collect AFB sputum this admit (none to produce)      VTE Risk Mitigation (From admission, onward)         Ordered     enoxaparin injection 40 mg  Daily         03/19/23 0855     IP VTE HIGH RISK PATIENT  Once         03/19/23 0207     Place sequential compression device  Until discontinued         03/19/23 0207                Discharge Planning   STANISLAV: 3/24/2023     Code Status: DNR   Is the patient medically ready for discharge?:     Reason for patient still in hospital (select all that apply): Patient trending condition  Discharge Plan A: Home with family (and Palliative care NP at home)             Critical care time spent on the evaluation and treatment of severe organ dysfunction, review of pertinent labs and imaging studies, discussions with consulting providers and discussions with patient/family: 30 minutes.      Karine Ryan MD  Department of Hospital Medicine   [facility] [place] - Intensive Care

## 2023-03-22 NOTE — SUBJECTIVE & OBJECTIVE
"Past Medical History:   Diagnosis Date    Ambulates with cane     Arthritis     COPD (chronic obstructive pulmonary disease)     Diabetes mellitus type II     Hypertension     Mycobacterium avium complex     X's 2    Symptomatic anemia 01/28/2020    Tuberculosis     Ulcerative colitis        Past Surgical History:   Procedure Laterality Date    CATARACT EXTRACTION Bilateral     COLONOSCOPY N/A 01/22/2018    Procedure: COLONOSCOPY;  Surgeon: Roel Mendez MD;  Location: [facility];  Service: Endoscopy;  Laterality: N/A;  Pt confirmend appt.    melanoma      forehead       Review of patient's allergies indicates:  No Known Allergies    No current facility-administered medications on file prior to encounter.     Current Outpatient Medications on File Prior to Encounter   Medication Sig    BD ULTRA-FINE TOMÁS PEN NEEDLES 32 gauge x 5/32" Ndle USE QID UTD    SYMBICORT 160-4.5 mcg/actuation HFAA INHALE 2 PUFFS BY MOUTH EVERY 12 HOURS INTO LUNGS    albuterol (ACCUNEB) 0.63 mg/3 mL Nebu USE 1 VIAL IN NEBULIZER EVERY 6 HOURS AS NEEDED FOR COUGH    albuterol (PROVENTIL/VENTOLIN HFA) 90 mcg/actuation inhaler INHALE 2 PUFFS BY MOUTH EVERY 6 HOURS AS NEEDED    atorvastatin (LIPITOR) 40 MG tablet Take 1 tablet by mouth once daily    blood sugar diagnostic (TRUE METRIX GLUCOSE TEST STRIP) Strp Test blood sugar 3 times daily    blood sugar diagnostic Strp To check BG one time daily PRN, to use with insurance preferred meter    blood-glucose meter kit To check BG one time daily PRN, to use with insurance preferred meter    glipiZIDE (GLUCOTROL) 5 MG tablet Take 1 tablet by mouth once daily    ibuprofen (ADVIL,MOTRIN) 800 MG tablet Take 1 tablet (800 mg total) by mouth every 8 (eight) hours as needed for Pain (neck pain).    IRON, FERROUS SULFATE, 325 mg (65 mg iron) Tab tablet Take 1 tablet by mouth twice daily    isosorbide mononitrate (IMDUR) 30 MG 24 hr tablet Take 1 tablet by mouth once daily    lancets 28 gauge Misc 1 lancet by " Misc.(Non-Drug; Combo Route) route 3 (three) times daily. True Metrix lancets    lancets Misc To check BG one time daily PRN, to use with insurance preferred meter    mucus clearing device (ACAPELLA, FLUTTER) by Misc.(Non-Drug; Combo Route) route 2 (two) times daily.    multivitamin capsule Take 1 capsule by mouth once daily.    PROTONIX 40 mg tablet Take by mouth once daily.    sodium chloride 3% 3 % nebulizer solution Take 4 mLs by nebulization as needed for Other (daily for sputum production).     Family History       Problem Relation (Age of Onset)    Arthritis Daughter    Cancer Father    Depression Sister    Diabetes Daughter    No Known Problems Mother, Brother, Maternal Aunt, Maternal Uncle, Paternal Aunt, Paternal Uncle, Maternal Grandmother, Maternal Grandfather, Paternal Grandmother, Paternal Grandfather, Other          Tobacco Use    Smoking status: Former     Packs/day: 1.00     Years: 55.00     Pack years: 55.00     Types: Cigarettes    Smokeless tobacco: Never    Tobacco comments:     uses cigars on ocassion   Substance and Sexual Activity    Alcohol use: Not Currently     Comment: socially    Drug use: No    Sexual activity: Not Currently     Partners: Female     Review of Systems   Constitutional:  Negative for appetite change, chills, diaphoresis and fatigue.   HENT:  Negative for congestion, sore throat and tinnitus.    Eyes:  Negative for pain, discharge and itching.   Respiratory:  Positive for cough, shortness of breath and wheezing. Negative for chest tightness.    Gastrointestinal:  Negative for abdominal distention, abdominal pain, blood in stool, nausea and vomiting.   Endocrine: Negative for cold intolerance, heat intolerance and polydipsia.   Genitourinary:  Negative for difficulty urinating, dysuria, flank pain, frequency and hematuria.   Musculoskeletal:  Negative for arthralgias and back pain.   Neurological:  Positive for weakness. Negative for dizziness, seizures, facial asymmetry,  light-headedness, numbness and headaches.   Psychiatric/Behavioral:  Negative for agitation, confusion and hallucinations.    Objective:     Vital Signs (Most Recent):  Temp: 97.8 °F (36.6 °C) (03/22/23 1501)  Pulse: 88 (03/22/23 1628)  Resp: (!) 23 (03/22/23 1628)  BP: 129/74 (03/22/23 1501)  SpO2: 100 % (03/22/23 1628)   Vital Signs (24h Range):  Temp:  [97.3 °F (36.3 °C)-97.8 °F (36.6 °C)] 97.8 °F (36.6 °C)  Pulse:  [] 88  Resp:  [18-52] 23  SpO2:  [91 %-100 %] 100 %  BP: (117-163)/() 129/74     Weight: 76.7 kg (169 lb)  Body mass index is 19.04 kg/m².    Physical Exam  Constitutional:       General: He is in acute distress.   HENT:      Head: Normocephalic and atraumatic.      Nose: Nose normal. No congestion or rhinorrhea.      Mouth/Throat:      Mouth: Mucous membranes are dry.   Eyes:      Extraocular Movements: Extraocular movements intact.      Conjunctiva/sclera: Conjunctivae normal.      Pupils: Pupils are equal, round, and reactive to light.   Cardiovascular:      Rate and Rhythm: Regular rhythm. Tachycardia present.      Pulses: Normal pulses.      Heart sounds: Normal heart sounds.   Pulmonary:      Effort: Respiratory distress (on BIBAP) present.      Breath sounds: Wheezing present.   Chest:      Chest wall: No tenderness.   Abdominal:      General: Abdomen is flat. Bowel sounds are normal. There is no distension.      Palpations: Abdomen is soft.      Tenderness: There is no abdominal tenderness. There is no right CVA tenderness, left CVA tenderness, guarding or rebound.   Musculoskeletal:         General: Normal range of motion.      Cervical back: Normal range of motion and neck supple.   Skin:     General: Skin is warm and dry.   Neurological:      General: No focal deficit present.      Mental Status: He is alert and oriented to person, place, and time. Mental status is at baseline.      Cranial Nerves: No cranial nerve deficit.      Sensory: No sensory deficit.   Psychiatric:          Mood and Affect: Mood normal.         Behavior: Behavior normal.         Thought Content: Thought content normal.         Judgment: Judgment normal.         CRANIAL NERVES     CN III, IV, VI   Pupils are equal, round, and reactive to light.     Significant Labs: All pertinent labs within the past 24 hours have been reviewed.  A1C:   Recent Labs   Lab 03/18/23 1951   HGBA1C 8.8*     CBC:   Recent Labs   Lab 03/21/23 0400 03/22/23 0401   WBC 29.15* 20.33*   HGB 9.7* 9.4*   HCT 29.7* 28.6*    346       CMP:   Recent Labs   Lab 03/21/23 0400 03/22/23 0401   * 139   K 4.1 3.9    105   CO2 22* 26   * 190*   BUN 24* 19   CREATININE 1.0 0.9   CALCIUM 8.1* 8.3*   PROT 6.5 6.3   ALBUMIN 2.3* 2.3*   BILITOT 0.3 0.3   ALKPHOS 80 77   AST 28 22   ALT 22 23   ANIONGAP 9 8       Lactic Acid: No results for input(s): LACTATE in the last 48 hours.  Troponin:   No results for input(s): TROPONINI, TROPONINIHS in the last 48 hours.      Significant Imaging: I have reviewed all pertinent imaging results/findings within the past 24 hours.

## 2023-03-22 NOTE — ASSESSMENT & PLAN NOTE
Patient's FSGs are uncontrolled due to hyperglycemia on current medication regimen.  Last A1c reviewed-   Lab Results   Component Value Date    HGBA1C 8.8 (H) 03/18/2023     Most recent fingerstick glucose reviewed-   Recent Labs   Lab 03/21/23 2053 03/21/23  2104 03/22/23  0758 03/22/23  1132   POCTGLUCOSE 191* 196* 136* 62*     Current correctional scale  Medium  Increase anti-hyperglycemic dose as follows-   Antihyperglycemics (From admission, onward)    Start     Stop Route Frequency Ordered    03/21/23 2100  insulin detemir U-100 pen 20 Units         -- SubQ 2 times daily 03/21/23 1700    03/21/23 0715  insulin aspart U-100 pen 10 Units         -- SubQ 3 times daily with meals 03/20/23 1702    03/19/23 0956  insulin aspart U-100 pen 0-5 Units         -- SubQ Before meals & nightly PRN 03/19/23 0856      Hold Oral hypoglycemics while patient is in the hospital.

## 2023-03-22 NOTE — ASSESSMENT & PLAN NOTE
 The left ventricle is normal in size with mildly decreased systolic function.   The estimated ejection fraction is 45%. Inferoseptal akinesis   Left ventricular diastolic dysfunction.   Normal right ventricular size with normal right ventricular systolic function.   Severe left atrial enlargement.   Mild right atrial enlargement.   Normal central venous pressure (3 mmHg).   The estimated PA systolic pressure is 8 mmHg.    - this is new diagnosis  - he does not appear volume overloaded on exam but   BNP  Recent Labs   Lab 03/18/23 1951   *     Given lasix x1  - monitor ins/outs  - appears to be euvolemic

## 2023-03-22 NOTE — CONSULTS
"[facility] - Intensive Care  Infectious Disease  Consult Note    Patient Name: Regan Alvarez  MRN: [id number]  Admission Date: 3/18/2023  Hospital Length of Stay: 4 days  Attending Physician: Karine Ryan MD  Primary Care Provider: Ishaan Adamson MD     Isolation Status: No active isolations    Patient information was obtained from patient and ER records.      Consults  Assessment/Plan:     Pulmonary  COPD exacerbation  82M with h/o T2dm, copd, tobacco abuse, prior pulmonary MAC with bronchiectasis and gerd admitted 3/19 with worsening sob/productive phegm. resp culture normal altagracia. CT R basilar consolidatin. Doesn't want to get worked up for lung cancer is present. 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 (Says that he had a lot of trouble tolerating the antibiotic before and doesn't want to have diarrhea again from antibiotics. ). ID consulted for "antibiotic management    Suspect aspiration pneumonia and copd exacerbation. Clinically improving on abx. Recurrent MAC infection very possible, but patient doesn't want prolonged antibiotics again for MAC if present    Recommendations:   - continue cefepime while inpatient, but can consider stopping in next few days and on discharge. Agree with stopping vancomycin and doxycycline. Avoid macrolides in this patient with h/o pulmonary MAC 2/2 risk of inducing macrolide resistant NTM infection    Discussed importance of airway clearance. Encouraged use of Aerobika and nebulized hypertonic saline twice a day. hypertonic saline is hostile environment for mycobacteria. Recommended decreasing duration of hot showers, avoid soil and water aerosols, use fans in shower area, avoid fine mist shower heads (heavy droplets better), avoid bubbling water (hot tubs, humidifiers), flush water heater frequently to avoid biofilm buildup    Prevent reflux- avoid stomach sleeping. Sleep inclined. Famotidine/tums preferrable to PPI if needed. Stop liquids 3hr " "before bedtime.                        Thank you for your consult. I will sign off. Please contact us if you have any additional questions.    Jayde Scott MD  Infectious Disease  [facility] - Intensive Care    Subjective:     Principal Problem: Acute hypoxemic respiratory failure    HPI:   82M with h/o T2dm, copd, tobacco abuse, prior pulmonary MAC admitted 3/19 with worsening sob/productive phegm. Reports chronic sob, but says some days it's worse. Lives with his daughter and says he's normally able to get around her house, but cough recently worse. Says he had a lot of trouble with secretions. Felt like he was vomiting but was mostly mucous. 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 Knows that he's elderly and doesn't have long left and wants quality of life over quantity. Doesn't want to get worked up for lung cancer is present. 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 Says that he had a lot of trouble tolerating the antibiotic before and doesn't want to have diarrhea again from antibiotics.        Last saw Dr Almaraz 2020  "MAC infection and severe COPD. He was treated for MAC from Jan 2016 to Dec 2017 with a triple antibiotic regimen. He was AFB culture negative at the end of the treatment period. "      Ct  worsening right basilar consolidation concerning for pneumonia.     Otherwise stable background of severe postinflammatory changes in the lungs including emphysema, multifocal bronchiectasis and scarring and multiloculated air-fluid levels in the pleural space in the apex and right lower hemithorax.         On day 5 antibiotics, vanc/cefepime/azithromycin    Acid Fast Susceptibility, Slow Grower FINAL 02/03/16 1210 Abnormal     Comment: SOURCE: LUNG, source = lung abscess, organism = M avium   intracellulare complex   SUSCEPTIBILITY, AFB, SLOW GROWER                       FINAL   MYCOBACTERIUM AVIUM COMPLEX     Organism identified by client.   There are no established interpretive guidelines for " "agents   reported without interpretations.   ----------------------------------------------------------   Organism     MYCOBACTERIUM AVIUM COMPLEX   Antibiotic    MARCO A (mcg/mL)  Interpretation   ----------------------------------------------------------   Moxifloxacin             2       I   Clarithromycin           4       S   Amikacin                64   Streptomycin           >64   Linezolid               16       I   Ethambutol             >16   Rifampin                >8   Rifabutin                1        ID consulted for "antibiotic management      Past Medical History:   Diagnosis Date    Ambulates with cane     Arthritis     COPD (chronic obstructive pulmonary disease)     Diabetes mellitus type II     Hypertension     Mycobacterium avium complex     X's 2    Symptomatic anemia 01/28/2020    Tuberculosis     Ulcerative colitis        Past Surgical History:   Procedure Laterality Date    CATARACT EXTRACTION Bilateral     COLONOSCOPY N/A 01/22/2018    Procedure: COLONOSCOPY;  Surgeon: Roel Mendez MD;  Location: [facility];  Service: Endoscopy;  Laterality: N/A;  Pt confirmend appt.    melanoma      forehead       Review of patient's allergies indicates:  No Known Allergies    No current facility-administered medications on file prior to encounter.     Current Outpatient Medications on File Prior to Encounter   Medication Sig    BD ULTRA-FINE TOMÁS PEN NEEDLES 32 gauge x 5/32" Ndle USE QID UTD    SYMBICORT 160-4.5 mcg/actuation HFAA INHALE 2 PUFFS BY MOUTH EVERY 12 HOURS INTO LUNGS    albuterol (ACCUNEB) 0.63 mg/3 mL Nebu USE 1 VIAL IN NEBULIZER EVERY 6 HOURS AS NEEDED FOR COUGH    albuterol (PROVENTIL/VENTOLIN HFA) 90 mcg/actuation inhaler INHALE 2 PUFFS BY MOUTH EVERY 6 HOURS AS NEEDED    atorvastatin (LIPITOR) 40 MG tablet Take 1 tablet by mouth once daily    blood sugar diagnostic (TRUE METRIX GLUCOSE TEST STRIP) Strp Test blood sugar 3 times daily    blood sugar diagnostic Strp To check " BG one time daily PRN, to use with insurance preferred meter    blood-glucose meter kit To check BG one time daily PRN, to use with insurance preferred meter    glipiZIDE (GLUCOTROL) 5 MG tablet Take 1 tablet by mouth once daily    ibuprofen (ADVIL,MOTRIN) 800 MG tablet Take 1 tablet (800 mg total) by mouth every 8 (eight) hours as needed for Pain (neck pain).    IRON, FERROUS SULFATE, 325 mg (65 mg iron) Tab tablet Take 1 tablet by mouth twice daily    isosorbide mononitrate (IMDUR) 30 MG 24 hr tablet Take 1 tablet by mouth once daily    lancets 28 gauge Misc 1 lancet by Misc.(Non-Drug; Combo Route) route 3 (three) times daily. True Metrix lancets    lancets Misc To check BG one time daily PRN, to use with insurance preferred meter    mucus clearing device (ACAPELLA, FLUTTER) by Misc.(Non-Drug; Combo Route) route 2 (two) times daily.    multivitamin capsule Take 1 capsule by mouth once daily.    PROTONIX 40 mg tablet Take by mouth once daily.    sodium chloride 3% 3 % nebulizer solution Take 4 mLs by nebulization as needed for Other (daily for sputum production).     Family History       Problem Relation (Age of Onset)    Arthritis Daughter    Cancer Father    Depression Sister    Diabetes Daughter    No Known Problems Mother, Brother, Maternal Aunt, Maternal Uncle, Paternal Aunt, Paternal Uncle, Maternal Grandmother, Maternal Grandfather, Paternal Grandmother, Paternal Grandfather, Other          Tobacco Use    Smoking status: Former     Packs/day: 1.00     Years: 55.00     Pack years: 55.00     Types: Cigarettes    Smokeless tobacco: Never    Tobacco comments:     uses cigars on ocassion   Substance and Sexual Activity    Alcohol use: Not Currently     Comment: socially    Drug use: No    Sexual activity: Not Currently     Partners: Female     Review of Systems   Constitutional:  Negative for appetite change, chills, diaphoresis and fatigue.   HENT:  Negative for congestion, sore throat and  tinnitus.    Eyes:  Negative for pain, discharge and itching.   Respiratory:  Positive for cough, shortness of breath and wheezing. Negative for chest tightness.    Gastrointestinal:  Negative for abdominal distention, abdominal pain, blood in stool, nausea and vomiting.   Endocrine: Negative for cold intolerance, heat intolerance and polydipsia.   Genitourinary:  Negative for difficulty urinating, dysuria, flank pain, frequency and hematuria.   Musculoskeletal:  Negative for arthralgias and back pain.   Neurological:  Positive for weakness. Negative for dizziness, seizures, facial asymmetry, light-headedness, numbness and headaches.   Psychiatric/Behavioral:  Negative for agitation, confusion and hallucinations.    Objective:     Vital Signs (Most Recent):  Temp: 97.8 °F (36.6 °C) (03/22/23 1501)  Pulse: 88 (03/22/23 1628)  Resp: (!) 23 (03/22/23 1628)  BP: 129/74 (03/22/23 1501)  SpO2: 100 % (03/22/23 1628)   Vital Signs (24h Range):  Temp:  [97.3 °F (36.3 °C)-97.8 °F (36.6 °C)] 97.8 °F (36.6 °C)  Pulse:  [] 88  Resp:  [18-52] 23  SpO2:  [91 %-100 %] 100 %  BP: (117-163)/() 129/74     Weight: 76.7 kg (169 lb)  Body mass index is 19.04 kg/m².    Physical Exam  Constitutional:       General: He is in acute distress.   HENT:      Head: Normocephalic and atraumatic.      Nose: Nose normal. No congestion or rhinorrhea.      Mouth/Throat:      Mouth: Mucous membranes are dry.   Eyes:      Extraocular Movements: Extraocular movements intact.      Conjunctiva/sclera: Conjunctivae normal.      Pupils: Pupils are equal, round, and reactive to light.   Cardiovascular:      Rate and Rhythm: Regular rhythm. Tachycardia present.      Pulses: Normal pulses.      Heart sounds: Normal heart sounds.   Pulmonary:      Effort: Respiratory distress (on BIBAP) present.      Breath sounds: Wheezing present.   Chest:      Chest wall: No tenderness.   Abdominal:      General: Abdomen is flat. Bowel sounds are normal. There is  no distension.      Palpations: Abdomen is soft.      Tenderness: There is no abdominal tenderness. There is no right CVA tenderness, left CVA tenderness, guarding or rebound.   Musculoskeletal:         General: Normal range of motion.      Cervical back: Normal range of motion and neck supple.   Skin:     General: Skin is warm and dry.   Neurological:      General: No focal deficit present.      Mental Status: He is alert and oriented to person, place, and time. Mental status is at baseline.      Cranial Nerves: No cranial nerve deficit.      Sensory: No sensory deficit.   Psychiatric:         Mood and Affect: Mood normal.         Behavior: Behavior normal.         Thought Content: Thought content normal.         Judgment: Judgment normal.         CRANIAL NERVES     CN III, IV, VI   Pupils are equal, round, and reactive to light.     Significant Labs: All pertinent labs within the past 24 hours have been reviewed.  A1C:   Recent Labs   Lab 03/18/23 1951   HGBA1C 8.8*     CBC:   Recent Labs   Lab 03/21/23 0400 03/22/23 0401   WBC 29.15* 20.33*   HGB 9.7* 9.4*   HCT 29.7* 28.6*    346       CMP:   Recent Labs   Lab 03/21/23 0400 03/22/23  0401   * 139   K 4.1 3.9    105   CO2 22* 26   * 190*   BUN 24* 19   CREATININE 1.0 0.9   CALCIUM 8.1* 8.3*   PROT 6.5 6.3   ALBUMIN 2.3* 2.3*   BILITOT 0.3 0.3   ALKPHOS 80 77   AST 28 22   ALT 22 23   ANIONGAP 9 8       Lactic Acid: No results for input(s): LACTATE in the last 48 hours.  Troponin:   No results for input(s): TROPONINI, TROPONINIHS in the last 48 hours.      Significant Imaging: I have reviewed all pertinent imaging results/findings within the past 24 hours.

## 2023-03-22 NOTE — ASSESSMENT & PLAN NOTE
Know patient with severe COPD presenting with worsening shortness of breath, wheezing and cough  Last PFTs 2020  Suspect trigger is pneumonia  No longer smoking cigarettes    - started steroids- continue x5 days  - nebs Q4h  - continue antibiotics for PNA treatment   - Pulmonary consulted  - needs testing for home O2 at discharge

## 2023-03-22 NOTE — ASSESSMENT & PLAN NOTE
He has 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 with cleared cultures   - unable to collect AFB sputum this admit (none to produce)

## 2023-03-22 NOTE — CONSULTS
Independent bed mobility. No pressure injury identified on buttocks/sacrum. Continue Pressure Injury Prevention interventions.

## 2023-03-22 NOTE — PLAN OF CARE
Problem: Physical Therapy  Goal: Physical Therapy Goal  Description: Goals to be met by: 3/2/23     Patient will increase functional independence with mobility by performin. Supine to sit with Supervision  2. Sit to stand transfer with Supervision  3. Gait  x 10-15 feet with Supervision using Rolling Walker.   4. Ascend/descend 2 stair with CGA  5. Lower extremity exercise program x10 reps per handout, with supervision    Outcome: Ongoing, Progressing   Pt limited 2/2 Increased work of breathing.  Continue with POC as able.

## 2023-03-22 NOTE — ASSESSMENT & PLAN NOTE
Patient admitted with Hypoxic which is Acute.  he is not on home oxygen. Signs/symptoms of respiratory failure include- tachypnea, increased work of breathing, respiratory distress and use of accessory muscles present on admission.   - Labs and images were reviewed. Patient Has not has a recent ABG.   - Supplemental oxygen was provided and noted- Nasal Cannula 4 LPM and BiPAP PRN  - Respiratory failure is due to- COPD and Pneumonia   - continue steroids, nebs for COPD exacerbation  - continue antibiotics for pneumonia. Sputum culture normal altagracia. Will DC vanc.   - TTE with new diastolic dysfunction and BNP elevated- given lasix x1, appears euvolemic.   - Pulmonary following  - needs testing for home O2 prior to discharge  - needs Pulm follow up on discharge

## 2023-03-23 PROBLEM — R06.03 ACUTE RESPIRATORY DISTRESS: Status: ACTIVE | Noted: 2023-01-01

## 2023-03-23 PROBLEM — Z51.5 PALLIATIVE CARE ENCOUNTER: Status: ACTIVE | Noted: 2023-01-01

## 2023-03-23 PROBLEM — I48.0 PAROXYSMAL ATRIAL FIBRILLATION WITH RVR: Status: ACTIVE | Noted: 2023-01-01

## 2023-03-23 PROBLEM — Z51.5 PALLIATIVE CARE ENCOUNTER: Status: RESOLVED | Noted: 2023-01-01 | Resolved: 2023-01-01

## 2023-03-23 NOTE — CARE UPDATE
[facility]  Nurses Note -- 4 Eyes      3/23/2023       Skin assessed on: Q Shift      [x] No Pressure Injuries Present    [x]Prevention Measures Documented    [] Yes LDA  for Pressure Injury Previously documented     [] Yes New Pressure Injury Discovered   [] LDA for New Pressure Injury Added      Attending RN:  Linda Chatman RN     Second RN:  Nicole Iraheta RN

## 2023-03-23 NOTE — CARE UPDATE
[facility]  Nurses Note -- 4 Eyes      3/22/2023       Skin assessed on: Q Shift      [x] No Pressure Injuries Present    [x]Prevention Measures Documented    [] Yes LDA  for Pressure Injury Previously documented     [] Yes New Pressure Injury Discovered   [] LDA for New Pressure Injury Added      Attending RN:  Linda Chatman RN     Second RN:  Lizeth HIGH RN

## 2023-03-23 NOTE — ASSESSMENT & PLAN NOTE
Know patient with severe COPD presenting with worsening shortness of breath, wheezing and cough  Last PFTs 2020  Suspect trigger is pneumonia  No longer smoking cigarettes    - started steroids- complete x5 days  - nebs Q4h  - continue antibiotics for PNA treatment   - Pulmonary consulted  - needs testing for home O2 at discharge-- will do today

## 2023-03-23 NOTE — ASSESSMENT & PLAN NOTE
Palliative Encounter    Date: 03/22/2023  - interval chart reviewed in detail; pt discussed in ICU MDT rounds  - met with pt at bedside to discuss ACP documents; discussed plan to received existing MPOA paperwork from daughter and complete LAPOST; planned to await documents from daughter incase addition MPOA paperwork was needed to complete all at one time   - later met with both pt and daughter; MPOA documents reviewed, confirmed MPOA is daughter Alix; copy placed on pt's chart to be added to EMR  - DNR/DNI Lapost completed with with pt; daughter was at bedside and supported all of pt's decisions  - ongoing GOC/ACP conversations with pt and daughter; they continue to have good insight into pt's condition; continued plan to proceed with [facility] with [facility]   - emotional support provided; allowed time for questions/concerns, all addressed   - ongoing communication with MDT     Palliative Encounter    Date: 03/20/2023  - interval chart reviewed in detail; pt discussed in ICU MDT rounds   - spoke with pt's niece outside pt's room, shared daughter has formal POA paperwork   - met with pt at ICU bedside; continued GOC/ACP discussion   - discussed completion of LAPOST and MPOA; he also believed that his daughter had formal POA documents; explained that I would like to add a copy to his chart to avoid confusion of MPOA in the future since wife is still alive but per pt and daughter lacks capacity; explained that if that paperwork was not readily available  We could also complete facility MPOA document   - discussed plan to confirm POA paperwork with daughter and complete all necessary document prior to discharge   - reassured pt that I was able to confirm [facility] will be able to provide his home palliative care, which he was excited to hear as he admires the care they have provided to his wife   - later called daughter to confirm existence of POA documents; she was happy to provide a copy for  "pt's chart when she visits tomorrow; also discussed plan to complete LAPOST and explained LAPOST document   - daughter continues to have good insight into pt's condition and continues to share his GOC for optimization with limits to invasive procedures, with focus for avoiding hospital and QOL/comfort  - emotional support provided; allowed both pt and daughter time for questions/concerns, all addressed   - ongoing communication with MDT     Palliative Consult   Date: 03/20/2023  - consult received; interval chart reviewed in detail; discussed pt discussed during ICU MDT rounds   - met with patient, at ICU bedside; introduction to palliative medicine team and role in current care and admission   - learned more about pt outside of hospital and current admission; he lives at home with his wife "Stella" and daughter Alix; Stella is currently under hospice care following, what seems to be per pt's explanation, 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; he shared many accounts from his past   - his wife is currently receiving " Hospice care; pt shares that he is very fond of the hospice provider caring for his wife; he also shares recently requiring to use her home oxygen as he previously has not been prescribed/required O2 at home   - at baseline typically ambulatory and able to perform ADLS with minimal to no assistance, besides cooking which his daughter does; but recently hasn't been able to do same amount of physical activity, such as bringing trash out, without becoming severely SOB; dyspnea with minimal exertion and sometimes at rest; does have assistive devices at home including a motorized scooter to assist with ambulation; discussed PT/OT while inpatient to assess abilities with oxygen and potentially home PT to improve strength and maintain recent  abilities   - briefly discussed the need for additional support for his medical needs at home,  pt would be a good candidate for hospice if aligned with GOC, but would have to stop UC infusions which seem to drastically improve his QOL; pt has a strong support system with daugther and private nurse in home several days a week; recommending addition of home palliative care which pt is agreeable to; originally planned to further discussion tomorrow once I am able to assess what services/company his wife has; he was open to this discussion and acknowledged his declining health and need for more assistance; also expressed not wanting to be increased burden to his daughter   - emotional support provided   - Allowed time for questions/concerns; all addressed; expressed availability of myself/palliative team for additional questions/concerns   - later attempted to call daughter, Alix, to learn she was at pt's bedside; met at bedside and discussed pt's care needs at home; confirmed wife's hospice provider is [facility]; both pt and daughter are agreeable to Palliative with [facility]  - daughter aware/agrees with DNR wishes and for overall GOC for improvement in reversible  conditions but with focus of QOL and comfort.    - Contacted [facility] to confirm that they also had home palliative services   - updated CM/SW for coordination of palliative with [facility]; updated hospital primary

## 2023-03-23 NOTE — ASSESSMENT & PLAN NOTE
"Palliative Encounter    Date: 03/23/2023  - interval chart reviewed in detail; pt discussed in ICU MDT rounds  - was called to pt's bedside by RN due to acute respiratory distress which was determined to likely be due to pain, anxiety, fatigue; pt expressing "he's had enough", "he's too old", "I want to go home"; pt satting in 70s-80s during this time, tripoding, increased work of breathing and dyspnea; morphine 1mg IV provided; emotional support provided and talked through his feelings; validated difficulty of current condition; symptoms quickly improved and sats improved following morphine   - alerted hospital primary during event and was later joined by pt's daughter and Dr. Ryan for GOC discussion; plan discussed for assisting pt in getting a good night's sleep (main reason for wanting to go home), symptom management, and coordination of discharge for tomorrow; risks of rushed discharge explained   - discussed recommendation to consider hospice care sooner than previously discussed; desire to continue UC treatments continues to be barrier to hospice; both pt and daughter agree that hospice services would be beneficial to pt's current symptoms and needs, their GOC align with philosophy of care; discussed plan for attempting to continue with scheduled UC treatment for early April and transition to hospice as soon as possible (See UC); they are in agreement with this plan   - confirmed Lapost signature by hospital primary; DNR/DNI LAPOST now in LAPOST registry and ACP docs of EMR   - emotional support provided throughout multiple pt interactions through out the day and all questions addressed   - ongoing communication with MDT     Palliative Encounter    Date: 03/22/2023  - interval chart reviewed in detail; pt discussed in ICU MDT rounds  - met with pt at bedside to discuss ACP documents; discussed plan to received existing MPOA paperwork from daughter and complete LAPOST; planned to await documents from " daughter incase addition MPOA paperwork was needed to complete all at one time   - later met with both pt and daughter; MPOA documents reviewed, confirmed MPOA is daughter Alix; copy placed on pt's chart to be added to EMR  - DNR/DNI Lapost completed with with pt; daughter was at bedside and supported all of pt's decisions  - ongoing GOC/ACP conversations with pt and daughter; they continue to have good insight into pt's condition; continued plan to proceed with [facility] palliative with [facility]   - emotional support provided; allowed time for questions/concerns, all addressed   - ongoing communication with MDT     Palliative Encounter    Date: 03/20/2023  - interval chart reviewed in detail; pt discussed in ICU MDT rounds   - spoke with pt's niece outside pt's room, shared daughter has formal POA paperwork   - met with pt at ICU bedside; continued GOC/ACP discussion   - discussed completion of LAPOST and MPOA; he also believed that his daughter had formal POA documents; explained that I would like to add a copy to his chart to avoid confusion of MPOA in the future since wife is still alive but per pt and daughter lacks capacity; explained that if that paperwork was not readily available  We could also complete facility MPOA document   - discussed plan to confirm POA paperwork with daughter and complete all necessary document prior to discharge   - reassured pt that I was able to confirm [facility] will be able to provide his home palliative care, which he was excited to hear as he admires the care they have provided to his wife   - later called daughter to confirm existence of POA documents; she was happy to provide a copy for pt's chart when she visits tomorrow; also discussed plan to complete LAPOST and explained LAPOST document   - daughter continues to have good insight into pt's condition and continues to share his GOC for optimization with limits to invasive procedures, with focus for avoiding  "hospital and QOL/comfort  - emotional support provided; allowed both pt and daughter time for questions/concerns, all addressed   - ongoing communication with MDT     Palliative Consult   Date: 03/20/2023  - consult received; interval chart reviewed in detail; discussed pt discussed during ICU MDT rounds   - met with patient, at ICU bedside; introduction to palliative medicine team and role in current care and admission   - learned more about pt outside of hospital and current admission; he lives at home with his wife "Stella" and daughter Alix; Stella is currently under hospice care following, what seems to be per pt's explanation, 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; he shared many accounts from his past   - his wife is currently receiving Hospice care; pt shares that he is very fond of the hospice provider caring for his wife; he also shares recently requiring to use her home oxygen as he previously has not been prescribed/required O2 at home   - at baseline typically ambulatory and able to perform ADLS with " minimal to no assistance, besides cooking which his daughter does; but recently hasn't been able to do same amount of physical activity, such as bringing trash out, without becoming severely SOB; dyspnea with minimal exertion and sometimes at rest; does have assistive devices at home including a motorized scooter to assist with ambulation; discussed PT/OT while inpatient to assess abilities with oxygen and potentially home PT to improve strength and maintain recent  abilities   - briefly discussed the need for additional support for his medical needs at home,  pt would be a good candidate for hospice if aligned with GOC, but would have to stop UC infusions which seem to drastically improve his QOL; pt has a strong support system with daugther and private nurse in home several days a week; recommending addition of home palliative care which pt is agreeable to; originally planned to further discussion tomorrow once I am able to assess what services/company his wife has; he was open to this discussion and acknowledged his declining health and need for more assistance; also expressed not wanting to be increased burden to his daughter   - emotional support provided   - Allowed time for questions/concerns; all addressed; expressed availability of myself/palliative team for additional questions/concerns   - later attempted to call daughter, Alix, to learn she was at pt's bedside; met at bedside and discussed pt's care needs at home; confirmed wife's hospice provider is [facility]; both pt and daughter are agreeable to Palliative with [facility]  - daughter aware/agrees with DNR wishes and for overall GOC for improvement in reversible conditions but with focus of QOL and comfort.    - Contacted [facility] to confirm that they also had home palliative services   - updated CM/SW for coordination of palliative with [facility]; updated hospital primary

## 2023-03-23 NOTE — SUBJECTIVE & OBJECTIVE
Interval History: Agitated, short of breath this morning. Improved with IV morphine.     Review of Systems   Constitutional:  Negative for chills and fever.   Respiratory:  Positive for shortness of breath. Negative for cough.    Cardiovascular:  Negative for chest pain.   Gastrointestinal:  Negative for abdominal pain, nausea and vomiting.   Neurological:  Positive for weakness.   Psychiatric/Behavioral:  Negative for confusion.    Objective:     Vital Signs (Most Recent):  Temp: 97.4 °F (36.3 °C) (03/23/23 0701)  Pulse: 92 (03/23/23 1120)  Resp: (!) 28 (03/23/23 1120)  BP: (!) 142/80 (03/23/23 0900)  SpO2: (!) 92 % (03/23/23 1120)   Vital Signs (24h Range):  Temp:  [97.4 °F (36.3 °C)-98 °F (36.7 °C)] 97.4 °F (36.3 °C)  Pulse:  [] 92  Resp:  [21-52] 28  SpO2:  [91 %-100 %] 92 %  BP: (117-177)/(63-96) 142/80     Weight: 76.7 kg (169 lb)  Body mass index is 19.04 kg/m².    Intake/Output Summary (Last 24 hours) at 3/23/2023 1146  Last data filed at 3/23/2023 0800  Gross per 24 hour   Intake 146.08 ml   Output 600 ml   Net -453.92 ml        Physical Exam  Vitals and nursing note reviewed.   Constitutional:       General: He is not in acute distress.     Appearance: He is ill-appearing. He is not toxic-appearing.      Comments: Thin man   HENT:      Head: Normocephalic and atraumatic.      Nose: Nose normal.      Mouth/Throat:      Mouth: Mucous membranes are moist.   Cardiovascular:      Rate and Rhythm: Tachycardia present. Rhythm irregular.      Pulses: Normal pulses.      Heart sounds: Normal heart sounds.      Comments: Afib on monitor  Pulmonary:      Effort: No respiratory distress.      Breath sounds: No wheezing, rhonchi or rales.      Comments: Diminished breath sounds bilaterally. Room air  Abdominal:      General: Bowel sounds are normal. There is no distension.      Palpations: Abdomen is soft.      Tenderness: There is no abdominal tenderness. There is no guarding.   Musculoskeletal:      Right lower  leg: No edema.      Left lower leg: No edema.   Skin:     General: Skin is warm and dry.   Neurological:      Mental Status: He is alert and oriented to person, place, and time.       Significant Labs: All pertinent labs within the past 24 hours have been reviewed.    Significant Imaging: I have reviewed all pertinent imaging results/findings within the past 24 hours.

## 2023-03-23 NOTE — PROGRESS NOTES
[facility] [place] Medicine  Progress Note    Patient Name: Regan Alvarez  MRN: [id number]  Patient Class: IP- Inpatient   Admission Date: 3/18/2023  Length of Stay: 5 days  Attending Physician: Karine Ryan MD  Primary Care Provider: Ishaan Adamson MD        Subjective:     Principal Problem:Acute hypoxemic respiratory failure        HPI:  82-year-old  male with medical history significant for type 2 diabetes mellitus,hypertension,ulcerative colitis,chronic obstructive pulmonary disease,tobacco use disorder in remission, has 55 pack year of cigarette smoking, and previous Mycobacterium avium complex infection who presented to the ED with worsening shortness of breath that did not respond to home nebs,he was said to be saturating in the 80s when EMS arrived but up to 93 at presentation to the ED,shortness of breath was associated with productive cough but no hemoptysis,he denied chest pain, no orthopnea, pedal swelling or PND.He denied any urinary symptoms,no dysuria, no frequency,no urgency or straining at micturition.No change in bowel habit,no diarrhea or constipation.      Overview/Hospital Course:  Mr Regan Alvarez has COPD (FEV1/FVC 48%) no longer smoking, not currently on home O2 (used O2 PRN from his wife who is on hospice care). Does not follow with Pulmonary. 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 He is immunocompromised-- he has UC on vedolizumab with last infusion 2/10/23.     He was admitted with acute hypoxic respiratory failure and severe sepsis due to suspected pneumonia. Started broad spectrum antibiotics. CT chest confirms RLL infiltrate. TTE shows new EF 45% with inferoseptal hypokinesis, diastolic dysfunction, and normal PAP/RV function. Given IV lasix. He was requiring BiPAP for work of breathing. Pulmonary consulted. Sputum culture normal altagracia. He is steadily improving and  weaning O2. Palliative following- plan for home palliative on discharge. PT, OT also following.     He has decided that he no longer wants lab checks. He wants to go home. Plan for home palliative upon discharge and likely transition to home hospice after next vedolizumab infusion.       Interval History: Agitated, short of breath this morning. Improved with IV morphine.     Review of Systems   Constitutional:  Negative for chills and fever.   Respiratory:  Positive for shortness of breath. Negative for cough.    Cardiovascular:  Negative for chest pain.   Gastrointestinal:  Negative for abdominal pain, nausea and vomiting.   Neurological:  Positive for weakness.   Psychiatric/Behavioral:  Negative for confusion.    Objective:     Vital Signs (Most Recent):  Temp: 97.4 °F (36.3 °C) (03/23/23 0701)  Pulse: 92 (03/23/23 1120)  Resp: (!) 28 (03/23/23 1120)  BP: (!) 142/80 (03/23/23 0900)  SpO2: (!) 92 % (03/23/23 1120)   Vital Signs (24h Range):  Temp:  [97.4 °F (36.3 °C)-98 °F (36.7 °C)] 97.4 °F (36.3 °C)  Pulse:  [] 92  Resp:  [21-52] 28  SpO2:  [91 %-100 %] 92 %  BP: (117-177)/(63-96) 142/80     Weight: 76.7 kg (169 lb)  Body mass index is 19.04 kg/m².    Intake/Output Summary (Last 24 hours) at 3/23/2023 1146  Last data filed at 3/23/2023 0800  Gross per 24 hour   Intake 146.08 ml   Output 600 ml   Net -453.92 ml        Physical Exam  Vitals and nursing note reviewed.   Constitutional:       General: He is not in acute distress.     Appearance: He is ill-appearing. He is not toxic-appearing.      Comments: Thin man   HENT:      Head: Normocephalic and atraumatic.      Nose: Nose normal.      Mouth/Throat:      Mouth: Mucous membranes are moist.   Cardiovascular:      Rate and Rhythm: Tachycardia present. Rhythm irregular.      Pulses: Normal pulses.      Heart sounds: Normal heart sounds.      Comments: Afib on monitor  Pulmonary:      Effort: No respiratory distress.      Breath sounds: No wheezing, rhonchi  or rales.      Comments: Diminished breath sounds bilaterally. Room air  Abdominal:      General: Bowel sounds are normal. There is no distension.      Palpations: Abdomen is soft.      Tenderness: There is no abdominal tenderness. There is no guarding.   Musculoskeletal:      Right lower leg: No edema.      Left lower leg: No edema.   Skin:     General: Skin is warm and dry.   Neurological:      Mental Status: He is alert and oriented to person, place, and time.       Significant Labs: All pertinent labs within the past 24 hours have been reviewed.    Significant Imaging: I have reviewed all pertinent imaging results/findings within the past 24 hours.      Assessment/Plan:      * Acute hypoxemic respiratory failure  Patient admitted with Hypoxic which is Acute.  he is not on home oxygen. Signs/symptoms of respiratory failure include- tachypnea, increased work of breathing, respiratory distress and use of accessory muscles present on admission.   - Labs and images were reviewed. Patient Has not has a recent ABG.   - Supplemental oxygen was provided and noted- room air now  - Respiratory failure is due to- COPD and Pneumonia   - continue steroids, nebs for COPD exacerbation  - continue antibiotics for pneumonia. Sputum culture normal altagracia.   - TTE with new diastolic dysfunction and BNP elevated- given lasix x1, appears euvolemic.   - Pulmonary following  - needs testing for home O2 prior to discharge-- will do today   - home palliative with transition to hospice upon discharge       Hypophosphatemia  Replace and monitor        Immunocompromised state  Due to infusions given for UC      ACP (advance care planning)  Advance Care Planning     Date: 03/20/2023  See Dr Ryan note from 3/19/23-- DNR code status, no ventilator. Palliative consulted.     Advance Care Planning     Date: 03/23/2023  Discussed goals of care with patient and his family. Palliative care also present. Will arrange everything with goal to  discharge to home with Palliative care tomorrow. Likely will transition to home hospice after next UC infusion. Time spent discussing goals of care = 16 minutes          Pulmonary nodule  Noted on CT  Patient and family do not want to risk biopsy       Uncontrolled type 2 diabetes mellitus with hyperglycemia, without long-term current use of insulin  Patient's FSGs are uncontrolled due to hyperglycemia on current medication regimen.  Last A1c reviewed-   Lab Results   Component Value Date    HGBA1C 8.8 (H) 03/18/2023     Most recent fingerstick glucose reviewed-   Recent Labs   Lab 03/22/23  1234 03/22/23  1633 03/22/23 2001 03/23/23  0819   POCTGLUCOSE 80 186* 227* 166*     Current correctional scale  Medium  Increase anti-hyperglycemic dose as follows-   Antihyperglycemics (From admission, onward)      Start     Stop Route Frequency Ordered    03/23/23 2100  insulin detemir U-100 pen 20 Units         -- SubQ Nightly 03/22/23 1202    03/22/23 1645  insulin aspart U-100 pen 8 Units         -- SubQ 3 times daily with meals 03/22/23 1202    03/19/23 0956  insulin aspart U-100 pen 0-5 Units         -- SubQ Before meals & nightly PRN 03/19/23 0856        Hold Oral hypoglycemics while patient is in the hospital.      Ulcerative colitis without complications  He has UC on vedolizumab with last infusion 2/10/23  - immunocompromised due to this  - no signs of UC flare      Acute diastolic heart failure  The left ventricle is normal in size with mildly decreased systolic function.  The estimated ejection fraction is 45%. Inferoseptal akinesis  Left ventricular diastolic dysfunction.  Normal right ventricular size with normal right ventricular systolic function.  Severe left atrial enlargement.  Mild right atrial enlargement.  Normal central venous pressure (3 mmHg).  The estimated PA systolic pressure is 8 mmHg.    - this is new diagnosis  - he does not appear volume overloaded on exam but   BNP  Recent Labs   Lab  03/18/23  1951   *     Given lasix x1  - monitor ins/outs  - appears to be euvolemic       Severe sepsis  This patient does have evidence of infective focus  My overall impression is severe sepsis.  Source: Respiratory  Antibiotics given-   Antibiotics (72h ago, onward)      Start     Stop Route Frequency Ordered    03/19/23 1215  cefepime in dextrose 5 % IVPB 2 g         -- IV Every 8 hours (non-standard times) 03/19/23 1104    03/19/23 1000  mupirocin 2 % ointment         03/24 0859 Nasl 2 times daily 03/19/23 0855          Latest lactate reviewed-  No results for input(s): LACTATE in the last 72 hours.  Organ dysfunction indicated by Acute respiratory failure    Fluid challenge Contraindicated- Fluid bolus is contraindicated in this patient due to Congestive Heart Failure - new diagnosis diastolic dysfunction     Post- resuscitation assessment Yes Perfusion exam was performed within 6 hours of septic shock presentation after bolus shows Adequate tissue perfusion assessed by non-invasive monitoring       Will Not start Pressors- Levophed for MAP of 65  Source control achieved by: antibiotics   - WBC has stalled, not improving. respiratory status improved  - no Staph on cultures- DC'd vanc  - continue cefepime for GNR and GPC in pairs noted on gram stain sputum culture  - per his request, no more lab draws    Essential hypertension  BP is generally controlled  - continue home imdur    COPD exacerbation  Know patient with severe COPD presenting with worsening shortness of breath, wheezing and cough  Last PFTs 2020  Suspect trigger is pneumonia  No longer smoking cigarettes    - started steroids- complete x5 days  - nebs Q4h  - continue antibiotics for PNA treatment   - Pulmonary consulted  - needs testing for home O2 at discharge-- will do today     History of KIET infection  He has 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 with cleared cultures   - AFB sputum negative  - he does not want  retreatment if this recurs  - continue hypertonic nebs     Paroxysmal Afib RVR  - noted on 3/23  - likely triggered by pulmonary disease  - will start metoprolol for HR control  - will start eliquis as well, need to discuss with family if they want this to continue on discharge     VTE Risk Mitigation (From admission, onward)           Ordered     enoxaparin injection 40 mg  Daily         03/19/23 0855     IP VTE HIGH RISK PATIENT  Once         03/19/23 0207     Place sequential compression device  Until discontinued         03/19/23 0207                    Discharge Planning   STANISLAV: 3/24/2023     Code Status: DNR   Is the patient medically ready for discharge?:     Reason for patient still in hospital (select all that apply): Patient trending condition  Discharge Plan A: Home with family (and Palliative care NP at home)        Advance Care Planning     Date: 03/23/2023  CLAUDETTEOST completed. 5 minutes spent.            Critical care time spent on the evaluation and treatment of severe organ dysfunction, review of pertinent labs and imaging studies, discussions with consulting providers and discussions with patient/family: 30 minutes.      Karine Ryan MD  Department of Hospital Medicine   [facility] - Intensive Care

## 2023-03-23 NOTE — PT/OT/SLP PROGRESS
Occupational Therapy      Patient Name:  Regan Alvarez   MRN:  [id number]    OT orders received. Per evaluating PT, pt is not in need of skilled acute OT services at this time as he is able to perform ADLs and functional mobility w/o concerns. MD present for discussion and aware of no need for OT at this time. OT to sign off.     3/23/2023

## 2023-03-23 NOTE — ASSESSMENT & PLAN NOTE
- diagnoses in his 70s, per patient   - symptoms much improved with infusion treatments with vedolizumab every 2-3 months (per pt)  - desire to continue UC treatment is a current barrier to pt's willingness to transition to hospice care as this treatment has greatly improved his quality of life and attributes to saving his life   - noted; management per hospital primary     Pt for fetal echo  Denies pregnancy complaints  States active fetus

## 2023-03-23 NOTE — ASSESSMENT & PLAN NOTE
- known history of severe COPD; history of smoking (currently not smoking)   - sats in 80s upon EMS assessment; has responded well to Duenebs and bipap at admit  - dyspnea and increased work of breathing on occasion on NC, pt is not agreeable to bipap   -  not currently on home O2 (used O2 PRN from his wife who is on hospice care)  - 3/23 episode of resp. Distress, dyspnea, tripoding, sats 70s-80s; pt also verbalizing pain, anxiety, and wanting to go home; morphine 1mg IV given for air hunger and pain, this along with emotional support and talking through feelings improved symptoms  - discussed plan for anxiety, insomnia, and pain to reduce respiratory burden with hospital primary and collaborating provider; continued availability of IV morphine 1mg for high pain or if another episode of respiratory distress were to occur (discussed with bedside RN), ativan 0.5mg PO PRN (encouraged pt to trial if any returned anxiety and to assess effectiveness/side effects before going home if desired home med), trazadone HS (pt correlated lack of sleep with desire to go home), Norco 5mg PO also available (if pain not relieved by tylenol, to reserve morphine for acute distress/severe pain)   - hospice qualifying if/when aligns with GOC   - noted, management per hospital primary and PCCM

## 2023-03-23 NOTE — ASSESSMENT & PLAN NOTE
- diagnoses in his 70s, per patient   - symptoms much improved with infusion treatments with vedolizumab every 2-3 months (per pt)  - desire to continue UC treatment is a current barrier to pt's willingness to transition to hospice care as this treatment has greatly improved his quality of life and attributes to saving his life  - current plan for pt to discharge home with palliative care with heart of hospice, complete scheduled infusion for early April, and then transition to hospice care; assessing possibility of receiving this next 1 additional infusion of vedolizumab for palliative purposes if pt were to transition to hospice prior to scheduled infusion date (improves pt's quality of life, bowel incontinent prior to treatment, now fully continent)   - noted; management per hospital primary

## 2023-03-23 NOTE — PROGRESS NOTES
[facility] - Intensive Care  Palliative Medicine  Progress Note    Patient Name: eRgan Alvarez  MRN: [id number]  Admission Date: 3/18/2023  Hospital Length of Stay: 5 days  Code Status: DNR   Attending Provider: Karine Ryan MD  Consulting Provider: Veronica Wright NP  Primary Care Physician: Ishaan Adamson MD  Principal Problem:Acute hypoxemic respiratory failure    Patient information was obtained from patient, relative(s) and primary team.      Assessment/Plan:   Advance Care Planning     Pulmonary  * Acute hypoxemic respiratory failure  - See COPD exacerbation  - noted; management per PCCM and hospital primary     COPD exacerbation  - known history of severe COPD; history of smoking (currently not smoking)   - sats in 80s upon EMS assessment; has responded well to Duenebs and bipap at admit  - dyspnea and increased work of breathing on occasion on NC, pt is not agreeable to bipap   -  not currently on home O2 (used O2 PRN from his wife who is on hospice care)  - 3/23 episode of resp. Distress, dyspnea, tripoding, sats 70s-80s; pt also verbalizing pain, anxiety, and wanting to go home; morphine 1mg IV given for air hunger and pain, this along with emotional support and talking through feelings improved symptoms  - discussed plan for anxiety, insomnia, and pain to reduce respiratory burden with hospital primary and collaborating provider; continued availability of IV morphine 1mg for high pain or if another episode of respiratory distress were to occur (discussed with bedside RN), ativan 0.5mg PO PRN (encouraged pt to trial if any returned anxiety and to assess effectiveness/side effects before going home if desired home med), trazadone HS (pt correlated lack of sleep with desire to go home), Norco 5mg PO also available (if pain not relieved by tylenol, to reserve morphine for acute distress/severe pain)   - hospice qualifying if/when aligns with GOC   - noted, management per hospital primary and PCCM  "    Cardiac/Vascular  Acute diastolic heart failure  - 3/19 Echo: new EF 45% with inferoseptal hypokinesis, diastolic dysfunction, and normal PAP/RV function  - respiratory symptoms also correlated to COPD   - hospice appropriate if/when aligns with GOC   - noted; management per hospital primary     ID  Severe sepsis  - suspected secondary to pneumonia    - pt immunocompromised due to UC treatment   - IV abx regimen per hospital primary   - noted; management per hospital primary     History of KIET infection  - pt advocates that if MAC was every suspected/confirmed in the future he would not wish to repeat treatment for this   - noted; management per hospital primary     GI  Ulcerative colitis without complications  - diagnoses in his 70s, per patient   - symptoms much improved with infusion treatments with vedolizumab every 2-3 months (per pt)  - desire to continue UC treatment is a current barrier to pt's willingness to transition to hospice care as this treatment has greatly improved his quality of life and attributes to saving his life  - current plan for pt to discharge home with palliative care with [facility], complete scheduled infusion for early April, and then transition to hospice care; assessing possibility of receiving this next 1 additional infusion of vedolizumab for palliative purposes if pt were to transition to hospice prior to scheduled infusion date (improves pt's quality of life, bowel incontinent prior to treatment, now fully continent)   - noted; management per hospital primary      Palliative Care  ACP (advance care planning)  Palliative Encounter    Date: 03/23/2023  - interval chart reviewed in detail; pt discussed in ICU MDT rounds  - was called to pt's bedside by RN due to acute respiratory distress which was determined to likely be due to pain, anxiety, fatigue; pt expressing "he's had enough", "he's too old", "I want to go home"; pt satting in 70s-80s during this time, tripoding, " increased work of breathing and dyspnea; morphine 1mg IV provided; emotional support provided and talked through his feelings; validated difficulty of current condition; symptoms quickly improved and sats improved following morphine   - alerted hospital primary during event and was later joined by pt's daughter and Dr. Ryan for GOC discussion; plan discussed for assisting pt in getting a good night's sleep (main reason for wanting to go home), symptom management, and coordination of discharge for tomorrow; risks of rushed discharge explained   - discussed recommendation to consider hospice care sooner than previously discussed; desire to continue UC treatments continues to be barrier to hospice; both pt and daughter agree that hospice services would be beneficial to pt's current symptoms and needs, their GOC align with philosophy of care; discussed plan for attempting to continue with scheduled UC treatment for early April and transition to hospice as soon as possible (See UC); they are in agreement with this plan   - confirmed Lapost signature by hospital primary; DNR/DNI LAPOST now in LAPOST registry and ACP docs of EMR   - emotional support provided throughout multiple pt interactions through out the day and all questions addressed   - ongoing communication with MDT     Palliative Encounter    Date: 03/22/2023  - interval chart reviewed in detail; pt discussed in ICU MDT rounds  - met with pt at bedside to discuss ACP documents; discussed plan to received existing MPOA paperwork from daughter and complete LAPOST; planned to await documents from daughter incase addition MPOA paperwork was needed to complete all at one time   - later met with both pt and daughter; MPOA documents reviewed, confirmed MPOA is daughter Alix; copy placed on pt's chart to be added to EMR  - DNR/DNI Lapost completed with with pt; daughter was at bedside and supported all of pt's decisions  - ongoing GOC/ACP conversations with pt  and daughter; they continue to have good insight into pt's condition; continued plan to proceed with home palliative with [facility]   - emotional support provided; allowed time for questions/concerns, all addressed   - ongoing communication with MDT     Palliative Encounter    Date: 03/20/2023  - interval chart reviewed in detail; pt discussed in ICU MDT rounds   - spoke with pt's niece outside pt's room, shared daughter has formal POA paperwork   - met with pt at ICU bedside; continued GOC/ACP discussion   - discussed completion of LAPOST and MPOA; he also believed that his daughter had formal POA documents; explained that I would like to add a copy to his chart to avoid confusion of MPOA in the future since wife is still alive but per pt and daughter lacks capacity; explained that if that paperwork was not readily available  We could also complete facility MPOA document   - discussed plan to confirm POA paperwork with daughter and complete all necessary document prior to discharge   - reassured pt that I was able to confirm [facility] will be able to provide his home palliative care, which he was excited to hear as he admires the care they have provided to his wife   - later called daughter to confirm existence of POA documents; she was happy to provide a copy for pt's chart when she visits tomorrow; also discussed plan to complete LAPOST and explained LAPOST document   - daughter continues to have good insight into pt's condition and continues to share his GOC for optimization with limits to invasive procedures, with focus for avoiding hospital and QOL/comfort  - emotional support provided; allowed both pt and daughter time for questions/concerns, all addressed   - ongoing communication with MDT     Palliative Consult   Date: 03/20/2023  - consult received; interval chart reviewed in detail; discussed pt discussed during ICU MDT rounds   - met with patient, at ICU bedside; introduction to palliative  "medicine team and role in current care and admission   - learned more about pt outside of hospital and current admission; he lives at home with his wife "Stella" and daughter Alix; Stella is currently under hospice care following, what seems to be per pt's explanation, 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; he shared many accounts from his past   - his wife is currently receiving Hospice care; pt shares that he is very fond of the hospice provider caring for his wife; he also shares recently requiring to use her home oxygen as he previously has not been prescribed/required O2 at home   - at baseline typically ambulatory and able to perform ADLS with minimal to no assistance, besides cooking which his daughter does; but recently hasn't been able to do same amount of physical activity, such as bringing trash out, without becoming severely SOB; dyspnea with minimal exertion and sometimes at rest; does have assistive devices at home including a motorized scooter to assist with ambulation; discussed PT/OT while inpatient " "to assess abilities with oxygen and potentially home PT to improve strength and maintain recent  abilities   - briefly discussed the need for additional support for his medical needs at home,  pt would be a good candidate for hospice if aligned with GOC, but would have to stop UC infusions which seem to drastically improve his QOL; pt has a strong support system with daugther and private nurse in home several days a week; recommending addition of home palliative care which pt is agreeable to; originally planned to further discussion tomorrow once I am able to assess what services/company his wife has; he was open to this discussion and acknowledged his declining health and need for more assistance; also expressed not wanting to be increased burden to his daughter   - emotional support provided   - Allowed time for questions/concerns; all addressed; expressed availability of myself/palliative team for additional questions/concerns   - later attempted to call daughter, Alix, to learn she was at pt's bedside; met at bedside and discussed pt's care needs at home; confirmed wife's hospice provider is [facility]; both pt and daughter are agreeable to Palliative with [facility]  - daughter aware/agrees with DNR wishes and for overall GOC for improvement in reversible conditions but with focus of QOL and comfort.    - Contacted [facility] to confirm that they also had home palliative services   - updated CM/SW for coordination of palliative with [facility]; updated hospital primary        I will follow-up with patient. Please contact us if you have any additional questions.    Subjective:     Chief Complaint:   Chief Complaint   Patient presents with    Shortness of Breath     Pt arrived via ems, pt chief complaint is shortness of breath. Pt has twila SOB all day was on home treatment upon ems arrival stating in 80's. Pt on duo neb now but only stating 93 at this time.         HPI:   From H&P: "82-year-old " " male with medical history significant for type 2 diabetes mellitus,hypertension,ulcerative colitis,chronic obstructive pulmonary disease,tobacco use disorder in remission, has 55 pack year of cigarette smoking, and previous Mycobacterium avium complex infection who presented to the ED with worsening shortness of breath that did not respond to home nebs,he was said to be saturating in the 80s when EMS arrived but up to 93 at presentation to the ED,shortness of breath was associated with productive cough but no hemoptysis,he denied chest pain, no orthopnea, pedal swelling or PND.He denied any urinary symptoms,no dysuria, no frequency,no urgency or straining at micturition.No change in bowel habit,no diarrhea or constipation."    Palliative medicine consulted for advance care planning and goals of care discussion; Met with pt at bedside; for details of visit, see advance care planning section of plan.         Hospital Course:  No notes on file    Past Medical History:   Diagnosis Date    Ambulates with cane     Arthritis     COPD (chronic obstructive pulmonary disease)     Diabetes mellitus type II     Hypertension     Mycobacterium avium complex     X's 2    Symptomatic anemia 01/28/2020    Tuberculosis     Ulcerative colitis      Past Surgical History:   Procedure Laterality Date    CATARACT EXTRACTION Bilateral     COLONOSCOPY N/A 01/22/2018    Procedure: COLONOSCOPY;  Surgeon: Roel Mendez MD;  Location[facility];  Service: Endoscopy;  Laterality: N/A;  Pt confirmend appt.    melanoma      forehead     Review of patient's allergies indicates:  No Known Allergies    Medications:  Continuous Infusions:  Scheduled Meds:   albuterol-ipratropium  3 mL Nebulization Q4H    atorvastatin  40 mg Oral Daily    ceFEPime (MAXIPIME) IVPB  2 g Intravenous Q8H    enoxaparin  40 mg Subcutaneous Daily    ferrous sulfate  1 tablet Oral Daily    isosorbide mononitrate  30 mg Oral Daily    metoprolol " tartrate  50 mg Oral BID    mupirocin   Nasal BID    pantoprazole  40 mg Oral Daily    sodium chloride 3%  4 mL Nebulization Q8H    trazodone  50 mg Oral QHS     PRN Meds:acetaminophen, dextrose 10%, dextrose 10%, dextrose, dextrose, glucagon (human recombinant), HYDROcodone-acetaminophen, insulin aspart U-100, LORazepam, magic mouthwash (diphenhydrAMINE 12.5 mg/5 mL 20 mL, aluminum & magnesium hydroxide-simethicone (MYLANTA) 20 mL, LIDOcaine HCl 2% (XYLOCAINE) 20 mL) solution, magnesium oxide, magnesium oxide, melatonin, morphine, potassium bicarbonate, potassium bicarbonate, potassium bicarbonate, potassium, sodium phosphates, potassium, sodium phosphates, potassium, sodium phosphates, sodium chloride 0.9%    Family History       Problem Relation (Age of Onset)    Arthritis Daughter    Cancer Father    Depression Sister    Diabetes Daughter    No Known Problems Mother, Brother, Maternal Aunt, Maternal Uncle, Paternal Aunt, Paternal Uncle, Maternal Grandmother, Maternal Grandfather, Paternal Grandmother, Paternal Grandfather, Other          Tobacco Use    Smoking status: Former     Packs/day: 1.00     Years: 55.00     Pack years: 55.00     Types: Cigarettes    Smokeless tobacco: Never    Tobacco comments:     uses cigars on ocassion   Substance and Sexual Activity    Alcohol use: Not Currently     Comment: socially    Drug use: No    Sexual activity: Not Currently     Partners: Female     Objective:     Vital Signs (Most Recent):  Temp: 97.7 °F (36.5 °C) (03/23/23 1101)  Pulse: 90 (03/23/23 1536)  Resp: (!) 36 (03/23/23 1536)  BP: 121/73 (03/23/23 1501)  SpO2: (!) 92 % (03/23/23 1536)   Vital Signs (24h Range):  Temp:  [97.4 °F (36.3 °C)-98 °F (36.7 °C)] 97.7 °F (36.5 °C)  Pulse:  [] 90  Resp:  [21-44] 36  SpO2:  [92 %-100 %] 92 %  BP: (102-177)/(62-96) 121/73     Weight: 76.7 kg (169 lb)  Body mass index is 19.04 kg/m².    Physical Exam  Vitals and nursing note reviewed.   Constitutional:        General: He is not in acute distress.     Appearance: He is ill-appearing.      Comments: Elderly, frail   HENT:      Head: Normocephalic and atraumatic.      Nose: Nose normal.      Mouth/Throat:      Mouth: Mucous membranes are dry.      Comments: Mild erythema to upper gums   Pulmonary:      Effort: Pulmonary effort is normal. No respiratory distress.      Comments: Occasional dyspnea with prolonged conversation despite O2 via NC; in AM episode of dyspnea, increased work of breathing, tripoding, sats 70s-80s (suspected due to verbalized to pain and anxiety)   Musculoskeletal:      Right lower leg: No edema.      Left lower leg: No edema.   Neurological:      Mental Status: He is alert and oriented to person, place, and time.   Psychiatric:         Mood and Affect: Mood normal.         Thought Content: Thought content normal.     Advance Care Planning   Advance Directives:   Living Will: No    LaPOST: No    Do Not Resuscitate Status: Yes (will plan to complete LAPOST prior to discharge))        Oral Declaration: Yes  Agent's Name:  Alix (daughter); wife lacks capacity/dementia, only living child is Alix per pt)    Decision Making:  Patient answered questions  Goals of Care: What is most important right now is to focus on spending time at home, remaining as independent as possible, symptom/pain control, quality of life, even if it means sacrificing a little time. Accordingly, we have decided that the best plan to meet the patient's goals includes continuing with treatment and enrolling in home palliative care.        Significant Labs: All pertinent labs within the past 24 hours have been reviewed.  CBC:   Recent Labs   Lab 03/23/23  0346   WBC 21.30*   HGB 10.1*   HCT 31.3*   MCV 93          BMP:  Recent Labs   Lab 03/23/23  0346   *      K 3.9      CO2 23   BUN 18   CREATININE 0.9   CALCIUM 8.5*       LFT:  Lab Results   Component Value Date    AST 22 03/22/2023    ALKPHOS 77  03/22/2023    BILITOT 0.3 03/22/2023     Albumin:   Albumin   Date Value Ref Range Status   03/22/2023 2.3 (L) 3.5 - 5.2 g/dL Final     Protein:   Total Protein   Date Value Ref Range Status   03/22/2023 6.3 6.0 - 8.4 g/dL Final     Lactic acid:   Lab Results   Component Value Date    LACTATE 1.5 03/19/2023    LACTATE 1.5 03/19/2023       Significant Imaging: I have reviewed all pertinent imaging results/findings within the past 24 hours.        Total visit time: 85 minutes    > 50% of  35  min visit spent in chart review, face to face discussion of symptom assessment, coordination of care with other specialists, and discharge planning.    50 min ACP time spent: goals of care, emotional support, formulating and communicating prognosis, exploring burden/ benefit of various approaches of treatment.     Veronica Wrihgt NP  Palliative Medicine  [facility] - Intensive Care

## 2023-03-23 NOTE — NURSING
Sepsis Screen (most recent)       Sepsis Screen (IP) - 03/23/23 1321       Is the patient's history or complaint suggestive of a possible infection? Yes  -    Are there at least two of the following signs and symptoms present? Yes  -    Sepsis signs/symptoms - Tachycardia Tachycardia     >90  -    Sepsis signs/symptoms - Tachypnea Tachypnea     >20  -    Sepsis signs/symptoms - WBC WBC < 4,000 or WBC > 12,000  -    Are any of the following organ dysfunction criteria present and not considered to be due to a chronic condition? Yes  -    Organ Dysfunction Criteria - O2 O2 Saturation < 95% on room air  -    Initiate Sepsis Protocol No  -    Reason sepsis not considered Pt. receiving appropriate management  -              User Key  (r) = Recorded By, (t) = Taken By, (c) = Cosigned By      Initials Name     Radha Mcginnis RN

## 2023-03-23 NOTE — ASSESSMENT & PLAN NOTE
This patient does have evidence of infective focus  My overall impression is severe sepsis.  Source: Respiratory  Antibiotics given-   Antibiotics (72h ago, onward)    Start     Stop Route Frequency Ordered    03/19/23 1215  cefepime in dextrose 5 % IVPB 2 g         -- IV Every 8 hours (non-standard times) 03/19/23 1104    03/19/23 1000  mupirocin 2 % ointment         03/24 0859 Nasl 2 times daily 03/19/23 0855        Latest lactate reviewed-  No results for input(s): LACTATE in the last 72 hours.  Organ dysfunction indicated by Acute respiratory failure    Fluid challenge Contraindicated- Fluid bolus is contraindicated in this patient due to Congestive Heart Failure - new diagnosis diastolic dysfunction     Post- resuscitation assessment Yes Perfusion exam was performed within 6 hours of septic shock presentation after bolus shows Adequate tissue perfusion assessed by non-invasive monitoring       Will Not start Pressors- Levophed for MAP of 65  Source control achieved by: antibiotics   - WBC has stalled, not improving. respiratory status improved  - no Staph on cultures- DC'd vanc  - continue cefepime for GNR and GPC in pairs noted on gram stain sputum culture  - per his request, no more lab draws

## 2023-03-23 NOTE — PROGRESS NOTES
[facility] - Intensive Care  Palliative Medicine  Progress Note    Patient Name: Regan Alvarez  MRN: [id number]  Admission Date: 3/18/2023  Hospital Length of Stay: 5 days  Code Status: DNR   Attending Provider: Karine Ryan MD  Consulting Provider: Veronica Wright NP  Primary Care Physician: Ishaan Adamson MD  Principal Problem:Acute hypoxemic respiratory failure    Patient information was obtained from patient, relative(s) and primary team.      Assessment/Plan:   Advance Care Planning     Pulmonary  * Acute hypoxemic respiratory failure  - See COPD exacerbation  - noted; [facility]     COPD exacerbation  - known history of severe COPD; history of smoking (currently not smoking)   - sats in 80s upon EMS assessment; has responded well to Duenebs and bipap at admit  - dyspnea and increased work of breathing on occasion on NC, pt is not agreeable to bipap   -  not currently on home O2 (used O2 PRN from his wife who is on hospice care)  - hospice qualifying if/when aligns with GOC   - noted, management per hospital primary and [facility]     Cardiac/Vascular  Acute diastolic heart failure  - 3/19 Echo: new EF 45% with inferoseptal hypokinesis, diastolic dysfunction, and normal PAP/RV function  - respiratory symptoms also correlated to COPD   - hospice appropriate if/when aligns with GOC   - noted; management per hospital primary     ID  Severe sepsis  - suspected secondary to pneumonia    - pt immunocompromised due to UC treatment   - IV abx regimen per hospital primary   - noted; management per hospital primary     History of KIET infection  - pt advocates that if MAC was every suspected/confirmed in the future he would not wish to repeat treatment for this   - noted; management per hospital primary     Immunology/Multi System  Immunocompromised state  - see UC   - noted; management per hospital primary     GI  Ulcerative colitis without complications  - diagnoses in his 70s, per  patient   - symptoms much improved with infusion treatments with vedolizumab every 2-3 months (per pt)  - desire to continue UC treatment is a current barrier to pt's willingness to transition to hospice care as this treatment has greatly improved his quality of life and attributes to saving his life   - noted; management per hospital primary      Palliative Care  ACP (advance care planning)  Palliative Encounter    Date: 03/22/2023  - interval chart reviewed in detail; pt discussed in ICU MDT rounds  - met with pt at bedside to discuss ACP documents; discussed plan to received existing MPOA paperwork from daughter and complete LAPOST; planned to await documents from daughter incase addition MPOA paperwork was needed to complete all at one time   - later met with both pt and daughter; MPOA documents reviewed, confirmed MPOA is daughter Alix; copy placed on pt's chart to be added to EMR  - DNR/DNI Lapost completed with with pt; daughter was at bedside and supported all of pt's decisions  - ongoing GOC/ACP conversations with pt and daughter; they continue to have good insight into pt's condition; continued plan to proceed with home palliative with [facility]   - emotional support provided; allowed time for questions/concerns, all addressed   - ongoing communication with MDT     Palliative Encounter    Date: 03/20/2023  - interval chart reviewed in detail; pt discussed in ICU MDT rounds   - spoke with pt's niece outside pt's room, shared daughter has formal POA paperwork   - met with pt at ICU bedside; continued GOC/ACP discussion   - discussed completion of LAPOST and MPOA; he also believed that his daughter had formal POA documents; explained that I would like to add a copy to his chart to avoid confusion of MPOA in the future since wife is still alive but per pt and daughter lacks capacity; explained that if that paperwork was not readily available  We could also complete facility MPOA document   -  "discussed plan to confirm POA paperwork with daughter and complete all necessary document prior to discharge   - reassured pt that I was able to confirm [facility] will be able to provide his home palliative care, which he was excited to hear as he admires the care they have provided to his wife   - later called daughter to confirm existence of POA documents; she was happy to provide a copy for pt's chart when she visits tomorrow; also discussed plan to complete LAPOST and explained LAPOST document   - daughter continues to have good insight into pt's condition and continues to share his GOC for optimization with limits to invasive procedures, with focus for avoiding hospital and QOL/comfort  - emotional support provided; allowed both pt and daughter time for questions/concerns, all addressed   - ongoing communication with MDT     Palliative Consult   Date: 03/20/2023  - consult received; interval chart reviewed in detail; discussed pt discussed during ICU MDT rounds   - met with patient, at ICU bedside; introduction to palliative medicine team and role in current care and admission   - learned more about pt outside of hospital and current admission; he lives at home with his wife "Stella" and daughter Alix; Stella is currently under hospice care following, what seems to be per pt's explanation, 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"; he is from [place] and Stella is from [place], they enjoyed trips to visit family regularly; he correlates his previous cigarette addiction as a result of the lifestyle of his work; later in his carrier he realized the toll that industry had on his overall life and transitioned to being a lumber farmer; he shared many accounts from his past   - his wife is currently receiving Hospice care; pt shares that he is very fond of the hospice provider caring for his wife; he also shares recently requiring to use her home oxygen as he previously has not been prescribed/required O2 at home   - at baseline typically ambulatory and able to perform ADLS with minimal to no assistance, besides cooking which his daughter does; but recently hasn't been able to do same amount of physical activity, such as bringing trash out, without becoming severely SOB; dyspnea with minimal exertion and sometimes at rest; does have assistive devices at home including a motorized scooter to assist with ambulation; discussed PT/OT while inpatient to assess abilities with oxygen and potentially home PT to improve strength and maintain recent  abilities   - briefly discussed the need for additional support for his medical needs at home,  pt would be a good candidate for hospice if aligned with GOC, but would have to stop UC infusions which seem to drastically improve his QOL; pt has a strong support system with real and private nurse in home several days a week; recommending addition of home palliative care which pt is agreeable to; originally planned to further discussion tomorrow once I am able to assess what services/company his wife has; he was open to this discussion and acknowledged his declining health and need for more assistance; also expressed not wanting to be increased burden to his daughter   - emotional support provided   - Allowed time for questions/concerns; all addressed; expressed availability of " "myself/palliative team for additional questions/concerns   - later attempted to call daughter, Alix, to learn she was at pt's bedside; met at bedside and discussed pt's care needs at home; confirmed wife's hospice provider is [facility]; both pt and daughter are agreeable to Palliative with [facility]  - daughter aware/agrees with DNR wishes and for overall GOC for improvement in reversible conditions but with focus of QOL and comfort.    - Contacted [facility] to confirm that they also had home palliative services   - updated CM/SW for coordination of palliative with [facility]; updated hospital primary        I will follow-up with patient. Please contact us if you have any additional questions.    Subjective:     Chief Complaint:   Chief Complaint   Patient presents with    Shortness of Breath     Pt arrived via ems, pt chief complaint is shortness of breath. Pt has twila SOB all day was on home treatment upon ems arrival stating in 80's. Pt on duo neb now but only stating 93 at this time.         HPI:   From H&P: "82-year-old  male with medical history significant for type 2 diabetes mellitus,hypertension,ulcerative colitis,chronic obstructive pulmonary disease,tobacco use disorder in remission, has 55 pack year of cigarette smoking, and previous Mycobacterium avium complex infection who presented to the ED with worsening shortness of breath that did not respond to home nebs,he was said to be saturating in the 80s when EMS arrived but up to 93 at presentation to the ED,shortness of breath was associated with productive cough but no hemoptysis,he denied chest pain, no orthopnea, pedal swelling or PND.He denied any urinary symptoms,no dysuria, no frequency,no urgency or straining at micturition.No change in bowel habit,no diarrhea or constipation."    Palliative medicine consulted for advance care planning and goals of care discussion; Met with pt at bedside; for details of visit, see advance " care planning section of plan.         Hospital Course:  No notes on file    Past Medical History:   Diagnosis Date    Ambulates with cane     Arthritis     COPD (chronic obstructive pulmonary disease)     Diabetes mellitus type II     Hypertension     Mycobacterium avium complex     X's 2    Symptomatic anemia 01/28/2020    Tuberculosis     Ulcerative colitis      Past Surgical History:   Procedure Laterality Date    CATARACT EXTRACTION Bilateral     COLONOSCOPY N/A 01/22/2018    Procedure: COLONOSCOPY;  Surgeon: Roel Mendez MD;  [facility];  Service: Endoscopy;  Laterality: N/A;  Pt confirmend appt.    melanoma      forehead     Review of patient's allergies indicates:  No Known Allergies    Medications:  Continuous Infusions:  Scheduled Meds:   albuterol-ipratropium  3 mL Nebulization Q4H    atorvastatin  40 mg Oral Daily    ceFEPime (MAXIPIME) IVPB  2 g Intravenous Q8H    enoxaparin  40 mg Subcutaneous Daily    ferrous sulfate  1 tablet Oral Daily    isosorbide mononitrate  30 mg Oral Daily    metoprolol tartrate  50 mg Oral BID    mupirocin   Nasal BID    pantoprazole  40 mg Oral Daily    sodium chloride 3%  4 mL Nebulization Q8H    trazodone  50 mg Oral QHS     PRN Meds:acetaminophen, dextrose 10%, dextrose 10%, dextrose, dextrose, glucagon (human recombinant), HYDROcodone-acetaminophen, insulin aspart U-100, LORazepam, magic mouthwash (diphenhydrAMINE 12.5 mg/5 mL 20 mL, aluminum & magnesium hydroxide-simethicone (MYLANTA) 20 mL, LIDOcaine HCl 2% (XYLOCAINE) 20 mL) solution, magnesium oxide, magnesium oxide, melatonin, morphine, potassium bicarbonate, potassium bicarbonate, potassium bicarbonate, potassium, sodium phosphates, potassium, sodium phosphates, potassium, sodium phosphates, sodium chloride 0.9%    Family History       Problem Relation (Age of Onset)    Arthritis Daughter    Cancer Father    Depression Sister    Diabetes Daughter    No Known Problems Mother, Brother,  Maternal Aunt, Maternal Uncle, Paternal Aunt, Paternal Uncle, Maternal Grandmother, Maternal Grandfather, Paternal Grandmother, Paternal Grandfather, Other          Tobacco Use    Smoking status: Former     Packs/day: 1.00     Years: 55.00     Pack years: 55.00     Types: Cigarettes    Smokeless tobacco: Never    Tobacco comments:     uses cigars on ocassion   Substance and Sexual Activity    Alcohol use: Not Currently     Comment: socially    Drug use: No    Sexual activity: Not Currently     Partners: Female     Objective:     Vital Signs (Most Recent):  Temp: 97.7 °F (36.5 °C) (03/23/23 1101)  Pulse: 83 (03/23/23 1501)  Resp: (!) 30 (03/23/23 1501)  BP: 121/73 (03/23/23 1501)  SpO2: (!) 94 % (03/23/23 1501)   Vital Signs (24h Range):  Temp:  [97.4 °F (36.3 °C)-98 °F (36.7 °C)] 97.7 °F (36.5 °C)  Pulse:  [] 83  Resp:  [21-44] 30  SpO2:  [92 %-100 %] 94 %  BP: (102-177)/(62-96) 121/73     Weight: 76.7 kg (169 lb)  Body mass index is 19.04 kg/m².    Physical Exam  Vitals and nursing note reviewed.   Constitutional:       General: He is not in acute distress.     Appearance: He is ill-appearing.      Comments: Elderly, frail   HENT:      Head: Normocephalic and atraumatic.      Nose: Nose normal.      Mouth/Throat:      Mouth: Mucous membranes are dry.   Pulmonary:      Effort: Pulmonary effort is normal. No respiratory distress.      Comments: Occasional dyspnea with prolonged conversation despite O2 via NC  Musculoskeletal:      Right lower leg: No edema.      Left lower leg: No edema.   Neurological:      Mental Status: He is alert and oriented to person, place, and time.   Psychiatric:         Mood and Affect: Mood normal.         Thought Content: Thought content normal.     Advance Care Planning   Advance Directives:   Living Will: No    LaPOST: No    Do Not Resuscitate Status: Yes (will plan to complete LAPOST prior to discharge))        Oral Declaration: Yes  Agent's Name:  Alix (daughter); wife  lacks capacity/dementia, only living child is Alix per pt)    Decision Making:  Patient answered questions  Goals of Care: What is most important right now is to focus on spending time at home, remaining as independent as possible, symptom/pain control, quality of life, even if it means sacrificing a little time. Accordingly, we have decided that the best plan to meet the patient's goals includes continuing with treatment and to enroll in home palliative care.        Significant Labs: All pertinent labs within the past 24 hours have been reviewed.  CBC:   Recent Labs   Lab 03/23/23  0346   WBC 21.30*   HGB 10.1*   HCT 31.3*   MCV 93          BMP:  Recent Labs   Lab 03/23/23  0346   *      K 3.9      CO2 23   BUN 18   CREATININE 0.9   CALCIUM 8.5*       LFT:  Lab Results   Component Value Date    AST 22 03/22/2023    ALKPHOS 77 03/22/2023    BILITOT 0.3 03/22/2023     Albumin:   Albumin   Date Value Ref Range Status   03/22/2023 2.3 (L) 3.5 - 5.2 g/dL Final     Protein:   Total Protein   Date Value Ref Range Status   03/22/2023 6.3 6.0 - 8.4 g/dL Final     Lactic acid:   Lab Results   Component Value Date    LACTATE 1.5 03/19/2023    LACTATE 1.5 03/19/2023       Significant Imaging: I have reviewed all pertinent imaging results/findings within the past 24 hours.      Total visit time: 90 minutes    > 50% of  35  min visit spent in chart review, face to face discussion of symptom assessment, coordination of care with other specialists, documentation, and discharge planning.    55 min ACP time spent: goals of care, emotional support, formulating and communicating prognosis, exploring burden/ benefit of various approaches of treatment.     Veronica Wright NP  Palliative Medicine  [facility] - [place] - Intensive Care

## 2023-03-23 NOTE — ASSESSMENT & PLAN NOTE
Patient's FSGs are uncontrolled due to hyperglycemia on current medication regimen.  Last A1c reviewed-   Lab Results   Component Value Date    HGBA1C 8.8 (H) 03/18/2023     Most recent fingerstick glucose reviewed-   Recent Labs   Lab 03/22/23  1234 03/22/23  1633 03/22/23 2001 03/23/23  0819   POCTGLUCOSE 80 186* 227* 166*     Current correctional scale  Medium  Increase anti-hyperglycemic dose as follows-   Antihyperglycemics (From admission, onward)    Start     Stop Route Frequency Ordered    03/23/23 2100  insulin detemir U-100 pen 20 Units         -- SubQ Nightly 03/22/23 1202    03/22/23 1645  insulin aspart U-100 pen 8 Units         -- SubQ 3 times daily with meals 03/22/23 1202    03/19/23 0956  insulin aspart U-100 pen 0-5 Units         -- SubQ Before meals & nightly PRN 03/19/23 0856      Hold Oral hypoglycemics while patient is in the hospital.

## 2023-03-23 NOTE — ASSESSMENT & PLAN NOTE
He has 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 with cleared cultures   - AFB sputum negative  - he does not want retreatment if this recurs  - continue hypertonic nebs

## 2023-03-23 NOTE — SUBJECTIVE & OBJECTIVE
Past Medical History:   Diagnosis Date    Ambulates with cane     Arthritis     COPD (chronic obstructive pulmonary disease)     Diabetes mellitus type II     Hypertension     Mycobacterium avium complex     X's 2    Symptomatic anemia 01/28/2020    Tuberculosis     Ulcerative colitis      Past Surgical History:   Procedure Laterality Date    CATARACT EXTRACTION Bilateral     COLONOSCOPY N/A 01/22/2018    Procedure: COLONOSCOPY;  Surgeon: Roel Mendez MD;  Location[facility];  Service: Endoscopy;  Laterality: N/A;  Pt confirmend appt.    melanoma      forehead     Review of patient's allergies indicates:  No Known Allergies    Medications:  Continuous Infusions:  Scheduled Meds:   albuterol-ipratropium  3 mL Nebulization Q4H    atorvastatin  40 mg Oral Daily    ceFEPime (MAXIPIME) IVPB  2 g Intravenous Q8H    enoxaparin  40 mg Subcutaneous Daily    ferrous sulfate  1 tablet Oral Daily    isosorbide mononitrate  30 mg Oral Daily    metoprolol tartrate  50 mg Oral BID    mupirocin   Nasal BID    pantoprazole  40 mg Oral Daily    sodium chloride 3%  4 mL Nebulization Q8H    trazodone  50 mg Oral QHS     PRN Meds:acetaminophen, dextrose 10%, dextrose 10%, dextrose, dextrose, glucagon (human recombinant), HYDROcodone-acetaminophen, insulin aspart U-100, LORazepam, magic mouthwash (diphenhydrAMINE 12.5 mg/5 mL 20 mL, aluminum & magnesium hydroxide-simethicone (MYLANTA) 20 mL, LIDOcaine HCl 2% (XYLOCAINE) 20 mL) solution, magnesium oxide, magnesium oxide, melatonin, morphine, potassium bicarbonate, potassium bicarbonate, potassium bicarbonate, potassium, sodium phosphates, potassium, sodium phosphates, potassium, sodium phosphates, sodium chloride 0.9%    Family History       Problem Relation (Age of Onset)    Arthritis Daughter    Cancer Father    Depression Sister    Diabetes Daughter    No Known Problems Mother, Brother, Maternal Aunt, Maternal Uncle, Paternal Aunt, Paternal Uncle, Maternal Grandmother, Maternal  Grandfather, Paternal Grandmother, Paternal Grandfather, Other          Tobacco Use    Smoking status: Former     Packs/day: 1.00     Years: 55.00     Pack years: 55.00     Types: Cigarettes    Smokeless tobacco: Never    Tobacco comments:     uses cigars on ocassion   Substance and Sexual Activity    Alcohol use: Not Currently     Comment: socially    Drug use: No    Sexual activity: Not Currently     Partners: Female     Objective:     Vital Signs (Most Recent):  Temp: 97.7 °F (36.5 °C) (03/23/23 1101)  Pulse: 83 (03/23/23 1501)  Resp: (!) 30 (03/23/23 1501)  BP: 121/73 (03/23/23 1501)  SpO2: (!) 94 % (03/23/23 1501)   Vital Signs (24h Range):  Temp:  [97.4 °F (36.3 °C)-98 °F (36.7 °C)] 97.7 °F (36.5 °C)  Pulse:  [] 83  Resp:  [21-44] 30  SpO2:  [92 %-100 %] 94 %  BP: (102-177)/(62-96) 121/73     Weight: 76.7 kg (169 lb)  Body mass index is 19.04 kg/m².    Physical Exam  Vitals and nursing note reviewed.   Constitutional:       General: He is not in acute distress.     Appearance: He is ill-appearing.      Comments: Elderly, frail   HENT:      Head: Normocephalic and atraumatic.      Nose: Nose normal.      Mouth/Throat:      Mouth: Mucous membranes are dry.   Pulmonary:      Effort: Pulmonary effort is normal. No respiratory distress.      Comments: Occasional dyspnea with prolonged conversation despite O2 via NC  Musculoskeletal:      Right lower leg: No edema.      Left lower leg: No edema.   Neurological:      Mental Status: He is alert and oriented to person, place, and time.   Psychiatric:         Mood and Affect: Mood normal.         Thought Content: Thought content normal.     Advance Care Planning   Advance Directives:   Living Will: No    LaPOST: No    Do Not Resuscitate Status: Yes (will plan to complete LAPOST prior to discharge))        Oral Declaration: Yes  Agent's Name:  Alix (daughter); wife lacks capacity/dementia, only living child is Alix per pt)    Decision Making:  Patient  answered questions  Goals of Care: What is most important right now is to focus on spending time at home, remaining as independent as possible, symptom/pain control, quality of life, even if it means sacrificing a little time. Accordingly, we have decided that the best plan to meet the patient's goals includes continuing with treatment and to enroll in home palliative care.        Significant Labs: All pertinent labs within the past 24 hours have been reviewed.  CBC:   Recent Labs   Lab 03/23/23  0346   WBC 21.30*   HGB 10.1*   HCT 31.3*   MCV 93          BMP:  Recent Labs   Lab 03/23/23  0346   *      K 3.9      CO2 23   BUN 18   CREATININE 0.9   CALCIUM 8.5*       LFT:  Lab Results   Component Value Date    AST 22 03/22/2023    ALKPHOS 77 03/22/2023    BILITOT 0.3 03/22/2023     Albumin:   Albumin   Date Value Ref Range Status   03/22/2023 2.3 (L) 3.5 - 5.2 g/dL Final     Protein:   Total Protein   Date Value Ref Range Status   03/22/2023 6.3 6.0 - 8.4 g/dL Final     Lactic acid:   Lab Results   Component Value Date    LACTATE 1.5 03/19/2023    LACTATE 1.5 03/19/2023       Significant Imaging: I have reviewed all pertinent imaging results/findings within the past 24 hours.

## 2023-03-23 NOTE — ASSESSMENT & PLAN NOTE
Advance Care Planning     Date: 03/20/2023  See Dr Ryan note from 3/19/23-- DNR code status, no ventilator. Palliative consulted.      Advance Care Planning     Date: 03/23/2023  Discussed goals of care with patient and his family. Palliative care also present. Will arrange everything with goal to discharge to home with Palliative care tomorrow. Likely will transition to home hospice after next UC infusion. Time spent discussing goals of care = 16 minutes

## 2023-03-23 NOTE — ASSESSMENT & PLAN NOTE
Patient admitted with Hypoxic which is Acute.  he is not on home oxygen. Signs/symptoms of respiratory failure include- tachypnea, increased work of breathing, respiratory distress and use of accessory muscles present on admission.   - Labs and images were reviewed. Patient Has not has a recent ABG.   - Supplemental oxygen was provided and noted- room air now  - Respiratory failure is due to- COPD and Pneumonia   - continue steroids, nebs for COPD exacerbation  - continue antibiotics for pneumonia. Sputum culture normal altagracia.   - TTE with new diastolic dysfunction and BNP elevated- given lasix x1, appears euvolemic.   - Pulmonary following  - needs testing for home O2 prior to discharge-- will do today   - home palliative with transition to hospice upon discharge

## 2023-03-23 NOTE — ASSESSMENT & PLAN NOTE
- known history of severe COPD; history of smoking (currently not smoking)   - sats in 80s upon EMS assessment; has responded well to Duenebs and bipap at admit  - dyspnea and increased work of breathing on occasion on NC, pt is not agreeable to bipap   -  not currently on home O2 (used O2 PRN from his wife who is on hospice care)  - hospice qualifying if/when aligns with GOC   - noted, management per hospital primary and PCCM

## 2023-03-23 NOTE — SUBJECTIVE & OBJECTIVE
Past Medical History:   Diagnosis Date    Ambulates with cane     Arthritis     COPD (chronic obstructive pulmonary disease)     Diabetes mellitus type II     Hypertension     Mycobacterium avium complex     X's 2    Symptomatic anemia 01/28/2020    Tuberculosis     Ulcerative colitis      Past Surgical History:   Procedure Laterality Date    CATARACT EXTRACTION Bilateral     COLONOSCOPY N/A 01/22/2018    Procedure: COLONOSCOPY;  Surgeon: Roel Mendez MD;  Location: [facility];  Service: Endoscopy;  Laterality: N/A;  Pt confirmend appt.    melanoma      forehead     Review of patient's allergies indicates:  No Known Allergies    Medications:  Continuous Infusions:  Scheduled Meds:   albuterol-ipratropium  3 mL Nebulization Q4H    atorvastatin  40 mg Oral Daily    ceFEPime (MAXIPIME) IVPB  2 g Intravenous Q8H    enoxaparin  40 mg Subcutaneous Daily    ferrous sulfate  1 tablet Oral Daily    isosorbide mononitrate  30 mg Oral Daily    metoprolol tartrate  50 mg Oral BID    mupirocin   Nasal BID    pantoprazole  40 mg Oral Daily    sodium chloride 3%  4 mL Nebulization Q8H    trazodone  50 mg Oral QHS     PRN Meds:acetaminophen, dextrose 10%, dextrose 10%, dextrose, dextrose, glucagon (human recombinant), HYDROcodone-acetaminophen, insulin aspart U-100, LORazepam, magic mouthwash (diphenhydrAMINE 12.5 mg/5 mL 20 mL, aluminum & magnesium hydroxide-simethicone (MYLANTA) 20 mL, LIDOcaine HCl 2% (XYLOCAINE) 20 mL) solution, magnesium oxide, magnesium oxide, melatonin, morphine, potassium bicarbonate, potassium bicarbonate, potassium bicarbonate, potassium, sodium phosphates, potassium, sodium phosphates, potassium, sodium phosphates, sodium chloride 0.9%    Family History       Problem Relation (Age of Onset)    Arthritis Daughter    Cancer Father    Depression Sister    Diabetes Daughter    No Known Problems Mother, Brother, Maternal Aunt, Maternal Uncle, Paternal Aunt, Paternal Uncle, Maternal Grandmother, Maternal  Grandfather, Paternal Grandmother, Paternal Grandfather, Other          Tobacco Use    Smoking status: Former     Packs/day: 1.00     Years: 55.00     Pack years: 55.00     Types: Cigarettes    Smokeless tobacco: Never    Tobacco comments:     uses cigars on ocassion   Substance and Sexual Activity    Alcohol use: Not Currently     Comment: socially    Drug use: No    Sexual activity: Not Currently     Partners: Female     Objective:     Vital Signs (Most Recent):  Temp: 97.7 °F (36.5 °C) (03/23/23 1101)  Pulse: 90 (03/23/23 1536)  Resp: (!) 36 (03/23/23 1536)  BP: 121/73 (03/23/23 1501)  SpO2: (!) 92 % (03/23/23 1536)   Vital Signs (24h Range):  Temp:  [97.4 °F (36.3 °C)-98 °F (36.7 °C)] 97.7 °F (36.5 °C)  Pulse:  [] 90  Resp:  [21-44] 36  SpO2:  [92 %-100 %] 92 %  BP: (102-177)/(62-96) 121/73     Weight: 76.7 kg (169 lb)  Body mass index is 19.04 kg/m².    Physical Exam  Vitals and nursing note reviewed.   Constitutional:       General: He is not in acute distress.     Appearance: He is ill-appearing.      Comments: Elderly, frail   HENT:      Head: Normocephalic and atraumatic.      Nose: Nose normal.      Mouth/Throat:      Mouth: Mucous membranes are dry.      Comments: Mild erythema to upper gums   Pulmonary:      Effort: Pulmonary effort is normal. No respiratory distress.      Comments: Occasional dyspnea with prolonged conversation despite O2 via NC; in AM episode of dyspnea, increased work of breathing, tripoding, sats 70s-80s (suspected due to verbalized to pain and anxiety)   Musculoskeletal:      Right lower leg: No edema.      Left lower leg: No edema.   Neurological:      Mental Status: He is alert and oriented to person, place, and time.   Psychiatric:         Mood and Affect: Mood normal.         Thought Content: Thought content normal.     Advance Care Planning   Advance Directives:   Living Will: No    LaPOST: No    Do Not Resuscitate Status: Yes (will plan to complete LAPOST prior to  discharge))        Oral Declaration: Yes  Agent's Name:  Alix (daughter); wife lacks capacity/dementia, only living child is Alix per pt)    Decision Making:  Patient answered questions  Goals of Care: What is most important right now is to focus on spending time at home, remaining as independent as possible, symptom/pain control, quality of life, even if it means sacrificing a little time. Accordingly, we have decided that the best plan to meet the patient's goals includes continuing with treatment and enrolling in home palliative care.        Significant Labs: All pertinent labs within the past 24 hours have been reviewed.  CBC:   Recent Labs   Lab 03/23/23  0346   WBC 21.30*   HGB 10.1*   HCT 31.3*   MCV 93          BMP:  Recent Labs   Lab 03/23/23  0346   *      K 3.9      CO2 23   BUN 18   CREATININE 0.9   CALCIUM 8.5*       LFT:  Lab Results   Component Value Date    AST 22 03/22/2023    ALKPHOS 77 03/22/2023    BILITOT 0.3 03/22/2023     Albumin:   Albumin   Date Value Ref Range Status   03/22/2023 2.3 (L) 3.5 - 5.2 g/dL Final     Protein:   Total Protein   Date Value Ref Range Status   03/22/2023 6.3 6.0 - 8.4 g/dL Final     Lactic acid:   Lab Results   Component Value Date    LACTATE 1.5 03/19/2023    LACTATE 1.5 03/19/2023       Significant Imaging: I have reviewed all pertinent imaging results/findings within the past 24 hours.

## 2023-03-23 NOTE — PLAN OF CARE
[facility] - Intensive Care      HOME HEALTH ORDERS  FACE TO FACE ENCOUNTER    Patient Name: Regan Alvarez  YOB: 1940    PCP: Ishaan Adamson MD   PCP Address: [address]  PCP Phone Number: [phone]  PCP Fax: [phone]    Encounter Date: 3/18/23    Admit to Home Health    Diagnoses:  Active Hospital Problems    Diagnosis  POA    *Acute hypoxemic respiratory failure [J96.01]  Yes    Paroxysmal atrial fibrillation with RVR [I48.0]  Yes    Acute respiratory distress [R06.03]  Yes    ACP (advance care planning) [Z71.89]  Not Applicable    Immunocompromised state [D84.9]  Yes    Hypophosphatemia [E83.39]  Yes    Pulmonary nodule [R91.1]  Yes     Pt has a new 1.3cm GUTIERREZ pulmonary nodule that wasn't present 1 year ago. Pt is an ex-smoker, quit 5 years ago. He however is high risk for complications if he undergoes a biopsy (pneumothorax) given the extend of his emphysema. Pt also not interested in any treatment if it were malignancy  - will however repeat ct chest in 3 months for surveillance        Uncontrolled type 2 diabetes mellitus with hyperglycemia, without long-term current use of insulin [E11.65]  Yes    Ulcerative colitis without complications [K51.90]  Yes    Acute diastolic heart failure [I50.31]  Yes    Essential hypertension [I10]  Yes     Chronic    COPD exacerbation [J44.1]  Yes    Severe sepsis [A41.9, R65.20]  Yes    History of KIET infection [Z86.19]  Yes     Currently being watched surveillance. Last sputum was negative on 1/2020        Resolved Hospital Problems    Diagnosis Date Resolved POA    Palliative care encounter [Z51.5] 03/23/2023 Not Applicable    Hypomagnesemia [E83.42] 03/22/2023 Yes       Follow Up Appointments:  Future Appointments   Date Time Provider Department Center   5/10/2023 10:30 AM LAB, [facility] [place]   5/10/2023 10:45 AM LAB, [facility] [place]   5/19/2023 10:20 AM Ishaan PIEDRA  MD [facility]       Allergies:Review of patient's allergies indicates:  No Known Allergies    Medications: Review discharge medications with patient and family and provide education.    Current Facility-Administered Medications   Medication Dose Route Frequency Provider Last Rate Last Admin    acetaminophen tablet 650 mg  650 mg Oral Q6H PRN Amando Kim MD   650 mg at 03/22/23 1957    albuterol-ipratropium 2.5 mg-0.5 mg/3 mL nebulizer solution 3 mL  3 mL Nebulization Q4H Karine Ryan MD   3 mL at 03/24/23 0336    atorvastatin tablet 40 mg  40 mg Oral Daily Sterling Cash MD   40 mg at 03/23/23 0850    cefepime in dextrose 5 % IVPB 2 g  2 g Intravenous Q8H Karine Ryan  mL/hr at 03/24/23 0414 2 g at 03/24/23 0414    dextrose 10% bolus 125 mL 125 mL  12.5 g Intravenous PRN Karine Ryan MD        dextrose 10% bolus 250 mL 250 mL  25 g Intravenous PRN Karine Ryan MD        dextrose 40 % gel 15,000 mg  15 g Oral PRN Karine Ryan MD        dextrose 40 % gel 30,000 mg  30 g Oral PRN Karine Ryan MD        enoxaparin injection 40 mg  40 mg Subcutaneous Daily Karine Ryan MD   40 mg at 03/22/23 1659    ferrous sulfate tablet 1 each  1 tablet Oral Daily Sterling Cash MD   1 each at 03/23/23 0850    glucagon (human recombinant) injection 1 mg  1 mg Intramuscular PRN Karine Ryan MD        HYDROcodone-acetaminophen 5-325 mg per tablet 1 tablet  1 tablet Oral Q6H PRN Veronica Wright NP   1 tablet at 03/24/23 0413    insulin aspart U-100 pen 0-5 Units  0-5 Units Subcutaneous QID (AC + HS) PRN Karine Ryan MD   1 Units at 03/22/23 2005    isosorbide mononitrate 24 hr tablet 30 mg  30 mg Oral Daily Sterling Cash MD   30 mg at 03/23/23 0850    LORazepam tablet 0.5 mg  0.5 mg Oral Q6H PRN Veronica Wright, NP        magic mouthwash (diphenhydrAMINE 12.5 mg/5 mL 20 mL, aluminum & magnesium hydroxide-simethicone (MYLANTA) 20 mL, LIDOcaine HCl  2% (XYLOCAINE) 20 mL) solution  15 mL Swish & Spit Q4H PRN Karine Ryan MD   15 mL at 03/23/23 1206    magnesium oxide tablet 800 mg  800 mg Oral PRN Karine Ryan MD        magnesium oxide tablet 800 mg  800 mg Oral PRN Karine Ryan MD        melatonin tablet 6 mg  6 mg Oral Nightly PRN Gabrielle Carrasco MD   6 mg at 03/22/23 2222    metoprolol tartrate (LOPRESSOR) tablet 50 mg  50 mg Oral BID Karine Ryan MD   50 mg at 03/23/23 2107    morphine injection 1 mg  1 mg Intravenous Q4H PRN Veronica Wright NP        pantoprazole EC tablet 40 mg  40 mg Oral Daily Sterling Cash MD   40 mg at 03/23/23 0850    potassium bicarbonate disintegrating tablet 35 mEq  35 mEq Oral PRN Karine Ryan MD        potassium bicarbonate disintegrating tablet 50 mEq  50 mEq Oral PRN Karine Ryan MD   50 mEq at 03/20/23 1624    potassium bicarbonate disintegrating tablet 60 mEq  60 mEq Oral PRN Karine Ryan MD        potassium, sodium phosphates 280-160-250 mg packet 2 packet  2 packet Oral PRN Karine yRan MD        potassium, sodium phosphates 280-160-250 mg packet 2 packet  2 packet Oral PRN Karine Ryan MD        potassium, sodium phosphates 280-160-250 mg packet 2 packet  2 packet Oral PRN Karine Ryan MD        sodium chloride 0.9% flush 10 mL  10 mL Intravenous PRN Sterling Cash MD        sodium chloride 3% nebulizer solution 4 mL  4 mL Nebulization Q8H Karine Ryan MD   4 mL at 03/23/23 2351    traZODone tablet 50 mg  50 mg Oral QHS Veronica Wright NP   50 mg at 03/23/23 2107     Current Discharge Medication List        CONTINUE these medications which have CHANGED    Details   albuterol (ACCUNEB) 0.63 mg/3 mL Nebu Take 6 mLs (1.26 mg total) by nebulization every 6 (six) hours. Rescue  Qty: 1080 mL, Refills: 3    Associated Diagnoses: Pulmonary Mycobacterium avium complex (MAC) infection           CONTINUE these medications which have NOT CHANGED     "Details   BD ULTRA-FINE TOMÁS PEN NEEDLES 32 gauge x 5/32" Ndle USE QID UTD  Refills: 5      SYMBICORT 160-4.5 mcg/actuation HFAA INHALE 2 PUFFS BY MOUTH EVERY 12 HOURS INTO LUNGS  Qty: 10.2 g, Refills: 11    Associated Diagnoses: Pulmonary lesion of left side of chest; Mycobacterium avium complex; COPD, severe      albuterol (PROVENTIL/VENTOLIN HFA) 90 mcg/actuation inhaler INHALE 2 PUFFS BY MOUTH EVERY 6 HOURS AS NEEDED  Qty: 9 g, Refills: 0    Associated Diagnoses: Chronic obstructive pulmonary disease, unspecified COPD type      atorvastatin (LIPITOR) 40 MG tablet Take 1 tablet by mouth once daily  Qty: 90 tablet, Refills: 0    Associated Diagnoses: Asymptomatic carotid artery stenosis, unspecified laterality      !! blood sugar diagnostic (TRUE METRIX GLUCOSE TEST STRIP) Strp Test blood sugar 3 times daily  Qty: 300 strip, Refills: 3    Associated Diagnoses: Uncontrolled type 2 diabetes mellitus with hyperglycemia, without long-term current use of insulin      !! blood sugar diagnostic Strp To check BG one time daily PRN, to use with insurance preferred meter  Qty: 100 each, Refills: 0    Associated Diagnoses: Type 2 diabetes mellitus without complication, with long-term current use of insulin      blood-glucose meter kit To check BG one time daily PRN, to use with insurance preferred meter  Qty: 1 each, Refills: 0    Associated Diagnoses: Type 2 diabetes mellitus without complication, with long-term current use of insulin      glipiZIDE (GLUCOTROL) 5 MG tablet Take 1 tablet by mouth once daily  Qty: 90 tablet, Refills: 0    Associated Diagnoses: Uncontrolled type 2 diabetes mellitus with hyperglycemia, without long-term current use of insulin      IRON, FERROUS SULFATE, 325 mg (65 mg iron) Tab tablet Take 1 tablet by mouth twice daily  Qty: 180 tablet, Refills: 0      isosorbide mononitrate (IMDUR) 30 MG 24 hr tablet Take 1 tablet by mouth once daily  Qty: 90 tablet, Refills: 1    Associated Diagnoses: " Hypertension, essential      !! lancets 28 gauge Misc 1 lancet by Misc.(Non-Drug; Combo Route) route 3 (three) times daily. True Metrix lancets  Qty: 300 each, Refills: 3    Associated Diagnoses: Uncontrolled type 2 diabetes mellitus with hyperglycemia, without long-term current use of insulin      !! lancets Misc To check BG one time daily PRN, to use with insurance preferred meter  Qty: 100 each, Refills: 0    Associated Diagnoses: Type 2 diabetes mellitus without complication, with long-term current use of insulin      mucus clearing device (ACAPELLA, FLUTTER) by Misc.(Non-Drug; Combo Route) route 2 (two) times daily.  Qty: 1 Device, Refills: 0      multivitamin capsule Take 1 capsule by mouth once daily.      PROTONIX 40 mg tablet Take by mouth once daily.      sodium chloride 3% 3 % nebulizer solution Take 4 mLs by nebulization as needed for Other (daily for sputum production).  Qty: 60 mL, Refills: 1       !! - Potential duplicate medications found. Please discuss with provider.        STOP taking these medications       ibuprofen (ADVIL,MOTRIN) 800 MG tablet Comments:   Reason for Stopping:                 I have seen and examined this patient within the last 30 days. My clinical findings that support the need for the home health skilled services and home bound status are the following:no   Weakness/numbness causing balance and gait disturbance due to COPD Exacerbation and Weakness/Debility making it taxing to leave home.     Diet:   diabetic diet 2000 calorie      Referrals/ Consults  Physical Therapy to evaluate and treat. Evaluate for home safety and equipment needs; Establish/upgrade home exercise program. Perform / instruct on therapeutic exercises, gait training, transfer training, and Range of Motion.  Occupational Therapy to evaluate and treat. Evaluate home environment for safety and equipment needs. Perform/Instruct on transfers, ADL training, ROM, and therapeutic exercises.   to  evaluate for community resources/long-range planning.  Aide to provide assistance with personal care, ADLs, and vital signs.    Consult to home Palliative care    Activities:   activity as tolerated    Nursing:   Agency to admit patient within 24 hours of hospital discharge unless specified on physician order or at patient request    SN to complete comprehensive assessment including routine vital signs. Instruct on disease process and s/s of complications to report to MD. Review/verify medication list sent home with the patient at time of discharge  and instruct patient/caregiver as needed. Frequency may be adjusted depending on start of care date.     Skilled nurse to perform up to 3 visits PRN for symptoms related to diagnosis    Notify MD if SBP > 160 or < 90; DBP > 90 or < 50; HR > 120 or < 50; Temp > 101; O2 < 88%    Ok to schedule additional visits based on staff availability and patient request on consecutive days within the home health episode.    When multiple disciplines ordered:    Start of Care occurs on Sunday - Wednesday schedule remaining discipline evaluations as ordered on separate consecutive days following the start of care.    Thursday SOC -schedule subsequent evaluations Friday and Monday the following week.     Friday - Saturday SOC - schedule subsequent discipline evaluations on consecutive days starting Monday of the following week.      I certify that this patient is confined to his home and needs intermittent skilled nursing care, physical therapy, speech therapy, and occupational therapy.    Karine Ryan MD  03/24/2023

## 2023-03-23 NOTE — ASSESSMENT & PLAN NOTE
- pt advocates that if MAC was every suspected/confirmed in the future he would not wish to repeat treatment for this   - noted; management per hospital primary

## 2023-03-24 NOTE — PLAN OF CARE
03/24/23 1136   Medicare Message   Important Message from Medicare regarding Discharge Appeal Rights Given to patient/caregiver;Explained to patient/caregiver;Signed/date by patient/caregiver   Date IMM was signed 03/24/23   Time IMM was signed 1100     Met with patient and daughter, Nereida at bedside.  Patient stated that he is ready to go home now.

## 2023-03-24 NOTE — PLAN OF CARE
Patient and Patient's daughter given discharge paperwork and education, all questions answered. Telemetry monitor and PIV removed. Patient transported by wheelchair per ICU nurse to ride by personal vehicle with personal belongings.

## 2023-03-24 NOTE — DISCHARGE SUMMARY
[facility] Medicine  Discharge Summary      Patient Name: Regan Alvarez  MRN: [id number]  [facility]: [id number]  Patient Class: IP- Inpatient  Admission Date: 3/18/2023  Hospital Length of Stay: 6 days  Discharge Date and Time:  03/24/2023 6:10 AM  Attending Physician: Karine Ryan MD   Discharging Provider: Karine Ryan MD  Primary Care Provider: Ishaan Adamson MD    Primary Care Team: Networked reference to record PCT     HPI:   82-year-old  male with medical history significant for type 2 diabetes mellitus,hypertension,ulcerative colitis,chronic obstructive pulmonary disease,tobacco use disorder in remission, has 55 pack year of cigarette smoking, and previous Mycobacterium avium complex infection who presented to the ED with worsening shortness of breath that did not respond to home nebs,he was said to be saturating in the 80s when EMS arrived but up to 93 at presentation to the ED,shortness of breath was associated with productive cough but no hemoptysis,he denied chest pain, no orthopnea, pedal swelling or PND.He denied any urinary symptoms,no dysuria, no frequency,no urgency or straining at micturition.No change in bowel habit,no diarrhea or constipation.      * No surgery found *      Hospital Course:   Mr Regan Alvarez has COPD (FEV1/FVC 48%) no longer smoking, not currently on home O2 (used O2 PRN from his wife who is on hospice care). Does not follow with Pulmonary. 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 He is immunocompromised-- he has UC on vedolizumab with last infusion 2/10/23.     He was admitted with acute hypoxic respiratory failure and severe sepsis due to suspected pneumonia. Started broad spectrum antibiotics. CT chest confirms RLL infiltrate. TTE shows new EF 45% with inferoseptal hypokinesis, diastolic dysfunction, and normal PAP/RV function. Given IV lasix. He was  requiring BiPAP for work of breathing. Pulmonary consulted. Sputum culture normal altagracia. He is steadily improving and weaning O2. He weaned to room air at rest and with activity. He did have paroxysmal Afib RVR. Discussed, rate controlled without intervention, will hold on anticoagulation. Palliative following- plan for home palliative on discharge. PT, OT also following.     He has decided that he no longer wants lab checks. He wants to go home. Plan for home palliative upon discharge and likely transition to home hospice after next vedolizumab infusion. DME ordered.        Goals of Care Treatment Preferences:  Code Status: DNR    Health care agent: Alix (daughter); wife lacks capacity/dementia, only living child is Alix per pt)  Health care agent number: No value filed.          What is most important right now is to focus on spending time at home, remaining as independent as possible, symptom/pain control, quality of life, even if it means sacrificing a little time.  Accordingly, we have decided that the best plan to meet the patient's goals includes continuing with treatment.      Consults:   Consults (From admission, onward)        Status Ordering Provider     Inpatient consult to Pulmonology  Once        Provider:  Ab Noyola MD    Completed JEANNIE BUSTOS     Inpatient consult to Palliative Care  Once        Provider:  Jessica Carter NP    Completed JEANNIE BUSTOS     Inpatient consult to Spiritual Care  Once        Provider:  (Not yet assigned)    Completed JEANNIE BUSTOS     Inpatient consult to Registered Dietitian/Nutritionist  Once        Provider:  (Not yet assigned)    Completed JEANNIE BUSTOS          No new Assessment & Plan notes have been filed under this hospital service since the last note was generated.  Service: Hospital Medicine    Final Active Diagnoses:    Diagnosis Date Noted POA    PRINCIPAL PROBLEM:  Acute hypoxemic respiratory failure [J96.01] 01/29/2017 Yes  "   Paroxysmal atrial fibrillation with RVR [I48.0] 03/23/2023 Yes    Acute respiratory distress [R06.03] 03/23/2023 Yes    ACP (advance care planning) [Z71.89] 03/19/2023 Not Applicable    Immunocompromised state [D84.9] 03/19/2023 Yes    Hypophosphatemia [E83.39] 03/19/2023 Yes    Pulmonary nodule [R91.1] 03/06/2020 Yes    Uncontrolled type 2 diabetes mellitus with hyperglycemia, without long-term current use of insulin [E11.65] 06/06/2019 Yes    Ulcerative colitis without complications [K51.90] 01/30/2019 Yes    Acute diastolic heart failure [I50.31] 02/21/2017 Yes    Essential hypertension [I10] 01/29/2017 Yes     Chronic    COPD exacerbation [J44.1] 01/29/2017 Yes    Severe sepsis [A41.9, R65.20] 01/29/2017 Yes    History of KIET infection [Z86.19] 01/14/2016 Yes      Problems Resolved During this Admission:    Diagnosis Date Noted Date Resolved POA    Palliative care encounter [Z51.5] 03/23/2023 03/23/2023 Not Applicable    Hypomagnesemia [E83.42] 03/19/2023 03/22/2023 Yes       Discharged Condition: stable    Disposition: Home-Health Care Svc    Follow Up: with home Palliative, likely transition to hospice soon    Patient Instructions:      HOSPITAL BED FOR HOME USE     Order Specific Question Answer Comments   Type: Semi-electric    Length of need (1-99 months): 99    Does patient have medical equipment at home? none    Height: 6' 7" (2.007 m)    Weight: 76.7 kg (169 lb)    Please check all that apply: Patient requires the head of bed to be elevated more than 30 degrees most of the time due to congestive heart failure, chronic pulmonary disease, or aspiration.  Pillows and wedges have been considered and ruled out.      Ambulatory referral/consult to [facility]   Standing Status: Future   Referral Priority: Routine Referral Type: Consultation   Referral Reason: Specialty Services Required   Number of Visits Requested: 1     Diet Cardiac     Notify your health " care provider if you experience any of the following:  temperature >100.4     Notify your health care provider if you experience any of the following:  persistent nausea and vomiting or diarrhea     Notify your health care provider if you experience any of the following:  severe uncontrolled pain     Notify your health care provider if you experience any of the following:  redness, tenderness, or signs of infection (pain, swelling, redness, odor or green/yellow discharge around incision site)     Notify your health care provider if you experience any of the following:  difficulty breathing or increased cough     Notify your health care provider if you experience any of the following:  severe persistent headache     Notify your health care provider if you experience any of the following:  worsening rash     Notify your health care provider if you experience any of the following:  persistent dizziness, light-headedness, or visual disturbances     Notify your health care provider if you experience any of the following:  increased confusion or weakness     Activity as tolerated       Significant Diagnostic Studies: Labs: All labs within the past 24 hours have been reviewed    Pending Diagnostic Studies:     None         Medications:  Reconciled Home Medications:      Medication List      CHANGE how you take these medications    * albuterol 90 mcg/actuation inhaler  Commonly known as: PROVENTIL/VENTOLIN HFA  INHALE 2 PUFFS BY MOUTH EVERY 6 HOURS AS NEEDED  What changed: Another medication with the same name was changed. Make sure you understand how and when to take each.     * albuterol 0.63 mg/3 mL Nebu  Commonly known as: ACCUNEB  Take 6 mLs (1.26 mg total) by nebulization every 6 (six) hours. Rescue  What changed: See the new instructions.         * This list has 2 medication(s) that are the same as other medications prescribed for you. Read the directions carefully, and ask your doctor or other care provider to  "review them with you.            CONTINUE taking these medications    atorvastatin 40 MG tablet  Commonly known as: LIPITOR  Take 1 tablet by mouth once daily     BD ULTRA-FINE TOMÁS PEN NEEDLE 32 gauge x 5/32" Ndle  Generic drug: pen needle, diabetic  USE QID UTD     * blood sugar diagnostic Strp  Commonly known as: TRUE METRIX GLUCOSE TEST STRIP  Test blood sugar 3 times daily     * blood sugar diagnostic Strp  To check BG one time daily PRN, to use with insurance preferred meter     blood-glucose meter kit  To check BG one time daily PRN, to use with insurance preferred meter     glipiZIDE 5 MG tablet  Commonly known as: GLUCOTROL  Take 1 tablet by mouth once daily     IRON (FERROUS SULFATE) 325 mg (65 mg iron) Tab tablet  Generic drug: ferrous sulfate  Take 1 tablet by mouth twice daily     isosorbide mononitrate 30 MG 24 hr tablet  Commonly known as: IMDUR  Take 1 tablet by mouth once daily     * lancets 28 gauge Misc  1 lancet by Misc.(Non-Drug; Combo Route) route 3 (three) times daily. True Metrix lancets     * lancets Misc  To check BG one time daily PRN, to use with insurance preferred meter     mucus clearing device  Commonly known as: ACAPELLA, FLUTTER  by Misc.(Non-Drug; Combo Route) route 2 (two) times daily.     multivitamin capsule  Take 1 capsule by mouth once daily.     PROTONIX 40 MG tablet  Generic drug: pantoprazole  Take by mouth once daily.     sodium chloride 3% 3 % nebulizer solution  Take 4 mLs by nebulization as needed for Other (daily for sputum production).     SYMBICORT 160-4.5 mcg/actuation aa  Generic drug: budesonide-formoterol 160-4.5 mcg  INHALE 2 PUFFS BY MOUTH EVERY 12 HOURS INTO LUNGS         * This list has 4 medication(s) that are the same as other medications prescribed for you. Read the directions carefully, and ask your doctor or other care provider to review them with you.            STOP taking these medications    ibuprofen 800 MG tablet  Commonly known as: " COLIN BAIRD            Indwelling Lines/Drains at time of discharge:   Lines/Drains/Airways     None                 Time spent on the discharge of patient: 35 minutes        Karine Ryan MD  Department of Hospital Medicine  [facility] [place] - Intensive Care

## 2023-03-24 NOTE — PLAN OF CARE
03/24/23 1508   Final Note   Assessment Type Final Discharge Note   Anticipated Discharge Disposition Home-Health  (with Palliative care)   Post-Acute Status   Discharge Delays None known at this time     HH and Palliative care at home orders faxed to [facility] at [phone].  Provider to call the patient's home to notify of provider name.

## 2023-03-24 NOTE — PROGRESS NOTES
Met with patient and daughter at bedside.  Patient choice form completed for HH.  They request that [facility] assign a provider.  They are also interested in [facility] Palliative care program.      Patient declined hospital bed stating that he has a bed with an adjustable frame.

## 2023-03-24 NOTE — ACP (ADVANCE CARE PLANNING)
Advance Care Planning   Date: 03/24/2023  Discharge planned for today.  Pt to discharge on HH with home palliative medicine with [facility].   Continued discussion of discharge plan/needs with RIRI Spaulding with [facility].  Chelly confirms that palliative team can assess/admit pt to service tomorrow.  She is continuing assessment of the possibility of pt to be approved to start hospice services, but allow for pt to continue with already scheduled vedolizumab infusiion for palliative symptom management (see previous notes).  This was pt's only current barrier for willingness to start hospice.     Discussed above with MDT during ICU rounds, and additionally with LAURA/RIRI for discharge coordination.  Bedside RN, Linda, assisting in providing update to pt's daughter of the above at discharge as she was not present at time of attempted visit.     Veronica Wright NP   Palliative Medicine

## 2023-04-28 ENCOUNTER — TELEPHONE (OUTPATIENT)
Dept: FAMILY MEDICINE | Facility: CLINIC | Age: 83
End: 2023-04-28
Payer: MEDICARE

## 2023-04-28 NOTE — TELEPHONE ENCOUNTER
----- Message from Abraham Loi sent at 4/28/2023  3:30 PM CDT -----  Regarding: [facility] [phone]  Type: Patient Call Back    Who called: [facility]    What is the request in detail: called to inform the doctor of the pt's death. Pt passed away on 04/20/2023     Can the clinic reply by [facility]? No     Would the patient rather a call back or a response via [facility]? Call back     Best call back number: [phone]    Additional Information:    Thank you.

## 2023-05-09 LAB
ACID FAST MOD KINY STN SPEC: NORMAL
MYCOBACTERIUM SPEC QL CULT: NORMAL

## 2023-10-12 NOTE — ASSESSMENT & PLAN NOTE
Will continue with SSI and basal insulin.     Hydroxyzine Counseling: Patient advised that the medication is sedating and not to drive a car after taking this medication.  Patient informed of potential adverse effects including but not limited to dry mouth, urinary retention, and blurry vision.  The patient verbalized understanding of the proper use and possible adverse effects of hydroxyzine.  All of the patient's questions and concerns were addressed.

## 2024-09-04 NOTE — PLAN OF CARE
Medication(s) Requested: Gabapentin   Last office visit: 2/13/2024  Last refill: 12/27/2023  Is the patient due for refill of this medication(s): Yes  PDMP review: Criteria met. Forwarded to Physician/AIRAM for signature.         Problem: Fall Risk (Adult)  Intervention: Safety Promotion/Fall Prevention    02/25/17 1400   Safety Interventions   Safety Promotion/Fall Prevention assistive device/personal item within reach;Fall Risk reviewed with patient/family;lighting adjusted;medications reviewed;nonskid shoes/slippers when out of bed;room near unit station;side rails raised x 2         Goal: Absence of Falls  Patient will demonstrate the desired outcomes by discharge/transition of care.   Outcome: Ongoing (interventions implemented as appropriate)    02/25/17 1544   Fall Risk (Adult)   Absence of Falls making progress toward outcome         Problem: Pneumonia (Adult)  Intervention: Prevent/Manage Infection Progression    02/25/17 1544   Prevent/Manage Colorectal Surgical Infection   Fever Reduction/Comfort Measures medication administered   Safety Interventions   Infection Prevention single patient room provided   Infection Management aseptic technique maintained         Goal: Signs and Symptoms of Listed Potential Problems Will be Absent, Minimized or Managed (Pneumonia)  Signs and symptoms of listed potential problems will be absent, minimized or managed by discharge/transition of care (reference Pneumonia (Adult) CPG).   Outcome: Ongoing (interventions implemented as appropriate)    02/25/17 1544   Pneumonia   Problems Assessed (Pneumonia) all   Problems Present (Pneumonia) none         Problem: Infection, Risk/Actual (Adult)  Intervention: Manage Suspected/Actual Infection    02/25/17 1544   Prevent/Manage Colorectal Surgical Infection   Fever Reduction/Comfort Measures medication administered   Safety Interventions   Infection Management aseptic technique maintained         Goal: Infection Prevention/Resolution  Patient will demonstrate the desired outcomes by discharge/transition of care.   Outcome: Ongoing (interventions implemented as appropriate)    02/25/17 1544   Infection, Risk/Actual (Adult)   Infection Prevention/Resolution  making progress toward outcome